# Patient Record
Sex: MALE | Race: WHITE | Employment: OTHER | URBAN - METROPOLITAN AREA
[De-identification: names, ages, dates, MRNs, and addresses within clinical notes are randomized per-mention and may not be internally consistent; named-entity substitution may affect disease eponyms.]

---

## 2020-05-17 ENCOUNTER — TELEPHONE (OUTPATIENT)
Dept: FAMILY MEDICINE CLINIC | Facility: CLINIC | Age: 60
End: 2020-05-17

## 2020-05-17 PROBLEM — E07.9 THYROID DISEASE: Status: ACTIVE | Noted: 2020-05-17

## 2020-05-17 PROBLEM — I10 HYPERTENSION: Status: ACTIVE | Noted: 2020-05-17

## 2020-05-17 PROBLEM — I48.91 ATRIAL FIBRILLATION (HCC): Status: ACTIVE | Noted: 2020-05-17

## 2020-05-17 RX ORDER — METOPROLOL SUCCINATE 25 MG/1
25 TABLET, EXTENDED RELEASE ORAL DAILY
COMMUNITY
End: 2020-05-17 | Stop reason: CLARIF

## 2020-05-17 RX ORDER — LISINOPRIL 20 MG/1
20 TABLET ORAL DAILY
COMMUNITY
End: 2020-05-19 | Stop reason: ALTCHOICE

## 2020-05-17 RX ORDER — LEVOTHYROXINE SODIUM 0.05 MG/1
50 TABLET ORAL
COMMUNITY

## 2020-05-17 RX ORDER — TORSEMIDE 20 MG/1
20 TABLET ORAL DAILY
COMMUNITY
End: 2020-05-19 | Stop reason: ALTCHOICE

## 2020-05-17 RX ORDER — METOPROLOL SUCCINATE 50 MG/1
50 TABLET, EXTENDED RELEASE ORAL 2 TIMES DAILY
COMMUNITY

## 2020-05-17 RX ORDER — BUPROPION HYDROCHLORIDE 300 MG/1
300 TABLET ORAL EVERY MORNING
COMMUNITY

## 2020-05-17 RX ORDER — AMIODARONE HYDROCHLORIDE 200 MG/1
200 TABLET ORAL DAILY
COMMUNITY
End: 2020-08-31

## 2020-05-17 RX ORDER — ATORVASTATIN CALCIUM 20 MG/1
20 TABLET, FILM COATED ORAL NIGHTLY
COMMUNITY

## 2020-05-19 ENCOUNTER — LAB ENCOUNTER (OUTPATIENT)
Dept: LAB | Facility: HOSPITAL | Age: 60
End: 2020-05-19
Attending: FAMILY MEDICINE
Payer: MEDICARE

## 2020-05-19 ENCOUNTER — OFFICE VISIT (OUTPATIENT)
Dept: FAMILY MEDICINE CLINIC | Facility: CLINIC | Age: 60
End: 2020-05-19
Payer: MEDICARE

## 2020-05-19 ENCOUNTER — HOSPITAL ENCOUNTER (OUTPATIENT)
Dept: GENERAL RADIOLOGY | Facility: HOSPITAL | Age: 60
Discharge: HOME | End: 2020-05-19
Attending: FAMILY MEDICINE
Payer: MEDICARE

## 2020-05-19 VITALS
HEIGHT: 75 IN | OXYGEN SATURATION: 98 % | TEMPERATURE: 98 F | RESPIRATION RATE: 16 BRPM | BODY MASS INDEX: 37.67 KG/M2 | DIASTOLIC BLOOD PRESSURE: 80 MMHG | WEIGHT: 303 LBS | HEART RATE: 60 BPM | SYSTOLIC BLOOD PRESSURE: 140 MMHG

## 2020-05-19 DIAGNOSIS — I48.0 PAROXYSMAL ATRIAL FIBRILLATION (HCC): ICD-10-CM

## 2020-05-19 DIAGNOSIS — Z89.422 HISTORY OF PARTIAL RAY AMPUTATION OF SECOND TOE OF LEFT FOOT (HCC): ICD-10-CM

## 2020-05-19 DIAGNOSIS — F32.9 REACTIVE DEPRESSION: ICD-10-CM

## 2020-05-19 DIAGNOSIS — Z79.01 ANTICOAGULATED: ICD-10-CM

## 2020-05-19 DIAGNOSIS — S86.019S RUPTURE OF ACHILLES TENDON, UNSPECIFIED LATERALITY, SEQUELA: ICD-10-CM

## 2020-05-19 DIAGNOSIS — Z01.818 OTHER SPECIFIED PRE-OPERATIVE EXAMINATION: ICD-10-CM

## 2020-05-19 DIAGNOSIS — Z01.812 PRE-OPERATIVE LABORATORY EXAMINATION: ICD-10-CM

## 2020-05-19 DIAGNOSIS — M17.11 PRIMARY OSTEOARTHRITIS OF RIGHT KNEE: Primary | ICD-10-CM

## 2020-05-19 DIAGNOSIS — K59.01 SLOW TRANSIT CONSTIPATION: ICD-10-CM

## 2020-05-19 DIAGNOSIS — E07.9 THYROID DISEASE: ICD-10-CM

## 2020-05-19 DIAGNOSIS — Z88.9 ALLERGY HISTORY, DRUG: ICD-10-CM

## 2020-05-19 DIAGNOSIS — T07.XXXA MULTIPLE ABRASIONS: ICD-10-CM

## 2020-05-19 DIAGNOSIS — E78.2 MIXED HYPERLIPIDEMIA: ICD-10-CM

## 2020-05-19 DIAGNOSIS — I10 ESSENTIAL HYPERTENSION: ICD-10-CM

## 2020-05-19 PROCEDURE — 3008F BODY MASS INDEX DOCD: CPT | Performed by: FAMILY MEDICINE

## 2020-05-19 PROCEDURE — 85025 COMPLETE CBC W/AUTO DIFF WBC: CPT

## 2020-05-19 PROCEDURE — 3077F SYST BP >= 140 MM HG: CPT | Performed by: FAMILY MEDICINE

## 2020-05-19 PROCEDURE — 36415 COLL VENOUS BLD VENIPUNCTURE: CPT

## 2020-05-19 PROCEDURE — 93010 ELECTROCARDIOGRAM REPORT: CPT | Performed by: FAMILY MEDICINE

## 2020-05-19 PROCEDURE — 71046 X-RAY EXAM CHEST 2 VIEWS: CPT | Performed by: FAMILY MEDICINE

## 2020-05-19 PROCEDURE — 99202 OFFICE O/P NEW SF 15 MIN: CPT | Performed by: FAMILY MEDICINE

## 2020-05-19 PROCEDURE — 81015 MICROSCOPIC EXAM OF URINE: CPT

## 2020-05-19 PROCEDURE — 93005 ELECTROCARDIOGRAM TRACING: CPT

## 2020-05-19 PROCEDURE — 80053 COMPREHEN METABOLIC PANEL: CPT

## 2020-05-19 PROCEDURE — 86850 RBC ANTIBODY SCREEN: CPT

## 2020-05-19 PROCEDURE — 86900 BLOOD TYPING SEROLOGIC ABO: CPT

## 2020-05-19 PROCEDURE — 86901 BLOOD TYPING SEROLOGIC RH(D): CPT

## 2020-05-19 PROCEDURE — 3079F DIAST BP 80-89 MM HG: CPT | Performed by: FAMILY MEDICINE

## 2020-05-19 PROCEDURE — 87081 CULTURE SCREEN ONLY: CPT

## 2020-05-19 NOTE — H&P (VIEW-ONLY)
300 [facility]    PRE[facility]    HPI:   I have been consulted by Dr. Candelario Persons to see Yobani Grady 62year old male for a preoperative evaluation and medical clearance.  He has a long history of worsening severe degenerative arthritis of his r Tobacco Use      Smoking status: Never Smoker      Smokeless tobacco: Never Used    Substance and Sexual Activity      Alcohol use: Yes        Drinks per session: 1 or 2        Binge frequency: Less than monthly      Drug use: Never      Sexual activity: N hives,     EXAM:   /80 (BP Location: Left arm, Patient Position: Sitting, Cuff Size: adult)   Pulse 60   Temp 97.8 °F (36.6 °C)   Resp 16   Ht 75\"   Wt (!) 303 lb (137.4 kg)   SpO2 98%   BMI 37.87 kg/m²  Estimated body mass index is 37.87 kg/m² as c right knee  (primary encounter diagnosis)    (I48.0) Paroxysmal atrial fibrillation (HCC)    (I10) Essential hypertension    (E07.9) Thyroid disease    (Z68.37) BMI 37.0-37.9, adult    (E78.2) Mixed hyperlipidemia    (F32.9) Reactive depression    (Z79.01) with food for four weeks.     GI protection: Protonix  Incentive Spirometry   Telemetry as needed  CPAP/O2 as needed   DM: QID glucoscans and sliding scale insulin as needed   Renal protection: (hydration / NSAID and ACE/ARB avoidance)   Cognitive protectio

## 2020-05-19 NOTE — H&P
[facility] PRE-OP CLINIC [facility]    PRE-OP NOTE    HPI:   I have been consulted by Dr. Regina Hoffman to see Enid Valentine 62year old male for a preoperative evaluation and medical clearance.  He has a long history of worsening severe degenerative arthritis of his r Tobacco Use      Smoking status: Never Smoker      Smokeless tobacco: Never Used    Substance and Sexual Activity      Alcohol use: Yes        Drinks per session: 1 or 2        Binge frequency: Less than monthly      Drug use: Never      Sexual activity: N hives,     EXAM:   /80 (BP Location: Left arm, Patient Position: Sitting, Cuff Size: adult)   Pulse 60   Temp 97.8 °F (36.6 °C)   Resp 16   Ht 75\"   Wt (!) 303 lb (137.4 kg)   SpO2 98%   BMI 37.87 kg/m²  Estimated body mass index is 37.87 kg/m² as c right knee  (primary encounter diagnosis)    (I48.0) Paroxysmal atrial fibrillation (HCC)    (I10) Essential hypertension    (E07.9) Thyroid disease    (Z68.37) BMI 37.0-37.9, adult    (E78.2) Mixed hyperlipidemia    (F32.9) Reactive depression    (Z79.01) with food for four weeks.     GI protection: Protonix  Incentive Spirometry   Telemetry as needed  CPAP/O2 as needed   DM: QID glucoscans and sliding scale insulin as needed   Renal protection: (hydration / NSAID and ACE/ARB avoidance)   Cognitive protectio

## 2020-05-22 PROBLEM — M17.11 OSTEOARTHRITIS OF RIGHT KNEE: Status: ACTIVE | Noted: 2020-05-22

## 2020-05-22 NOTE — TELEPHONE ENCOUNTER
Dr Eri Zuleta please review.     H&P- complete  LABS- MCV (105.2), MCH (35.0), RDW-SD (60.4), RDW (15.3), chloride (95), CO2 (35.0), BUN (26), creatinine (1.52), GFR (49), albumin (3.2), A/G ratio (0.8), all other labs WNL  EKG- abnormal  XRAY- WNL    Cardiology

## 2020-05-22 NOTE — DISCHARGE SUMMARY
Ortho Discharge Summary     Patient ID:  Jason Berry  [id number]  1 year old  7/11/1960      Admit Date: 6/1/2020  8:25 AM    Discharge Date and Time: 6/2/2020  3:18 PM    Attending Physician: Chuckie Fontaine MD     Reason for admission: right knee DJD

## 2020-05-28 PROBLEM — Z89.412: Chronic | Status: ACTIVE | Noted: 2020-05-28

## 2020-05-28 PROBLEM — Z87.19 HISTORY OF CONSTIPATION: Status: ACTIVE | Noted: 2020-05-28

## 2020-05-29 ENCOUNTER — LAB ENCOUNTER (OUTPATIENT)
Dept: LAB | Facility: HOSPITAL | Age: 60
End: 2020-05-29
Attending: ORTHOPAEDIC SURGERY
Payer: MEDICARE

## 2020-05-29 DIAGNOSIS — Z01.818 PREOP TESTING: ICD-10-CM

## 2020-06-01 ENCOUNTER — ANESTHESIA (OUTPATIENT)
Dept: SURGERY | Facility: HOSPITAL | Age: 60
End: 2020-06-01
Payer: MEDICARE

## 2020-06-01 ENCOUNTER — APPOINTMENT (OUTPATIENT)
Dept: GENERAL RADIOLOGY | Facility: HOSPITAL | Age: 60
End: 2020-06-01
Attending: PHYSICIAN ASSISTANT
Payer: MEDICARE

## 2020-06-01 ENCOUNTER — ANESTHESIA EVENT (OUTPATIENT)
Dept: SURGERY | Facility: HOSPITAL | Age: 60
End: 2020-06-01
Payer: MEDICARE

## 2020-06-01 ENCOUNTER — HOSPITAL ENCOUNTER (OUTPATIENT)
Facility: HOSPITAL | Age: 60
Discharge: HOME | End: 2020-06-02
Attending: ORTHOPAEDIC SURGERY | Admitting: ORTHOPAEDIC SURGERY
Payer: MEDICARE

## 2020-06-01 DIAGNOSIS — M17.11 PRIMARY OSTEOARTHRITIS OF RIGHT KNEE: Primary | ICD-10-CM

## 2020-06-01 DIAGNOSIS — Z01.818 PREOP TESTING: ICD-10-CM

## 2020-06-01 PROBLEM — Z96.651 S/P TOTAL KNEE ARTHROPLASTY, RIGHT: Status: ACTIVE | Noted: 2020-06-01

## 2020-06-01 PROBLEM — Z79.01 ENCOUNTER FOR CURRENT LONG-TERM USE OF ANTICOAGULANTS: Chronic | Status: ACTIVE | Noted: 2020-06-01

## 2020-06-01 PROCEDURE — 88305 TISSUE EXAM BY PATHOLOGIST: CPT | Performed by: ORTHOPAEDIC SURGERY

## 2020-06-01 PROCEDURE — 73560 X-RAY EXAM OF KNEE 1 OR 2: CPT | Performed by: PHYSICIAN ASSISTANT

## 2020-06-01 PROCEDURE — 88311 DECALCIFY TISSUE: CPT | Performed by: ORTHOPAEDIC SURGERY

## 2020-06-01 PROCEDURE — S0028 INJECTION, FAMOTIDINE, 20 MG: HCPCS | Performed by: PHYSICIAN ASSISTANT

## 2020-06-01 PROCEDURE — 0SRC069 REPLACEMENT OF RIGHT KNEE JOINT WITH OXIDIZED ZIRCONIUM ON POLYETHYLENE SYNTHETIC SUBSTITUTE, CEMENTED, OPEN APPROACH: ICD-10-PCS | Performed by: ORTHOPAEDIC SURGERY

## 2020-06-01 DEVICE — IMPLANTABLE DEVICE: Type: IMPLANTABLE DEVICE | Site: KNEE | Status: FUNCTIONAL

## 2020-06-01 DEVICE — CEM BN NLTX STRL REUSE MED VSC: Type: IMPLANTABLE DEVICE | Site: KNEE | Status: FUNCTIONAL

## 2020-06-01 DEVICE — DOME PAT 38MM 9MM STRL 3 PG: Type: IMPLANTABLE DEVICE | Site: KNEE | Status: FUNCTIONAL

## 2020-06-01 DEVICE — TKA CAP, PRIMARY W/O STEM EXT: Type: IMPLANTABLE DEVICE | Status: FUNCTIONAL

## 2020-06-01 RX ORDER — CYCLOBENZAPRINE HCL 5 MG
5 TABLET ORAL EVERY 8 HOURS PRN
Status: DISCONTINUED | OUTPATIENT
Start: 2020-06-01 | End: 2020-06-02

## 2020-06-01 RX ORDER — ONDANSETRON 4 MG/1
4 TABLET, FILM COATED ORAL EVERY 8 HOURS PRN
Qty: 30 TABLET | Refills: 0 | Status: SHIPPED | OUTPATIENT
Start: 2020-06-01 | End: 2020-08-31

## 2020-06-01 RX ORDER — TRANEXAMIC ACID 10 MG/ML
1000 INJECTION, SOLUTION INTRAVENOUS ONCE
Status: COMPLETED | OUTPATIENT
Start: 2020-06-01 | End: 2020-06-01

## 2020-06-01 RX ORDER — HYDROCODONE BITARTRATE AND ACETAMINOPHEN 5; 325 MG/1; MG/1
1 TABLET ORAL AS NEEDED
Status: DISCONTINUED | OUTPATIENT
Start: 2020-06-01 | End: 2020-06-01 | Stop reason: HOSPADM

## 2020-06-01 RX ORDER — BUPROPION HYDROCHLORIDE 300 MG/1
300 TABLET ORAL DAILY
Status: DISCONTINUED | OUTPATIENT
Start: 2020-06-01 | End: 2020-06-02

## 2020-06-01 RX ORDER — DEXAMETHASONE SODIUM PHOSPHATE 4 MG/ML
VIAL (ML) INJECTION AS NEEDED
Status: DISCONTINUED | OUTPATIENT
Start: 2020-06-01 | End: 2020-06-01 | Stop reason: SURG

## 2020-06-01 RX ORDER — NALOXONE HYDROCHLORIDE 0.4 MG/ML
80 INJECTION, SOLUTION INTRAMUSCULAR; INTRAVENOUS; SUBCUTANEOUS AS NEEDED
Status: DISCONTINUED | OUTPATIENT
Start: 2020-06-01 | End: 2020-06-01 | Stop reason: HOSPADM

## 2020-06-01 RX ORDER — SODIUM CHLORIDE, SODIUM LACTATE, POTASSIUM CHLORIDE, CALCIUM CHLORIDE 600; 310; 30; 20 MG/100ML; MG/100ML; MG/100ML; MG/100ML
INJECTION, SOLUTION INTRAVENOUS CONTINUOUS
Status: DISCONTINUED | OUTPATIENT
Start: 2020-06-01 | End: 2020-06-01 | Stop reason: HOSPADM

## 2020-06-01 RX ORDER — PANTOPRAZOLE SODIUM 40 MG/1
40 TABLET, DELAYED RELEASE ORAL
Qty: 30 TABLET | Refills: 0 | Status: SHIPPED | OUTPATIENT
Start: 2020-06-01 | End: 2020-08-31

## 2020-06-01 RX ORDER — AMIODARONE HYDROCHLORIDE 200 MG/1
200 TABLET ORAL DAILY
Status: DISCONTINUED | OUTPATIENT
Start: 2020-06-01 | End: 2020-06-02

## 2020-06-01 RX ORDER — DIPHENHYDRAMINE HYDROCHLORIDE 50 MG/ML
12.5 INJECTION INTRAMUSCULAR; INTRAVENOUS EVERY 4 HOURS PRN
Status: DISCONTINUED | OUTPATIENT
Start: 2020-06-01 | End: 2020-06-02

## 2020-06-01 RX ORDER — ACETAMINOPHEN 500 MG
1000 TABLET ORAL ONCE
Status: COMPLETED | OUTPATIENT
Start: 2020-06-01 | End: 2020-06-01

## 2020-06-01 RX ORDER — ASPIRIN 325 MG
325 TABLET, DELAYED RELEASE (ENTERIC COATED) ORAL 2 TIMES DAILY
Qty: 60 TABLET | Refills: 0 | Status: SHIPPED | OUTPATIENT
Start: 2020-06-01 | End: 2020-06-02

## 2020-06-01 RX ORDER — CELECOXIB 200 MG/1
200 CAPSULE ORAL ONCE
Status: COMPLETED | OUTPATIENT
Start: 2020-06-01 | End: 2020-06-01

## 2020-06-01 RX ORDER — METOPROLOL TARTRATE 5 MG/5ML
2.5 INJECTION INTRAVENOUS ONCE
Status: DISCONTINUED | OUTPATIENT
Start: 2020-06-01 | End: 2020-06-01 | Stop reason: HOSPADM

## 2020-06-01 RX ORDER — HYDROCODONE BITARTRATE AND ACETAMINOPHEN 5; 325 MG/1; MG/1
2 TABLET ORAL AS NEEDED
Status: DISCONTINUED | OUTPATIENT
Start: 2020-06-01 | End: 2020-06-01 | Stop reason: HOSPADM

## 2020-06-01 RX ORDER — PROCHLORPERAZINE EDISYLATE 5 MG/ML
10 INJECTION INTRAMUSCULAR; INTRAVENOUS EVERY 6 HOURS PRN
Status: DISCONTINUED | OUTPATIENT
Start: 2020-06-01 | End: 2020-06-02

## 2020-06-01 RX ORDER — ATORVASTATIN CALCIUM 20 MG/1
20 TABLET, FILM COATED ORAL NIGHTLY
Status: DISCONTINUED | OUTPATIENT
Start: 2020-06-01 | End: 2020-06-02

## 2020-06-01 RX ORDER — HYDROMORPHONE HYDROCHLORIDE 1 MG/ML
1.2 INJECTION, SOLUTION INTRAMUSCULAR; INTRAVENOUS; SUBCUTANEOUS EVERY 2 HOUR PRN
Status: DISCONTINUED | OUTPATIENT
Start: 2020-06-01 | End: 2020-06-02

## 2020-06-01 RX ORDER — HYDROMORPHONE HYDROCHLORIDE 1 MG/ML
0.4 INJECTION, SOLUTION INTRAMUSCULAR; INTRAVENOUS; SUBCUTANEOUS EVERY 5 MIN PRN
Status: DISCONTINUED | OUTPATIENT
Start: 2020-06-01 | End: 2020-06-01 | Stop reason: HOSPADM

## 2020-06-01 RX ORDER — DIPHENHYDRAMINE HYDROCHLORIDE 50 MG/ML
25 INJECTION INTRAMUSCULAR; INTRAVENOUS ONCE AS NEEDED
Status: ACTIVE | OUTPATIENT
Start: 2020-06-01 | End: 2020-06-01

## 2020-06-01 RX ORDER — OXYCODONE HYDROCHLORIDE 5 MG/1
5 TABLET ORAL EVERY 4 HOURS PRN
Status: DISCONTINUED | OUTPATIENT
Start: 2020-06-01 | End: 2020-06-02

## 2020-06-01 RX ORDER — OXYCODONE HCL 10 MG/1
10 TABLET, FILM COATED, EXTENDED RELEASE ORAL ONCE
Status: COMPLETED | OUTPATIENT
Start: 2020-06-01 | End: 2020-06-01

## 2020-06-01 RX ORDER — CEFADROXIL 500 MG/1
500 CAPSULE ORAL 2 TIMES DAILY
Qty: 14 CAPSULE | Refills: 0 | Status: SHIPPED | OUTPATIENT
Start: 2020-06-01 | End: 2020-06-08

## 2020-06-01 RX ORDER — PROCHLORPERAZINE EDISYLATE 5 MG/ML
5 INJECTION INTRAMUSCULAR; INTRAVENOUS ONCE AS NEEDED
Status: DISCONTINUED | OUTPATIENT
Start: 2020-06-01 | End: 2020-06-01 | Stop reason: HOSPADM

## 2020-06-01 RX ORDER — ONDANSETRON 2 MG/ML
4 INJECTION INTRAMUSCULAR; INTRAVENOUS EVERY 4 HOURS PRN
Status: DISCONTINUED | OUTPATIENT
Start: 2020-06-01 | End: 2020-06-02

## 2020-06-01 RX ORDER — HYDRALAZINE HYDROCHLORIDE 20 MG/ML
10 INJECTION INTRAMUSCULAR; INTRAVENOUS ONCE
Status: DISCONTINUED | OUTPATIENT
Start: 2020-06-01 | End: 2020-06-01 | Stop reason: HOSPADM

## 2020-06-01 RX ORDER — BISACODYL 10 MG
10 SUPPOSITORY, RECTAL RECTAL
Status: DISCONTINUED | OUTPATIENT
Start: 2020-06-01 | End: 2020-06-02

## 2020-06-01 RX ORDER — FAMOTIDINE 10 MG/ML
20 INJECTION, SOLUTION INTRAVENOUS 2 TIMES DAILY
Status: DISCONTINUED | OUTPATIENT
Start: 2020-06-01 | End: 2020-06-02

## 2020-06-01 RX ORDER — DOCUSATE SODIUM 100 MG/1
100 CAPSULE, LIQUID FILLED ORAL 2 TIMES DAILY
Status: DISCONTINUED | OUTPATIENT
Start: 2020-06-01 | End: 2020-06-02

## 2020-06-01 RX ORDER — FAMOTIDINE 20 MG/1
20 TABLET ORAL ONCE
Status: COMPLETED | OUTPATIENT
Start: 2020-06-01 | End: 2020-06-01

## 2020-06-01 RX ORDER — MAGNESIUM HYDROXIDE 1200 MG/15ML
LIQUID ORAL CONTINUOUS PRN
Status: COMPLETED | OUTPATIENT
Start: 2020-06-01 | End: 2020-06-01

## 2020-06-01 RX ORDER — HYDROMORPHONE HYDROCHLORIDE 1 MG/ML
0.6 INJECTION, SOLUTION INTRAMUSCULAR; INTRAVENOUS; SUBCUTANEOUS EVERY 5 MIN PRN
Status: DISCONTINUED | OUTPATIENT
Start: 2020-06-01 | End: 2020-06-01 | Stop reason: HOSPADM

## 2020-06-01 RX ORDER — HYDROMORPHONE HYDROCHLORIDE 1 MG/ML
0.8 INJECTION, SOLUTION INTRAMUSCULAR; INTRAVENOUS; SUBCUTANEOUS EVERY 2 HOUR PRN
Status: DISCONTINUED | OUTPATIENT
Start: 2020-06-01 | End: 2020-06-02

## 2020-06-01 RX ORDER — DIPHENHYDRAMINE HCL 25 MG
25 CAPSULE ORAL EVERY 4 HOURS PRN
Status: DISCONTINUED | OUTPATIENT
Start: 2020-06-01 | End: 2020-06-02

## 2020-06-01 RX ORDER — SODIUM CHLORIDE, SODIUM LACTATE, POTASSIUM CHLORIDE, CALCIUM CHLORIDE 600; 310; 30; 20 MG/100ML; MG/100ML; MG/100ML; MG/100ML
INJECTION, SOLUTION INTRAVENOUS CONTINUOUS
Status: DISCONTINUED | OUTPATIENT
Start: 2020-06-01 | End: 2020-06-02

## 2020-06-01 RX ORDER — ONDANSETRON 2 MG/ML
4 INJECTION INTRAMUSCULAR; INTRAVENOUS ONCE AS NEEDED
Status: DISCONTINUED | OUTPATIENT
Start: 2020-06-01 | End: 2020-06-01 | Stop reason: HOSPADM

## 2020-06-01 RX ORDER — FAMOTIDINE 20 MG/1
20 TABLET ORAL 2 TIMES DAILY
Status: DISCONTINUED | OUTPATIENT
Start: 2020-06-01 | End: 2020-06-02

## 2020-06-01 RX ORDER — METOPROLOL SUCCINATE 50 MG/1
50 TABLET, EXTENDED RELEASE ORAL DAILY
Status: DISCONTINUED | OUTPATIENT
Start: 2020-06-02 | End: 2020-06-02

## 2020-06-01 RX ORDER — HYDROMORPHONE HYDROCHLORIDE 1 MG/ML
0.4 INJECTION, SOLUTION INTRAMUSCULAR; INTRAVENOUS; SUBCUTANEOUS EVERY 2 HOUR PRN
Status: DISCONTINUED | OUTPATIENT
Start: 2020-06-01 | End: 2020-06-02

## 2020-06-01 RX ORDER — ASPIRIN 325 MG
325 TABLET ORAL 2 TIMES DAILY
Status: DISCONTINUED | OUTPATIENT
Start: 2020-06-01 | End: 2020-06-01

## 2020-06-01 RX ORDER — MORPHINE SULFATE 4 MG/ML
4 INJECTION, SOLUTION INTRAMUSCULAR; INTRAVENOUS EVERY 10 MIN PRN
Status: DISCONTINUED | OUTPATIENT
Start: 2020-06-01 | End: 2020-06-01 | Stop reason: HOSPADM

## 2020-06-01 RX ORDER — MORPHINE SULFATE 10 MG/ML
6 INJECTION, SOLUTION INTRAMUSCULAR; INTRAVENOUS EVERY 10 MIN PRN
Status: DISCONTINUED | OUTPATIENT
Start: 2020-06-01 | End: 2020-06-01 | Stop reason: HOSPADM

## 2020-06-01 RX ORDER — METOCLOPRAMIDE HYDROCHLORIDE 5 MG/ML
10 INJECTION INTRAMUSCULAR; INTRAVENOUS EVERY 6 HOURS PRN
Status: DISCONTINUED | OUTPATIENT
Start: 2020-06-01 | End: 2020-06-02

## 2020-06-01 RX ORDER — OXYCODONE HYDROCHLORIDE 5 MG/1
10 TABLET ORAL EVERY 4 HOURS PRN
Status: DISCONTINUED | OUTPATIENT
Start: 2020-06-01 | End: 2020-06-02

## 2020-06-01 RX ORDER — HYDRALAZINE HYDROCHLORIDE 20 MG/ML
10 INJECTION INTRAMUSCULAR; INTRAVENOUS EVERY 6 HOURS PRN
Status: DISCONTINUED | OUTPATIENT
Start: 2020-06-01 | End: 2020-06-02

## 2020-06-01 RX ORDER — MORPHINE SULFATE 4 MG/ML
2 INJECTION, SOLUTION INTRAMUSCULAR; INTRAVENOUS EVERY 10 MIN PRN
Status: DISCONTINUED | OUTPATIENT
Start: 2020-06-01 | End: 2020-06-01 | Stop reason: HOSPADM

## 2020-06-01 RX ORDER — SENNOSIDES 8.6 MG
17.2 TABLET ORAL NIGHTLY
Status: DISCONTINUED | OUTPATIENT
Start: 2020-06-01 | End: 2020-06-02

## 2020-06-01 RX ORDER — MIDAZOLAM HYDROCHLORIDE 1 MG/ML
INJECTION INTRAMUSCULAR; INTRAVENOUS AS NEEDED
Status: DISCONTINUED | OUTPATIENT
Start: 2020-06-01 | End: 2020-06-01 | Stop reason: SURG

## 2020-06-01 RX ORDER — SODIUM PHOSPHATE, DIBASIC AND SODIUM PHOSPHATE, MONOBASIC 7; 19 G/133ML; G/133ML
1 ENEMA RECTAL ONCE AS NEEDED
Status: DISCONTINUED | OUTPATIENT
Start: 2020-06-01 | End: 2020-06-02

## 2020-06-01 RX ORDER — HALOPERIDOL 5 MG/ML
0.25 INJECTION INTRAMUSCULAR ONCE AS NEEDED
Status: DISCONTINUED | OUTPATIENT
Start: 2020-06-01 | End: 2020-06-01 | Stop reason: HOSPADM

## 2020-06-01 RX ORDER — POLYETHYLENE GLYCOL 3350 17 G/17G
17 POWDER, FOR SOLUTION ORAL DAILY PRN
Status: DISCONTINUED | OUTPATIENT
Start: 2020-06-01 | End: 2020-06-02

## 2020-06-01 RX ORDER — HYDROMORPHONE HYDROCHLORIDE 1 MG/ML
0.2 INJECTION, SOLUTION INTRAMUSCULAR; INTRAVENOUS; SUBCUTANEOUS EVERY 5 MIN PRN
Status: DISCONTINUED | OUTPATIENT
Start: 2020-06-01 | End: 2020-06-01 | Stop reason: HOSPADM

## 2020-06-01 RX ORDER — DOCUSATE SODIUM 100 MG/1
100 CAPSULE, LIQUID FILLED ORAL 2 TIMES DAILY
Qty: 60 CAPSULE | Refills: 2 | Status: SHIPPED | OUTPATIENT
Start: 2020-06-01 | End: 2020-08-31

## 2020-06-01 RX ORDER — TRANEXAMIC ACID 10 MG/ML
INJECTION, SOLUTION INTRAVENOUS AS NEEDED
Status: DISCONTINUED | OUTPATIENT
Start: 2020-06-01 | End: 2020-06-01 | Stop reason: SURG

## 2020-06-01 RX ORDER — LEVOTHYROXINE SODIUM 0.05 MG/1
50 TABLET ORAL
Status: DISCONTINUED | OUTPATIENT
Start: 2020-06-01 | End: 2020-06-02

## 2020-06-01 RX ORDER — SCOLOPAMINE TRANSDERMAL SYSTEM 1 MG/1
1 PATCH, EXTENDED RELEASE TRANSDERMAL ONCE
Status: DISCONTINUED | OUTPATIENT
Start: 2020-06-01 | End: 2020-06-02

## 2020-06-01 RX ORDER — BUPIVACAINE HYDROCHLORIDE 7.5 MG/ML
INJECTION, SOLUTION INTRASPINAL AS NEEDED
Status: DISCONTINUED | OUTPATIENT
Start: 2020-06-01 | End: 2020-06-01 | Stop reason: SURG

## 2020-06-01 RX ORDER — ACETAMINOPHEN 325 MG/1
650 TABLET ORAL EVERY 6 HOURS SCHEDULED
Status: DISCONTINUED | OUTPATIENT
Start: 2020-06-01 | End: 2020-06-02

## 2020-06-01 RX ORDER — OXYCODONE HYDROCHLORIDE 5 MG/1
TABLET ORAL EVERY 4 HOURS PRN
Qty: 60 TABLET | Refills: 0 | Status: SHIPPED | OUTPATIENT
Start: 2020-06-01 | End: 2020-08-31

## 2020-06-01 RX ORDER — LIDOCAINE HYDROCHLORIDE 10 MG/ML
INJECTION, SOLUTION EPIDURAL; INFILTRATION; INTRACAUDAL; PERINEURAL AS NEEDED
Status: DISCONTINUED | OUTPATIENT
Start: 2020-06-01 | End: 2020-06-01 | Stop reason: SURG

## 2020-06-01 RX ORDER — ONDANSETRON 2 MG/ML
INJECTION INTRAMUSCULAR; INTRAVENOUS AS NEEDED
Status: DISCONTINUED | OUTPATIENT
Start: 2020-06-01 | End: 2020-06-01 | Stop reason: SURG

## 2020-06-01 RX ORDER — LABETALOL HYDROCHLORIDE 5 MG/ML
5 INJECTION, SOLUTION INTRAVENOUS EVERY 10 MIN PRN
Status: COMPLETED | OUTPATIENT
Start: 2020-06-01 | End: 2020-06-01

## 2020-06-01 RX ORDER — PHENYLEPHRINE HCL 10 MG/ML
VIAL (ML) INJECTION AS NEEDED
Status: DISCONTINUED | OUTPATIENT
Start: 2020-06-01 | End: 2020-06-01 | Stop reason: SURG

## 2020-06-01 RX ADMIN — MIDAZOLAM HYDROCHLORIDE 2 MG: 1 INJECTION INTRAMUSCULAR; INTRAVENOUS at 11:10:00

## 2020-06-01 RX ADMIN — MIDAZOLAM HYDROCHLORIDE 2 MG: 1 INJECTION INTRAMUSCULAR; INTRAVENOUS at 11:03:00

## 2020-06-01 RX ADMIN — ONDANSETRON 4 MG: 2 INJECTION INTRAMUSCULAR; INTRAVENOUS at 11:30:00

## 2020-06-01 RX ADMIN — SODIUM CHLORIDE, SODIUM LACTATE, POTASSIUM CHLORIDE, CALCIUM CHLORIDE: 600; 310; 30; 20 INJECTION, SOLUTION INTRAVENOUS at 13:16:00

## 2020-06-01 RX ADMIN — LIDOCAINE HYDROCHLORIDE 10 MG: 10 INJECTION, SOLUTION EPIDURAL; INFILTRATION; INTRACAUDAL; PERINEURAL at 11:08:00

## 2020-06-01 RX ADMIN — BUPIVACAINE HYDROCHLORIDE 1.5 ML: 7.5 INJECTION, SOLUTION INTRASPINAL at 11:12:00

## 2020-06-01 RX ADMIN — PHENYLEPHRINE HCL 100 MCG: 10 MG/ML VIAL (ML) INJECTION at 11:59:00

## 2020-06-01 RX ADMIN — DEXAMETHASONE SODIUM PHOSPHATE 4 MG: 4 MG/ML VIAL (ML) INJECTION at 11:30:00

## 2020-06-01 RX ADMIN — TRANEXAMIC ACID 1000 MG: 10 INJECTION, SOLUTION INTRAVENOUS at 11:20:00

## 2020-06-01 NOTE — ANESTHESIA PROCEDURE NOTES
Spinal Block  Performed by: Hoang Cherry CRNA  Authorized by: Alana Nunez DO       General Information and Staff    Start Time:  6/1/2020 11:08 AM  End Time:  6/1/2020 11:12 AM  Anesthesiologist:  Alana Nunez DO  CRNA:  Amanda Schwartz

## 2020-06-01 NOTE — PLAN OF CARE
Problem: Patient Centered Care  Goal: Patient preferences are identified and integrated in the patient's plan of care  Description  Interventions:  - What would you like us to know as we care for you?  Patient is retired but found a job at the [employer] and evaluate response  - Implement non-pharmacological measures as appropriate and evaluate response  - Consider cultural and social influences on pain and pain management  - Manage/alleviate anxiety  - Utilize distraction and/or relaxation techniques  - M for pressure ulcer development  - Assess and document skin integrity  - Assess and document dressing/incision, wound bed, drain sites and surrounding tissue  - Implement wound care per orders  - Initiate isolation precautions as appropriate  - Initiate Pre O2 via NC, lung sounds clear, denies shortness of breathing nor chest pain. Patient can be up with walker, weight bearing as tolerated on his right leg. Pain is now managed with Dilaudid IVP/Oxy as needed. Patient still needs to void post op.   Patient w

## 2020-06-01 NOTE — ANESTHESIA POSTPROCEDURE EVALUATION
Patient: Shelbie Fonseca    Procedure Summary     Date:  06/01/20 Room / Location:  [facility] OR 04 / [facility] OR    Anesthesia Start:  2308 Anesthesia Stop:      Procedure:  KNEE TOTAL REPLACEMENT (Right Knee) Diagnosis:  (right knee degenerative joint

## 2020-06-01 NOTE — PROGRESS NOTES
[facility]    Ortho Medical Progress Note     Lali Hernandez Patient Status:  Outpatient in a Bed    1960 MRN [id number]   Location [address] [facility] Attending Angie Carrington MD   Hosp Day # 0 PCP [facility] following for medical assessment by monitoring respiratory status, hemodynamics, GI status, reviewing labs, and assisting with anticoagulation monitoring as patient appropriate. Fransisca Clayton 3, APN  6/1/2020

## 2020-06-01 NOTE — INTERVAL H&P NOTE
Pre-op Diagnosis: right knee degenerative joint disease    The above referenced H&P was reviewed by Shirley Fields MD on 6/1/2020, the patient was examined and no significant changes have occurred in the patient's condition since the H&P was performed.   I

## 2020-06-01 NOTE — ANESTHESIA PREPROCEDURE EVALUATION
Anesthesia PreOp Note    HPI:     Daniella Campos is a 62year old male who presents for preoperative consultation requested by: Louise Figueroa MD    Date of Surgery: 6/1/2020    Procedure(s):  KNEE TOTAL REPLACEMENT  Indication: right knee degenerative Succinate ER 50 MG Oral Tablet 24 Hr, Take 50 mg by mouth daily. , Disp: , Rfl: , 6/1/2020 at 0515      lactated ringers infusion, , Intravenous, Continuous, SporerHaresh MD, Last Rate: 20 mL/hr at 06/01/20 0915  metoprolol Tartrate (LOPRESSOR) tab 25 mg, Not on file        Active member of club or organization: Not on file        Attends meetings of clubs or organizations: Not on file        Relationship status: Not on file      Intimate partner violence:        Fear of current or ex partner: Not on file have informed Perla Lee of the risks of spinal anesthesia including, but not limited to: failure, headache, backache, difficulty breathing, infection, bleeding, nerve damage, paralysis, death.  The patient desires the proposed anesthetic as planned

## 2020-06-02 VITALS
HEIGHT: 75 IN | SYSTOLIC BLOOD PRESSURE: 129 MMHG | DIASTOLIC BLOOD PRESSURE: 92 MMHG | WEIGHT: 304 LBS | HEART RATE: 77 BPM | OXYGEN SATURATION: 97 % | BODY MASS INDEX: 37.8 KG/M2 | TEMPERATURE: 98 F | RESPIRATION RATE: 18 BRPM

## 2020-06-02 PROCEDURE — 85018 HEMOGLOBIN: CPT | Performed by: NURSE PRACTITIONER

## 2020-06-02 PROCEDURE — 97530 THERAPEUTIC ACTIVITIES: CPT

## 2020-06-02 PROCEDURE — 97110 THERAPEUTIC EXERCISES: CPT

## 2020-06-02 PROCEDURE — 85014 HEMATOCRIT: CPT | Performed by: NURSE PRACTITIONER

## 2020-06-02 PROCEDURE — 80053 COMPREHEN METABOLIC PANEL: CPT | Performed by: NURSE PRACTITIONER

## 2020-06-02 PROCEDURE — 97161 PT EVAL LOW COMPLEX 20 MIN: CPT

## 2020-06-02 PROCEDURE — 97116 GAIT TRAINING THERAPY: CPT

## 2020-06-02 NOTE — CM/SW NOTE
SHARMILA met with pt to discuss discharge planning per MDO. SW confirmed pt's address. Pt states he lives in a 3 level house with 3 steps to go into the home and 6 steps to go to the bedrooms. Pt lives with his father and son in the home.  Pt used a cane previo

## 2020-06-02 NOTE — OPERATIVE REPORT
[address], [facility] [address]  OPERATIVE REPORT  PATIENT NAME: Catherine Albarado  MR#: [id number]  PMD: Laurie Carr  DATE OF PROCEDURE: 6/1/2020  PREOPERATIVE DIAGNOSIS: Degenerative Arthritis right knee  POSTOPERA the same day surgery program on Monday, June 1, 2020. Following proper identification in the preoperative holding area with confirmation of the above-stated procedure, the patient was brought to the main operating room suite. Procedure was confirmed.  He re appropriate. The rotation was based between the medial and middle third of the tibial tubercle. This was provisionally pinned. A size 10 right EMPOWR 3D Non-Porous Femur femoral component was placed along with a 10 mm polyethylene spacer.  With this in plac

## 2020-06-02 NOTE — PHYSICAL THERAPY NOTE
PHYSICAL THERAPY KNEE EVALUATION - INPATIENT       Room Number: 421/421-A  Evaluation Date: 6/2/2020  Type of Evaluation: Initial  Physician Order: PT Eval and Treat    Presenting Problem: R TKR  Reason for Therapy: Mobility Dysfunction and Discharge Plann training;Range of motion;Strengthening;Stair training;Transfer training;Balance training  Rehab Potential : Good  Frequency (Obs): BID       PHYSICAL THERAPY MEDICAL/SOCIAL HISTORY     History related to current admission: admitted 6/1/2020 for R TKR by  promotion;Breathing techniques;Relaxation;Repositioning(received pain meds prior to session)    COGNITION  · Overall Cognitive Status:  WFL - within functional limits    RANGE OF MOTION AND STRENGTH ASSESSMENT  Upper extremity ROM and strength are within f mobility  Functional activity tolerated  Gait training  ROM  Strengthening  Lower therapeutic exercise:   Ankle pumps  Heel slides  Knee extension  LAQ  Quad sets  Transfer training    Patient End of Session: Up in chair;Needs met;Call light within reach;RN

## 2020-06-02 NOTE — PROGRESS NOTES
[facility]    Progress Note    Lali Hernandez Patient Status:  Outpatient in a Bed    1960 MRN [id number]   Location [facility] 4W/SW/SE Attending Angie Carrington MD   Hosp Day # 0 PCP [facility]       Subjective:   Jennifer Hunter CA 8.4 (L) 06/02/2020    ALB 3.0 (L) 06/02/2020    ALKPHO 106 06/02/2020    BILT 1.0 06/02/2020    TP 6.6 06/02/2020    AST 33 06/02/2020    ALT 23 06/02/2020       Xr Knee (1 Or 2 Views), Right (cpt=73560)    Result Date: 6/1/2020  CONCLUSION: Status post

## 2020-06-02 NOTE — PLAN OF CARE
Problem: Patient Centered Care  Goal: Patient preferences are identified and integrated in the patient's plan of care  Description  Interventions:  - What would you like us to know as we care for you?  Patient is retired but found a job at the [employer] based on type and severity of pain and evaluate response  - Implement non-pharmacological measures as appropriate and evaluate response  - Consider cultural and social influences on pain and pain management  - Manage/alleviate anxiety  - Utilize distractio of infection  Description  INTERVENTIONS:  - Assess and document risk factors for pressure ulcer development  - Assess and document skin integrity  - Assess and document dressing/incision, wound bed, drain sites and surrounding tissue  - Implement wound ca Adequate for Discharge       Richad Beer is stable,S/P right TKA. Has hx of numbness due to frostbites has hands tremors. Breathing on room air.  encouraged the use of IS every hour while awake. tolerated diet well .  Dressing to right knee acewrap with poler ic

## 2020-06-02 NOTE — PHYSICAL THERAPY NOTE
PHYSICAL THERAPY KNEE TREATMENT NOTE - INPATIENT     Room Number: 421/421-A             Presenting Problem: R TKR    Problem List  Principal Problem:    Osteoarthritis of right knee  Active Problems:    Atrial fibrillation (HCC)    Hypertension    Thyroid Restriction: R lower extremity        R Lower Extremity: Weight Bearing as Tolerated       PAIN ASSESSMENT   Ratin  Location: R knee  Management Techniques:  Activity promotion;Relaxation;Repositioning(received pain meds prior to session)    BALANCE  St GOALS  Goals to be met by: 6/4/2020  Patient Goal Patient's self-stated goal is: to go home   Goal #1 Patient is able to demonstrate supine - sit EOB @ level: modified independent     Goal #1   Current Status Goal Met   Goal #2 Patient is able to [facility]

## 2020-08-31 RX ORDER — FAMOTIDINE 20 MG/1
20 TABLET ORAL ONCE
Status: CANCELLED | OUTPATIENT
Start: 2020-08-31 | End: 2020-08-31

## 2020-08-31 RX ORDER — METOCLOPRAMIDE 10 MG/1
10 TABLET ORAL ONCE
Status: CANCELLED | OUTPATIENT
Start: 2020-08-31 | End: 2020-08-31

## 2020-08-31 RX ORDER — ACETAMINOPHEN 500 MG
1000 TABLET ORAL ONCE
Status: CANCELLED | OUTPATIENT
Start: 2020-08-31 | End: 2020-08-31

## 2020-08-31 RX ORDER — SODIUM CHLORIDE, SODIUM LACTATE, POTASSIUM CHLORIDE, CALCIUM CHLORIDE 600; 310; 30; 20 MG/100ML; MG/100ML; MG/100ML; MG/100ML
INJECTION, SOLUTION INTRAVENOUS CONTINUOUS
Status: CANCELLED | OUTPATIENT
Start: 2020-08-31

## 2020-09-01 ENCOUNTER — TELEPHONE (OUTPATIENT)
Dept: FAMILY MEDICINE CLINIC | Facility: CLINIC | Age: 60
End: 2020-09-01

## 2020-09-01 ENCOUNTER — LAB ENCOUNTER (OUTPATIENT)
Dept: LAB | Facility: HOSPITAL | Age: 60
End: 2020-09-01
Attending: FAMILY MEDICINE
Payer: MEDICARE

## 2020-09-01 ENCOUNTER — OFFICE VISIT (OUTPATIENT)
Dept: FAMILY MEDICINE CLINIC | Facility: CLINIC | Age: 60
End: 2020-09-01
Payer: MEDICARE

## 2020-09-01 VITALS
WEIGHT: 293 LBS | OXYGEN SATURATION: 98 % | DIASTOLIC BLOOD PRESSURE: 68 MMHG | RESPIRATION RATE: 16 BRPM | SYSTOLIC BLOOD PRESSURE: 118 MMHG | BODY MASS INDEX: 36.43 KG/M2 | TEMPERATURE: 98 F | HEIGHT: 75 IN | HEART RATE: 78 BPM

## 2020-09-01 DIAGNOSIS — Z01.818 OTHER SPECIFIED PRE-OPERATIVE EXAMINATION: ICD-10-CM

## 2020-09-01 DIAGNOSIS — T07.XXXA MULTIPLE EXCORIATIONS: ICD-10-CM

## 2020-09-01 DIAGNOSIS — E07.9 THYROID DISEASE: ICD-10-CM

## 2020-09-01 DIAGNOSIS — E78.2 MIXED HYPERLIPIDEMIA: ICD-10-CM

## 2020-09-01 DIAGNOSIS — Z89.412 HISTORY OF PARTIAL RAY AMPUTATION OF LEFT GREAT TOE (HCC): Chronic | ICD-10-CM

## 2020-09-01 DIAGNOSIS — Z01.812 PRE-OPERATIVE LABORATORY EXAMINATION: ICD-10-CM

## 2020-09-01 DIAGNOSIS — M17.12 PRIMARY OSTEOARTHRITIS OF LEFT KNEE: Primary | ICD-10-CM

## 2020-09-01 DIAGNOSIS — Z79.01 ENCOUNTER FOR CURRENT LONG-TERM USE OF ANTICOAGULANTS: Chronic | ICD-10-CM

## 2020-09-01 DIAGNOSIS — F51.01 PRIMARY INSOMNIA: ICD-10-CM

## 2020-09-01 DIAGNOSIS — Z89.422 S/P AMPUTATION OF LESSER TOE, LEFT (HCC): ICD-10-CM

## 2020-09-01 DIAGNOSIS — I10 ESSENTIAL HYPERTENSION: ICD-10-CM

## 2020-09-01 DIAGNOSIS — I48.0 PAROXYSMAL ATRIAL FIBRILLATION (HCC): ICD-10-CM

## 2020-09-01 DIAGNOSIS — Z96.651 S/P TOTAL KNEE ARTHROPLASTY, RIGHT: ICD-10-CM

## 2020-09-01 DIAGNOSIS — Z87.19 HISTORY OF CONSTIPATION: ICD-10-CM

## 2020-09-01 PROBLEM — T14.8XXA EXCORIATION: Chronic | Status: ACTIVE | Noted: 2020-09-01

## 2020-09-01 LAB
ALBUMIN SERPL-MCNC: 3 G/DL (ref 3.4–5)
ALBUMIN/GLOB SERPL: 0.8 {RATIO} (ref 1–2)
ALP LIVER SERPL-CCNC: 116 U/L (ref 45–117)
ALT SERPL-CCNC: 23 U/L (ref 16–61)
ANION GAP SERPL CALC-SCNC: 8 MMOL/L (ref 0–18)
ANTIBODY SCREEN: NEGATIVE
AST SERPL-CCNC: 38 U/L (ref 15–37)
BASOPHILS # BLD AUTO: 0.06 X10(3) UL (ref 0–0.2)
BASOPHILS NFR BLD AUTO: 0.8 %
BILIRUB SERPL-MCNC: 0.7 MG/DL (ref 0.1–2)
BUN BLD-MCNC: 16 MG/DL (ref 7–18)
BUN/CREAT SERPL: 12.1 (ref 10–20)
CALCIUM BLD-MCNC: 8.4 MG/DL (ref 8.5–10.1)
CHLORIDE SERPL-SCNC: 99 MMOL/L (ref 98–112)
CO2 SERPL-SCNC: 34 MMOL/L (ref 21–32)
CREAT BLD-MCNC: 1.32 MG/DL (ref 0.7–1.3)
DEPRECATED RDW RBC AUTO: 64.1 FL (ref 35.1–46.3)
EOSINOPHIL # BLD AUTO: 0.12 X10(3) UL (ref 0–0.7)
EOSINOPHIL NFR BLD AUTO: 1.7 %
ERYTHROCYTE [DISTWIDTH] IN BLOOD BY AUTOMATED COUNT: 16.6 % (ref 11–15)
GLOBULIN PLAS-MCNC: 3.9 G/DL (ref 2.8–4.4)
GLUCOSE BLD-MCNC: 96 MG/DL (ref 70–99)
HCT VFR BLD AUTO: 42.8 % (ref 39–53)
HGB BLD-MCNC: 14.5 G/DL (ref 13–17.5)
IMM GRANULOCYTES # BLD AUTO: 0.04 X10(3) UL (ref 0–1)
IMM GRANULOCYTES NFR BLD: 0.6 %
LYMPHOCYTES # BLD AUTO: 1.86 X10(3) UL (ref 1–4)
LYMPHOCYTES NFR BLD AUTO: 26.1 %
M PROTEIN MFR SERPL ELPH: 6.9 G/DL (ref 6.4–8.2)
MCH RBC QN AUTO: 36.1 PG (ref 26–34)
MCHC RBC AUTO-ENTMCNC: 33.9 G/DL (ref 31–37)
MCV RBC AUTO: 106.5 FL (ref 80–100)
MONOCYTES # BLD AUTO: 0.58 X10(3) UL (ref 0.1–1)
MONOCYTES NFR BLD AUTO: 8.1 %
NEUTROPHILS # BLD AUTO: 4.47 X10 (3) UL (ref 1.5–7.7)
NEUTROPHILS # BLD AUTO: 4.47 X10(3) UL (ref 1.5–7.7)
NEUTROPHILS NFR BLD AUTO: 62.7 %
OSMOLALITY SERPL CALC.SUM OF ELEC: 293 MOSM/KG (ref 275–295)
PATIENT FASTING Y/N/NP: YES
PLATELET # BLD AUTO: 210 10(3)UL (ref 150–450)
POTASSIUM SERPL-SCNC: 3.4 MMOL/L (ref 3.5–5.1)
RBC # BLD AUTO: 4.02 X10(6)UL (ref 4.3–5.7)
RH BLOOD TYPE: POSITIVE
SODIUM SERPL-SCNC: 141 MMOL/L (ref 136–145)
WBC # BLD AUTO: 7.1 X10(3) UL (ref 4–11)

## 2020-09-01 PROCEDURE — 86901 BLOOD TYPING SEROLOGIC RH(D): CPT

## 2020-09-01 PROCEDURE — 86850 RBC ANTIBODY SCREEN: CPT

## 2020-09-01 PROCEDURE — 99214 OFFICE O/P EST MOD 30 MIN: CPT | Performed by: FAMILY MEDICINE

## 2020-09-01 PROCEDURE — 36415 COLL VENOUS BLD VENIPUNCTURE: CPT

## 2020-09-01 PROCEDURE — 87081 CULTURE SCREEN ONLY: CPT

## 2020-09-01 PROCEDURE — 80053 COMPREHEN METABOLIC PANEL: CPT

## 2020-09-01 PROCEDURE — 3008F BODY MASS INDEX DOCD: CPT | Performed by: FAMILY MEDICINE

## 2020-09-01 PROCEDURE — 3074F SYST BP LT 130 MM HG: CPT | Performed by: FAMILY MEDICINE

## 2020-09-01 PROCEDURE — 3078F DIAST BP <80 MM HG: CPT | Performed by: FAMILY MEDICINE

## 2020-09-01 PROCEDURE — 85025 COMPLETE CBC W/AUTO DIFF WBC: CPT

## 2020-09-01 PROCEDURE — 86900 BLOOD TYPING SEROLOGIC ABO: CPT

## 2020-09-01 NOTE — TELEPHONE ENCOUNTER
Dr. Bhavna Rai, please review:    LABS- RBC (4.02), MCV (106.5), MCH (36.1), RDW-SD (64.1), RDW (16.6), Potassium (3.4), CO2 (34.0), Creatinine (1.32), calcium (8.4), GFR (57), AST (38), Albumin (3.0), A/G ratio (0.8), MRSA pending, all other labs WNL  EKG-05/19/20 Afib  XRAY-WNL    Pulmonary Clearance 05/20 in Media tab  Cardiac clearance 05/20 in Media tab  Hold Eliquis for 7 days prior to surgery      OK for surgery?

## 2020-09-01 NOTE — H&P
300 [facility] PRE-OP CLINIC [place]    PRE-OP NOTE    HPI:   I have been consulted by Dr. Bianca Esparza to see Taylor Camacho 56year old male for a preoperative evaluation and medical clearance.  He has a long history of worsening severe degenerative arthritis of TOTAL REPLACEMENT Right 6/1/2020    Performed by Rosalino Liang MD at [facility] MAIN OR   • TOTAL KNEE REPLACEMENT Right 2020     Social History    Socioeconomic History      Marital status: Unknown      Spouse name: Not on file      Number of children: Not on f skin lesion,   CARDIOVASCULAR:  Denies DVT.  Denies chest pain, palpitations, edema, dyspnea  RESPIRATORY:  Denies LESLY, Denies shortness of breath, wheezing, cough  GASTROINTESTINAL:  Denies abdominal pain, nausea, vomiting, constipation, diarrhea, or blood cyanosis, no clubbing,   NEURO:  No focal deficit, speech fluent, normal gait, strength and tone, sensory intact      Lab Results   Component Value Date    WBC 8.2 05/19/2020    HGB 14.5 06/02/2020    HCT 43.3 06/02/2020    .0 05/19/2020    CREATSER Hypertension     Thyroid disease     Osteoarthritis of right knee     History of constipation     History of partial ray amputation of left great toe ([facility] [address]     Encounter for current long-term use of anticoagulants     S/P total knee arthroplasty, right

## 2020-09-01 NOTE — TELEPHONE ENCOUNTER
Patient having surgery on DOS 09/14/20 LTKA with Dr [email or web address]      H&P- complete  LABS- RBC (4.02), MCV (106.5), MCH (36.1), RDW-SD (64.1), RDW (16.6), Potassium (3.4), CO2 (34.0), Creatinine (1.32), calcium (8.4), GFR (57), AST (38), Albumin (3.0), A/G ratio (0.8), MRSA pending, all other labs WNL  EKG-05/19/20 Afib  XRAY-WNL    Pulmonary Clearance 05/20 in Media tab  Cardiac clearance 05/20 in Media tab  Hold Eliquis for 7 days prior to surgery

## 2020-09-02 RX ORDER — ACETAMINOPHEN 325 MG/1
650 TABLET ORAL EVERY 6 HOURS SCHEDULED
Status: CANCELLED | OUTPATIENT
Start: 2020-09-02

## 2020-09-02 RX ORDER — METOCLOPRAMIDE HYDROCHLORIDE 5 MG/ML
10 INJECTION INTRAMUSCULAR; INTRAVENOUS EVERY 6 HOURS PRN
Status: CANCELLED | OUTPATIENT
Start: 2020-09-02 | End: 2020-09-04

## 2020-09-02 RX ORDER — DIPHENHYDRAMINE HCL 25 MG
25 CAPSULE ORAL EVERY 4 HOURS PRN
Status: CANCELLED | OUTPATIENT
Start: 2020-09-02

## 2020-09-02 RX ORDER — PROCHLORPERAZINE EDISYLATE 5 MG/ML
10 INJECTION INTRAMUSCULAR; INTRAVENOUS EVERY 6 HOURS PRN
Status: CANCELLED | OUTPATIENT
Start: 2020-09-02 | End: 2020-09-04

## 2020-09-02 RX ORDER — BISACODYL 10 MG
10 SUPPOSITORY, RECTAL RECTAL
Status: CANCELLED | OUTPATIENT
Start: 2020-09-02

## 2020-09-02 RX ORDER — DOCUSATE SODIUM 100 MG/1
100 CAPSULE, LIQUID FILLED ORAL 2 TIMES DAILY
Status: CANCELLED | OUTPATIENT
Start: 2020-09-02

## 2020-09-02 RX ORDER — ONDANSETRON 2 MG/ML
4 INJECTION INTRAMUSCULAR; INTRAVENOUS EVERY 4 HOURS PRN
Status: CANCELLED | OUTPATIENT
Start: 2020-09-02 | End: 2020-09-04

## 2020-09-02 RX ORDER — SODIUM CHLORIDE, SODIUM LACTATE, POTASSIUM CHLORIDE, CALCIUM CHLORIDE 600; 310; 30; 20 MG/100ML; MG/100ML; MG/100ML; MG/100ML
INJECTION, SOLUTION INTRAVENOUS CONTINUOUS
Status: CANCELLED | OUTPATIENT
Start: 2020-09-02

## 2020-09-02 RX ORDER — FAMOTIDINE 20 MG/1
20 TABLET ORAL 2 TIMES DAILY
Status: CANCELLED | OUTPATIENT
Start: 2020-09-02

## 2020-09-02 RX ORDER — OXYCODONE HYDROCHLORIDE 5 MG/1
5 TABLET ORAL EVERY 4 HOURS PRN
Status: CANCELLED | OUTPATIENT
Start: 2020-09-02

## 2020-09-02 RX ORDER — HYDROMORPHONE HYDROCHLORIDE 1 MG/ML
1.2 INJECTION, SOLUTION INTRAMUSCULAR; INTRAVENOUS; SUBCUTANEOUS EVERY 2 HOUR PRN
Status: CANCELLED | OUTPATIENT
Start: 2020-09-02

## 2020-09-02 RX ORDER — SODIUM PHOSPHATE, DIBASIC AND SODIUM PHOSPHATE, MONOBASIC 7; 19 G/133ML; G/133ML
1 ENEMA RECTAL ONCE AS NEEDED
Status: CANCELLED | OUTPATIENT
Start: 2020-09-02

## 2020-09-02 RX ORDER — CYCLOBENZAPRINE HCL 5 MG
5 TABLET ORAL EVERY 8 HOURS PRN
Status: CANCELLED | OUTPATIENT
Start: 2020-09-02

## 2020-09-02 RX ORDER — HYDROMORPHONE HYDROCHLORIDE 1 MG/ML
0.8 INJECTION, SOLUTION INTRAMUSCULAR; INTRAVENOUS; SUBCUTANEOUS EVERY 2 HOUR PRN
Status: CANCELLED | OUTPATIENT
Start: 2020-09-02

## 2020-09-02 RX ORDER — FAMOTIDINE 10 MG/ML
20 INJECTION, SOLUTION INTRAVENOUS 2 TIMES DAILY
Status: CANCELLED | OUTPATIENT
Start: 2020-09-02

## 2020-09-02 RX ORDER — POLYETHYLENE GLYCOL 3350 17 G/17G
17 POWDER, FOR SOLUTION ORAL DAILY PRN
Status: CANCELLED | OUTPATIENT
Start: 2020-09-02

## 2020-09-02 RX ORDER — DIPHENHYDRAMINE HYDROCHLORIDE 50 MG/ML
12.5 INJECTION INTRAMUSCULAR; INTRAVENOUS EVERY 4 HOURS PRN
Status: CANCELLED | OUTPATIENT
Start: 2020-09-02

## 2020-09-02 RX ORDER — DIPHENHYDRAMINE HYDROCHLORIDE 50 MG/ML
25 INJECTION INTRAMUSCULAR; INTRAVENOUS ONCE AS NEEDED
Status: CANCELLED | OUTPATIENT
Start: 2020-09-02 | End: 2020-09-02

## 2020-09-02 RX ORDER — OXYCODONE HYDROCHLORIDE 5 MG/1
10 TABLET ORAL EVERY 4 HOURS PRN
Status: CANCELLED | OUTPATIENT
Start: 2020-09-02

## 2020-09-02 RX ORDER — HYDROMORPHONE HYDROCHLORIDE 1 MG/ML
0.4 INJECTION, SOLUTION INTRAMUSCULAR; INTRAVENOUS; SUBCUTANEOUS EVERY 2 HOUR PRN
Status: CANCELLED | OUTPATIENT
Start: 2020-09-02

## 2020-09-02 NOTE — TELEPHONE ENCOUNTER
Spoke to patient, let him know he is clear for surgery per Dr. Roland Neri. Went over all test results. Patient aware he needs to hold Eliquis 7days prior to surgery. Will ONLY call if MRSA is positive.     MRSA-neg

## 2020-09-11 ENCOUNTER — APPOINTMENT (OUTPATIENT)
Dept: LAB | Facility: HOSPITAL | Age: 60
End: 2020-09-11
Attending: ORTHOPAEDIC SURGERY
Payer: MEDICARE

## 2020-09-11 DIAGNOSIS — Z01.818 PRE-OP TESTING: ICD-10-CM

## 2020-09-11 RX ORDER — ACETAMINOPHEN 500 MG
1000 TABLET ORAL ONCE
Status: CANCELLED | OUTPATIENT
Start: 2020-09-11 | End: 2020-09-11

## 2020-09-11 RX ORDER — SCOLOPAMINE TRANSDERMAL SYSTEM 1 MG/1
1 PATCH, EXTENDED RELEASE TRANSDERMAL ONCE
Status: CANCELLED | OUTPATIENT
Start: 2020-09-11 | End: 2020-09-11

## 2020-09-11 RX ORDER — CELECOXIB 200 MG/1
200 CAPSULE ORAL ONCE
Status: CANCELLED | OUTPATIENT
Start: 2020-09-11 | End: 2020-09-11

## 2020-09-11 RX ORDER — OXYCODONE HYDROCHLORIDE 5 MG/1
10 TABLET ORAL ONCE
Status: CANCELLED | OUTPATIENT
Start: 2020-09-11 | End: 2020-09-11

## 2020-09-11 RX ORDER — TRANEXAMIC ACID 10 MG/ML
1000 INJECTION, SOLUTION INTRAVENOUS ONCE
Status: CANCELLED | OUTPATIENT
Start: 2020-09-14

## 2020-09-12 LAB — SARS-COV-2 RNA RESP QL NAA+PROBE: NOT DETECTED

## 2020-09-14 ENCOUNTER — HOSPITAL ENCOUNTER (OUTPATIENT)
Facility: HOSPITAL | Age: 60
Setting detail: HOSPITAL OUTPATIENT SURGERY
Discharge: HOME | End: 2020-09-14
Attending: ORTHOPAEDIC SURGERY | Admitting: ORTHOPAEDIC SURGERY
Payer: MEDICARE

## 2020-09-14 VITALS
OXYGEN SATURATION: 95 % | WEIGHT: 300 LBS | RESPIRATION RATE: 17 BRPM | BODY MASS INDEX: 37.3 KG/M2 | HEART RATE: 90 BPM | HEIGHT: 75 IN | TEMPERATURE: 98 F | SYSTOLIC BLOOD PRESSURE: 177 MMHG | DIASTOLIC BLOOD PRESSURE: 85 MMHG

## 2020-09-14 DIAGNOSIS — Z01.818 PRE-OP TESTING: Primary | ICD-10-CM

## 2020-09-14 NOTE — PROGRESS NOTES
Noted open skin area on the left leg and black scab on the left knee , red and warm to touch, 's assitant informed seen the patient and still waiting for Dr. Augusto Jim to come see patient  But he is aware of cancellation today.

## 2020-10-19 ENCOUNTER — TELEPHONE (OUTPATIENT)
Dept: FAMILY MEDICINE CLINIC | Facility: CLINIC | Age: 60
End: 2020-10-19

## 2020-10-19 NOTE — TELEPHONE ENCOUNTER
Surgery on 11/03/20, Radha Sebastian with Dr. Sherif Bella @ [facility]    H&P-  Labs-  EKG- done 05/19/20  X-ray- done 05/19/20    Pulmonary Clearance 05/20 in Media tab  Cardiac clearance 05/20 in Media tab  Hold Eliquis for 7 days prior to surgery    Patient's surgery was canc

## 2020-10-27 ENCOUNTER — TELEPHONE (OUTPATIENT)
Dept: FAMILY MEDICINE CLINIC | Facility: CLINIC | Age: 60
End: 2020-10-27

## 2020-10-27 DIAGNOSIS — E87.6 HYPOKALEMIA: Primary | ICD-10-CM

## 2020-10-27 NOTE — TELEPHONE ENCOUNTER
Surgery on 11/03/20, Salima Henderson with Dr. Nidia Montes @ [address] [place] [address]     H&P- complete  Labs- RBC (4.07), MCV (108.4), MCH (34.6), RDW-SD (59.7), RDW (15.1), FBS (67), Potassium (3.4), CO2 (34), BUN (20), creatinine (1.35), Alk Phos (135), albumin (3.3), A/G ratio (0.8), +MSSA

## 2020-10-28 ENCOUNTER — LAB ENCOUNTER (OUTPATIENT)
Dept: LAB | Facility: HOSPITAL | Age: 60
End: 2020-10-28
Attending: FAMILY MEDICINE
Payer: MEDICARE

## 2020-10-28 ENCOUNTER — OFFICE VISIT (OUTPATIENT)
Dept: FAMILY MEDICINE CLINIC | Facility: CLINIC | Age: 60
End: 2020-10-28
Payer: MEDICARE

## 2020-10-28 VITALS
TEMPERATURE: 98 F | HEIGHT: 75 IN | WEIGHT: 270 LBS | OXYGEN SATURATION: 98 % | SYSTOLIC BLOOD PRESSURE: 118 MMHG | DIASTOLIC BLOOD PRESSURE: 68 MMHG | BODY MASS INDEX: 33.57 KG/M2 | HEART RATE: 90 BPM | RESPIRATION RATE: 16 BRPM

## 2020-10-28 DIAGNOSIS — I10 ESSENTIAL HYPERTENSION: ICD-10-CM

## 2020-10-28 DIAGNOSIS — R73.01 ELEVATED FASTING BLOOD SUGAR: ICD-10-CM

## 2020-10-28 DIAGNOSIS — M17.12 PRIMARY OSTEOARTHRITIS OF LEFT KNEE: ICD-10-CM

## 2020-10-28 DIAGNOSIS — Z01.812 PRE-OPERATIVE LABORATORY EXAMINATION: ICD-10-CM

## 2020-10-28 DIAGNOSIS — E06.3 HYPOTHYROIDISM DUE TO HASHIMOTO'S THYROIDITIS: ICD-10-CM

## 2020-10-28 DIAGNOSIS — E78.2 MIXED HYPERLIPIDEMIA: ICD-10-CM

## 2020-10-28 DIAGNOSIS — Z01.818 PREOP EXAMINATION: ICD-10-CM

## 2020-10-28 DIAGNOSIS — I48.20 CHRONIC ATRIAL FIBRILLATION (HCC): ICD-10-CM

## 2020-10-28 DIAGNOSIS — M10.9 GOUT, UNSPECIFIED CAUSE, UNSPECIFIED CHRONICITY, UNSPECIFIED SITE: ICD-10-CM

## 2020-10-28 DIAGNOSIS — Z01.818 PREOP EXAMINATION: Primary | ICD-10-CM

## 2020-10-28 DIAGNOSIS — E03.8 HYPOTHYROIDISM DUE TO HASHIMOTO'S THYROIDITIS: ICD-10-CM

## 2020-10-28 DIAGNOSIS — G47.33 OSA (OBSTRUCTIVE SLEEP APNEA): ICD-10-CM

## 2020-10-28 PROCEDURE — 80053 COMPREHEN METABOLIC PANEL: CPT

## 2020-10-28 PROCEDURE — 87081 CULTURE SCREEN ONLY: CPT

## 2020-10-28 PROCEDURE — 36415 COLL VENOUS BLD VENIPUNCTURE: CPT

## 2020-10-28 PROCEDURE — 3078F DIAST BP <80 MM HG: CPT | Performed by: FAMILY MEDICINE

## 2020-10-28 PROCEDURE — 85025 COMPLETE CBC W/AUTO DIFF WBC: CPT

## 2020-10-28 PROCEDURE — 83036 HEMOGLOBIN GLYCOSYLATED A1C: CPT

## 2020-10-28 PROCEDURE — 86850 RBC ANTIBODY SCREEN: CPT

## 2020-10-28 PROCEDURE — 99204 OFFICE O/P NEW MOD 45 MIN: CPT | Performed by: FAMILY MEDICINE

## 2020-10-28 PROCEDURE — 3008F BODY MASS INDEX DOCD: CPT | Performed by: FAMILY MEDICINE

## 2020-10-28 PROCEDURE — 3074F SYST BP LT 130 MM HG: CPT | Performed by: FAMILY MEDICINE

## 2020-10-28 PROCEDURE — 86901 BLOOD TYPING SEROLOGIC RH(D): CPT

## 2020-10-28 PROCEDURE — 87077 CULTURE AEROBIC IDENTIFY: CPT

## 2020-10-28 PROCEDURE — 86900 BLOOD TYPING SEROLOGIC ABO: CPT

## 2020-10-28 RX ORDER — RIBOFLAVIN (VITAMIN B2) 100 MG
100 TABLET ORAL DAILY
COMMUNITY

## 2020-10-28 RX ORDER — PNV NO.95/FERROUS FUM/FOLIC AC 28MG-0.8MG
1 TABLET ORAL DAILY
COMMUNITY

## 2020-10-28 RX ORDER — BUPROPION HYDROCHLORIDE 150 MG/1
150 TABLET, EXTENDED RELEASE ORAL NIGHTLY
COMMUNITY

## 2020-10-28 RX ORDER — ALLOPURINOL 100 MG/1
100 TABLET ORAL DAILY
COMMUNITY

## 2020-10-28 RX ORDER — CHOLECALCIFEROL (VITAMIN D3) 125 MCG
500 CAPSULE ORAL DAILY
COMMUNITY

## 2020-10-28 NOTE — H&P (VIEW-ONLY)
300 [facility]    HPI:   I have been consulted by Dr. Danielle Yu to see Ignacio Georgia 56year old male for a preoperative evaluation and medical clearance.  Sunita Herrera has a long history of worsening severe degenerative arthriti Levothyroxine Sodium 50 MCG Oral Tab Take 50 mcg by mouth before breakfast.     • Metoprolol Succinate ER 50 MG Oral Tablet 24 Hr Take 50 mg by mouth 2 (two) times a day. Past Medical History:   Diagnosis Date   • Arrhythmia     A. Fib   • Atrial fi club or organization: Not on file        Attends meetings of clubs or organizations: Not on file        Relationship status: Not on file      Intimate partner violence        Fear of current or ex partner: Not on file        Emotionally abused: Not on file EOMI, PERRLA, no scleral icterus, conjunctivae clear bilaterally, no eye discharge Nose: patent, no nasal discharge Throat:  No tonsillar erythema or exudate. Mouth:  No oral lesions or ulcerations, good dentition.   NECK: Supple, no CLAD, no JVD, no carot No  DM on insulin: No  Serum Creatinine >2 mg/dl: No  Saratha Thaddeus has an RCRI score that is low - moderate risk and is at 0.9% risk for major cardiac event in the perioperative period.      Patient is medically optimized and has an acceptable risk of surgery and

## 2020-10-28 NOTE — DISCHARGE SUMMARY
Ortho Discharge Summary     Patient ID:  Laal Beltran  [id number]  11 year old  7/11/1960      Admit Date: 11/3/2020  6:13 AM  Discharge Date and Time: 11/4/2020  3:03 PM    Attending Physician: Martha Hoffman MD     Reason for admission: left knee D

## 2020-10-28 NOTE — H&P
300 [facility] [facility]    PRE[facility]    HPI:   I have been consulted by Dr. Bhanu Castillo to see Moises Colon 56year old male for a preoperative evaluation and medical clearance.  Reta Hurley has a long history of worsening severe degenerative arthriti Levothyroxine Sodium 50 MCG Oral Tab Take 50 mcg by mouth before breakfast.     • Metoprolol Succinate ER 50 MG Oral Tablet 24 Hr Take 50 mg by mouth 2 (two) times a day. Past Medical History:   Diagnosis Date   • Arrhythmia     A. Fib   • Atrial fi club or organization: Not on file        Attends meetings of clubs or organizations: Not on file        Relationship status: Not on file      Intimate partner violence        Fear of current or ex partner: Not on file        Emotionally abused: Not on file EOMI, PERRLA, no scleral icterus, conjunctivae clear bilaterally, no eye discharge Nose: patent, no nasal discharge Throat:  No tonsillar erythema or exudate. Mouth:  No oral lesions or ulcerations, good dentition.   NECK: Supple, no CLAD, no JVD, no carot No  DM on insulin: No  Serum Creatinine >2 mg/dl: No  Stefania Caballero has an RCRI score that is low - moderate risk and is at 0.9% risk for major cardiac event in the perioperative period.      Patient is medically optimized and has an acceptable risk of surgery and

## 2020-10-29 RX ORDER — POTASSIUM CHLORIDE 1500 MG/1
40 TABLET, FILM COATED, EXTENDED RELEASE ORAL DAILY
Qty: 20 TABLET | Refills: 0 | Status: CANCELLED | OUTPATIENT
Start: 2020-10-29 | End: 2020-11-28

## 2020-10-29 NOTE — TELEPHONE ENCOUNTER
Spoke to patient, he has potassium at home. He will take 40meq daily for 3 days. Then get potassium redrawn at [facility] entered in [place].

## 2020-10-29 NOTE — TELEPHONE ENCOUNTER
MD Raúl Alvarez LPN; Candace Warren RN             I recommend pt take KDur 20 meq tablets and take 2 daily for three days. Repeat labs on Sunday or Monday.

## 2020-10-30 PROBLEM — Z01.818 PRE-OP TESTING: Status: ACTIVE | Noted: 2020-10-30

## 2020-10-30 RX ORDER — CYCLOBENZAPRINE HCL 5 MG
5 TABLET ORAL EVERY 8 HOURS PRN
Status: DISCONTINUED | OUTPATIENT
Start: 2020-10-30 | End: 2020-11-04

## 2020-10-30 RX ORDER — ONDANSETRON 2 MG/ML
4 INJECTION INTRAMUSCULAR; INTRAVENOUS EVERY 4 HOURS PRN
Status: ACTIVE | OUTPATIENT
Start: 2020-10-30 | End: 2020-11-01

## 2020-10-30 RX ORDER — ACETAMINOPHEN 500 MG
1000 TABLET ORAL ONCE
Status: DISCONTINUED | OUTPATIENT
Start: 2020-10-30 | End: 2020-10-30

## 2020-10-30 RX ORDER — PROCHLORPERAZINE EDISYLATE 5 MG/ML
10 INJECTION INTRAMUSCULAR; INTRAVENOUS EVERY 6 HOURS PRN
Status: ACTIVE | OUTPATIENT
Start: 2020-10-30 | End: 2020-11-01

## 2020-10-30 RX ORDER — SCOLOPAMINE TRANSDERMAL SYSTEM 1 MG/1
1 PATCH, EXTENDED RELEASE TRANSDERMAL ONCE
Status: DISCONTINUED | OUTPATIENT
Start: 2020-10-30 | End: 2020-11-06

## 2020-10-30 RX ORDER — BISACODYL 10 MG
10 SUPPOSITORY, RECTAL RECTAL
Status: DISCONTINUED | OUTPATIENT
Start: 2020-10-30 | End: 2020-11-04

## 2020-10-30 RX ORDER — OXYCODONE HYDROCHLORIDE 5 MG/1
10 TABLET ORAL EVERY 4 HOURS PRN
Status: DISCONTINUED | OUTPATIENT
Start: 2020-10-30 | End: 2020-11-04

## 2020-10-30 RX ORDER — OXYCODONE HYDROCHLORIDE 5 MG/1
10 TABLET ORAL ONCE
Status: COMPLETED | OUTPATIENT
Start: 2020-10-30 | End: 2020-11-03

## 2020-10-30 RX ORDER — FAMOTIDINE 20 MG/1
20 TABLET ORAL ONCE
Status: DISCONTINUED | OUTPATIENT
Start: 2020-10-30 | End: 2020-11-03 | Stop reason: HOSPADM

## 2020-10-30 RX ORDER — SODIUM CHLORIDE, SODIUM LACTATE, POTASSIUM CHLORIDE, CALCIUM CHLORIDE 600; 310; 30; 20 MG/100ML; MG/100ML; MG/100ML; MG/100ML
INJECTION, SOLUTION INTRAVENOUS CONTINUOUS
Status: DISCONTINUED | OUTPATIENT
Start: 2020-10-30 | End: 2020-11-04

## 2020-10-30 RX ORDER — HYDROMORPHONE HYDROCHLORIDE 1 MG/ML
1.2 INJECTION, SOLUTION INTRAMUSCULAR; INTRAVENOUS; SUBCUTANEOUS EVERY 2 HOUR PRN
Status: DISCONTINUED | OUTPATIENT
Start: 2020-10-30 | End: 2020-11-04

## 2020-10-30 RX ORDER — ACETAMINOPHEN 325 MG/1
650 TABLET ORAL EVERY 6 HOURS SCHEDULED
Status: DISCONTINUED | OUTPATIENT
Start: 2020-10-30 | End: 2020-11-04

## 2020-10-30 RX ORDER — CELECOXIB 200 MG/1
200 CAPSULE ORAL ONCE
Status: COMPLETED | OUTPATIENT
Start: 2020-10-30 | End: 2020-11-03

## 2020-10-30 RX ORDER — ACETAMINOPHEN 500 MG
1000 TABLET ORAL ONCE
Status: DISCONTINUED | OUTPATIENT
Start: 2020-10-30 | End: 2020-11-03 | Stop reason: HOSPADM

## 2020-10-30 RX ORDER — METOCLOPRAMIDE 10 MG/1
10 TABLET ORAL ONCE
Status: DISCONTINUED | OUTPATIENT
Start: 2020-10-30 | End: 2020-11-03 | Stop reason: HOSPADM

## 2020-10-30 RX ORDER — DIPHENHYDRAMINE HCL 25 MG
25 CAPSULE ORAL EVERY 4 HOURS PRN
Status: DISCONTINUED | OUTPATIENT
Start: 2020-10-30 | End: 2020-11-04

## 2020-10-30 RX ORDER — TRANEXAMIC ACID 10 MG/ML
1000 INJECTION, SOLUTION INTRAVENOUS ONCE
Status: DISCONTINUED | OUTPATIENT
Start: 2020-10-30 | End: 2020-11-02 | Stop reason: CLARIF

## 2020-10-30 RX ORDER — DOCUSATE SODIUM 100 MG/1
100 CAPSULE, LIQUID FILLED ORAL 2 TIMES DAILY
Status: DISCONTINUED | OUTPATIENT
Start: 2020-10-30 | End: 2020-11-04

## 2020-10-30 RX ORDER — METOCLOPRAMIDE HYDROCHLORIDE 5 MG/ML
10 INJECTION INTRAMUSCULAR; INTRAVENOUS EVERY 6 HOURS PRN
Status: ACTIVE | OUTPATIENT
Start: 2020-10-30 | End: 2020-11-01

## 2020-10-30 RX ORDER — FAMOTIDINE 20 MG/1
20 TABLET ORAL 2 TIMES DAILY
Status: DISCONTINUED | OUTPATIENT
Start: 2020-10-30 | End: 2020-11-04

## 2020-10-30 RX ORDER — FAMOTIDINE 10 MG/ML
20 INJECTION, SOLUTION INTRAVENOUS 2 TIMES DAILY
Status: DISCONTINUED | OUTPATIENT
Start: 2020-10-30 | End: 2020-11-04

## 2020-10-30 RX ORDER — HYDROMORPHONE HYDROCHLORIDE 1 MG/ML
0.8 INJECTION, SOLUTION INTRAMUSCULAR; INTRAVENOUS; SUBCUTANEOUS EVERY 2 HOUR PRN
Status: DISCONTINUED | OUTPATIENT
Start: 2020-10-30 | End: 2020-11-04

## 2020-10-30 RX ORDER — HYDROMORPHONE HYDROCHLORIDE 1 MG/ML
0.4 INJECTION, SOLUTION INTRAMUSCULAR; INTRAVENOUS; SUBCUTANEOUS EVERY 2 HOUR PRN
Status: DISCONTINUED | OUTPATIENT
Start: 2020-10-30 | End: 2020-11-04

## 2020-10-30 RX ORDER — OXYCODONE HYDROCHLORIDE 5 MG/1
5 TABLET ORAL EVERY 4 HOURS PRN
Status: DISCONTINUED | OUTPATIENT
Start: 2020-10-30 | End: 2020-11-04

## 2020-10-30 RX ORDER — POLYETHYLENE GLYCOL 3350 17 G/17G
17 POWDER, FOR SOLUTION ORAL DAILY PRN
Status: DISCONTINUED | OUTPATIENT
Start: 2020-10-30 | End: 2020-11-04

## 2020-10-30 RX ORDER — DIPHENHYDRAMINE HYDROCHLORIDE 50 MG/ML
12.5 INJECTION INTRAMUSCULAR; INTRAVENOUS EVERY 4 HOURS PRN
Status: DISCONTINUED | OUTPATIENT
Start: 2020-10-30 | End: 2020-11-04

## 2020-10-30 RX ORDER — DIPHENHYDRAMINE HYDROCHLORIDE 50 MG/ML
25 INJECTION INTRAMUSCULAR; INTRAVENOUS ONCE AS NEEDED
Status: ACTIVE | OUTPATIENT
Start: 2020-10-30 | End: 2020-10-30

## 2020-10-30 RX ORDER — SODIUM PHOSPHATE, DIBASIC AND SODIUM PHOSPHATE, MONOBASIC 7; 19 G/133ML; G/133ML
1 ENEMA RECTAL ONCE AS NEEDED
Status: DISCONTINUED | OUTPATIENT
Start: 2020-10-30 | End: 2020-11-04

## 2020-10-31 ENCOUNTER — LAB ENCOUNTER (OUTPATIENT)
Dept: LAB | Facility: HOSPITAL | Age: 60
End: 2020-10-31
Attending: ORTHOPAEDIC SURGERY
Payer: MEDICARE

## 2020-10-31 DIAGNOSIS — E87.6 HYPOKALEMIA: ICD-10-CM

## 2020-10-31 DIAGNOSIS — Z01.818 PREOP TESTING: ICD-10-CM

## 2020-10-31 PROCEDURE — 84132 ASSAY OF SERUM POTASSIUM: CPT

## 2020-10-31 PROCEDURE — 36415 COLL VENOUS BLD VENIPUNCTURE: CPT

## 2020-11-02 ENCOUNTER — ANESTHESIA EVENT (OUTPATIENT)
Dept: SURGERY | Facility: HOSPITAL | Age: 60
End: 2020-11-02
Payer: MEDICARE

## 2020-11-02 NOTE — TELEPHONE ENCOUNTER
Spoke to patient, he hadn't taken potassium as we discussed last week. He does have some at home and will make sure he takes 80meq today. He will also get betadine solutions and use once in each nostril today for +MSSA.  He is clear for surgery per Dr. Luis Miguel Lassiter

## 2020-11-02 NOTE — PAT NURSING NOTE
Dr Katherine Cruz aware of potassium level.   Stat electrolytes ordered for [facility]

## 2020-11-02 NOTE — TELEPHONE ENCOUNTER
Pt to take KDur 80 meq today and have stat electrolytes done prior to surgery tomorrow. He should call pcp after surgery for follow up labs in 2 weeks.      Pt is medically clear to proceed with surgery as planned if potassium is adequately replaced as abov

## 2020-11-02 NOTE — TELEPHONE ENCOUNTER
Dr. Ju Chong, patient had potassium redrawn Saturday and it 3.4, same as las labs on 10/28. He is on K-Dur 40 meq daily. He is also +MSSA, ok to have betadine swab, no time for bactroban ointment. Please advise.

## 2020-11-03 ENCOUNTER — HOSPITAL ENCOUNTER (OUTPATIENT)
Facility: HOSPITAL | Age: 60
Discharge: HOME | End: 2020-11-04
Attending: ORTHOPAEDIC SURGERY | Admitting: ORTHOPAEDIC SURGERY
Payer: MEDICARE

## 2020-11-03 ENCOUNTER — APPOINTMENT (OUTPATIENT)
Dept: GENERAL RADIOLOGY | Facility: HOSPITAL | Age: 60
End: 2020-11-03
Attending: PHYSICIAN ASSISTANT
Payer: MEDICARE

## 2020-11-03 ENCOUNTER — ANESTHESIA (OUTPATIENT)
Dept: SURGERY | Facility: HOSPITAL | Age: 60
End: 2020-11-03
Payer: MEDICARE

## 2020-11-03 DIAGNOSIS — Z01.818 PREOP TESTING: Primary | ICD-10-CM

## 2020-11-03 DIAGNOSIS — M17.12 DEGENERATIVE JOINT DISEASE OF LEFT KNEE: ICD-10-CM

## 2020-11-03 PROBLEM — M10.9 GOUT: Status: RESOLVED | Noted: 2020-10-28 | Resolved: 2020-11-03

## 2020-11-03 PROBLEM — Z96.652 STATUS POST LEFT KNEE REPLACEMENT: Status: ACTIVE | Noted: 2020-11-03

## 2020-11-03 PROBLEM — G47.33 OSA (OBSTRUCTIVE SLEEP APNEA): Status: ACTIVE | Noted: 2020-11-03

## 2020-11-03 PROBLEM — E87.6 HYPOKALEMIA: Status: ACTIVE | Noted: 2020-11-03

## 2020-11-03 PROBLEM — N20.0 URIC ACID KIDNEY STONE: Status: ACTIVE | Noted: 2020-11-03

## 2020-11-03 PROCEDURE — 80051 ELECTROLYTE PANEL: CPT | Performed by: ORTHOPAEDIC SURGERY

## 2020-11-03 PROCEDURE — 36415 COLL VENOUS BLD VENIPUNCTURE: CPT | Performed by: ORTHOPAEDIC SURGERY

## 2020-11-03 PROCEDURE — 88311 DECALCIFY TISSUE: CPT | Performed by: ORTHOPAEDIC SURGERY

## 2020-11-03 PROCEDURE — 0SRD0J9 REPLACEMENT OF LEFT KNEE JOINT WITH SYNTHETIC SUBSTITUTE, CEMENTED, OPEN APPROACH: ICD-10-PCS | Performed by: ORTHOPAEDIC SURGERY

## 2020-11-03 PROCEDURE — 97161 PT EVAL LOW COMPLEX 20 MIN: CPT

## 2020-11-03 PROCEDURE — 73560 X-RAY EXAM OF KNEE 1 OR 2: CPT | Performed by: PHYSICIAN ASSISTANT

## 2020-11-03 PROCEDURE — 97530 THERAPEUTIC ACTIVITIES: CPT

## 2020-11-03 PROCEDURE — 88305 TISSUE EXAM BY PATHOLOGIST: CPT | Performed by: ORTHOPAEDIC SURGERY

## 2020-11-03 DEVICE — IMPLANTABLE DEVICE: Type: IMPLANTABLE DEVICE | Site: KNEE | Status: FUNCTIONAL

## 2020-11-03 DEVICE — TKA CAP, PRIMARY W/O STEM EXT: Type: IMPLANTABLE DEVICE | Status: FUNCTIONAL

## 2020-11-03 DEVICE — CEM BN NLTX STRL REUSE MED VSC: Type: IMPLANTABLE DEVICE | Site: KNEE | Status: FUNCTIONAL

## 2020-11-03 DEVICE — DOME PAT 35MM 9MM STRL 3 PG: Type: IMPLANTABLE DEVICE | Site: KNEE | Status: FUNCTIONAL

## 2020-11-03 RX ORDER — NALOXONE HYDROCHLORIDE 0.4 MG/ML
80 INJECTION, SOLUTION INTRAMUSCULAR; INTRAVENOUS; SUBCUTANEOUS AS NEEDED
Status: DISCONTINUED | OUTPATIENT
Start: 2020-11-03 | End: 2020-11-03 | Stop reason: HOSPADM

## 2020-11-03 RX ORDER — SODIUM CHLORIDE, SODIUM LACTATE, POTASSIUM CHLORIDE, CALCIUM CHLORIDE 600; 310; 30; 20 MG/100ML; MG/100ML; MG/100ML; MG/100ML
INJECTION, SOLUTION INTRAVENOUS CONTINUOUS
Status: DISCONTINUED | OUTPATIENT
Start: 2020-11-03 | End: 2020-11-03 | Stop reason: HOSPADM

## 2020-11-03 RX ORDER — HYDROCODONE BITARTRATE AND ACETAMINOPHEN 5; 325 MG/1; MG/1
1 TABLET ORAL AS NEEDED
Status: DISCONTINUED | OUTPATIENT
Start: 2020-11-03 | End: 2020-11-03 | Stop reason: HOSPADM

## 2020-11-03 RX ORDER — MIDAZOLAM HYDROCHLORIDE 1 MG/ML
INJECTION INTRAMUSCULAR; INTRAVENOUS AS NEEDED
Status: DISCONTINUED | OUTPATIENT
Start: 2020-11-03 | End: 2020-11-03 | Stop reason: SURG

## 2020-11-03 RX ORDER — HALOPERIDOL 5 MG/ML
0.25 INJECTION INTRAMUSCULAR ONCE AS NEEDED
Status: DISCONTINUED | OUTPATIENT
Start: 2020-11-03 | End: 2020-11-03 | Stop reason: HOSPADM

## 2020-11-03 RX ORDER — ONDANSETRON 2 MG/ML
4 INJECTION INTRAMUSCULAR; INTRAVENOUS ONCE AS NEEDED
Status: COMPLETED | OUTPATIENT
Start: 2020-11-03 | End: 2020-11-03

## 2020-11-03 RX ORDER — SODIUM CHLORIDE, SODIUM LACTATE, POTASSIUM CHLORIDE, CALCIUM CHLORIDE 600; 310; 30; 20 MG/100ML; MG/100ML; MG/100ML; MG/100ML
INJECTION, SOLUTION INTRAVENOUS CONTINUOUS
Status: DISCONTINUED | OUTPATIENT
Start: 2020-11-03 | End: 2020-11-04

## 2020-11-03 RX ORDER — HYDROMORPHONE HYDROCHLORIDE 1 MG/ML
0.6 INJECTION, SOLUTION INTRAMUSCULAR; INTRAVENOUS; SUBCUTANEOUS EVERY 5 MIN PRN
Status: DISCONTINUED | OUTPATIENT
Start: 2020-11-03 | End: 2020-11-03 | Stop reason: HOSPADM

## 2020-11-03 RX ORDER — ONDANSETRON 4 MG/1
4 TABLET, FILM COATED ORAL EVERY 8 HOURS PRN
Qty: 30 TABLET | Refills: 0 | Status: SHIPPED | OUTPATIENT
Start: 2020-11-03

## 2020-11-03 RX ORDER — MORPHINE SULFATE 4 MG/ML
2 INJECTION, SOLUTION INTRAMUSCULAR; INTRAVENOUS EVERY 10 MIN PRN
Status: DISCONTINUED | OUTPATIENT
Start: 2020-11-03 | End: 2020-11-03 | Stop reason: HOSPADM

## 2020-11-03 RX ORDER — OXYCODONE HYDROCHLORIDE 5 MG/1
TABLET ORAL EVERY 4 HOURS PRN
Qty: 60 TABLET | Refills: 0 | Status: SHIPPED | OUTPATIENT
Start: 2020-11-03

## 2020-11-03 RX ORDER — BUPIVACAINE HYDROCHLORIDE 7.5 MG/ML
INJECTION, SOLUTION INTRASPINAL AS NEEDED
Status: DISCONTINUED | OUTPATIENT
Start: 2020-11-03 | End: 2020-11-03 | Stop reason: SURG

## 2020-11-03 RX ORDER — CEFADROXIL 500 MG/1
500 CAPSULE ORAL 2 TIMES DAILY
Qty: 14 CAPSULE | Refills: 0 | Status: SHIPPED | OUTPATIENT
Start: 2020-11-03 | End: 2020-11-10

## 2020-11-03 RX ORDER — SODIUM CHLORIDE, SODIUM LACTATE, POTASSIUM CHLORIDE, CALCIUM CHLORIDE 600; 310; 30; 20 MG/100ML; MG/100ML; MG/100ML; MG/100ML
INJECTION, SOLUTION INTRAVENOUS CONTINUOUS PRN
Status: DISCONTINUED | OUTPATIENT
Start: 2020-11-03 | End: 2020-11-03 | Stop reason: SURG

## 2020-11-03 RX ORDER — DOCUSATE SODIUM 100 MG/1
100 CAPSULE, LIQUID FILLED ORAL 2 TIMES DAILY
Qty: 60 CAPSULE | Refills: 2 | Status: SHIPPED | OUTPATIENT
Start: 2020-11-03

## 2020-11-03 RX ORDER — METOPROLOL SUCCINATE 50 MG/1
50 TABLET, EXTENDED RELEASE ORAL 2 TIMES DAILY
Status: DISCONTINUED | OUTPATIENT
Start: 2020-11-03 | End: 2020-11-04

## 2020-11-03 RX ORDER — ATORVASTATIN CALCIUM 20 MG/1
20 TABLET, FILM COATED ORAL NIGHTLY
Status: DISCONTINUED | OUTPATIENT
Start: 2020-11-03 | End: 2020-11-04

## 2020-11-03 RX ORDER — LEVOTHYROXINE SODIUM 0.05 MG/1
50 TABLET ORAL
Status: DISCONTINUED | OUTPATIENT
Start: 2020-11-04 | End: 2020-11-04

## 2020-11-03 RX ORDER — MULTIVITAMIN/IRON/FOLIC ACID 18MG-0.4MG
250 TABLET ORAL DAILY
Status: DISCONTINUED | OUTPATIENT
Start: 2020-11-03 | End: 2020-11-04

## 2020-11-03 RX ORDER — METOPROLOL TARTRATE 5 MG/5ML
2.5 INJECTION INTRAVENOUS ONCE
Status: DISCONTINUED | OUTPATIENT
Start: 2020-11-03 | End: 2020-11-03 | Stop reason: HOSPADM

## 2020-11-03 RX ORDER — ONDANSETRON 2 MG/ML
INJECTION INTRAMUSCULAR; INTRAVENOUS AS NEEDED
Status: DISCONTINUED | OUTPATIENT
Start: 2020-11-03 | End: 2020-11-03 | Stop reason: SURG

## 2020-11-03 RX ORDER — ACETAMINOPHEN 500 MG
TABLET ORAL EVERY 6 HOURS PRN
Qty: 90 TABLET | Refills: 0 | Status: SHIPPED | OUTPATIENT
Start: 2020-11-03

## 2020-11-03 RX ORDER — DEXAMETHASONE SODIUM PHOSPHATE 4 MG/ML
VIAL (ML) INJECTION AS NEEDED
Status: DISCONTINUED | OUTPATIENT
Start: 2020-11-03 | End: 2020-11-03 | Stop reason: SURG

## 2020-11-03 RX ORDER — ACETAMINOPHEN 500 MG
1000 TABLET ORAL ONCE
Status: COMPLETED | OUTPATIENT
Start: 2020-11-03 | End: 2020-11-03

## 2020-11-03 RX ORDER — METOPROLOL TARTRATE 5 MG/5ML
INJECTION INTRAVENOUS AS NEEDED
Status: DISCONTINUED | OUTPATIENT
Start: 2020-11-03 | End: 2020-11-03 | Stop reason: SURG

## 2020-11-03 RX ORDER — MORPHINE SULFATE 10 MG/ML
6 INJECTION, SOLUTION INTRAMUSCULAR; INTRAVENOUS EVERY 10 MIN PRN
Status: DISCONTINUED | OUTPATIENT
Start: 2020-11-03 | End: 2020-11-03 | Stop reason: HOSPADM

## 2020-11-03 RX ORDER — TRANEXAMIC ACID 10 MG/ML
INJECTION, SOLUTION INTRAVENOUS AS NEEDED
Status: DISCONTINUED | OUTPATIENT
Start: 2020-11-03 | End: 2020-11-03 | Stop reason: SURG

## 2020-11-03 RX ORDER — TRANEXAMIC ACID 10 MG/ML
1000 INJECTION, SOLUTION INTRAVENOUS ONCE
Status: DISCONTINUED | OUTPATIENT
Start: 2020-11-04 | End: 2020-11-03 | Stop reason: HOSPADM

## 2020-11-03 RX ORDER — BUPROPION HYDROCHLORIDE 150 MG/1
300 TABLET ORAL EVERY MORNING
Status: DISCONTINUED | OUTPATIENT
Start: 2020-11-04 | End: 2020-11-04

## 2020-11-03 RX ORDER — PROCHLORPERAZINE EDISYLATE 5 MG/ML
5 INJECTION INTRAMUSCULAR; INTRAVENOUS ONCE AS NEEDED
Status: DISCONTINUED | OUTPATIENT
Start: 2020-11-03 | End: 2020-11-03 | Stop reason: HOSPADM

## 2020-11-03 RX ORDER — BUPROPION HYDROCHLORIDE 150 MG/1
150 TABLET, EXTENDED RELEASE ORAL NIGHTLY
Status: DISCONTINUED | OUTPATIENT
Start: 2020-11-03 | End: 2020-11-04

## 2020-11-03 RX ORDER — HYDROMORPHONE HYDROCHLORIDE 1 MG/ML
0.2 INJECTION, SOLUTION INTRAMUSCULAR; INTRAVENOUS; SUBCUTANEOUS EVERY 5 MIN PRN
Status: DISCONTINUED | OUTPATIENT
Start: 2020-11-03 | End: 2020-11-03 | Stop reason: HOSPADM

## 2020-11-03 RX ORDER — POTASSIUM CHLORIDE 20 MEQ/1
60 TABLET, EXTENDED RELEASE ORAL DAILY
Status: DISCONTINUED | OUTPATIENT
Start: 2020-11-03 | End: 2020-11-04

## 2020-11-03 RX ORDER — MORPHINE SULFATE 4 MG/ML
4 INJECTION, SOLUTION INTRAMUSCULAR; INTRAVENOUS EVERY 10 MIN PRN
Status: DISCONTINUED | OUTPATIENT
Start: 2020-11-03 | End: 2020-11-03 | Stop reason: HOSPADM

## 2020-11-03 RX ORDER — LIDOCAINE HYDROCHLORIDE 10 MG/ML
INJECTION, SOLUTION EPIDURAL; INFILTRATION; INTRACAUDAL; PERINEURAL AS NEEDED
Status: DISCONTINUED | OUTPATIENT
Start: 2020-11-03 | End: 2020-11-03 | Stop reason: SURG

## 2020-11-03 RX ORDER — FAMOTIDINE 20 MG/1
20 TABLET ORAL ONCE
Status: COMPLETED | OUTPATIENT
Start: 2020-11-03 | End: 2020-11-03

## 2020-11-03 RX ORDER — ALLOPURINOL 100 MG/1
100 TABLET ORAL DAILY
Status: DISCONTINUED | OUTPATIENT
Start: 2020-11-03 | End: 2020-11-04

## 2020-11-03 RX ORDER — HYDROCODONE BITARTRATE AND ACETAMINOPHEN 5; 325 MG/1; MG/1
2 TABLET ORAL AS NEEDED
Status: DISCONTINUED | OUTPATIENT
Start: 2020-11-03 | End: 2020-11-03 | Stop reason: HOSPADM

## 2020-11-03 RX ORDER — HYDROMORPHONE HYDROCHLORIDE 1 MG/ML
0.4 INJECTION, SOLUTION INTRAMUSCULAR; INTRAVENOUS; SUBCUTANEOUS EVERY 5 MIN PRN
Status: DISCONTINUED | OUTPATIENT
Start: 2020-11-03 | End: 2020-11-03 | Stop reason: HOSPADM

## 2020-11-03 RX ADMIN — SODIUM CHLORIDE, SODIUM LACTATE, POTASSIUM CHLORIDE, CALCIUM CHLORIDE: 600; 310; 30; 20 INJECTION, SOLUTION INTRAVENOUS at 10:08:00

## 2020-11-03 RX ADMIN — BUPIVACAINE HYDROCHLORIDE 1.6 ML: 7.5 INJECTION, SOLUTION INTRASPINAL at 08:34:00

## 2020-11-03 RX ADMIN — METOPROLOL TARTRATE 2.5 MG: 5 INJECTION INTRAVENOUS at 09:24:00

## 2020-11-03 RX ADMIN — LIDOCAINE HYDROCHLORIDE 30 MG: 10 INJECTION, SOLUTION EPIDURAL; INFILTRATION; INTRACAUDAL; PERINEURAL at 08:34:00

## 2020-11-03 RX ADMIN — TRANEXAMIC ACID 1000 MG: 10 INJECTION, SOLUTION INTRAVENOUS at 08:50:00

## 2020-11-03 RX ADMIN — DEXAMETHASONE SODIUM PHOSPHATE 4 MG: 4 MG/ML VIAL (ML) INJECTION at 08:49:00

## 2020-11-03 RX ADMIN — METOPROLOL TARTRATE 2.5 MG: 5 INJECTION INTRAVENOUS at 09:13:00

## 2020-11-03 RX ADMIN — SODIUM CHLORIDE, SODIUM LACTATE, POTASSIUM CHLORIDE, CALCIUM CHLORIDE: 600; 310; 30; 20 INJECTION, SOLUTION INTRAVENOUS at 10:51:00

## 2020-11-03 RX ADMIN — ONDANSETRON 4 MG: 2 INJECTION INTRAMUSCULAR; INTRAVENOUS at 08:49:00

## 2020-11-03 RX ADMIN — MIDAZOLAM HYDROCHLORIDE 2 MG: 1 INJECTION INTRAMUSCULAR; INTRAVENOUS at 08:30:00

## 2020-11-03 RX ADMIN — SODIUM CHLORIDE, SODIUM LACTATE, POTASSIUM CHLORIDE, CALCIUM CHLORIDE: 600; 310; 30; 20 INJECTION, SOLUTION INTRAVENOUS at 08:27:00

## 2020-11-03 NOTE — ANESTHESIA POSTPROCEDURE EVALUATION
Patient: Marga Guadarrama    Procedure Summary     Date: 11/03/20 Room / Location: [address] [place] [address] MAIN OR 05 / 300 [facility] MAIN OR    Anesthesia Start: 0827 Anesthesia Stop: 3482    Procedure: KNEE TOTAL REPLACEMENT (Left Knee) Diagnosis: (left knee degenerative joint dise

## 2020-11-03 NOTE — INTERVAL H&P NOTE
Pre-op Diagnosis: left knee degenerative joint disease    The above referenced H&P was reviewed by CAMI Paredes on 11/3/2020, the patient was examined and no significant changes have occurred in the patient's condition since the H&P was performed.   I

## 2020-11-03 NOTE — ANESTHESIA PROCEDURE NOTES
Spinal Block  Performed by: Lyndon Dillon CRNA  Authorized by: Juan Chaney MD       General Information and Staff    Start Time:  11/3/2020 8:30 AM  End Time:  11/3/2020 8:34 AM  Anesthesiologist:  Juan Chaney MD  CRNA:  Lyndon Dillon CRNA  Per

## 2020-11-03 NOTE — PHYSICAL THERAPY NOTE
PHYSICAL THERAPY KNEE EVALUATION - INPATIENT       Room Number: 433/433-A  Evaluation Date: 11/3/2020  Type of Evaluation: Initial  Physician Order: PT Eval and Treat    Presenting Problem: L TKR by Dr. Jaya Bowers  Reason for Therapy: Mobility Dysfunction and mobility; Endurance; Patient education;Gait training;Range of motion;Strengthening;Stair training;Transfer training;Balance training  Rehab Potential : Good  Frequency (Obs): BID       PHYSICAL THERAPY MEDICAL/SOCIAL HISTORY     History related to current ad tomorrow. \"     PHYSICAL THERAPY EXAMINATION     OBJECTIVE  Precautions: Limb alert - left  Fall Risk: Standard fall risk    WEIGHT BEARING RESTRICTION  Weight Bearing Restriction: L lower extremity           L Lower Extremity: Weight Bearing as Tolerated (G-Code): CK    FUNCTIONAL ABILITY STATUS  Gait Assessment   Gait Assistance: Contact guard assist  Distance (ft): 5  Assistive Device: Rolling walker  Pattern: L Decreased stance time;L Flexed knee(slow but steady, guarded due to pain)  Stoop/Curb Assista

## 2020-11-03 NOTE — ANESTHESIA PREPROCEDURE EVALUATION
Anesthesia PreOp Note    HPI:     Nanda Ramires is a 56year old male who presents for preoperative consultation requested by: Josué Mclain MD    Date of Surgery: 11/3/2020    Procedure(s):  KNEE TOTAL REPLACEMENT  Indication: left knee degenerative ANESTH,ACHILLES TENDON SURG Bilateral    • APPENDECTOMY     • COLONOSCOPY     • FRACTURE SURGERY Left     wrist   • GASTRIC BYPASS,OBESITY,SB RECONSTRUC  2000   • KNEE TOTAL REPLACEMENT Right 6/1/2020    Performed by Gabrielle Kincaid MD at [facility] OR   • TO 20 mL/hr at 11/03/20 1314    •  metoprolol Tartrate (LOPRESSOR) tab 25 mg, 25 mg, Oral, Once PRN, Josué Mclain MD    •  clonidine/epinephrine/ropivacaine/ketorolac (CERTS) pain cocktail, , Intra-articular, Once (Intra-Op), Josué Mclain MD    •  acetami injection 0.4 mg, 0.4 mg, Intravenous, Q2H PRN, Farheen Murphy, PA    Or    •  HYDROmorphone HCl (DILAUDID) 1 MG/ML injection 0.8 mg, 0.8 mg, Intravenous, Q2H PRN, Farheen Murphy, PA    Or    •  HYDROmorphone HCl (DILAUDID) 1 MG/ML injection 1.2 mg, 1.2 Tobacco Use      Smoking status: Never Smoker      Smokeless tobacco: Never Used    Substance and Sexual Activity      Alcohol use:  Yes        Alcohol/week: 2.0 standard drinks        Types: 2 Standard drinks or equivalent per week        Frequency: 2-4 t 144/81   Pulse:  100   Resp:  18   SpO2:  98%   Weight: 127 kg (280 lb) 135.2 kg (298 lb 1.6 oz)   Height: 1.905 m (6' 3\")         Anesthesia Evaluation     Patient summary reviewed and Nursing notes reviewed    Airway   Mallampati: II  TM distance: >3 FB

## 2020-11-03 NOTE — CM/SW NOTE
SHARMILA met with the pt. At bedside. The pt. Lives with his father in a split level home with his bedroom up 5 stairs. The pt. Reports being independent prior to admission with adls, ambulation and driving. The pt's father also drives. The pt.  Has 3 childre

## 2020-11-04 VITALS
DIASTOLIC BLOOD PRESSURE: 92 MMHG | TEMPERATURE: 98 F | HEIGHT: 75 IN | HEART RATE: 96 BPM | WEIGHT: 298.13 LBS | RESPIRATION RATE: 20 BRPM | SYSTOLIC BLOOD PRESSURE: 135 MMHG | OXYGEN SATURATION: 99 % | BODY MASS INDEX: 37.07 KG/M2

## 2020-11-04 PROCEDURE — 97530 THERAPEUTIC ACTIVITIES: CPT

## 2020-11-04 PROCEDURE — 97116 GAIT TRAINING THERAPY: CPT

## 2020-11-04 PROCEDURE — 99212 OFFICE O/P EST SF 10 MIN: CPT

## 2020-11-04 PROCEDURE — 97110 THERAPEUTIC EXERCISES: CPT

## 2020-11-04 PROCEDURE — 94640 AIRWAY INHALATION TREATMENT: CPT

## 2020-11-04 PROCEDURE — 85025 COMPLETE CBC W/AUTO DIFF WBC: CPT | Performed by: FAMILY MEDICINE

## 2020-11-04 PROCEDURE — 80048 BASIC METABOLIC PNL TOTAL CA: CPT | Performed by: FAMILY MEDICINE

## 2020-11-04 NOTE — PROGRESS NOTES
[facility]    Progress Note    Perla Lee Patient Status:  Outpatient in a Bed    1960 MRN [id number]   Location [facility] 4W/SW/SE Attending Edison Champagne MD   Hosp Day # 0 PCP [facility]       Subjective:   [place] [address]: [phone]  C: [phone]

## 2020-11-04 NOTE — PLAN OF CARE
Problem: Patient Centered Care  Goal: Patient preferences are identified and integrated in the patient's plan of care  Description: Interventions:  - What would you like us to know as we care for you?   - Provide timely, complete, and accurate informatio for physical needs  - Identify cognitive and physical deficits and behaviors that affect risk of falls.   - [place] fall precautions as indicated by assessment.  - Educate pt/family on patient safety including physical limitations  - Instruct pt to call f

## 2020-11-04 NOTE — PROGRESS NOTES
[facility]    Progress Note    Fredrick Toney Patient Status:  Outpatient in a Bed    1960 MRN [id number]   Location [facility] 4W/SW/SE Attending Rubie Klinefelter, MD   Hosp Day # 0 PCP [facility]       Subjective:   Fer Ruiz 11/03/2020 at 12:24 PM                Assessment and Plan:     Primary osteoarthritis of left knee  Now replaced      Status post left knee replacement  Stable for discharge. He is to call pcp and review bp's, potassium and use of torsemide and potassium.

## 2020-11-04 NOTE — PLAN OF CARE
Pt adequate for D/C. Discharge med rec completed. Ortho, PT/OT, MD cleared. Scripts in chart, pt verbalizes comfort with PO pain meds. Ambulation safety measures taught. Discharge instructions presented and reviewed. Medication side effects discussed.  Sharon Mayorga

## 2020-11-04 NOTE — PHYSICAL THERAPY NOTE
PHYSICAL THERAPY KNEE TREATMENT NOTE - INPATIENT     Room Number: 433/433-A             Presenting Problem: L TKR by Dr. Grey Alpha    Problem List  Principal Problem:    Primary osteoarthritis of left knee  Active Problems:    Atrial fibrillation (HCC)    Hyp does the patient currently have. ..  -   Turning over in bed (including adjusting bedclothes, sheets and blankets)?: A Little   -   Sitting down on and standing up from a chair with arms (e.g., wheelchair, bedside commode, etc.): None   -   Moving from lyin #2  Current Status CGA   Goal #3 Patient is able to ambulate 300 feet with assistive device at assistance level: modified independent    Goal #3   Current Status CGA/SBA   Goal #4 Patient will negotiate 5 stairs/one curb w/ assistive device and supervision

## 2020-11-04 NOTE — WOUND PROGRESS NOTE
WOUND CARE NOTE    History:  Past Medical History:   Diagnosis Date   • Arrhythmia     A. Fib   • Atrial fibrillation (HCC)    • Calculus of kidney    • Depression    • Disorder of thyroid    • Essential hypertension    • High blood pressure    • High cho foam.     Allergies: Vancomycin and Daptomycin    Labs:   Lab Results   Component Value Date    WBC 11.0 11/04/2020    HGB 12.5 (L) 11/04/2020    HCT 39.2 11/04/2020    .0 11/04/2020    CREATSERUM 1.90 (H) 11/04/2020    BUN 25 (H) 11/04/2020    NA 1

## 2020-11-04 NOTE — PLAN OF CARE
Comments: Pt is alert, calling appropriately. Sats well on R/A. Vitals monitored, with results relayed to MD. Alyssa Corbett Tylenol and PRN Oxy 10 for pain management. Getting OOB x1a/walker. Gel ice packs kept to site. Ace over site is c/d/I.  Tolerating general d including physical limitations  - Instruct pt to call for assistance with activity based on assessment  - Modify environment to reduce risk of injury  - Provide assistive devices as appropriate  - Consider OT/PT consult to assist with strengthening/mobilit

## 2021-10-08 NOTE — TELEPHONE ENCOUNTER
CC:  Dalia Lopez is here today for possible emb/ frequent UTI's .    Medications: medications verified and updated  Refills needed today?  NO  Patient would like communication of their results via:  [employer]    Cell Phone:   Telephone Information:   Mobile [phone]     Okay to leave a message containing results? Yes       If your visit includes an exam today, would you like an assistant to be present in the room during that time? NO       Patient having surgery onDOS 06/01/20 RTKA with Dr [email or web address]      H&P- complete  LABS- MCV (105.2), MCH (35.0), RDW-SD (60.4), RDW (15.3), chloride (95), CO2 (35.0), BUN (26), creatinine (1.52), GFR (49), albumin (3.2), A/G ratio (0.8), all other labs WNL

## 2022-07-09 NOTE — PAT NURSING NOTE
Spoke with patient regarding stopping Apixaban prior to sure as instructed by MD. Patient states he is still taking his Apixaban. When asked when doctor wanted him to stop it for his upcoming surgery, patient stated he wasn't sure, it was in his information packet somewhere. Instructed the patient to find his packet as soon as possible to ensure he stops his medication in the appropriate amount of time. Instructed patient to notify office if he did not stop his Apixaban by the time frame given per MD instuctions to see how to next proceed.

## 2022-07-11 ENCOUNTER — LAB ENCOUNTER (OUTPATIENT)
Dept: LAB | Facility: HOSPITAL | Age: 62
End: 2022-07-11
Attending: ORTHOPAEDIC SURGERY
Payer: MEDICARE

## 2022-07-11 DIAGNOSIS — Z01.811 PRE-OP CHEST EXAM: Primary | ICD-10-CM

## 2022-07-11 DIAGNOSIS — Z01.818 PREOP TESTING: ICD-10-CM

## 2022-07-11 PROCEDURE — 85060 BLOOD SMEAR INTERPRETATION: CPT

## 2022-07-11 PROCEDURE — 85025 COMPLETE CBC W/AUTO DIFF WBC: CPT

## 2022-07-11 PROCEDURE — 93005 ELECTROCARDIOGRAM TRACING: CPT

## 2022-07-11 PROCEDURE — 93010 ELECTROCARDIOGRAM REPORT: CPT | Performed by: FAMILY MEDICINE

## 2022-07-11 PROCEDURE — 36415 COLL VENOUS BLD VENIPUNCTURE: CPT

## 2022-07-11 PROCEDURE — 87641 MR-STAPH DNA AMP PROBE: CPT

## 2022-07-12 LAB
BASOPHILS # BLD AUTO: 0.04 X10(3) UL (ref 0–0.2)
BASOPHILS NFR BLD AUTO: 0.5 %
DEPRECATED RDW RBC AUTO: 60.7 FL (ref 35.1–46.3)
EOSINOPHIL # BLD AUTO: 0.16 X10(3) UL (ref 0–0.7)
EOSINOPHIL NFR BLD AUTO: 2 %
ERYTHROCYTE [DISTWIDTH] IN BLOOD BY AUTOMATED COUNT: 14.9 % (ref 11–15)
HCT VFR BLD AUTO: 35.7 %
HGB BLD-MCNC: 11.7 G/DL
IMM GRANULOCYTES # BLD AUTO: 0.04 X10(3) UL (ref 0–1)
IMM GRANULOCYTES NFR BLD: 0.5 %
LYMPHOCYTES # BLD AUTO: 1.41 X10(3) UL (ref 1–4)
LYMPHOCYTES NFR BLD AUTO: 17.5 %
MCH RBC QN AUTO: 36.6 PG (ref 26–34)
MCHC RBC AUTO-ENTMCNC: 32.8 G/DL (ref 31–37)
MCV RBC AUTO: 111.6 FL
MONOCYTES # BLD AUTO: 0.96 X10(3) UL (ref 0.1–1)
MONOCYTES NFR BLD AUTO: 11.9 %
MRSA DNA SPEC QL NAA+PROBE: POSITIVE
NEUTROPHILS # BLD AUTO: 5.43 X10 (3) UL (ref 1.5–7.7)
NEUTROPHILS # BLD AUTO: 5.43 X10(3) UL (ref 1.5–7.7)
NEUTROPHILS NFR BLD AUTO: 67.6 %
PLATELET # BLD AUTO: 211 10(3)UL (ref 150–450)
RBC # BLD AUTO: 3.2 X10(6)UL
SARS-COV-2 RNA RESP QL NAA+PROBE: NOT DETECTED
WBC # BLD AUTO: 8 X10(3) UL (ref 4–11)

## 2022-07-12 RX ORDER — VANCOMYCIN 2 GRAM/500 ML IN 0.9 % SODIUM CHLORIDE INTRAVENOUS
15 EVERY 12 HOURS
Status: CANCELLED | OUTPATIENT
Start: 2022-07-12 | End: 2022-07-13

## 2022-07-12 NOTE — H&P
The H&P performed by Dr. Gay Elliott on 7/12/22 was reviewed by Nahomi Kim MD on 7/13/22, the patient was examined and no significant changes have occurred in the patient's condition since the H&P was performed. Risks, benefits, alternative treatments and consequences of no treatment were discussed.   We will proceed with procedure as planned

## 2022-07-12 NOTE — PAT NURSING NOTE
S/w Emilie Negrete from Dr Saba Rodrigez ofc re: (+) MRSA results. Per Emilie Negrete MD is aware and someone talked to pt to start ointment.

## 2022-07-13 ENCOUNTER — ANESTHESIA EVENT (OUTPATIENT)
Dept: SURGERY | Facility: HOSPITAL | Age: 62
End: 2022-07-13
Payer: MEDICARE

## 2022-07-13 ENCOUNTER — HOSPITAL ENCOUNTER (INPATIENT)
Facility: HOSPITAL | Age: 62
LOS: 1 days | Discharge: HOME | End: 2022-07-14
Attending: ORTHOPAEDIC SURGERY | Admitting: ORTHOPAEDIC SURGERY
Payer: MEDICARE

## 2022-07-13 ENCOUNTER — ANESTHESIA (OUTPATIENT)
Dept: SURGERY | Facility: HOSPITAL | Age: 62
End: 2022-07-13
Payer: MEDICARE

## 2022-07-13 ENCOUNTER — APPOINTMENT (OUTPATIENT)
Dept: GENERAL RADIOLOGY | Facility: HOSPITAL | Age: 62
End: 2022-07-13
Attending: ORTHOPAEDIC SURGERY
Payer: MEDICARE

## 2022-07-13 DIAGNOSIS — Z01.818 PREOP TESTING: Primary | ICD-10-CM

## 2022-07-13 PROBLEM — M19.011 PRIMARY OSTEOARTHRITIS OF RIGHT SHOULDER: Status: ACTIVE | Noted: 2022-07-13

## 2022-07-13 PROBLEM — M19.011 ARTHRITIS OF RIGHT GLENOHUMERAL JOINT: Status: ACTIVE | Noted: 2022-07-13

## 2022-07-13 PROBLEM — E78.5 HYPERLIPIDEMIA: Status: ACTIVE | Noted: 2020-10-28

## 2022-07-13 PROBLEM — I48.0 PAROXYSMAL ATRIAL FIBRILLATION (HCC): Status: ACTIVE | Noted: 2020-05-17

## 2022-07-13 PROCEDURE — 73020 X-RAY EXAM OF SHOULDER: CPT | Performed by: ORTHOPAEDIC SURGERY

## 2022-07-13 PROCEDURE — 0LS30ZZ REPOSITION RIGHT UPPER ARM TENDON, OPEN APPROACH: ICD-10-PCS | Performed by: ORTHOPAEDIC SURGERY

## 2022-07-13 PROCEDURE — 99232 SBSQ HOSP IP/OBS MODERATE 35: CPT | Performed by: HOSPITALIST

## 2022-07-13 PROCEDURE — 3E0T3BZ INTRODUCTION OF ANESTHETIC AGENT INTO PERIPHERAL NERVES AND PLEXI, PERCUTANEOUS APPROACH: ICD-10-PCS | Performed by: ANESTHESIOLOGY

## 2022-07-13 PROCEDURE — 0RRJ0JZ REPLACEMENT OF RIGHT SHOULDER JOINT WITH SYNTHETIC SUBSTITUTE, OPEN APPROACH: ICD-10-PCS | Performed by: ORTHOPAEDIC SURGERY

## 2022-07-13 DEVICE — BIOMET BC R 1X40 US: Type: IMPLANTABLE DEVICE | Site: SHOULDER | Status: FUNCTIONAL

## 2022-07-13 DEVICE — IMPLANTABLE DEVICE
Type: IMPLANTABLE DEVICE | Site: SHOULDER | Status: FUNCTIONAL
Brand: SIMPLICITI™

## 2022-07-13 DEVICE — NUCLEUS
Type: IMPLANTABLE DEVICE | Site: SHOULDER | Status: FUNCTIONAL
Brand: TORNIER SIMPLICITI SHOULDER SYSTEM

## 2022-07-13 DEVICE — IMPLANTABLE DEVICE
Type: IMPLANTABLE DEVICE | Site: SHOULDER | Status: FUNCTIONAL
Brand: AEQUALIS™ PERFORM+

## 2022-07-13 RX ORDER — BISACODYL 10 MG
10 SUPPOSITORY, RECTAL RECTAL
Status: DISCONTINUED | OUTPATIENT
Start: 2022-07-13 | End: 2022-07-14

## 2022-07-13 RX ORDER — BUPROPION HYDROCHLORIDE 150 MG/1
150 TABLET, EXTENDED RELEASE ORAL NIGHTLY
Status: DISCONTINUED | OUTPATIENT
Start: 2022-07-13 | End: 2022-07-14

## 2022-07-13 RX ORDER — ONDANSETRON 2 MG/ML
INJECTION INTRAMUSCULAR; INTRAVENOUS AS NEEDED
Status: DISCONTINUED | OUTPATIENT
Start: 2022-07-13 | End: 2022-07-13 | Stop reason: SURG

## 2022-07-13 RX ORDER — OXYCODONE HYDROCHLORIDE 5 MG/1
5 TABLET ORAL EVERY 4 HOURS PRN
Status: DISCONTINUED | OUTPATIENT
Start: 2022-07-13 | End: 2022-07-14

## 2022-07-13 RX ORDER — TRANEXAMIC ACID 10 MG/ML
INJECTION, SOLUTION INTRAVENOUS AS NEEDED
Status: DISCONTINUED | OUTPATIENT
Start: 2022-07-13 | End: 2022-07-13 | Stop reason: SURG

## 2022-07-13 RX ORDER — MEPERIDINE HYDROCHLORIDE 25 MG/ML
12.5 INJECTION INTRAMUSCULAR; INTRAVENOUS; SUBCUTANEOUS AS NEEDED
Status: DISCONTINUED | OUTPATIENT
Start: 2022-07-13 | End: 2022-07-13 | Stop reason: HOSPADM

## 2022-07-13 RX ORDER — ROCURONIUM BROMIDE 10 MG/ML
INJECTION, SOLUTION INTRAVENOUS AS NEEDED
Status: DISCONTINUED | OUTPATIENT
Start: 2022-07-13 | End: 2022-07-13 | Stop reason: SURG

## 2022-07-13 RX ORDER — ACETAMINOPHEN 500 MG
1000 TABLET ORAL EVERY 8 HOURS SCHEDULED
Status: DISCONTINUED | OUTPATIENT
Start: 2022-07-13 | End: 2022-07-14

## 2022-07-13 RX ORDER — MORPHINE SULFATE 4 MG/ML
4 INJECTION, SOLUTION INTRAMUSCULAR; INTRAVENOUS EVERY 10 MIN PRN
Status: DISCONTINUED | OUTPATIENT
Start: 2022-07-13 | End: 2022-07-13 | Stop reason: HOSPADM

## 2022-07-13 RX ORDER — METOCLOPRAMIDE 10 MG/1
10 TABLET ORAL ONCE
Status: COMPLETED | OUTPATIENT
Start: 2022-07-13 | End: 2022-07-13

## 2022-07-13 RX ORDER — ROPIVACAINE HYDROCHLORIDE 5 MG/ML
INJECTION, SOLUTION EPIDURAL; INFILTRATION; PERINEURAL
Status: COMPLETED | OUTPATIENT
Start: 2022-07-13 | End: 2022-07-13

## 2022-07-13 RX ORDER — FAMOTIDINE 20 MG/1
20 TABLET, FILM COATED ORAL ONCE
Status: COMPLETED | OUTPATIENT
Start: 2022-07-13 | End: 2022-07-13

## 2022-07-13 RX ORDER — SODIUM PHOSPHATE, DIBASIC AND SODIUM PHOSPHATE, MONOBASIC 7; 19 G/133ML; G/133ML
1 ENEMA RECTAL ONCE AS NEEDED
Status: DISCONTINUED | OUTPATIENT
Start: 2022-07-13 | End: 2022-07-14

## 2022-07-13 RX ORDER — SODIUM CHLORIDE, SODIUM LACTATE, POTASSIUM CHLORIDE, CALCIUM CHLORIDE 600; 310; 30; 20 MG/100ML; MG/100ML; MG/100ML; MG/100ML
INJECTION, SOLUTION INTRAVENOUS CONTINUOUS
Status: DISCONTINUED | OUTPATIENT
Start: 2022-07-13 | End: 2022-07-14

## 2022-07-13 RX ORDER — DOCUSATE SODIUM 100 MG/1
100 CAPSULE, LIQUID FILLED ORAL 2 TIMES DAILY PRN
Status: DISCONTINUED | OUTPATIENT
Start: 2022-07-13 | End: 2022-07-14

## 2022-07-13 RX ORDER — DOCUSATE SODIUM 100 MG/1
100 CAPSULE, LIQUID FILLED ORAL 2 TIMES DAILY
Status: DISCONTINUED | OUTPATIENT
Start: 2022-07-13 | End: 2022-07-14

## 2022-07-13 RX ORDER — POLYETHYLENE GLYCOL 3350 17 G/17G
17 POWDER, FOR SOLUTION ORAL DAILY PRN
Status: DISCONTINUED | OUTPATIENT
Start: 2022-07-13 | End: 2022-07-14

## 2022-07-13 RX ORDER — HYDROMORPHONE HYDROCHLORIDE 1 MG/ML
0.8 INJECTION, SOLUTION INTRAMUSCULAR; INTRAVENOUS; SUBCUTANEOUS EVERY 2 HOUR PRN
Status: DISCONTINUED | OUTPATIENT
Start: 2022-07-13 | End: 2022-07-14

## 2022-07-13 RX ORDER — ACETAMINOPHEN 500 MG
1000 TABLET ORAL EVERY 8 HOURS
Status: DISCONTINUED | OUTPATIENT
Start: 2022-07-13 | End: 2022-07-13

## 2022-07-13 RX ORDER — MAGNESIUM HYDROXIDE 1200 MG/15ML
LIQUID ORAL CONTINUOUS PRN
Status: COMPLETED | OUTPATIENT
Start: 2022-07-13 | End: 2022-07-13

## 2022-07-13 RX ORDER — SODIUM CHLORIDE, SODIUM LACTATE, POTASSIUM CHLORIDE, CALCIUM CHLORIDE 600; 310; 30; 20 MG/100ML; MG/100ML; MG/100ML; MG/100ML
INJECTION, SOLUTION INTRAVENOUS CONTINUOUS
Status: DISCONTINUED | OUTPATIENT
Start: 2022-07-13 | End: 2022-07-13 | Stop reason: HOSPADM

## 2022-07-13 RX ORDER — ONDANSETRON 2 MG/ML
4 INJECTION INTRAMUSCULAR; INTRAVENOUS EVERY 6 HOURS PRN
Status: DISCONTINUED | OUTPATIENT
Start: 2022-07-13 | End: 2022-07-14

## 2022-07-13 RX ORDER — MORPHINE SULFATE 10 MG/ML
6 INJECTION, SOLUTION INTRAMUSCULAR; INTRAVENOUS EVERY 10 MIN PRN
Status: DISCONTINUED | OUTPATIENT
Start: 2022-07-13 | End: 2022-07-13 | Stop reason: HOSPADM

## 2022-07-13 RX ORDER — OXYCODONE HYDROCHLORIDE 5 MG/1
10 TABLET ORAL EVERY 4 HOURS PRN
Status: DISCONTINUED | OUTPATIENT
Start: 2022-07-13 | End: 2022-07-14

## 2022-07-13 RX ORDER — ACETAMINOPHEN 500 MG
1000 TABLET ORAL ONCE
Status: COMPLETED | OUTPATIENT
Start: 2022-07-13 | End: 2022-07-13

## 2022-07-13 RX ORDER — MORPHINE SULFATE 15 MG/1
15 TABLET ORAL EVERY 4 HOURS PRN
Status: ACTIVE | OUTPATIENT
Start: 2022-07-13 | End: 2022-07-14

## 2022-07-13 RX ORDER — DIPHENHYDRAMINE HYDROCHLORIDE 50 MG/ML
25 INJECTION INTRAMUSCULAR; INTRAVENOUS ONCE AS NEEDED
Status: ACTIVE | OUTPATIENT
Start: 2022-07-13 | End: 2022-07-13

## 2022-07-13 RX ORDER — MORPHINE SULFATE 4 MG/ML
4 INJECTION, SOLUTION INTRAMUSCULAR; INTRAVENOUS EVERY 2 HOUR PRN
Status: ACTIVE | OUTPATIENT
Start: 2022-07-13 | End: 2022-07-14

## 2022-07-13 RX ORDER — BUPROPION HYDROCHLORIDE 300 MG/1
300 TABLET ORAL EVERY MORNING
Status: DISCONTINUED | OUTPATIENT
Start: 2022-07-14 | End: 2022-07-14

## 2022-07-13 RX ORDER — ALLOPURINOL 100 MG/1
100 TABLET ORAL DAILY
Status: DISCONTINUED | OUTPATIENT
Start: 2022-07-14 | End: 2022-07-14

## 2022-07-13 RX ORDER — METOPROLOL TARTRATE 5 MG/5ML
2.5 INJECTION INTRAVENOUS ONCE
Status: DISCONTINUED | OUTPATIENT
Start: 2022-07-13 | End: 2022-07-13 | Stop reason: HOSPADM

## 2022-07-13 RX ORDER — OXYCODONE HYDROCHLORIDE 5 MG/1
5 TABLET ORAL EVERY 4 HOURS PRN
Status: DISPENSED | OUTPATIENT
Start: 2022-07-13 | End: 2022-07-14

## 2022-07-13 RX ORDER — DIGOXIN 250 MCG
0.25 TABLET ORAL DAILY
Status: DISCONTINUED | OUTPATIENT
Start: 2022-07-14 | End: 2022-07-14

## 2022-07-13 RX ORDER — MORPHINE SULFATE 4 MG/ML
2 INJECTION, SOLUTION INTRAMUSCULAR; INTRAVENOUS EVERY 10 MIN PRN
Status: DISCONTINUED | OUTPATIENT
Start: 2022-07-13 | End: 2022-07-13 | Stop reason: HOSPADM

## 2022-07-13 RX ORDER — MORPHINE SULFATE 2 MG/ML
2 INJECTION, SOLUTION INTRAMUSCULAR; INTRAVENOUS EVERY 2 HOUR PRN
Status: ACTIVE | OUTPATIENT
Start: 2022-07-13 | End: 2022-07-14

## 2022-07-13 RX ORDER — DEXAMETHASONE SODIUM PHOSPHATE 4 MG/ML
VIAL (ML) INJECTION AS NEEDED
Status: DISCONTINUED | OUTPATIENT
Start: 2022-07-13 | End: 2022-07-13 | Stop reason: SURG

## 2022-07-13 RX ORDER — LIDOCAINE HYDROCHLORIDE 10 MG/ML
INJECTION, SOLUTION EPIDURAL; INFILTRATION; INTRACAUDAL; PERINEURAL AS NEEDED
Status: DISCONTINUED | OUTPATIENT
Start: 2022-07-13 | End: 2022-07-13 | Stop reason: SURG

## 2022-07-13 RX ORDER — HYDROMORPHONE HYDROCHLORIDE 1 MG/ML
0.4 INJECTION, SOLUTION INTRAMUSCULAR; INTRAVENOUS; SUBCUTANEOUS EVERY 5 MIN PRN
Status: DISCONTINUED | OUTPATIENT
Start: 2022-07-13 | End: 2022-07-13 | Stop reason: HOSPADM

## 2022-07-13 RX ORDER — DEXAMETHASONE SODIUM PHOSPHATE 10 MG/ML
INJECTION, SOLUTION INTRAMUSCULAR; INTRAVENOUS
Status: COMPLETED | OUTPATIENT
Start: 2022-07-13 | End: 2022-07-13

## 2022-07-13 RX ORDER — HYDROMORPHONE HYDROCHLORIDE 1 MG/ML
0.6 INJECTION, SOLUTION INTRAMUSCULAR; INTRAVENOUS; SUBCUTANEOUS EVERY 5 MIN PRN
Status: DISCONTINUED | OUTPATIENT
Start: 2022-07-13 | End: 2022-07-13 | Stop reason: HOSPADM

## 2022-07-13 RX ORDER — ATORVASTATIN CALCIUM 20 MG/1
20 TABLET, FILM COATED ORAL NIGHTLY
Status: DISCONTINUED | OUTPATIENT
Start: 2022-07-13 | End: 2022-07-14

## 2022-07-13 RX ORDER — SENNOSIDES 8.6 MG
17.2 TABLET ORAL NIGHTLY
Status: DISCONTINUED | OUTPATIENT
Start: 2022-07-13 | End: 2022-07-14

## 2022-07-13 RX ORDER — OXYCODONE HYDROCHLORIDE 5 MG/1
10 TABLET ORAL EVERY 4 HOURS PRN
Status: DISPENSED | OUTPATIENT
Start: 2022-07-13 | End: 2022-07-14

## 2022-07-13 RX ORDER — CELECOXIB 200 MG/1
200 CAPSULE ORAL ONCE
Status: COMPLETED | OUTPATIENT
Start: 2022-07-13 | End: 2022-07-13

## 2022-07-13 RX ORDER — OXYCODONE HCL 10 MG/1
10 TABLET, FILM COATED, EXTENDED RELEASE ORAL ONCE
Status: COMPLETED | OUTPATIENT
Start: 2022-07-13 | End: 2022-07-13

## 2022-07-13 RX ORDER — HYDROMORPHONE HYDROCHLORIDE 1 MG/ML
0.4 INJECTION, SOLUTION INTRAMUSCULAR; INTRAVENOUS; SUBCUTANEOUS EVERY 2 HOUR PRN
Status: DISCONTINUED | OUTPATIENT
Start: 2022-07-13 | End: 2022-07-14

## 2022-07-13 RX ORDER — MIDAZOLAM HYDROCHLORIDE 1 MG/ML
INJECTION INTRAMUSCULAR; INTRAVENOUS
Status: COMPLETED | OUTPATIENT
Start: 2022-07-13 | End: 2022-07-13

## 2022-07-13 RX ORDER — ONDANSETRON 2 MG/ML
4 INJECTION INTRAMUSCULAR; INTRAVENOUS EVERY 6 HOURS PRN
Status: DISCONTINUED | OUTPATIENT
Start: 2022-07-13 | End: 2022-07-13 | Stop reason: HOSPADM

## 2022-07-13 RX ORDER — LEVOTHYROXINE SODIUM 0.05 MG/1
50 TABLET ORAL
Status: DISCONTINUED | OUTPATIENT
Start: 2022-07-14 | End: 2022-07-14

## 2022-07-13 RX ORDER — NALOXONE HYDROCHLORIDE 0.4 MG/ML
80 INJECTION, SOLUTION INTRAMUSCULAR; INTRAVENOUS; SUBCUTANEOUS AS NEEDED
Status: DISCONTINUED | OUTPATIENT
Start: 2022-07-13 | End: 2022-07-13 | Stop reason: HOSPADM

## 2022-07-13 RX ORDER — PROCHLORPERAZINE EDISYLATE 5 MG/ML
5 INJECTION INTRAMUSCULAR; INTRAVENOUS EVERY 8 HOURS PRN
Status: DISCONTINUED | OUTPATIENT
Start: 2022-07-13 | End: 2022-07-14

## 2022-07-13 RX ORDER — HYDROMORPHONE HYDROCHLORIDE 1 MG/ML
0.2 INJECTION, SOLUTION INTRAMUSCULAR; INTRAVENOUS; SUBCUTANEOUS EVERY 5 MIN PRN
Status: DISCONTINUED | OUTPATIENT
Start: 2022-07-13 | End: 2022-07-13 | Stop reason: HOSPADM

## 2022-07-13 RX ORDER — MORPHINE SULFATE 4 MG/ML
6 INJECTION, SOLUTION INTRAMUSCULAR; INTRAVENOUS EVERY 2 HOUR PRN
Status: ACTIVE | OUTPATIENT
Start: 2022-07-13 | End: 2022-07-14

## 2022-07-13 RX ORDER — TRANEXAMIC ACID 10 MG/ML
1000 INJECTION, SOLUTION INTRAVENOUS ONCE
Status: COMPLETED | OUTPATIENT
Start: 2022-07-13 | End: 2022-07-13

## 2022-07-13 RX ADMIN — SODIUM CHLORIDE, SODIUM LACTATE, POTASSIUM CHLORIDE, CALCIUM CHLORIDE: 600; 310; 30; 20 INJECTION, SOLUTION INTRAVENOUS at 12:23:00

## 2022-07-13 RX ADMIN — DEXAMETHASONE SODIUM PHOSPHATE 4 MG: 4 MG/ML VIAL (ML) INJECTION at 10:04:00

## 2022-07-13 RX ADMIN — TRANEXAMIC ACID 1000 MG: 10 INJECTION, SOLUTION INTRAVENOUS at 10:05:00

## 2022-07-13 RX ADMIN — ROCURONIUM BROMIDE 20 MG: 10 INJECTION, SOLUTION INTRAVENOUS at 11:44:00

## 2022-07-13 RX ADMIN — ROCURONIUM BROMIDE 5 MG: 10 INJECTION, SOLUTION INTRAVENOUS at 09:45:00

## 2022-07-13 RX ADMIN — ONDANSETRON 4 MG: 2 INJECTION INTRAMUSCULAR; INTRAVENOUS at 10:04:00

## 2022-07-13 RX ADMIN — LIDOCAINE HYDROCHLORIDE 50 MG: 10 INJECTION, SOLUTION EPIDURAL; INFILTRATION; INTRACAUDAL; PERINEURAL at 09:45:00

## 2022-07-13 RX ADMIN — ROCURONIUM BROMIDE 35 MG: 10 INJECTION, SOLUTION INTRAVENOUS at 10:44:00

## 2022-07-13 RX ADMIN — MIDAZOLAM HYDROCHLORIDE 2 MG: 1 INJECTION INTRAMUSCULAR; INTRAVENOUS at 09:23:00

## 2022-07-13 RX ADMIN — ROPIVACAINE HYDROCHLORIDE 30 ML: 5 INJECTION, SOLUTION EPIDURAL; INFILTRATION; PERINEURAL at 09:23:00

## 2022-07-13 RX ADMIN — SODIUM CHLORIDE, SODIUM LACTATE, POTASSIUM CHLORIDE, CALCIUM CHLORIDE: 600; 310; 30; 20 INJECTION, SOLUTION INTRAVENOUS at 09:40:00

## 2022-07-13 RX ADMIN — DEXAMETHASONE SODIUM PHOSPHATE 10 MG: 10 INJECTION, SOLUTION INTRAMUSCULAR; INTRAVENOUS at 09:23:00

## 2022-07-13 NOTE — OPERATIVE REPORT
Pre-Operative Diagnosis: Right shoulder osteoarthritis     Post-Operative Diagnosis: Right shoulder osteoarthritis      Procedure Performed:   Right total shoulder arthroplasty    Surgeon(s) and Role:     * Farida Meng MD - Primary     * Jenny Guevara MD - Assisting Surgeon    Assistant(s):  PA: Kwesi Umana PA-C     Surgical Findings: see dictation     Specimen: humeral head      Estimated Blood Loss:  100 cc    Dictation Number:  -     Zeus Cordoba MD  7/13/2022  12:45 PM

## 2022-07-13 NOTE — ANESTHESIA PROCEDURE NOTES
Peripheral Block    Date/Time: 7/13/2022 9:23 AM  Performed by: Michael Hsu MD  Authorized by: Michael Hsu MD       General Information and Staff    Start Time:  7/13/2022 9:21 AM  End Time:  7/13/2022 9:30 AM  Anesthesiologist:  Michael Hsu MD  Performed by: Anesthesiologist  Patient Location:  PACU      Site Identification: real time ultrasound guided and image stored and retrievable      Reason for Block: at surgeon's request and post-op pain management    Preanesthetic Checklist: 2 patient identifers, IV checked, risks and benefits discussed, monitors and equipment checked, pre-op evaluation, timeout performed, anesthesia consent and sterile technique used      Procedure Details    Patient Position:  Sitting  Prep: ChloraPrep    Monitoring:  Cardiac monitor  Block Type:   Interscalene  Laterality:  Right  Injection Technique:  Single-shot    Needle    Needle Type:  Short-bevel  Needle Gauge:  22 G  Needle Length:  50 mm  Needle Localization:  Ultrasound guidance and nerve stimulator  Reason for Ultrasound Use: appropriate spread of the medication was noted in real time and no ultrasound evidence of intravascular and/or intraneural injection      Muscle Twitch Response: deltoid response and distal twitch      Assessment    Injection Assessment:  Good spread noted, incremental injection, local visualized surrounding nerve on ultrasound, low pressure, negative aspiration for heme and no pain on injection  Heart Rate Change: No        Medications  7/13/2022 9:23 AM  Ropivacaine (NAROPIN) injection 0.5%, 30 mL  dexamethasone (DECADRON) PF injection 10 mg/ml, 10 mg  midazolam (VERSED)injection 2mg/2ml, 2 mg    Additional Comments

## 2022-07-14 VITALS
HEART RATE: 72 BPM | SYSTOLIC BLOOD PRESSURE: 118 MMHG | HEIGHT: 75 IN | WEIGHT: 291.81 LBS | RESPIRATION RATE: 18 BRPM | TEMPERATURE: 98 F | BODY MASS INDEX: 36.28 KG/M2 | OXYGEN SATURATION: 97 % | DIASTOLIC BLOOD PRESSURE: 69 MMHG

## 2022-07-14 LAB
HCT VFR BLD AUTO: 32.8 %
HGB BLD-MCNC: 10.8 G/DL

## 2022-07-14 PROCEDURE — 99239 HOSP IP/OBS DSCHRG MGMT >30: CPT | Performed by: INTERNAL MEDICINE

## 2022-07-14 NOTE — PLAN OF CARE
Problem: Patient Centered Care  Goal: Patient preferences are identified and integrated in the patient's plan of care  Description: Interventions:  - What would you like us to know as we care for you?   - Provide timely, complete, and accurate information to patient/family  - Incorporate patient and family knowledge, values, beliefs, and cultural backgrounds into the planning and delivery of care  - Encourage patient/family to participate in care and decision-making at the level they choose  - Honor patient and family perspectives and choices  Outcome: Progressing     Problem: PAIN - ADULT  Goal: Verbalizes/displays adequate comfort level or patient's stated pain goal  Description: INTERVENTIONS:  - Encourage pt to monitor pain and request assistance  - Assess pain using appropriate pain scale  - Administer analgesics based on type and severity of pain and evaluate response  - Implement non-pharmacological measures as appropriate and evaluate response  - Consider cultural and social influences on pain and pain management  - Manage/alleviate anxiety  - Utilize distraction and/or relaxation techniques  - Monitor for opioid side effects  - Notify MD/LIP if interventions unsuccessful or patient reports new pain  - Anticipate increased pain with activity and pre-medicate as appropriate  Outcome: Progressing     Problem: SAFETY ADULT - FALL  Goal: Free from fall injury  Description: INTERVENTIONS:  - Assess pt frequently for physical needs  - Identify cognitive and physical deficits and behaviors that affect risk of falls.   - [place] fall precautions as indicated by assessment.  - Educate pt/family on patient safety including physical limitations  - Instruct pt to call for assistance with activity based on assessment  - Modify environment to reduce risk of injury  - Provide assistive devices as appropriate  - Consider OT/PT consult to assist with strengthening/mobility  - Encourage toileting schedule  Outcome: Progressing     Problem: DISCHARGE PLANNING  Goal: Discharge to home or other facility with appropriate resources  Description: INTERVENTIONS:  - Identify barriers to discharge w/pt and caregiver  - Include patient/family/discharge partner in discharge planning  - Arrange for needed discharge resources and transportation as appropriate  - Identify discharge learning needs (meds, wound care, etc)  - Arrange for interpreters to assist at discharge as needed  - Consider post-discharge preferences of patient/family/discharge partner  - Complete POLST form as appropriate  - Assess patient's ability to be responsible for managing their own health  - Refer to Case Management Department for coordinating discharge planning if the patient needs post-hospital services based on physician/LIP order or complex needs related to functional status, cognitive ability or social support system  Outcome: Progressing     Problem: SKIN/TISSUE INTEGRITY - ADULT  Goal: Incision(s), wounds(s) or drain site(s) healing without S/S of infection  Description: INTERVENTIONS:  - Assess and document risk factors for pressure ulcer development  - Assess and document skin integrity  - Assess and document dressing/incision, wound bed, drain sites and surrounding tissue  - Implement wound care per orders  - Initiate isolation precautions as appropriate  - Initiate Pressure Ulcer prevention bundle as indicated  Outcome: Progressing     Problem: MUSCULOSKELETAL - ADULT  Goal: Return mobility to safest level of function  Description: INTERVENTIONS:  - Assess patient stability and activity tolerance for standing, transferring and ambulating w/ or w/o assistive devices  - Assist with transfers and ambulation using safe patient handling equipment as needed  - Ensure adequate protection for wounds/incisions during mobilization  - Obtain PT/OT consults as needed  - Advance activity as appropriate  - Communicate ordered activity level and limitations with patient/family  Outcome: Progressing  Goal: Maintain proper alignment of affected body part  Description: INTERVENTIONS:  - Support and protect limb and body alignment per provider's orders  - Instruct and reinforce with patient and family use of appropriate assistive device and precautions (e.g. spinal or hip dislocation precautions)  Outcome: Progressing     Problem: Impaired Activities of Daily Living  Goal: Achieve highest/safest level of independence in self care  Description: Interventions:  - Assess ability and encourage patient to participate in ADLs to maximize function  - Promote sitting position while performing ADLs such as feeding, grooming, and bathing  - Educate and encourage patient/family in tolerated functional activity level and precautions during self-care  - Provide support under elbow of weak side to prevent shoulder subluxation  Outcome: Progressing    Pt alert and oriented. Surgical dressing CDI. Immobilizer in place. Ambulates with stand by assist. On remote tele. Voids freely. Ice gel packs as needed. Eliquis for Dvt prophylaxis. Tolerating diet. Scheduled tylenol for pain. Call light within reach. Bed in lowest and locked position. Plan for home when cleared.

## 2022-07-14 NOTE — PLAN OF CARE
Ambulating in room, denies pain. Imobilizer brace and ice in place. Good appetite, no nausea or vomiting. Calling appropriately. Problem: Patient Centered Care  Goal: Patient preferences are identified and integrated in the patient's plan of care  Description: Interventions:  - What would you like us to know as we care for you?   - Provide timely, complete, and accurate information to patient/family  - Incorporate patient and family knowledge, values, beliefs, and cultural backgrounds into the planning and delivery of care  - Encourage patient/family to participate in care and decision-making at the level they choose  - Honor patient and family perspectives and choices  Outcome: Progressing     Problem: PAIN - ADULT  Goal: Verbalizes/displays adequate comfort level or patient's stated pain goal  Description: INTERVENTIONS:  - Encourage pt to monitor pain and request assistance  - Assess pain using appropriate pain scale  - Administer analgesics based on type and severity of pain and evaluate response  - Implement non-pharmacological measures as appropriate and evaluate response  - Consider cultural and social influences on pain and pain management  - Manage/alleviate anxiety  - Utilize distraction and/or relaxation techniques  - Monitor for opioid side effects  - Notify MD/LIP if interventions unsuccessful or patient reports new pain  - Anticipate increased pain with activity and pre-medicate as appropriate  Outcome: Progressing     Problem: SAFETY ADULT - FALL  Goal: Free from fall injury  Description: INTERVENTIONS:  - Assess pt frequently for physical needs  - Identify cognitive and physical deficits and behaviors that affect risk of falls.   - [place] fall precautions as indicated by assessment.  - Educate pt/family on patient safety including physical limitations  - Instruct pt to call for assistance with activity based on assessment  - Modify environment to reduce risk of injury  - Provide assistive devices as appropriate  - Consider OT/PT consult to assist with strengthening/mobility  - Encourage toileting schedule  Outcome: Progressing     Problem: DISCHARGE PLANNING  Goal: Discharge to home or other facility with appropriate resources  Description: INTERVENTIONS:  - Identify barriers to discharge w/pt and caregiver  - Include patient/family/discharge partner in discharge planning  - Arrange for needed discharge resources and transportation as appropriate  - Identify discharge learning needs (meds, wound care, etc)  - Arrange for interpreters to assist at discharge as needed  - Consider post-discharge preferences of patient/family/discharge partner  - Complete POLST form as appropriate  - Assess patient's ability to be responsible for managing their own health  - Refer to Case Management Department for coordinating discharge planning if the patient needs post-hospital services based on physician/LIP order or complex needs related to functional status, cognitive ability or social support system  Outcome: Progressing     Problem: SKIN/TISSUE INTEGRITY - ADULT  Goal: Incision(s), wounds(s) or drain site(s) healing without S/S of infection  Description: INTERVENTIONS:  - Assess and document risk factors for pressure ulcer development  - Assess and document skin integrity  - Assess and document dressing/incision, wound bed, drain sites and surrounding tissue  - Implement wound care per orders  - Initiate isolation precautions as appropriate  - Initiate Pressure Ulcer prevention bundle as indicated  Outcome: Progressing     Problem: MUSCULOSKELETAL - ADULT  Goal: Return mobility to safest level of function  Description: INTERVENTIONS:  - Assess patient stability and activity tolerance for standing, transferring and ambulating w/ or w/o assistive devices  - Assist with transfers and ambulation using safe patient handling equipment as needed  - Ensure adequate protection for wounds/incisions during mobilization  - Obtain PT/OT consults as needed  - Advance activity as appropriate  - Communicate ordered activity level and limitations with patient/family  Outcome: Progressing  Goal: Maintain proper alignment of affected body part  Description: INTERVENTIONS:  - Support and protect limb and body alignment per provider's orders  - Instruct and reinforce with patient and family use of appropriate assistive device and precautions (e.g. spinal or hip dislocation precautions)  Outcome: Progressing     Problem: Impaired Activities of Daily Living  Goal: Achieve highest/safest level of independence in self care  Description: Interventions:  - Assess ability and encourage patient to participate in ADLs to maximize function  - Promote sitting position while performing ADLs such as feeding, grooming, and bathing  - Educate and encourage patient/family in tolerated functional activity level and precautions during self-care

## 2022-07-14 NOTE — PLAN OF CARE
Pt A&O x4. General diet. Stand by assist. RA. Clear to be DC home by P/T, hospitalist and ortho. AVS provided. Patient has remained free from falls throughout the day. Hourly rounding maintained. Pt's bed in lowest position w/ side rails up. Patient has been educated and is compliant with rufina light system. The patient has been frequently assessed  and evaluated pursuant to at risk medications. Pt's room tidy and clear from potential fall risk.     Problem: Patient Centered Care  Goal: Patient preferences are identified and integrated in the patient's plan of care  Description: Interventions:  - What would you like us to know as we care for you?   - Provide timely, complete, and accurate information to patient/family  - Incorporate patient and family knowledge, values, beliefs, and cultural backgrounds into the planning and delivery of care  - Encourage patient/family to participate in care and decision-making at the level they choose  - Honor patient and family perspectives and choices  Outcome: Adequate for Discharge     Problem: PAIN - ADULT  Goal: Verbalizes/displays adequate comfort level or patient's stated pain goal  Description: INTERVENTIONS:  - Encourage pt to monitor pain and request assistance  - Assess pain using appropriate pain scale  - Administer analgesics based on type and severity of pain and evaluate response  - Implement non-pharmacological measures as appropriate and evaluate response  - Consider cultural and social influences on pain and pain management  - Manage/alleviate anxiety  - Utilize distraction and/or relaxation techniques  - Monitor for opioid side effects  - Notify MD/LIP if interventions unsuccessful or patient reports new pain  - Anticipate increased pain with activity and pre-medicate as appropriate  Outcome: Adequate for Discharge     Problem: SAFETY ADULT - FALL  Goal: Free from fall injury  Description: INTERVENTIONS:  - Assess pt frequently for physical needs  - Identify cognitive and physical deficits and behaviors that affect risk of falls.   - [place] fall precautions as indicated by assessment.  - Educate pt/family on patient safety including physical limitations  - Instruct pt to call for assistance with activity based on assessment  - Modify environment to reduce risk of injury  - Provide assistive devices as appropriate  - Consider OT/PT consult to assist with strengthening/mobility  - Encourage toileting schedule  Outcome: Adequate for Discharge     Problem: DISCHARGE PLANNING  Goal: Discharge to home or other facility with appropriate resources  Description: INTERVENTIONS:  - Identify barriers to discharge w/pt and caregiver  - Include patient/family/discharge partner in discharge planning  - Arrange for needed discharge resources and transportation as appropriate  - Identify discharge learning needs (meds, wound care, etc)  - Arrange for interpreters to assist at discharge as needed  - Consider post-discharge preferences of patient/family/discharge partner  - Complete POLST form as appropriate  - Assess patient's ability to be responsible for managing their own health  - Refer to Case Management Department for coordinating discharge planning if the patient needs post-hospital services based on physician/LIP order or complex needs related to functional status, cognitive ability or social support system  Outcome: Adequate for Discharge     Problem: SKIN/TISSUE INTEGRITY - ADULT  Goal: Incision(s), wounds(s) or drain site(s) healing without S/S of infection  Description: INTERVENTIONS:  - Assess and document risk factors for pressure ulcer development  - Assess and document skin integrity  - Assess and document dressing/incision, wound bed, drain sites and surrounding tissue  - Implement wound care per orders  - Initiate isolation precautions as appropriate  - Initiate Pressure Ulcer prevention bundle as indicated  Outcome: Adequate for Discharge     Problem: MUSCULOSKELETAL - ADULT  Goal: Return mobility to safest level of function  Description: INTERVENTIONS:  - Assess patient stability and activity tolerance for standing, transferring and ambulating w/ or w/o assistive devices  - Assist with transfers and ambulation using safe patient handling equipment as needed  - Ensure adequate protection for wounds/incisions during mobilization  - Obtain PT/OT consults as needed  - Advance activity as appropriate  - Communicate ordered activity level and limitations with patient/family  Outcome: Adequate for Discharge  Goal: Maintain proper alignment of affected body part  Description: INTERVENTIONS:  - Support and protect limb and body alignment per provider's orders  - Instruct and reinforce with patient and family use of appropriate assistive device and precautions (e.g. spinal or hip dislocation precautions)  Outcome: Adequate for Discharge     Problem: Impaired Activities of Daily Living  Goal: Achieve highest/safest level of independence in self care  Description: Interventions:  - Assess ability and encourage patient to participate in ADLs to maximize function  - Promote sitting position while performing ADLs such as feeding, grooming, and bathing  - Educate and encourage patient/family in tolerated functional activity level and precautions during self-care    Outcome: Adequate for Discharge     Problem: Patient/Family Goals  Goal: Patient/Family Long Term Goal  Description: Patient's Long Term Goal:     Interventions:  - See additional Care Plan goals for specific interventions  Outcome: Adequate for Discharge  Goal: Patient/Family Short Term Goal  Description: Patient's Short Term Goal:     Interventions:  - See additional Care Plan goals for specific interventions  Outcome: Adequate for Discharge

## 2022-07-18 NOTE — PAYOR COMM NOTE
Discharge Notification    Patient Name: Raheel Turkey  Payor: Lc Susiejuana #: [id number]  Authorization Number: [id number]  [facility] Date/Time: 7/13/2022 7:28 AM  Discharge Date/Time: 7/14/2022 3:41 PM

## 2022-07-24 ENCOUNTER — HOSPITAL ENCOUNTER (INPATIENT)
Facility: HOSPITAL | Age: 62
LOS: 8 days | Discharge: HOME | DRG: 559 | End: 2022-08-01
Attending: HOSPITALIST
Payer: MEDICARE

## 2022-07-24 ENCOUNTER — HOSPITAL ENCOUNTER (INPATIENT)
Facility: HOSPITAL | Age: 62
LOS: 8 days | Discharge: HOME | DRG: 559 | End: 2022-08-01
Attending: HOSPITALIST | Admitting: HOSPITALIST
Payer: MEDICARE

## 2022-07-24 DIAGNOSIS — M00.9: ICD-10-CM

## 2022-07-24 LAB
ALBUMIN SERPL-MCNC: 2.3 G/DL (ref 3.4–5)
ALP LIVER SERPL-CCNC: 127 U/L
ALT SERPL-CCNC: 18 U/L
ANION GAP SERPL CALC-SCNC: 8 MMOL/L (ref 0–18)
AST SERPL-CCNC: 19 U/L (ref 15–37)
BASOPHILS # BLD AUTO: 0.03 X10(3) UL (ref 0–0.2)
BASOPHILS NFR BLD AUTO: 0.2 %
BILIRUB DIRECT SERPL-MCNC: 0.9 MG/DL (ref 0–0.2)
BILIRUB SERPL-MCNC: 1.4 MG/DL (ref 0.1–2)
BUN BLD-MCNC: 20 MG/DL (ref 7–18)
BUN/CREAT SERPL: 19.2 (ref 10–20)
CALCIUM BLD-MCNC: 8.5 MG/DL (ref 8.5–10.1)
CHLORIDE SERPL-SCNC: 103 MMOL/L (ref 98–112)
CO2 SERPL-SCNC: 26 MMOL/L (ref 21–32)
CREAT BLD-MCNC: 1.04 MG/DL
DEPRECATED RDW RBC AUTO: 59.9 FL (ref 35.1–46.3)
EOSINOPHIL # BLD AUTO: 0.06 X10(3) UL (ref 0–0.7)
EOSINOPHIL NFR BLD AUTO: 0.4 %
ERYTHROCYTE [DISTWIDTH] IN BLOOD BY AUTOMATED COUNT: 14.7 % (ref 11–15)
GLUCOSE BLD-MCNC: 133 MG/DL (ref 70–99)
HCT VFR BLD AUTO: 31.2 %
HGB BLD-MCNC: 9.7 G/DL
IMM GRANULOCYTES # BLD AUTO: 0.13 X10(3) UL (ref 0–1)
IMM GRANULOCYTES NFR BLD: 0.8 %
INR BLD: 1.17 (ref 0.8–1.2)
LYMPHOCYTES # BLD AUTO: 0.53 X10(3) UL (ref 1–4)
LYMPHOCYTES NFR BLD AUTO: 3.2 %
MAGNESIUM SERPL-MCNC: 1.8 MG/DL (ref 1.6–2.6)
MCH RBC QN AUTO: 35.3 PG (ref 26–34)
MCHC RBC AUTO-ENTMCNC: 31.1 G/DL (ref 31–37)
MCV RBC AUTO: 113.5 FL
MONOCYTES # BLD AUTO: 0.8 X10(3) UL (ref 0.1–1)
MONOCYTES NFR BLD AUTO: 4.8 %
NEUTROPHILS # BLD AUTO: 15.12 X10 (3) UL (ref 1.5–7.7)
NEUTROPHILS # BLD AUTO: 15.12 X10(3) UL (ref 1.5–7.7)
NEUTROPHILS NFR BLD AUTO: 90.6 %
OSMOLALITY SERPL CALC.SUM OF ELEC: 289 MOSM/KG (ref 275–295)
PLATELET # BLD AUTO: 397 10(3)UL (ref 150–450)
POTASSIUM SERPL-SCNC: 3.6 MMOL/L (ref 3.5–5.1)
PROT SERPL-MCNC: 6.8 G/DL (ref 6.4–8.2)
PROTHROMBIN TIME: 15 SECONDS (ref 11.6–14.8)
RBC # BLD AUTO: 2.75 X10(6)UL
SODIUM SERPL-SCNC: 137 MMOL/L (ref 136–145)
WBC # BLD AUTO: 16.7 X10(3) UL (ref 4–11)

## 2022-07-24 PROCEDURE — 99223 1ST HOSP IP/OBS HIGH 75: CPT | Performed by: HOSPITALIST

## 2022-07-24 RX ORDER — ALLOPURINOL 300 MG/1
TABLET ORAL
COMMUNITY
Start: 2022-06-08 | End: 2022-08-01

## 2022-07-24 RX ORDER — MAGNESIUM OXIDE 400 MG/1
400 TABLET ORAL ONCE
Status: COMPLETED | OUTPATIENT
Start: 2022-07-24 | End: 2022-07-25

## 2022-07-24 RX ORDER — BUPROPION HYDROCHLORIDE 150 MG/1
300 TABLET ORAL EVERY MORNING
Status: DISCONTINUED | OUTPATIENT
Start: 2022-07-25 | End: 2022-08-01

## 2022-07-24 RX ORDER — FAMOTIDINE 10 MG/ML
20 INJECTION, SOLUTION INTRAVENOUS 2 TIMES DAILY
Status: DISCONTINUED | OUTPATIENT
Start: 2022-07-24 | End: 2022-08-01

## 2022-07-24 RX ORDER — ATORVASTATIN CALCIUM 10 MG/1
10 TABLET, FILM COATED ORAL NIGHTLY
COMMUNITY
Start: 2022-04-26

## 2022-07-24 RX ORDER — TORSEMIDE 20 MG/1
TABLET ORAL
COMMUNITY
Start: 2022-04-20

## 2022-07-24 RX ORDER — DIGOXIN 125 MCG
0.25 TABLET ORAL DAILY
Status: DISCONTINUED | OUTPATIENT
Start: 2022-07-24 | End: 2022-08-01

## 2022-07-24 RX ORDER — FERROUS SULFATE 325(65) MG
1 TABLET ORAL DAILY
COMMUNITY
Start: 2019-11-08

## 2022-07-24 RX ORDER — HYDRALAZINE HYDROCHLORIDE 20 MG/ML
10 INJECTION INTRAMUSCULAR; INTRAVENOUS EVERY 4 HOURS PRN
Status: DISCONTINUED | OUTPATIENT
Start: 2022-07-24 | End: 2022-08-01

## 2022-07-24 RX ORDER — BUPROPION HYDROCHLORIDE 150 MG/1
150 TABLET, EXTENDED RELEASE ORAL NIGHTLY
Status: DISCONTINUED | OUTPATIENT
Start: 2022-07-24 | End: 2022-08-01

## 2022-07-24 RX ORDER — APIXABAN 5 MG/1
5 TABLET, FILM COATED ORAL 2 TIMES DAILY
COMMUNITY
Start: 2022-07-05

## 2022-07-24 RX ORDER — ALLOPURINOL 300 MG/1
300 TABLET ORAL DAILY
Status: DISCONTINUED | OUTPATIENT
Start: 2022-07-25 | End: 2022-08-01

## 2022-07-24 RX ORDER — MORPHINE SULFATE 4 MG/ML
4 INJECTION, SOLUTION INTRAMUSCULAR; INTRAVENOUS EVERY 2 HOUR PRN
Status: DISCONTINUED | OUTPATIENT
Start: 2022-07-24 | End: 2022-08-01

## 2022-07-24 RX ORDER — MORPHINE SULFATE 2 MG/ML
2 INJECTION, SOLUTION INTRAMUSCULAR; INTRAVENOUS EVERY 2 HOUR PRN
Status: DISCONTINUED | OUTPATIENT
Start: 2022-07-24 | End: 2022-08-01

## 2022-07-24 RX ORDER — ATORVASTATIN CALCIUM 10 MG/1
10 TABLET, FILM COATED ORAL NIGHTLY
Status: DISCONTINUED | OUTPATIENT
Start: 2022-07-24 | End: 2022-08-01

## 2022-07-24 RX ORDER — DIGOXIN 250 MCG
0.25 TABLET ORAL DAILY
COMMUNITY
Start: 2022-03-28

## 2022-07-24 RX ORDER — DICLOFENAC SODIUM 75 MG/1
TABLET, DELAYED RELEASE ORAL
Status: ON HOLD | COMMUNITY
End: 2022-07-24

## 2022-07-24 RX ORDER — ONDANSETRON 2 MG/ML
4 INJECTION INTRAMUSCULAR; INTRAVENOUS EVERY 6 HOURS PRN
Status: DISCONTINUED | OUTPATIENT
Start: 2022-07-24 | End: 2022-08-01

## 2022-07-24 RX ORDER — DILTIAZEM HYDROCHLORIDE 180 MG/1
180 CAPSULE, EXTENDED RELEASE ORAL DAILY
Status: DISCONTINUED | OUTPATIENT
Start: 2022-07-24 | End: 2022-08-01

## 2022-07-24 RX ORDER — AMOXICILLIN 500 MG/1
1 CAPSULE ORAL AS DIRECTED
COMMUNITY
End: 2022-08-01

## 2022-07-24 RX ORDER — CEFADROXIL 500 MG/1
CAPSULE ORAL
COMMUNITY
End: 2022-08-01

## 2022-07-24 RX ORDER — SPIRONOLACTONE 25 MG/1
TABLET ORAL
COMMUNITY
Start: 2019-11-08 | End: 2022-08-01

## 2022-07-24 RX ORDER — LEVOTHYROXINE SODIUM 0.05 MG/1
50 TABLET ORAL
Status: DISCONTINUED | OUTPATIENT
Start: 2022-07-25 | End: 2022-08-01

## 2022-07-24 RX ORDER — PSEUDOEPHEDRINE HCL 30 MG
100 TABLET ORAL 2 TIMES DAILY PRN
COMMUNITY
Start: 2020-09-29

## 2022-07-24 RX ORDER — SILDENAFIL 100 MG/1
TABLET, FILM COATED ORAL
COMMUNITY
End: 2022-08-01

## 2022-07-24 RX ORDER — SACUBITRIL AND VALSARTAN 24; 26 MG/1; MG/1
1 TABLET, FILM COATED ORAL 2 TIMES DAILY
COMMUNITY
Start: 2022-02-09 | End: 2022-08-01

## 2022-07-24 RX ORDER — OXYCODONE HYDROCHLORIDE AND ACETAMINOPHEN 5; 325 MG/1; MG/1
1-2 TABLET ORAL EVERY 4 HOURS PRN
Status: ON HOLD | COMMUNITY
Start: 2022-07-19 | End: 2022-07-24

## 2022-07-24 RX ORDER — ASCORBIC ACID 500 MG
500 TABLET ORAL DAILY
COMMUNITY
Start: 2020-07-27 | End: 2022-08-01

## 2022-07-24 RX ORDER — MORPHINE SULFATE 2 MG/ML
4 INJECTION, SOLUTION INTRAMUSCULAR; INTRAVENOUS
Status: DISCONTINUED | OUTPATIENT
Start: 2022-07-24 | End: 2022-08-01

## 2022-07-24 RX ORDER — DILTIAZEM HYDROCHLORIDE 180 MG/1
180 CAPSULE, EXTENDED RELEASE ORAL DAILY
Status: ON HOLD | COMMUNITY
End: 2022-07-24

## 2022-07-24 RX ORDER — AMOXICILLIN 875 MG/1
TABLET, COATED ORAL
COMMUNITY
End: 2022-08-01

## 2022-07-24 RX ORDER — SULFAMETHOXAZOLE AND TRIMETHOPRIM 800; 160 MG/1; MG/1
1 TABLET ORAL AS DIRECTED
COMMUNITY
End: 2022-08-01

## 2022-07-24 RX ORDER — ZOLPIDEM TARTRATE 5 MG/1
5 TABLET ORAL NIGHTLY PRN
Status: DISCONTINUED | OUTPATIENT
Start: 2022-07-24 | End: 2022-08-01

## 2022-07-24 RX ORDER — ALLOPURINOL 100 MG/1
2 TABLET ORAL DAILY
Status: ON HOLD | COMMUNITY
Start: 2019-03-05 | End: 2022-07-24

## 2022-07-24 RX ORDER — MAGNESIUM 200 MG
2 TABLET ORAL DAILY
COMMUNITY
Start: 2020-07-27

## 2022-07-24 RX ORDER — DEXTROSE AND SODIUM CHLORIDE 5; .45 G/100ML; G/100ML
INJECTION, SOLUTION INTRAVENOUS CONTINUOUS
Status: DISCONTINUED | OUTPATIENT
Start: 2022-07-24 | End: 2022-07-26

## 2022-07-24 RX ORDER — DILTIAZEM HYDROCHLORIDE 180 MG/1
180 CAPSULE, COATED, EXTENDED RELEASE ORAL DAILY
COMMUNITY
Start: 2022-05-02

## 2022-07-24 RX ORDER — VANCOMYCIN 2 GRAM/500 ML IN 0.9 % SODIUM CHLORIDE INTRAVENOUS
25 ONCE
Status: COMPLETED | OUTPATIENT
Start: 2022-07-24 | End: 2022-07-25

## 2022-07-24 RX ORDER — METOLAZONE 2.5 MG/1
TABLET ORAL
COMMUNITY
End: 2022-08-01

## 2022-07-25 ENCOUNTER — ANESTHESIA (OUTPATIENT)
Dept: SURGERY | Facility: HOSPITAL | Age: 62
End: 2022-07-25
Payer: MEDICARE

## 2022-07-25 ENCOUNTER — ANESTHESIA EVENT (OUTPATIENT)
Dept: SURGERY | Facility: HOSPITAL | Age: 62
End: 2022-07-25
Payer: MEDICARE

## 2022-07-25 ENCOUNTER — APPOINTMENT (OUTPATIENT)
Dept: CT IMAGING | Facility: HOSPITAL | Age: 62
DRG: 559 | End: 2022-07-25
Attending: PHYSICIAN ASSISTANT
Payer: MEDICARE

## 2022-07-25 PROBLEM — M00.9 SEPTIC JOINT OF RIGHT SHOULDER REGION (HCC): Status: ACTIVE | Noted: 2022-07-25

## 2022-07-25 LAB
ANION GAP SERPL CALC-SCNC: 10 MMOL/L (ref 0–18)
BUN BLD-MCNC: 24 MG/DL (ref 7–18)
BUN/CREAT SERPL: 21.4 (ref 10–20)
CALCIUM BLD-MCNC: 8.8 MG/DL (ref 8.5–10.1)
CHLORIDE SERPL-SCNC: 105 MMOL/L (ref 98–112)
CO2 SERPL-SCNC: 22 MMOL/L (ref 21–32)
CREAT BLD-MCNC: 1.12 MG/DL
GLUCOSE BLD-MCNC: 106 MG/DL (ref 70–99)
MAGNESIUM SERPL-MCNC: 1.9 MG/DL (ref 1.6–2.6)
OSMOLALITY SERPL CALC.SUM OF ELEC: 288 MOSM/KG (ref 275–295)
POTASSIUM SERPL-SCNC: 3.1 MMOL/L (ref 3.5–5.1)
SARS-COV-2 RNA RESP QL NAA+PROBE: DETECTED
SODIUM SERPL-SCNC: 137 MMOL/L (ref 136–145)

## 2022-07-25 PROCEDURE — 99233 SBSQ HOSP IP/OBS HIGH 50: CPT | Performed by: HOSPITALIST

## 2022-07-25 PROCEDURE — 0JBD0ZZ EXCISION OF RIGHT UPPER ARM SUBCUTANEOUS TISSUE AND FASCIA, OPEN APPROACH: ICD-10-PCS | Performed by: ORTHOPAEDIC SURGERY

## 2022-07-25 PROCEDURE — 0RRJ0J6 REPLACEMENT OF RIGHT SHOULDER JOINT WITH SYNTHETIC SUBSTITUTE, HUMERAL SURFACE, OPEN APPROACH: ICD-10-PCS | Performed by: ORTHOPAEDIC SURGERY

## 2022-07-25 PROCEDURE — 0RPJ0J6 REMOVAL OF SYNTHETIC SUBSTITUTE FROM RIGHT SHOULDER JOINT, HUMERAL SURFACE, OPEN APPROACH: ICD-10-PCS | Performed by: ORTHOPAEDIC SURGERY

## 2022-07-25 DEVICE — IMPLANTABLE DEVICE: Type: IMPLANTABLE DEVICE | Site: SHOULDER | Status: FUNCTIONAL

## 2022-07-25 RX ORDER — MIDAZOLAM HYDROCHLORIDE 1 MG/ML
INJECTION INTRAMUSCULAR; INTRAVENOUS AS NEEDED
Status: DISCONTINUED | OUTPATIENT
Start: 2022-07-25 | End: 2022-07-26 | Stop reason: SURG

## 2022-07-25 RX ORDER — VANCOMYCIN HYDROCHLORIDE
15 EVERY 12 HOURS
Status: DISCONTINUED | OUTPATIENT
Start: 2022-07-25 | End: 2022-07-27

## 2022-07-25 RX ORDER — POTASSIUM CHLORIDE 20 MEQ/1
40 TABLET, EXTENDED RELEASE ORAL ONCE
Status: COMPLETED | OUTPATIENT
Start: 2022-07-25 | End: 2022-07-25

## 2022-07-25 RX ORDER — SODIUM CHLORIDE 9 MG/ML
INJECTION, SOLUTION INTRAVENOUS CONTINUOUS PRN
Status: DISCONTINUED | OUTPATIENT
Start: 2022-07-25 | End: 2022-07-26 | Stop reason: SURG

## 2022-07-25 RX ORDER — MAGNESIUM OXIDE 400 MG/1
400 TABLET ORAL DAILY
Status: DISCONTINUED | OUTPATIENT
Start: 2022-07-26 | End: 2022-08-01

## 2022-07-25 RX ORDER — SODIUM CHLORIDE, SODIUM LACTATE, POTASSIUM CHLORIDE, CALCIUM CHLORIDE 600; 310; 30; 20 MG/100ML; MG/100ML; MG/100ML; MG/100ML
INJECTION, SOLUTION INTRAVENOUS CONTINUOUS PRN
Status: DISCONTINUED | OUTPATIENT
Start: 2022-07-25 | End: 2022-07-26 | Stop reason: SURG

## 2022-07-25 RX ORDER — DEXAMETHASONE SODIUM PHOSPHATE 4 MG/ML
VIAL (ML) INJECTION AS NEEDED
Status: DISCONTINUED | OUTPATIENT
Start: 2022-07-25 | End: 2022-07-26 | Stop reason: SURG

## 2022-07-25 RX ORDER — PHENYLEPHRINE HCL 10 MG/ML
VIAL (ML) INJECTION AS NEEDED
Status: DISCONTINUED | OUTPATIENT
Start: 2022-07-25 | End: 2022-07-26 | Stop reason: SURG

## 2022-07-25 RX ADMIN — PHENYLEPHRINE HCL 100 MCG: 10 MG/ML VIAL (ML) INJECTION at 23:19:00

## 2022-07-25 RX ADMIN — SODIUM CHLORIDE, SODIUM LACTATE, POTASSIUM CHLORIDE, CALCIUM CHLORIDE: 600; 310; 30; 20 INJECTION, SOLUTION INTRAVENOUS at 23:24:00

## 2022-07-25 RX ADMIN — SODIUM CHLORIDE: 9 INJECTION, SOLUTION INTRAVENOUS at 22:56:00

## 2022-07-25 RX ADMIN — MIDAZOLAM HYDROCHLORIDE 2 MG: 1 INJECTION INTRAMUSCULAR; INTRAVENOUS at 23:06:00

## 2022-07-25 RX ADMIN — DEXAMETHASONE SODIUM PHOSPHATE 4 MG: 4 MG/ML VIAL (ML) INJECTION at 23:27:00

## 2022-07-25 NOTE — PLAN OF CARE
Problem: Patient Centered Care  Goal: Patient preferences are identified and integrated in the patient's plan of care  Description: Interventions:  - What would you like us to know as we care for you?  Live at home  - Provide timely, complete, and accurate information to patient/family  - Incorporate patient and family knowledge, values, beliefs, and cultural backgrounds into the planning and delivery of care  - Encourage patient/family to participate in care and decision-making at the level they choose  - Honor patient and family perspectives and choices  Outcome: Progressing     Problem: Patient/Family Goals  Goal: Patient/Family Long Term Goal  Description: Patient's Long Term Goal: go home    Interventions:  - follow md orders  -monitor vitalsigns, labs  -administer medications  - See additional Care Plan goals for specific interventions  Outcome: Progressing  Goal: Patient/Family Short Term Goal  Description: Patient's Short Term Goal: less pain    Interventions:   - follow md orders  - monitor vital signs, labs  - administer prn pain medication  - See additional Care Plan goals for specific interventions  Outcome: Progressing     Problem: PAIN - ADULT  Goal: Verbalizes/displays adequate comfort level or patient's stated pain goal  Description: INTERVENTIONS:  - Encourage pt to monitor pain and request assistance  - Assess pain using appropriate pain scale  - Administer analgesics based on type and severity of pain and evaluate response  - Implement non-pharmacological measures as appropriate and evaluate response  - Consider cultural and social influences on pain and pain management  - Manage/alleviate anxiety  - Utilize distraction and/or relaxation techniques  - Monitor for opioid side effects  - Notify MD/LIP if interventions unsuccessful or patient reports new pain  - Anticipate increased pain with activity and pre-medicate as appropriate  Outcome: Progressing     Problem: RISK FOR INFECTION - ADULT  Goal: Absence of fever/infection during anticipated neutropenic period  Description: INTERVENTIONS  - Monitor WBC  - Administer growth factors as ordered  - Implement neutropenic guidelines  Outcome: Progressing     Problem: DISCHARGE PLANNING  Goal: Discharge to home or other facility with appropriate resources  Description: INTERVENTIONS:  - Identify barriers to discharge w/pt and caregiver  - Include patient/family/discharge partner in discharge planning  - Arrange for needed discharge resources and transportation as appropriate  - Identify discharge learning needs (meds, wound care, etc)  - Arrange for interpreters to assist at discharge as needed  - Consider post-discharge preferences of patient/family/discharge partner  - Complete POLST form as appropriate  - Assess patient's ability to be responsible for managing their own health  - Refer to Case Management Department for coordinating discharge planning if the patient needs post-hospital services based on physician/LIP order or complex needs related to functional status, cognitive ability or social support system  Outcome: Progressing  Direct admit from [facility], pt  is resting comfortably , denies pain at the moment, PRN Pain medication given, IVF, IV abx, call light within reach, all needs met at this time,

## 2022-07-25 NOTE — PLAN OF CARE
Mr. Iola Heimlich, Physician Assistant for Ortho came to see patient and was informed that patient had light brown oozing drainage coming from surgical site steri-strips. Physician Assistant is aware that 4x4 gauze pads along with Abdominal pad was applied to site for drainage. Dressing is dry and intact.

## 2022-07-25 NOTE — PROGRESS NOTES
[facility] Dosing Service    Initial Pharmacokinetic Consult for Vancomycin AUC Dosing    Suzanne Colunga is a 57year old patient who is being treated for cellulitis. Pharmacy has been asked to dose vancomycin by Myla Whitman MD.    Weights:  Ideal body weight: 84.5 kg (186 lb 4.6 oz)  Adjusted ideal body weight: 104.9 kg (231 lb 4 oz)  Actual weight:  135.5 kg (298 lb 11.2 oz)    Labs:  Lab Results   Component Value Date    CREATSERUM 1.04 07/24/2022      CrCl:  Estimated Creatinine Clearance: 85.8 mL/min (based on SCr of 1.04 mg/dL). Based on the above:    1. This patient has received a loading dose of Vancomycin  2000 mg IVPB (25mg/kg, capped at 2000 mg) x 1 dose. This will be followed by 1500 mg Q 12 hours based upon adjusted body weight of 104.9 kg and renal function. 2. Vancomycin peak and trough will be obtained at steady state in order to calculate AUC24. Goal AUC24 is 400-600 mg-h/L.    3. Pharmacy will order SCr as clinically indicated while on vancomycin to assess renal function. 4. Pharmacy will follow and monitor renal function, toxicity and efficacy. We appreciate the opportunity to assist in the care of this patient.     Fe Gallo PharmD  7/25/2022  1:08 AM  [place]  Pharmacy Extension: [phone]

## 2022-07-26 ENCOUNTER — APPOINTMENT (OUTPATIENT)
Dept: PICC SERVICES | Facility: HOSPITAL | Age: 62
DRG: 559 | End: 2022-07-26
Attending: PHYSICIAN ASSISTANT
Payer: MEDICARE

## 2022-07-26 ENCOUNTER — APPOINTMENT (OUTPATIENT)
Dept: GENERAL RADIOLOGY | Facility: HOSPITAL | Age: 62
DRG: 559 | End: 2022-07-26
Attending: PHYSICIAN ASSISTANT
Payer: MEDICARE

## 2022-07-26 ENCOUNTER — APPOINTMENT (OUTPATIENT)
Dept: GENERAL RADIOLOGY | Facility: HOSPITAL | Age: 62
DRG: 559 | End: 2022-07-26
Attending: STUDENT IN AN ORGANIZED HEALTH CARE EDUCATION/TRAINING PROGRAM
Payer: MEDICARE

## 2022-07-26 LAB
BASOPHILS # BLD: 0 X10(3) UL (ref 0–0.2)
BASOPHILS NFR BLD: 0 %
CRP SERPL-MCNC: 27.8 MG/DL (ref ?–0.3)
DEPRECATED RDW RBC AUTO: 62.8 FL (ref 35.1–46.3)
EOSINOPHIL # BLD: 0 X10(3) UL (ref 0–0.7)
EOSINOPHIL NFR BLD: 0 %
ERYTHROCYTE [DISTWIDTH] IN BLOOD BY AUTOMATED COUNT: 14.9 % (ref 11–15)
ERYTHROCYTE [SEDIMENTATION RATE] IN BLOOD: 103 MM/HR
HCT VFR BLD AUTO: 28.9 %
HGB BLD-MCNC: 9.1 G/DL
LYMPHOCYTES NFR BLD: 0.36 X10(3) UL (ref 1–4)
LYMPHOCYTES NFR BLD: 2 %
MCH RBC QN AUTO: 35.8 PG (ref 26–34)
MCHC RBC AUTO-ENTMCNC: 31.5 G/DL (ref 31–37)
MCV RBC AUTO: 113.8 FL
MONOCYTES # BLD: 0.72 X10(3) UL (ref 0.1–1)
MONOCYTES NFR BLD: 4 %
NEUTROPHILS # BLD AUTO: 16.11 X10 (3) UL (ref 1.5–7.7)
NEUTROPHILS NFR BLD: 92 %
NEUTS BAND NFR BLD: 2 %
NEUTS HYPERSEG # BLD: 16.83 X10(3) UL (ref 1.5–7.7)
PLATELET # BLD AUTO: 479 10(3)UL (ref 150–450)
PLATELET MORPHOLOGY: NORMAL
RBC # BLD AUTO: 2.54 X10(6)UL
TOTAL CELLS COUNTED BLD: 100
WBC # BLD AUTO: 17.9 X10(3) UL (ref 4–11)

## 2022-07-26 PROCEDURE — 99233 SBSQ HOSP IP/OBS HIGH 50: CPT | Performed by: HOSPITALIST

## 2022-07-26 PROCEDURE — 02HV33Z INSERTION OF INFUSION DEVICE INTO SUPERIOR VENA CAVA, PERCUTANEOUS APPROACH: ICD-10-PCS | Performed by: HOSPITALIST

## 2022-07-26 PROCEDURE — 71045 X-RAY EXAM CHEST 1 VIEW: CPT | Performed by: STUDENT IN AN ORGANIZED HEALTH CARE EDUCATION/TRAINING PROGRAM

## 2022-07-26 PROCEDURE — 73030 X-RAY EXAM OF SHOULDER: CPT | Performed by: PHYSICIAN ASSISTANT

## 2022-07-26 RX ORDER — VANCOMYCIN 2 GRAM/500 ML IN 0.9 % SODIUM CHLORIDE INTRAVENOUS
15 ONCE
Status: DISCONTINUED | OUTPATIENT
Start: 2022-07-26 | End: 2022-07-26

## 2022-07-26 RX ORDER — BISACODYL 10 MG
10 SUPPOSITORY, RECTAL RECTAL
Status: DISCONTINUED | OUTPATIENT
Start: 2022-07-26 | End: 2022-08-01

## 2022-07-26 RX ORDER — SODIUM PHOSPHATE, DIBASIC AND SODIUM PHOSPHATE, MONOBASIC 7; 19 G/133ML; G/133ML
1 ENEMA RECTAL ONCE AS NEEDED
Status: DISCONTINUED | OUTPATIENT
Start: 2022-07-26 | End: 2022-07-28

## 2022-07-26 RX ORDER — VANCOMYCIN HYDROCHLORIDE 1 G/20ML
INJECTION, POWDER, LYOPHILIZED, FOR SOLUTION INTRAVENOUS AS NEEDED
Status: DISCONTINUED | OUTPATIENT
Start: 2022-07-26 | End: 2022-07-26 | Stop reason: HOSPADM

## 2022-07-26 RX ORDER — ACETAMINOPHEN 325 MG/1
650 TABLET ORAL
Status: DISCONTINUED | OUTPATIENT
Start: 2022-07-26 | End: 2022-08-01

## 2022-07-26 RX ORDER — DIPHENHYDRAMINE HYDROCHLORIDE 50 MG/ML
25 INJECTION INTRAMUSCULAR; INTRAVENOUS ONCE AS NEEDED
Status: ACTIVE | OUTPATIENT
Start: 2022-07-26 | End: 2022-07-26

## 2022-07-26 RX ORDER — ONDANSETRON 2 MG/ML
INJECTION INTRAMUSCULAR; INTRAVENOUS AS NEEDED
Status: DISCONTINUED | OUTPATIENT
Start: 2022-07-26 | End: 2022-07-26 | Stop reason: SURG

## 2022-07-26 RX ORDER — HYDROMORPHONE HYDROCHLORIDE 1 MG/ML
0.2 INJECTION, SOLUTION INTRAMUSCULAR; INTRAVENOUS; SUBCUTANEOUS EVERY 5 MIN PRN
Status: DISCONTINUED | OUTPATIENT
Start: 2022-07-26 | End: 2022-07-26 | Stop reason: HOSPADM

## 2022-07-26 RX ORDER — DOCUSATE SODIUM 100 MG/1
100 CAPSULE, LIQUID FILLED ORAL 2 TIMES DAILY
Status: DISCONTINUED | OUTPATIENT
Start: 2022-07-26 | End: 2022-08-01

## 2022-07-26 RX ORDER — MORPHINE SULFATE 10 MG/ML
6 INJECTION, SOLUTION INTRAMUSCULAR; INTRAVENOUS EVERY 10 MIN PRN
Status: DISCONTINUED | OUTPATIENT
Start: 2022-07-26 | End: 2022-07-26 | Stop reason: HOSPADM

## 2022-07-26 RX ORDER — HYDROMORPHONE HYDROCHLORIDE 1 MG/ML
0.4 INJECTION, SOLUTION INTRAMUSCULAR; INTRAVENOUS; SUBCUTANEOUS EVERY 5 MIN PRN
Status: DISCONTINUED | OUTPATIENT
Start: 2022-07-26 | End: 2022-07-26 | Stop reason: HOSPADM

## 2022-07-26 RX ORDER — ONDANSETRON 2 MG/ML
4 INJECTION INTRAMUSCULAR; INTRAVENOUS EVERY 6 HOURS PRN
Status: DISCONTINUED | OUTPATIENT
Start: 2022-07-26 | End: 2022-08-01

## 2022-07-26 RX ORDER — HYDROMORPHONE HYDROCHLORIDE 1 MG/ML
0.4 INJECTION, SOLUTION INTRAMUSCULAR; INTRAVENOUS; SUBCUTANEOUS EVERY 2 HOUR PRN
Status: DISCONTINUED | OUTPATIENT
Start: 2022-07-26 | End: 2022-08-01

## 2022-07-26 RX ORDER — HYDROMORPHONE HYDROCHLORIDE 1 MG/ML
0.6 INJECTION, SOLUTION INTRAMUSCULAR; INTRAVENOUS; SUBCUTANEOUS EVERY 5 MIN PRN
Status: DISCONTINUED | OUTPATIENT
Start: 2022-07-26 | End: 2022-07-26 | Stop reason: HOSPADM

## 2022-07-26 RX ORDER — PROCHLORPERAZINE EDISYLATE 5 MG/ML
5 INJECTION INTRAMUSCULAR; INTRAVENOUS EVERY 8 HOURS PRN
Status: DISCONTINUED | OUTPATIENT
Start: 2022-07-26 | End: 2022-08-01

## 2022-07-26 RX ORDER — OXYCODONE HYDROCHLORIDE 5 MG/1
5 TABLET ORAL EVERY 4 HOURS PRN
Status: DISCONTINUED | OUTPATIENT
Start: 2022-07-26 | End: 2022-08-01

## 2022-07-26 RX ORDER — HYDROMORPHONE HYDROCHLORIDE 1 MG/ML
0.8 INJECTION, SOLUTION INTRAMUSCULAR; INTRAVENOUS; SUBCUTANEOUS EVERY 2 HOUR PRN
Status: DISCONTINUED | OUTPATIENT
Start: 2022-07-26 | End: 2022-08-01

## 2022-07-26 RX ORDER — SENNOSIDES 8.6 MG
17.2 TABLET ORAL NIGHTLY
Status: DISCONTINUED | OUTPATIENT
Start: 2022-07-26 | End: 2022-08-01

## 2022-07-26 RX ORDER — POLYETHYLENE GLYCOL 3350 17 G/17G
17 POWDER, FOR SOLUTION ORAL DAILY PRN
Status: DISCONTINUED | OUTPATIENT
Start: 2022-07-26 | End: 2022-08-01

## 2022-07-26 RX ORDER — NALOXONE HYDROCHLORIDE 0.4 MG/ML
80 INJECTION, SOLUTION INTRAMUSCULAR; INTRAVENOUS; SUBCUTANEOUS AS NEEDED
Status: DISCONTINUED | OUTPATIENT
Start: 2022-07-26 | End: 2022-07-26 | Stop reason: HOSPADM

## 2022-07-26 RX ORDER — OXYCODONE HYDROCHLORIDE 5 MG/1
10 TABLET ORAL EVERY 4 HOURS PRN
Status: DISCONTINUED | OUTPATIENT
Start: 2022-07-26 | End: 2022-08-01

## 2022-07-26 RX ORDER — SODIUM CHLORIDE, SODIUM LACTATE, POTASSIUM CHLORIDE, CALCIUM CHLORIDE 600; 310; 30; 20 MG/100ML; MG/100ML; MG/100ML; MG/100ML
INJECTION, SOLUTION INTRAVENOUS CONTINUOUS
Status: DISCONTINUED | OUTPATIENT
Start: 2022-07-26 | End: 2022-07-26 | Stop reason: HOSPADM

## 2022-07-26 RX ORDER — MORPHINE SULFATE 4 MG/ML
4 INJECTION, SOLUTION INTRAMUSCULAR; INTRAVENOUS EVERY 10 MIN PRN
Status: DISCONTINUED | OUTPATIENT
Start: 2022-07-26 | End: 2022-07-26 | Stop reason: HOSPADM

## 2022-07-26 RX ORDER — MORPHINE SULFATE 4 MG/ML
2 INJECTION, SOLUTION INTRAMUSCULAR; INTRAVENOUS EVERY 10 MIN PRN
Status: DISCONTINUED | OUTPATIENT
Start: 2022-07-26 | End: 2022-07-26 | Stop reason: HOSPADM

## 2022-07-26 RX ORDER — LIDOCAINE HYDROCHLORIDE 10 MG/ML
5 INJECTION, SOLUTION EPIDURAL; INFILTRATION; INTRACAUDAL; PERINEURAL
Status: COMPLETED | OUTPATIENT
Start: 2022-07-26 | End: 2022-07-26

## 2022-07-26 RX ADMIN — PHENYLEPHRINE HCL 50 MCG: 10 MG/ML VIAL (ML) INJECTION at 00:46:00

## 2022-07-26 RX ADMIN — PHENYLEPHRINE HCL 100 MCG: 10 MG/ML VIAL (ML) INJECTION at 01:01:00

## 2022-07-26 RX ADMIN — ONDANSETRON 4 MG: 2 INJECTION INTRAMUSCULAR; INTRAVENOUS at 00:35:00

## 2022-07-26 NOTE — PLAN OF CARE
Problem: Patient Centered Care  Goal: Patient preferences are identified and integrated in the patient's plan of care  Description: Interventions:  - What would you like us to know as we care for you?  Live at home  - Provide timely, complete, and accurate information to patient/family  - Incorporate patient and family knowledge, values, beliefs, and cultural backgrounds into the planning and delivery of care  - Encourage patient/family to participate in care and decision-making at the level they choose  - Honor patient and family perspectives and choices  Outcome: Progressing     Problem: Patient/Family Goals  Goal: Patient/Family Long Term Goal  Description: Patient's Long Term Goal: go home    Interventions:  - follow md orders  -monitor vitalsigns, labs  -administer medications  - See additional Care Plan goals for specific interventions  Outcome: Progressing  Goal: Patient/Family Short Term Goal  Description: Patient's Short Term Goal: less pain    Interventions:   - follow md orders  - monitor vital signs, labs  - administer prn pain medication  - See additional Care Plan goals for specific interventions  Outcome: Progressing     Problem: PAIN - ADULT  Goal: Verbalizes/displays adequate comfort level or patient's stated pain goal  Description: INTERVENTIONS:  - Encourage pt to monitor pain and request assistance  - Assess pain using appropriate pain scale  - Administer analgesics based on type and severity of pain and evaluate response  - Implement non-pharmacological measures as appropriate and evaluate response  - Consider cultural and social influences on pain and pain management  - Manage/alleviate anxiety  - Utilize distraction and/or relaxation techniques  - Monitor for opioid side effects  - Notify MD/LIP if interventions unsuccessful or patient reports new pain  - Anticipate increased pain with activity and pre-medicate as appropriate  Outcome: Progressing     Problem: RISK FOR INFECTION - ADULT  Goal: Absence of fever/infection during anticipated neutropenic period  Description: INTERVENTIONS  - Monitor WBC  - Administer growth factors as ordered  - Implement neutropenic guidelines  Outcome: Progressing     Problem: SAFETY ADULT - FALL  Goal: Free from fall injury  Description: INTERVENTIONS:  - Assess pt frequently for physical needs  - Identify cognitive and physical deficits and behaviors that affect risk of falls. - [place] fall precautions as indicated by assessment.  - Educate pt/family on patient safety including physical limitations  - Instruct pt to call for assistance with activity based on assessment  - Modify environment to reduce risk of injury  - Provide assistive devices as appropriate  - Consider OT/PT consult to assist with strengthening/mobility  - Encourage toileting schedule  Outcome: Progressing     Problem: DISCHARGE PLANNING  Goal: Discharge to home or other facility with appropriate resources  Description: INTERVENTIONS:  - Identify barriers to discharge w/pt and caregiver  - Include patient/family/discharge partner in discharge planning  - Arrange for needed discharge resources and transportation as appropriate  - Identify discharge learning needs (meds, wound care, etc)  - Arrange for interpreters to assist at discharge as needed  - Consider post-discharge preferences of patient/family/discharge partner  - Complete POLST form as appropriate  - Assess patient's ability to be responsible for managing their own health  - Refer to Case Management Department for coordinating discharge planning if the patient needs post-hospital services based on physician/LIP order or complex needs related to functional status, cognitive ability or social support system  Outcome: Progressing     Patient is presently resting in the bed. Alert x 4. Patient is medicated as needed for pain or discomfort. Vital signs taken and stable. Patient receives intravenous fluids per doctor orders.  Patient receives intravenous antibiotics per orders. Patient was seen by Infection and Disease doctor on today. Patient seen by Mr. Jocy Asher, Physician Assistant for Ortho per consult. Patient currently Nothing by mouth for surgical procedure on tomorrow.

## 2022-07-26 NOTE — ANESTHESIA PROCEDURE NOTES
Peripheral IV  Date/Time: 7/25/2022 11:24 PM  Inserted by: Alpesh Pang MD    Placement  Needle size: 18 G  Laterality: left  Location: wrist  Local anesthetic: none  Site prep: alcohol  Technique: anatomical landmarks  Attempts: 1

## 2022-07-26 NOTE — OPERATIVE REPORT
Pre-Operative Diagnosis: Septic joint of right shoulder region [facility]) [M00.9]     Post-Operative Diagnosis: Septic joint of right shoulder region [facility]) [M00.9]      Procedure Performed:   RIGHT SHOULDER IRRIGATION AND DEBRIDEMENT WITH REVISION OF TOTAL SHOULDER ARTHROPLASTY,  APPLICATION OF PREVENA WOUND VAC    Surgeon(s) and Role:     Gricelda Britton MD - Primary    Assistant(s):  PA: Becka Daigle PA-C     Surgical Findings: see dictation     Specimen: tissue cultures      Estimated Blood Loss: 200 ml     Dictation Number:  Art SUJIT Kaur  7/26/2022  1:29 AM

## 2022-07-26 NOTE — ANESTHESIA POSTPROCEDURE EVALUATION
Patient: Jojo Ng    Procedure Summary     Date: 07/25/22 Room / Location: [facility] MAIN OR [address] [place] [address] MAIN OR    Anesthesia Start: 2256 Anesthesia Stop:     Procedure: RIGHT SHOULDER IRRIGATION AND DEBRIDEMENT WITH REVISION OF TOTAL SHOULDER ARTHROPLASTY,  APPLICATION OF PREVENA WOUND VAC (Right ) Diagnosis:       Septic joint of right shoulder region ([address]      (Septic joint of right shoulder region [facility]) [M00.9])    Surgeons: Zena Carl MD Anesthesiologist: General Zakiya MD    Anesthesia Type: general ASA Status: 3 - Emergent          Anesthesia Type: general    Vitals Value Taken Time   /60 07/26/22 0129   Temp 98.7 07/26/22 0129   Pulse 75 07/26/22 0129   Resp 18 07/26/22 0129   SpO2 99 07/26/22 0129       [facility] AN Post Evaluation:   Patient Evaluated in PACU  Patient Participation: complete - patient participated  Level of Consciousness: awake and alert  Pain Score: 0  Pain Management: adequate  Airway Patency:patent  Dental exam unchanged from preop  Yes    Cardiovascular Status: hemodynamically stable  Respiratory Status: nasal cannula  Postoperative Hydration stable      YENY Jeannie Ganser, MD  7/26/2022 1:29 AM

## 2022-07-26 NOTE — PLAN OF CARE
Admitted from PACU. Given morphine x2 and dilaudid x1 for pain, sitting at the edge of the bed this morning. Neurovascularly intact, wiggles fingers on R hand, adequate cap refill, wound vac in place. Will continue to monitor. Problem: Patient Centered Care  Goal: Patient preferences are identified and integrated in the patient's plan of care  Description: Interventions:  - What would you like us to know as we care for you?  Live at home  - Provide timely, complete, and accurate information to patient/family  - Incorporate patient and family knowledge, values, beliefs, and cultural backgrounds into the planning and delivery of care  - Encourage patient/family to participate in care and decision-making at the level they choose  - Honor patient and family perspectives and choices  Outcome: Progressing     Problem: Patient/Family Goals  Goal: Patient/Family Long Term Goal  Description: Patient's Long Term Goal: go home    Interventions:  - follow md orders  -monitor vitalsigns, labs  -administer medications  - See additional Care Plan goals for specific interventions  Outcome: Progressing  Goal: Patient/Family Short Term Goal  Description: Patient's Short Term Goal: less pain    Interventions:   - follow md orders  - monitor vital signs, labs  - administer prn pain medication  - See additional Care Plan goals for specific interventions  Outcome: Progressing     Problem: PAIN - ADULT  Goal: Verbalizes/displays adequate comfort level or patient's stated pain goal  Description: INTERVENTIONS:  - Encourage pt to monitor pain and request assistance  - Assess pain using appropriate pain scale  - Administer analgesics based on type and severity of pain and evaluate response  - Implement non-pharmacological measures as appropriate and evaluate response  - Consider cultural and social influences on pain and pain management  - Manage/alleviate anxiety  - Utilize distraction and/or relaxation techniques  - Monitor for opioid side effects  - Notify MD/LIP if interventions unsuccessful or patient reports new pain  - Anticipate increased pain with activity and pre-medicate as appropriate  Outcome: Progressing     Problem: RISK FOR INFECTION - ADULT  Goal: Absence of fever/infection during anticipated neutropenic period  Description: INTERVENTIONS  - Monitor WBC  - Administer growth factors as ordered  - Implement neutropenic guidelines  Outcome: Progressing     Problem: SAFETY ADULT - FALL  Goal: Free from fall injury  Description: INTERVENTIONS:  - Assess pt frequently for physical needs  - Identify cognitive and physical deficits and behaviors that affect risk of falls.   - [place] fall precautions as indicated by assessment.  - Educate pt/family on patient safety including physical limitations  - Instruct pt to call for assistance with activity based on assessment  - Modify environment to reduce risk of injury  - Provide assistive devices as appropriate  - Consider OT/PT consult to assist with strengthening/mobility  - Encourage toileting schedule  Outcome: Progressing     Problem: DISCHARGE PLANNING  Goal: Discharge to home or other facility with appropriate resources  Description: INTERVENTIONS:  - Identify barriers to discharge w/pt and caregiver  - Include patient/family/discharge partner in discharge planning  - Arrange for needed discharge resources and transportation as appropriate  - Identify discharge learning needs (meds, wound care, etc)  - Arrange for interpreters to assist at discharge as needed  - Consider post-discharge preferences of patient/family/discharge partner  - Complete POLST form as appropriate  - Assess patient's ability to be responsible for managing their own health  - Refer to Case Management Department for coordinating discharge planning if the patient needs post-hospital services based on physician/LIP order or complex needs related to functional status, cognitive ability or social support system  Outcome: Progressing

## 2022-07-26 NOTE — ANESTHESIA PROCEDURE NOTES
Airway  Date/Time: 7/25/2022 11:10 PM  Urgency: Elective      General Information and Staff    Patient location during procedure: OR  Anesthesiologist: Warnell Gosselin, MD  Performed: anesthesiologist     Indications and Patient Condition  Indications for airway management: anesthesia  Spontaneous Ventilation: absent  Sedation level: deep  Preoxygenated: yes  Patient position: sniffing  Mask difficulty assessment: 0 - not attempted  Planned trial extubation    Final Airway Details  Final airway type: endotracheal airway      Successful airway: ETT  Cuffed: yes   Successful intubation technique: Video laryngoscopy  Facilitating devices/methods: intubating stylet, cricoid pressure and rapid sequence intubation  Endotracheal tube insertion site: oral  Blade: GlideScope  Blade size: #4  ETT size (mm): 7.5    Cormack-Lehane Classification: grade I - full view of glottis  Placement verified by: chest auscultation and capnometry   Measured from: teeth  ETT to teeth (cm): 23  Number of attempts at approach: 1  Ventilation between attempts: none  Number of other approaches attempted: 0

## 2022-07-27 ENCOUNTER — APPOINTMENT (OUTPATIENT)
Dept: GENERAL RADIOLOGY | Facility: HOSPITAL | Age: 62
DRG: 559 | End: 2022-07-27
Attending: STUDENT IN AN ORGANIZED HEALTH CARE EDUCATION/TRAINING PROGRAM
Payer: MEDICARE

## 2022-07-27 LAB
BASOPHILS # BLD AUTO: 0.02 X10(3) UL (ref 0–0.2)
BASOPHILS NFR BLD AUTO: 0.2 %
DEPRECATED RDW RBC AUTO: 63.8 FL (ref 35.1–46.3)
EOSINOPHIL # BLD AUTO: 0.01 X10(3) UL (ref 0–0.7)
EOSINOPHIL NFR BLD AUTO: 0.1 %
ERYTHROCYTE [DISTWIDTH] IN BLOOD BY AUTOMATED COUNT: 15.1 % (ref 11–15)
HCT VFR BLD AUTO: 26.6 %
HGB BLD-MCNC: 8.1 G/DL
IMM GRANULOCYTES # BLD AUTO: 0.36 X10(3) UL (ref 0–1)
IMM GRANULOCYTES NFR BLD: 2.8 %
LYMPHOCYTES # BLD AUTO: 0.65 X10(3) UL (ref 1–4)
LYMPHOCYTES NFR BLD AUTO: 5.1 %
MCH RBC QN AUTO: 34.9 PG (ref 26–34)
MCHC RBC AUTO-ENTMCNC: 30.5 G/DL (ref 31–37)
MCV RBC AUTO: 114.7 FL
MONOCYTES # BLD AUTO: 0.91 X10(3) UL (ref 0.1–1)
MONOCYTES NFR BLD AUTO: 7.1 %
NEUTROPHILS # BLD AUTO: 10.84 X10 (3) UL (ref 1.5–7.7)
NEUTROPHILS # BLD AUTO: 10.84 X10(3) UL (ref 1.5–7.7)
NEUTROPHILS NFR BLD AUTO: 84.7 %
PLATELET # BLD AUTO: 416 10(3)UL (ref 150–450)
RBC # BLD AUTO: 2.32 X10(6)UL
VANCOMYCIN PEAK SERPL-MCNC: 31.5 UG/ML (ref 30–50)
WBC # BLD AUTO: 12.8 X10(3) UL (ref 4–11)

## 2022-07-27 PROCEDURE — 05WYX3Z REVISION OF INFUSION DEVICE IN UPPER VEIN, EXTERNAL APPROACH: ICD-10-PCS | Performed by: HOSPITALIST

## 2022-07-27 PROCEDURE — 71045 X-RAY EXAM CHEST 1 VIEW: CPT | Performed by: STUDENT IN AN ORGANIZED HEALTH CARE EDUCATION/TRAINING PROGRAM

## 2022-07-27 PROCEDURE — 99233 SBSQ HOSP IP/OBS HIGH 50: CPT | Performed by: HOSPITALIST

## 2022-07-27 RX ORDER — LIDOCAINE HYDROCHLORIDE 10 MG/ML
5 INJECTION, SOLUTION EPIDURAL; INFILTRATION; INTRACAUDAL; PERINEURAL
Status: COMPLETED | OUTPATIENT
Start: 2022-07-27 | End: 2022-07-27

## 2022-07-27 RX ORDER — CEFAZOLIN SODIUM/WATER 2 G/20 ML
2 SYRINGE (ML) INTRAVENOUS EVERY 8 HOURS
Status: DISCONTINUED | OUTPATIENT
Start: 2022-07-27 | End: 2022-07-30

## 2022-07-27 NOTE — CM/SW NOTE
Patient will require ancef every 8 hours for 6 weeks. Patient's L arm is in a sling and he has a wound vac to his arm. Patient lives with his 8 year old father. SHARMILA/REYNALDO to follow up on 7/28/22 to make determination on what is the best discharge plan for the patient. Patient cannot administer himself. PLAN:  Further assessment to be completed on 7/28 to determine a safe discharge plan for both IV infusion q 8 hours and wound vac needs. HH for IV and wound care needs if a capable CG can help administer or MATILDE for the duration of the antibiotic therapy. / to remain available for support and/or discharge planning.      Maribel PLEITEZN RN [address]  RN Case Manager  [phone]

## 2022-07-27 NOTE — PLAN OF CARE
Pt. Alert, oriented, vitals stable. C/o pain to right arm- prn  and scheduled medications with effectiveness. Up with assistance, set up for adls. Wound vac on and patent per orders. Isolation precautions per protocol, denies sob or respiratory distress on room air. Call light within reach, able to make needs known. Problem: Patient Centered Care  Goal: Patient preferences are identified and integrated in the patient's plan of care  Description: Interventions:  - What would you like us to know as we care for you?  Live at home  - Provide timely, complete, and accurate information to patient/family  - Incorporate patient and family knowledge, values, beliefs, and cultural backgrounds into the planning and delivery of care  - Encourage patient/family to participate in care and decision-making at the level they choose  - Honor patient and family perspectives and choices  Outcome: Progressing     Problem: Patient/Family Goals  Goal: Patient/Family Long Term Goal  Description: Patient's Long Term Goal: go home    Interventions:  - follow md orders  -monitor vitalsigns, labs  -administer medications  - See additional Care Plan goals for specific interventions  Outcome: Progressing  Goal: Patient/Family Short Term Goal  Description: Patient's Short Term Goal: less pain    Interventions:   - follow md orders  - monitor vital signs, labs  - administer prn pain medication  - See additional Care Plan goals for specific interventions  Outcome: Progressing     Problem: PAIN - ADULT  Goal: Verbalizes/displays adequate comfort level or patient's stated pain goal  Description: INTERVENTIONS:  - Encourage pt to monitor pain and request assistance  - Assess pain using appropriate pain scale  - Administer analgesics based on type and severity of pain and evaluate response  - Implement non-pharmacological measures as appropriate and evaluate response  - Consider cultural and social influences on pain and pain management  - Manage/alleviate anxiety  - Utilize distraction and/or relaxation techniques  - Monitor for opioid side effects  - Notify MD/LIP if interventions unsuccessful or patient reports new pain  - Anticipate increased pain with activity and pre-medicate as appropriate  Outcome: Progressing     Problem: RISK FOR INFECTION - ADULT  Goal: Absence of fever/infection during anticipated neutropenic period  Description: INTERVENTIONS  - Monitor WBC  - Administer growth factors as ordered  - Implement neutropenic guidelines  Outcome: Progressing     Problem: SAFETY ADULT - FALL  Goal: Free from fall injury  Description: INTERVENTIONS:  - Assess pt frequently for physical needs  - Identify cognitive and physical deficits and behaviors that affect risk of falls.   - [place] fall precautions as indicated by assessment.  - Educate pt/family on patient safety including physical limitations  - Instruct pt to call for assistance with activity based on assessment  - Modify environment to reduce risk of injury  - Provide assistive devices as appropriate  - Consider OT/PT consult to assist with strengthening/mobility  - Encourage toileting schedule  Outcome: Progressing     Problem: DISCHARGE PLANNING  Goal: Discharge to home or other facility with appropriate resources  Description: INTERVENTIONS:  - Identify barriers to discharge w/pt and caregiver  - Include patient/family/discharge partner in discharge planning  - Arrange for needed discharge resources and transportation as appropriate  - Identify discharge learning needs (meds, wound care, etc)  - Arrange for interpreters to assist at discharge as needed  - Consider post-discharge preferences of patient/family/discharge partner  - Complete POLST form as appropriate  - Assess patient's ability to be responsible for managing their own health  - Refer to Case Management Department for coordinating discharge planning if the patient needs post-hospital services based on physician/LIP order or complex needs related to functional status, cognitive ability or social support system  Outcome: Progressing     Problem: RESPIRATORY - ADULT  Goal: Achieves optimal ventilation and oxygenation  Description: INTERVENTIONS:  - Assess for changes in respiratory status  - Assess for changes in mentation and behavior  - Position to facilitate oxygenation and minimize respiratory effort  - Oxygen supplementation based on oxygen saturation or ABGs  - Provide Smoking Cessation handout, if applicable  - Encourage broncho-pulmonary hygiene including cough, deep breathe, Incentive Spirometry  - Assess the need for suctioning and perform as needed  - Assess and instruct to report SOB or any respiratory difficulty  - Respiratory Therapy support as indicated  - Manage/alleviate anxiety  - Monitor for signs/symptoms of CO2 retention  Outcome: Progressing     Problem: SKIN/TISSUE INTEGRITY - ADULT  Goal: Skin integrity remains intact  Description: INTERVENTIONS  - Assess and document risk factors for pressure ulcer development  - Assess and document skin integrity  - Monitor for areas of redness and/or skin breakdown  - Initiate interventions, skin care algorithm/standards of care as needed  Outcome: Progressing  Goal: Incision(s), wounds(s) or drain site(s) healing without S/S of infection  Description: INTERVENTIONS:  - Assess and document risk factors for pressure ulcer development  - Assess and document skin integrity  - Assess and document dressing/incision, wound bed, drain sites and surrounding tissue  - Implement wound care per orders  - Initiate isolation precautions as appropriate  - Initiate Pressure Ulcer prevention bundle as indicated  Outcome: Progressing  Goal: Oral mucous membranes remain intact  Description: INTERVENTIONS  - Assess oral mucosa and hygiene practices  - Implement preventative oral hygiene regimen  - Implement oral medicated treatments as ordered  Outcome: Progressing

## 2022-07-27 NOTE — PLAN OF CARE
Problem: Patient Centered Care  Goal: Patient preferences are identified and integrated in the patient's plan of care  Description: Interventions:  - What would you like us to know as we care for you?  Live at home  - Provide timely, complete, and accurate information to patient/family  - Incorporate patient and family knowledge, values, beliefs, and cultural backgrounds into the planning and delivery of care  - Encourage patient/family to participate in care and decision-making at the level they choose  - Honor patient and family perspectives and choices  Outcome: Progressing     Problem: Patient/Family Goals  Goal: Patient/Family Long Term Goal  Description: Patient's Long Term Goal: go home    Interventions:  - follow md orders  -monitor vitalsigns, labs  -administer medications  - See additional Care Plan goals for specific interventions  Outcome: Progressing  Goal: Patient/Family Short Term Goal  Description: Patient's Short Term Goal: less pain    Interventions:   - follow md orders  - monitor vital signs, labs  - administer prn pain medication  - See additional Care Plan goals for specific interventions  Outcome: Progressing     Problem: PAIN - ADULT  Goal: Verbalizes/displays adequate comfort level or patient's stated pain goal  Description: INTERVENTIONS:  - Encourage pt to monitor pain and request assistance  - Assess pain using appropriate pain scale  - Administer analgesics based on type and severity of pain and evaluate response  - Implement non-pharmacological measures as appropriate and evaluate response  - Consider cultural and social influences on pain and pain management  - Manage/alleviate anxiety  - Utilize distraction and/or relaxation techniques  - Monitor for opioid side effects  - Notify MD/LIP if interventions unsuccessful or patient reports new pain  - Anticipate increased pain with activity and pre-medicate as appropriate  Outcome: Progressing     Problem: RISK FOR INFECTION - ADULT  Goal: Absence of fever/infection during anticipated neutropenic period  Description: INTERVENTIONS  - Monitor WBC  - Administer growth factors as ordered  - Implement neutropenic guidelines  Outcome: Progressing     Problem: SAFETY ADULT - FALL  Goal: Free from fall injury  Description: INTERVENTIONS:  - Assess pt frequently for physical needs  - Identify cognitive and physical deficits and behaviors that affect risk of falls.   - [place] fall precautions as indicated by assessment.  - Educate pt/family on patient safety including physical limitations  - Instruct pt to call for assistance with activity based on assessment  - Modify environment to reduce risk of injury  - Provide assistive devices as appropriate  - Consider OT/PT consult to assist with strengthening/mobility  - Encourage toileting schedule  Outcome: Progressing     Problem: DISCHARGE PLANNING  Goal: Discharge to home or other facility with appropriate resources  Description: INTERVENTIONS:  - Identify barriers to discharge w/pt and caregiver  - Include patient/family/discharge partner in discharge planning  - Arrange for needed discharge resources and transportation as appropriate  - Identify discharge learning needs (meds, wound care, etc)  - Arrange for interpreters to assist at discharge as needed  - Consider post-discharge preferences of patient/family/discharge partner  - Complete POLST form as appropriate  - Assess patient's ability to be responsible for managing their own health  - Refer to Case Management Department for coordinating discharge planning if the patient needs post-hospital services based on physician/LIP order or complex needs related to functional status, cognitive ability or social support system  Outcome: Progressing     Problem: RESPIRATORY - ADULT  Goal: Achieves optimal ventilation and oxygenation  Description: INTERVENTIONS:  - Assess for changes in respiratory status  - Assess for changes in mentation and behavior  - Position to facilitate oxygenation and minimize respiratory effort  - Oxygen supplementation based on oxygen saturation or ABGs  - Provide Smoking Cessation handout, if applicable  - Encourage broncho-pulmonary hygiene including cough, deep breathe, Incentive Spirometry  - Assess the need for suctioning and perform as needed  - Assess and instruct to report SOB or any respiratory difficulty  - Respiratory Therapy support as indicated  - Manage/alleviate anxiety  - Monitor for signs/symptoms of CO2 retention  Outcome: Progressing     Problem: SKIN/TISSUE INTEGRITY - ADULT  Goal: Skin integrity remains intact  Description: INTERVENTIONS  - Assess and document risk factors for pressure ulcer development  - Assess and document skin integrity  - Monitor for areas of redness and/or skin breakdown  - Initiate interventions, skin care algorithm/standards of care as needed  Outcome: Progressing  Goal: Incision(s), wounds(s) or drain site(s) healing without S/S of infection  Description: INTERVENTIONS:  - Assess and document risk factors for pressure ulcer development  - Assess and document skin integrity  - Assess and document dressing/incision, wound bed, drain sites and surrounding tissue  - Implement wound care per orders  - Initiate isolation precautions as appropriate  - Initiate Pressure Ulcer prevention bundle as indicated  Outcome: Progressing  Goal: Oral mucous membranes remain intact  Description: INTERVENTIONS  - Assess oral mucosa and hygiene practices  - Implement preventative oral hygiene regimen  - Implement oral medicated treatments as ordered  Outcome: Progressing

## 2022-07-28 ENCOUNTER — APPOINTMENT (OUTPATIENT)
Dept: ULTRASOUND IMAGING | Facility: HOSPITAL | Age: 62
DRG: 559 | End: 2022-07-28
Attending: NURSE PRACTITIONER
Payer: MEDICARE

## 2022-07-28 LAB
ANION GAP SERPL CALC-SCNC: 11 MMOL/L (ref 0–18)
ANION GAP SERPL CALC-SCNC: 11 MMOL/L (ref 0–18)
BILIRUB UR QL: NEGATIVE
BUN BLD-MCNC: 48 MG/DL (ref 7–18)
BUN BLD-MCNC: 50 MG/DL (ref 7–18)
BUN/CREAT SERPL: 14.1 (ref 10–20)
BUN/CREAT SERPL: 14.5 (ref 10–20)
CALCIUM BLD-MCNC: 8.3 MG/DL (ref 8.5–10.1)
CALCIUM BLD-MCNC: 8.4 MG/DL (ref 8.5–10.1)
CHLORIDE SERPL-SCNC: 109 MMOL/L (ref 98–112)
CHLORIDE SERPL-SCNC: 109 MMOL/L (ref 98–112)
CLARITY UR: CLEAR
CO2 SERPL-SCNC: 19 MMOL/L (ref 21–32)
CO2 SERPL-SCNC: 20 MMOL/L (ref 21–32)
COLOR UR: YELLOW
CREAT BLD-MCNC: 3.31 MG/DL
CREAT BLD-MCNC: 3.55 MG/DL
CREAT UR-SCNC: 63 MG/DL
DEPRECATED RDW RBC AUTO: 66.2 FL (ref 35.1–46.3)
ERYTHROCYTE [DISTWIDTH] IN BLOOD BY AUTOMATED COUNT: 15.4 % (ref 11–15)
GLUCOSE BLD-MCNC: 87 MG/DL (ref 70–99)
GLUCOSE BLD-MCNC: 93 MG/DL (ref 70–99)
GLUCOSE UR-MCNC: NEGATIVE MG/DL
HCT VFR BLD AUTO: 27.3 %
HGB BLD-MCNC: 8.2 G/DL
HGB UR QL STRIP.AUTO: NEGATIVE
KETONES UR-MCNC: NEGATIVE MG/DL
LEUKOCYTE ESTERASE UR QL STRIP.AUTO: NEGATIVE
MCH RBC QN AUTO: 35.2 PG (ref 26–34)
MCHC RBC AUTO-ENTMCNC: 30 G/DL (ref 31–37)
MCV RBC AUTO: 117.2 FL
NITRITE UR QL STRIP.AUTO: NEGATIVE
NT-PROBNP SERPL-MCNC: 5378 PG/ML (ref ?–125)
OSMOLALITY SERPL CALC.SUM OF ELEC: 300 MOSM/KG (ref 275–295)
OSMOLALITY SERPL CALC.SUM OF ELEC: 303 MOSM/KG (ref 275–295)
PH UR: 5 [PH] (ref 5–8)
PLATELET # BLD AUTO: 474 10(3)UL (ref 150–450)
POTASSIUM SERPL-SCNC: 3.5 MMOL/L (ref 3.5–5.1)
POTASSIUM SERPL-SCNC: 3.7 MMOL/L (ref 3.5–5.1)
PROT UR-MCNC: NEGATIVE MG/DL
RBC # BLD AUTO: 2.33 X10(6)UL
SODIUM SERPL-SCNC: 139 MMOL/L (ref 136–145)
SODIUM SERPL-SCNC: 140 MMOL/L (ref 136–145)
SODIUM SERPL-SCNC: 28 MMOL/L
SP GR UR STRIP: <=1.005 (ref 1–1.03)
UROBILINOGEN UR STRIP-ACNC: 0.2
VANCOMYCIN SERPL-MCNC: 22.6 UG/ML (ref ?–40)
WBC # BLD AUTO: 9.8 X10(3) UL (ref 4–11)

## 2022-07-28 PROCEDURE — 76775 US EXAM ABDO BACK WALL LIM: CPT | Performed by: NURSE PRACTITIONER

## 2022-07-28 PROCEDURE — 99233 SBSQ HOSP IP/OBS HIGH 50: CPT | Performed by: HOSPITALIST

## 2022-07-28 RX ORDER — SODIUM CHLORIDE 9 MG/ML
INJECTION, SOLUTION INTRAVENOUS CONTINUOUS
Status: DISCONTINUED | OUTPATIENT
Start: 2022-07-28 | End: 2022-07-28

## 2022-07-28 RX ORDER — DIPHENHYDRAMINE HCL 25 MG
25 CAPSULE ORAL EVERY 6 HOURS PRN
Status: DISCONTINUED | OUTPATIENT
Start: 2022-07-28 | End: 2022-08-01

## 2022-07-28 NOTE — PLAN OF CARE
VS stable. C/o same right shoulder pain, relieved with oxy PO. Pt refusing to wear the sling at all times. Up with SBA. Strict intake and output. Daily weights. Fall precautions. 2D echo ordered. Problem: Patient Centered Care  Goal: Patient preferences are identified and integrated in the patient's plan of care  Description: Interventions:  - What would you like us to know as we care for you?  Live at home  - Provide timely, complete, and accurate information to patient/family  - Incorporate patient and family knowledge, values, beliefs, and cultural backgrounds into the planning and delivery of care  - Encourage patient/family to participate in care and decision-making at the level they choose  - Honor patient and family perspectives and choices  Outcome: Progressing     Problem: Patient/Family Goals  Goal: Patient/Family Long Term Goal  Description: Patient's Long Term Goal: go home    Interventions:  - follow md orders  -monitor vitalsigns, labs  -administer medications  - See additional Care Plan goals for specific interventions  Outcome: Progressing  Goal: Patient/Family Short Term Goal  Description: Patient's Short Term Goal: less pain    Interventions:   - follow md orders  - monitor vital signs, labs  - administer prn pain medication  - See additional Care Plan goals for specific interventions  Outcome: Progressing     Problem: PAIN - ADULT  Goal: Verbalizes/displays adequate comfort level or patient's stated pain goal  Description: INTERVENTIONS:  - Encourage pt to monitor pain and request assistance  - Assess pain using appropriate pain scale  - Administer analgesics based on type and severity of pain and evaluate response  - Implement non-pharmacological measures as appropriate and evaluate response  - Consider cultural and social influences on pain and pain management  - Manage/alleviate anxiety  - Utilize distraction and/or relaxation techniques  - Monitor for opioid side effects  - Notify MD/LIP if interventions unsuccessful or patient reports new pain  - Anticipate increased pain with activity and pre-medicate as appropriate  Outcome: Progressing     Problem: RISK FOR INFECTION - ADULT  Goal: Absence of fever/infection during anticipated neutropenic period  Description: INTERVENTIONS  - Monitor WBC  - Administer growth factors as ordered  - Implement neutropenic guidelines  Outcome: Progressing     Problem: SAFETY ADULT - FALL  Goal: Free from fall injury  Description: INTERVENTIONS:  - Assess pt frequently for physical needs  - Identify cognitive and physical deficits and behaviors that affect risk of falls.   - [place] fall precautions as indicated by assessment.  - Educate pt/family on patient safety including physical limitations  - Instruct pt to call for assistance with activity based on assessment  - Modify environment to reduce risk of injury  - Provide assistive devices as appropriate  - Consider OT/PT consult to assist with strengthening/mobility  - Encourage toileting schedule  Outcome: Progressing     Problem: DISCHARGE PLANNING  Goal: Discharge to home or other facility with appropriate resources  Description: INTERVENTIONS:  - Identify barriers to discharge w/pt and caregiver  - Include patient/family/discharge partner in discharge planning  - Arrange for needed discharge resources and transportation as appropriate  - Identify discharge learning needs (meds, wound care, etc)  - Arrange for interpreters to assist at discharge as needed  - Consider post-discharge preferences of patient/family/discharge partner  - Complete POLST form as appropriate  - Assess patient's ability to be responsible for managing their own health  - Refer to Case Management Department for coordinating discharge planning if the patient needs post-hospital services based on physician/LIP order or complex needs related to functional status, cognitive ability or social support system  Outcome: Progressing     Problem: RESPIRATORY - ADULT  Goal: Achieves optimal ventilation and oxygenation  Description: INTERVENTIONS:  - Assess for changes in respiratory status  - Assess for changes in mentation and behavior  - Position to facilitate oxygenation and minimize respiratory effort  - Oxygen supplementation based on oxygen saturation or ABGs  - Provide Smoking Cessation handout, if applicable  - Encourage broncho-pulmonary hygiene including cough, deep breathe, Incentive Spirometry  - Assess the need for suctioning and perform as needed  - Assess and instruct to report SOB or any respiratory difficulty  - Respiratory Therapy support as indicated  - Manage/alleviate anxiety  - Monitor for signs/symptoms of CO2 retention  Outcome: Progressing     Problem: SKIN/TISSUE INTEGRITY - ADULT  Goal: Skin integrity remains intact  Description: INTERVENTIONS  - Assess and document risk factors for pressure ulcer development  - Assess and document skin integrity  - Monitor for areas of redness and/or skin breakdown  - Initiate interventions, skin care algorithm/standards of care as needed  Outcome: Progressing  Goal: Incision(s), wounds(s) or drain site(s) healing without S/S of infection  Description: INTERVENTIONS:  - Assess and document risk factors for pressure ulcer development  - Assess and document skin integrity  - Assess and document dressing/incision, wound bed, drain sites and surrounding tissue  - Implement wound care per orders  - Initiate isolation precautions as appropriate  - Initiate Pressure Ulcer prevention bundle as indicated  Outcome: Progressing  Goal: Oral mucous membranes remain intact  Description: INTERVENTIONS  - Assess oral mucosa and hygiene practices  - Implement preventative oral hygiene regimen  - Implement oral medicated treatments as ordered  Outcome: Progressing

## 2022-07-28 NOTE — PLAN OF CARE
Problem: Patient Centered Care  Goal: Patient preferences are identified and integrated in the patient's plan of care  Description: Interventions:  - What would you like us to know as we care for you?  Live at home  - Provide timely, complete, and accurate information to patient/family  - Incorporate patient and family knowledge, values, beliefs, and cultural backgrounds into the planning and delivery of care  - Encourage patient/family to participate in care and decision-making at the level they choose  - Honor patient and family perspectives and choices  Outcome: Progressing     Problem: Patient/Family Goals  Goal: Patient/Family Long Term Goal  Description: Patient's Long Term Goal: go home    Interventions:  - follow md orders  -monitor vitalsigns, labs  -administer medications  - See additional Care Plan goals for specific interventions  Outcome: Progressing  Goal: Patient/Family Short Term Goal  Description: Patient's Short Term Goal: less pain    Interventions:   - follow md orders  - monitor vital signs, labs  - administer prn pain medication  - See additional Care Plan goals for specific interventions  Outcome: Progressing     Problem: PAIN - ADULT  Goal: Verbalizes/displays adequate comfort level or patient's stated pain goal  Description: INTERVENTIONS:  - Encourage pt to monitor pain and request assistance  - Assess pain using appropriate pain scale  - Administer analgesics based on type and severity of pain and evaluate response  - Implement non-pharmacological measures as appropriate and evaluate response  - Consider cultural and social influences on pain and pain management  - Manage/alleviate anxiety  - Utilize distraction and/or relaxation techniques  - Monitor for opioid side effects  - Notify MD/LIP if interventions unsuccessful or patient reports new pain  - Anticipate increased pain with activity and pre-medicate as appropriate  Outcome: Progressing     Problem: RISK FOR INFECTION - ADULT  Goal: Absence of fever/infection during anticipated neutropenic period  Description: INTERVENTIONS  - Monitor WBC  - Administer growth factors as ordered  - Implement neutropenic guidelines  Outcome: Progressing     Problem: SAFETY ADULT - FALL  Goal: Free from fall injury  Description: INTERVENTIONS:  - Assess pt frequently for physical needs  - Identify cognitive and physical deficits and behaviors that affect risk of falls.   - [place] fall precautions as indicated by assessment.  - Educate pt/family on patient safety including physical limitations  - Instruct pt to call for assistance with activity based on assessment  - Modify environment to reduce risk of injury  - Provide assistive devices as appropriate  - Consider OT/PT consult to assist with strengthening/mobility  - Encourage toileting schedule  Outcome: Progressing     Problem: DISCHARGE PLANNING  Goal: Discharge to home or other facility with appropriate resources  Description: INTERVENTIONS:  - Identify barriers to discharge w/pt and caregiver  - Include patient/family/discharge partner in discharge planning  - Arrange for needed discharge resources and transportation as appropriate  - Identify discharge learning needs (meds, wound care, etc)  - Arrange for interpreters to assist at discharge as needed  - Consider post-discharge preferences of patient/family/discharge partner  - Complete POLST form as appropriate  - Assess patient's ability to be responsible for managing their own health  - Refer to Case Management Department for coordinating discharge planning if the patient needs post-hospital services based on physician/LIP order or complex needs related to functional status, cognitive ability or social support system  Outcome: Progressing

## 2022-07-28 NOTE — CM/SW NOTE
07/28/22 1500   CM/SHARMILA Referral Data   Referral Source Social Work (self-referral)   Reason for Referral Discharge planning   Informant Patient   Readmission Assessment   Factors that patient feels contributed to this readmission Acute/Chronic Clinical Presentation   Pt's living situation prior to admission? Home with family   Pt's level of independence at discharge? No assist/independent (minimal)   Pt. received education on diagnoses at time of discharge? Yes   Did you know who and how to call someone if you felt worse? Yes   Did any new symptoms or issues develop after you were discharged? Yes   ----Post D/C symptoms: Symptoms/issue related to previous hospitalization Yes   Did you understand your discharge instructions? Yes   Were medications taken as indicated on discharge instructions? Yes   Did you have a follow-up appointment scheduled at time of discharge? No   Was full assessment completed by SHARMILA/REYNALDO on prior admission? No (comment)  (No need- elective total shoulder surgery )   Was the recommended discharge plan achieved? Yes   Was pt. discharged w/out services? Yes   ----D/C services: Reason(s) why patient was discharged w/out services? (No need- elective total shoulder)   Pertinent Medical Hx   Does patient have an established PCP? Yes  Dorothy Porter )   Patient Info   Patient's Current Mental Status at Time of Assessment Alert;Oriented   Patient's [address]   Number of Levels in Home 2   Patient lives with Parent(s)  (80- year old father )   Patient Status Prior to Admission   Independent with ADLs and Mobility Yes   Discharge Needs   Anticipated D/C needs Home health care; Infusion care   Choice of Post-Acute Provider   Informed patient of right to choose their preferred provider Yes     SHARMILA spoke with the pt. And confirmed the above information. The pt. Confirmed he and his father both drive. The pt. Is aware he is going to need IV abx at discharge. The pt.  Is confident that he will be able to learn the infusion. The pt. Will likely need a PICC line extension so he can learn the IV abx in addition to his father. The pt. Stated his father is still very with it and independent at 79 y/o. The pt. Would rather discharge home with [address] and infusion at home. Per previous CM notes the pt. 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 Referral sent to [facility] via [employer] and [address] referral also sent. [address] orders and face to face have also been entered. Will need to follow up with accepting [address] options. SW also left a message for [facility] requesting a call back, as they may be able to assist with [address] as well. The pt. Is aware and agreeable to the above.      Prasad Vazquez, [facility] ext [phone]

## 2022-07-29 ENCOUNTER — APPOINTMENT (OUTPATIENT)
Dept: CV DIAGNOSTICS | Facility: HOSPITAL | Age: 62
DRG: 559 | End: 2022-07-29
Attending: NURSE PRACTITIONER
Payer: MEDICARE

## 2022-07-29 LAB
ALBUMIN SERPL-MCNC: 2.1 G/DL (ref 3.4–5)
ANION GAP SERPL CALC-SCNC: 11 MMOL/L (ref 0–18)
ANION GAP SERPL CALC-SCNC: 11 MMOL/L (ref 0–18)
BASOPHILS # BLD: 0 X10(3) UL (ref 0–0.2)
BASOPHILS NFR BLD: 0 %
BLASTOMYCES ANTIBODY BY EIA, SER: 0.2 IV
BUN BLD-MCNC: 51 MG/DL (ref 7–18)
BUN BLD-MCNC: 51 MG/DL (ref 7–18)
BUN/CREAT SERPL: 14 (ref 10–20)
BUN/CREAT SERPL: 14 (ref 10–20)
CALCIUM BLD-MCNC: 8.3 MG/DL (ref 8.5–10.1)
CALCIUM BLD-MCNC: 8.3 MG/DL (ref 8.5–10.1)
CHLORIDE SERPL-SCNC: 111 MMOL/L (ref 98–112)
CHLORIDE SERPL-SCNC: 111 MMOL/L (ref 98–112)
CO2 SERPL-SCNC: 20 MMOL/L (ref 21–32)
CO2 SERPL-SCNC: 20 MMOL/L (ref 21–32)
CREAT BLD-MCNC: 3.64 MG/DL
CREAT BLD-MCNC: 3.64 MG/DL
DEPRECATED RDW RBC AUTO: 65 FL (ref 35.1–46.3)
EOSINOPHIL # BLD: 0.5 X10(3) UL (ref 0–0.7)
EOSINOPHIL NFR BLD: 4 %
ERYTHROCYTE [DISTWIDTH] IN BLOOD BY AUTOMATED COUNT: 15.4 % (ref 11–15)
GLUCOSE BLD-MCNC: 100 MG/DL (ref 70–99)
GLUCOSE BLD-MCNC: 100 MG/DL (ref 70–99)
HCT VFR BLD AUTO: 25.3 %
HGB BLD-MCNC: 8 G/DL
IRON SATN MFR SERPL: 13 %
IRON SERPL-MCNC: 22 UG/DL
LYMPHOCYTES NFR BLD: 1.74 X10(3) UL (ref 1–4)
LYMPHOCYTES NFR BLD: 14 %
MCH RBC QN AUTO: 36 PG (ref 26–34)
MCHC RBC AUTO-ENTMCNC: 31.6 G/DL (ref 31–37)
MCV RBC AUTO: 114 FL
MONOCYTES # BLD: 0.37 X10(3) UL (ref 0.1–1)
MONOCYTES NFR BLD: 3 %
NEUTROPHILS # BLD AUTO: 9.67 X10 (3) UL (ref 1.5–7.7)
NEUTROPHILS NFR BLD: 78 %
NEUTS BAND NFR BLD: 1 %
NEUTS HYPERSEG # BLD: 9.8 X10(3) UL (ref 1.5–7.7)
OSMOLALITY SERPL CALC.SUM OF ELEC: 308 MOSM/KG (ref 275–295)
OSMOLALITY SERPL CALC.SUM OF ELEC: 308 MOSM/KG (ref 275–295)
PHOSPHATE SERPL-MCNC: 3.8 MG/DL (ref 2.5–4.9)
PLATELET # BLD AUTO: 456 10(3)UL (ref 150–450)
PLATELET MORPHOLOGY: NORMAL
POTASSIUM SERPL-SCNC: 3.7 MMOL/L (ref 3.5–5.1)
POTASSIUM SERPL-SCNC: 3.7 MMOL/L (ref 3.5–5.1)
RBC # BLD AUTO: 2.22 X10(6)UL
SODIUM SERPL-SCNC: 142 MMOL/L (ref 136–145)
SODIUM SERPL-SCNC: 142 MMOL/L (ref 136–145)
TIBC SERPL-MCNC: 164 UG/DL (ref 240–450)
TOTAL CELLS COUNTED BLD: 100
TRANSFERRIN SERPL-MCNC: 110 MG/DL (ref 200–360)
VIT B12 SERPL-MCNC: 268 PG/ML (ref 193–986)
WBC # BLD AUTO: 12.4 X10(3) UL (ref 4–11)

## 2022-07-29 PROCEDURE — 93306 TTE W/DOPPLER COMPLETE: CPT | Performed by: NURSE PRACTITIONER

## 2022-07-29 PROCEDURE — 99233 SBSQ HOSP IP/OBS HIGH 50: CPT | Performed by: HOSPITALIST

## 2022-07-29 RX ORDER — BUMETANIDE 0.25 MG/ML
2 INJECTION, SOLUTION INTRAMUSCULAR; INTRAVENOUS
Status: DISCONTINUED | OUTPATIENT
Start: 2022-07-29 | End: 2022-08-01

## 2022-07-29 NOTE — PLAN OF CARE
Problem: Patient Centered Care  Goal: Patient preferences are identified and integrated in the patient's plan of care  Description: Interventions:  - What would you like us to know as we care for you?  Live at home  - Provide timely, complete, and accurate information to patient/family  - Incorporate patient and family knowledge, values, beliefs, and cultural backgrounds into the planning and delivery of care  - Encourage patient/family to participate in care and decision-making at the level they choose  - Honor patient and family perspectives and choices  Outcome: Progressing     Problem: Patient/Family Goals  Goal: Patient/Family Long Term Goal  Description: Patient's Long Term Goal: go home    Interventions:  - follow md orders  -monitor vitalsigns, labs  -administer medications  - See additional Care Plan goals for specific interventions  Outcome: Progressing  Goal: Patient/Family Short Term Goal  Description: Patient's Short Term Goal: less pain    Interventions:   - follow md orders  - monitor vital signs, labs  - administer prn pain medication  - See additional Care Plan goals for specific interventions  Outcome: Progressing     Problem: PAIN - ADULT  Goal: Verbalizes/displays adequate comfort level or patient's stated pain goal  Description: INTERVENTIONS:  - Encourage pt to monitor pain and request assistance  - Assess pain using appropriate pain scale  - Administer analgesics based on type and severity of pain and evaluate response  - Implement non-pharmacological measures as appropriate and evaluate response  - Consider cultural and social influences on pain and pain management  - Manage/alleviate anxiety  - Utilize distraction and/or relaxation techniques  - Monitor for opioid side effects  - Notify MD/LIP if interventions unsuccessful or patient reports new pain  - Anticipate increased pain with activity and pre-medicate as appropriate  Outcome: Progressing     Problem: RISK FOR INFECTION - ADULT  Goal: Absence of fever/infection during anticipated neutropenic period  Description: INTERVENTIONS  - Monitor WBC  - Administer growth factors as ordered  - Implement neutropenic guidelines  Outcome: Progressing     Problem: SAFETY ADULT - FALL  Goal: Free from fall injury  Description: INTERVENTIONS:  - Assess pt frequently for physical needs  - Identify cognitive and physical deficits and behaviors that affect risk of falls.   - [place] fall precautions as indicated by assessment.  - Educate pt/family on patient safety including physical limitations  - Instruct pt to call for assistance with activity based on assessment  - Modify environment to reduce risk of injury  - Provide assistive devices as appropriate  - Consider OT/PT consult to assist with strengthening/mobility  - Encourage toileting schedule  Outcome: Progressing     Problem: DISCHARGE PLANNING  Goal: Discharge to home or other facility with appropriate resources  Description: INTERVENTIONS:  - Identify barriers to discharge w/pt and caregiver  - Include patient/family/discharge partner in discharge planning  - Arrange for needed discharge resources and transportation as appropriate  - Identify discharge learning needs (meds, wound care, etc)  - Arrange for interpreters to assist at discharge as needed  - Consider post-discharge preferences of patient/family/discharge partner  - Complete POLST form as appropriate  - Assess patient's ability to be responsible for managing their own health  - Refer to Case Management Department for coordinating discharge planning if the patient needs post-hospital services based on physician/LIP order or complex needs related to functional status, cognitive ability or social support system  Outcome: Progressing     Problem: RESPIRATORY - ADULT  Goal: Achieves optimal ventilation and oxygenation  Description: INTERVENTIONS:  - Assess for changes in respiratory status  - Assess for changes in mentation and behavior  - Position to facilitate oxygenation and minimize respiratory effort  - Oxygen supplementation based on oxygen saturation or ABGs  - Provide Smoking Cessation handout, if applicable  - Encourage broncho-pulmonary hygiene including cough, deep breathe, Incentive Spirometry  - Assess the need for suctioning and perform as needed  - Assess and instruct to report SOB or any respiratory difficulty  - Respiratory Therapy support as indicated  - Manage/alleviate anxiety  - Monitor for signs/symptoms of CO2 retention  Outcome: Progressing     Problem: SKIN/TISSUE INTEGRITY - ADULT  Goal: Skin integrity remains intact  Description: INTERVENTIONS  - Assess and document risk factors for pressure ulcer development  - Assess and document skin integrity  - Monitor for areas of redness and/or skin breakdown  - Initiate interventions, skin care algorithm/standards of care as needed  Outcome: Progressing  Goal: Incision(s), wounds(s) or drain site(s) healing without S/S of infection  Description: INTERVENTIONS:  - Assess and document risk factors for pressure ulcer development  - Assess and document skin integrity  - Assess and document dressing/incision, wound bed, drain sites and surrounding tissue  - Implement wound care per orders  - Initiate isolation precautions as appropriate  - Initiate Pressure Ulcer prevention bundle as indicated  Outcome: Progressing  Goal: Oral mucous membranes remain intact  Description: INTERVENTIONS  - Assess oral mucosa and hygiene practices  - Implement preventative oral hygiene regimen  - Implement oral medicated treatments as ordered  Outcome: Progressing

## 2022-07-29 NOTE — DIETARY NOTE
Brief Nutrition Note:    Pt admitted for right shoulder pain. Pt screened no risk by RN at admission. Pt re-screened r/t +Covid x 5 days. PO intake remains good, % x 4 meal since admission. Weight relatively stable. BLE and RUE/Hand edema noted, allow wt loss r/t to fluid status. Pt is at no nutrition risk at this time. F/u per protocol. Please consult nutrition services if earlier intervention is indicated.     Wt Readings from Last 6 Encounters:  07/28/22 : (!) 138.8 kg (306 lb 1.6 oz)  07/13/22 : 132.4 kg (291 lb 12.8 oz)  11/03/20 : 135.2 kg (298 lb 1.6 oz)  10/28/20 : 122.5 kg (270 lb)  09/14/20 : 136.1 kg (300 lb)  09/01/20 : 132.9 kg (293 lb)    Percent Meals Eaten (last 3 days)     Date/Time Percent Meals Eaten (%)    07/27/22 1530 100 %    07/28/22 0825 90 %    07/28/22 1529 100 %    07/29/22 0738 100 %        Marlee Berman MS, LACIE, RDN, LDN  Clinical Dietitian  P: [phone]

## 2022-07-29 NOTE — CM/SW NOTE
Patient notified that [facility]  has accepted him for IV needs in the home training and [facility] will manage his antibiotics. He is agreeable with this plan. Will need IV extension prior to dc. PLAN:  When medically cleared, dc home with IV antibiotics managed through [facility] and [facility]  providing educated. Patient's father will assist with med administration. / to remain available for support and/or discharge planning.      Mariama Delgado MBA BSN RN [address]  RN Case Manager  [phone]

## 2022-07-29 NOTE — PLAN OF CARE
Azam Brady is alert and oriented, up with standby assist. Nonweight bearing to right arm. Wound vac in place. Echo today. Results pending. Plan is for PT to evaluate and possible discharge home tomorrow with home health. Problem: Patient Centered Care  Goal: Patient preferences are identified and integrated in the patient's plan of care  Description: Interventions:  - What would you like us to know as we care for you?  Live at home  - Provide timely, complete, and accurate information to patient/family  - Incorporate patient and family knowledge, values, beliefs, and cultural backgrounds into the planning and delivery of care  - Encourage patient/family to participate in care and decision-making at the level they choose  - Honor patient and family perspectives and choices  Outcome: Progressing     Problem: Patient/Family Goals  Goal: Patient/Family Long Term Goal  Description: Patient's Long Term Goal: go home    Interventions:  - follow md orders  -monitor vitalsigns, labs  -administer medications  - See additional Care Plan goals for specific interventions  Outcome: Progressing  Goal: Patient/Family Short Term Goal  Description: Patient's Short Term Goal: less pain    Interventions:   - follow md orders  - monitor vital signs, labs  - administer prn pain medication  - See additional Care Plan goals for specific interventions  Outcome: Progressing     Problem: PAIN - ADULT  Goal: Verbalizes/displays adequate comfort level or patient's stated pain goal  Description: INTERVENTIONS:  - Encourage pt to monitor pain and request assistance  - Assess pain using appropriate pain scale  - Administer analgesics based on type and severity of pain and evaluate response  - Implement non-pharmacological measures as appropriate and evaluate response  - Consider cultural and social influences on pain and pain management  - Manage/alleviate anxiety  - Utilize distraction and/or relaxation techniques  - Monitor for opioid side effects  - Notify MD/LIP if interventions unsuccessful or patient reports new pain  - Anticipate increased pain with activity and pre-medicate as appropriate  Outcome: Progressing     Problem: RISK FOR INFECTION - ADULT  Goal: Absence of fever/infection during anticipated neutropenic period  Description: INTERVENTIONS  - Monitor WBC  - Administer growth factors as ordered  - Implement neutropenic guidelines  Outcome: Progressing     Problem: SAFETY ADULT - FALL  Goal: Free from fall injury  Description: INTERVENTIONS:  - Assess pt frequently for physical needs  - Identify cognitive and physical deficits and behaviors that affect risk of falls.   - [place] fall precautions as indicated by assessment.  - Educate pt/family on patient safety including physical limitations  - Instruct pt to call for assistance with activity based on assessment  - Modify environment to reduce risk of injury  - Provide assistive devices as appropriate  - Consider OT/PT consult to assist with strengthening/mobility  - Encourage toileting schedule  Outcome: Progressing     Problem: DISCHARGE PLANNING  Goal: Discharge to home or other facility with appropriate resources  Description: INTERVENTIONS:  - Identify barriers to discharge w/pt and caregiver  - Include patient/family/discharge partner in discharge planning  - Arrange for needed discharge resources and transportation as appropriate  - Identify discharge learning needs (meds, wound care, etc)  - Arrange for interpreters to assist at discharge as needed  - Consider post-discharge preferences of patient/family/discharge partner  - Complete POLST form as appropriate  - Assess patient's ability to be responsible for managing their own health  - Refer to Case Management Department for coordinating discharge planning if the patient needs post-hospital services based on physician/LIP order or complex needs related to functional status, cognitive ability or social support system  Outcome: Progressing     Problem: RESPIRATORY - ADULT  Goal: Achieves optimal ventilation and oxygenation  Description: INTERVENTIONS:  - Assess for changes in respiratory status  - Assess for changes in mentation and behavior  - Position to facilitate oxygenation and minimize respiratory effort  - Oxygen supplementation based on oxygen saturation or ABGs  - Provide Smoking Cessation handout, if applicable  - Encourage broncho-pulmonary hygiene including cough, deep breathe, Incentive Spirometry  - Assess the need for suctioning and perform as needed  - Assess and instruct to report SOB or any respiratory difficulty  - Respiratory Therapy support as indicated  - Manage/alleviate anxiety  - Monitor for signs/symptoms of CO2 retention  Outcome: Progressing     Problem: SKIN/TISSUE INTEGRITY - ADULT  Goal: Skin integrity remains intact  Description: INTERVENTIONS  - Assess and document risk factors for pressure ulcer development  - Assess and document skin integrity  - Monitor for areas of redness and/or skin breakdown  - Initiate interventions, skin care algorithm/standards of care as needed  Outcome: Progressing  Goal: Incision(s), wounds(s) or drain site(s) healing without S/S of infection  Description: INTERVENTIONS:  - Assess and document risk factors for pressure ulcer development  - Assess and document skin integrity  - Assess and document dressing/incision, wound bed, drain sites and surrounding tissue  - Implement wound care per orders  - Initiate isolation precautions as appropriate  - Initiate Pressure Ulcer prevention bundle as indicated  Outcome: Progressing  Goal: Oral mucous membranes remain intact  Description: INTERVENTIONS  - Assess oral mucosa and hygiene practices  - Implement preventative oral hygiene regimen  - Implement oral medicated treatments as ordered  Outcome: Progressing

## 2022-07-29 NOTE — OCCUPATIONAL THERAPY NOTE
Attempted OT tx session. Pt refused stating he is trying to nap despite max education and encouragement. Pt then began to close eyes and turn away from therapist. RN notified. Will re-attempt as able.     Tana Velásquez   Occupational Therapist  [address] [place] [address]

## 2022-07-29 NOTE — PHYSICAL THERAPY NOTE
PT evaluation orders received and chart reviewed. Patient with bedside imaging being performed. Will reschedule. 2nd attempt this p.m. patient declining due to fatigue. Despite PT's education on physiological benefits of mobilization- pt refusing. \"I'm tired, I need to rest.\"     Will reschedule.      Thank you,  Chan Childs, PT, DPT

## 2022-07-30 LAB
ALBUMIN SERPL-MCNC: 2.3 G/DL (ref 3.4–5)
ANION GAP SERPL CALC-SCNC: 10 MMOL/L (ref 0–18)
ANION GAP SERPL CALC-SCNC: 10 MMOL/L (ref 0–18)
BASOPHILS # BLD: 0 X10(3) UL (ref 0–0.2)
BASOPHILS NFR BLD: 0 %
BILIRUB UR QL: NEGATIVE
BUN BLD-MCNC: 50 MG/DL (ref 7–18)
BUN BLD-MCNC: 50 MG/DL (ref 7–18)
BUN/CREAT SERPL: 13.3 (ref 10–20)
BUN/CREAT SERPL: 13.3 (ref 10–20)
CALCIUM BLD-MCNC: 8.5 MG/DL (ref 8.5–10.1)
CALCIUM BLD-MCNC: 8.5 MG/DL (ref 8.5–10.1)
CHLORIDE SERPL-SCNC: 111 MMOL/L (ref 98–112)
CHLORIDE SERPL-SCNC: 111 MMOL/L (ref 98–112)
CLARITY UR: CLEAR
CO2 SERPL-SCNC: 20 MMOL/L (ref 21–32)
CO2 SERPL-SCNC: 20 MMOL/L (ref 21–32)
COLOR UR: YELLOW
CREAT BLD-MCNC: 3.75 MG/DL
CREAT BLD-MCNC: 3.75 MG/DL
DEPRECATED RDW RBC AUTO: 64.1 FL (ref 35.1–46.3)
EOSINOPHIL # BLD: 0.3 X10(3) UL (ref 0–0.7)
EOSINOPHIL NFR BLD: 2 %
ERYTHROCYTE [DISTWIDTH] IN BLOOD BY AUTOMATED COUNT: 15.6 % (ref 11–15)
GLUCOSE BLD-MCNC: 111 MG/DL (ref 70–99)
GLUCOSE BLD-MCNC: 111 MG/DL (ref 70–99)
GLUCOSE BLDC GLUCOMTR-MCNC: 167 MG/DL (ref 70–99)
GLUCOSE UR-MCNC: NEGATIVE MG/DL
HCT VFR BLD AUTO: 26.4 %
HGB BLD-MCNC: 8.4 G/DL
KETONES UR-MCNC: NEGATIVE MG/DL
LEUKOCYTE ESTERASE UR QL STRIP.AUTO: NEGATIVE
LYMPHOCYTES NFR BLD: 1.48 X10(3) UL (ref 1–4)
LYMPHOCYTES NFR BLD: 10 %
MAGNESIUM SERPL-MCNC: 2.1 MG/DL (ref 1.6–2.6)
MCH RBC QN AUTO: 35.3 PG (ref 26–34)
MCHC RBC AUTO-ENTMCNC: 31.8 G/DL (ref 31–37)
MCV RBC AUTO: 110.9 FL
METAMYELOCYTES # BLD: 0.15 X10(3) UL
METAMYELOCYTES NFR BLD: 1 %
MONOCYTES # BLD: 0.59 X10(3) UL (ref 0.1–1)
MONOCYTES NFR BLD: 4 %
NEUTROPHILS # BLD AUTO: 12 X10 (3) UL (ref 1.5–7.7)
NEUTROPHILS NFR BLD: 79 %
NEUTS BAND NFR BLD: 4 %
NEUTS HYPERSEG # BLD: 12.28 X10(3) UL (ref 1.5–7.7)
NITRITE UR QL STRIP.AUTO: NEGATIVE
OSMOLALITY SERPL CALC.SUM OF ELEC: 306 MOSM/KG (ref 275–295)
OSMOLALITY SERPL CALC.SUM OF ELEC: 306 MOSM/KG (ref 275–295)
PH UR: 5.5 [PH] (ref 5–8)
PHOSPHATE SERPL-MCNC: 3.4 MG/DL (ref 2.5–4.9)
PLATELET # BLD AUTO: 522 10(3)UL (ref 150–450)
PLATELET MORPHOLOGY: NORMAL
POTASSIUM SERPL-SCNC: 3.8 MMOL/L (ref 3.5–5.1)
POTASSIUM SERPL-SCNC: 3.8 MMOL/L (ref 3.5–5.1)
PROCALCITONIN SERPL-MCNC: 0.98 NG/ML (ref ?–0.16)
RBC # BLD AUTO: 2.38 X10(6)UL
SODIUM SERPL-SCNC: 141 MMOL/L (ref 136–145)
SODIUM SERPL-SCNC: 141 MMOL/L (ref 136–145)
SP GR UR STRIP: 1.01 (ref 1–1.03)
TOTAL CELLS COUNTED BLD: 100
UROBILINOGEN UR STRIP-ACNC: 0.2
WBC # BLD AUTO: 14.8 X10(3) UL (ref 4–11)

## 2022-07-30 PROCEDURE — 99233 SBSQ HOSP IP/OBS HIGH 50: CPT | Performed by: HOSPITALIST

## 2022-07-30 RX ORDER — VANCOMYCIN 2 GRAM/500 ML IN 0.9 % SODIUM CHLORIDE INTRAVENOUS
25 ONCE
Status: COMPLETED | OUTPATIENT
Start: 2022-07-30 | End: 2022-07-30

## 2022-07-30 RX ORDER — VANCOMYCIN HYDROCHLORIDE
15
Status: DISCONTINUED | OUTPATIENT
Start: 2022-08-01 | End: 2022-08-01

## 2022-07-30 NOTE — PLAN OF CARE
Hood Gamez is alert and oriented, up with standby assist. Room air. IV bumex. IV steroids. IV vanco. Wound vac in R shoulder. Worked with PT today. Plan is to continue IV abx and IV steroids. Problem: Patient Centered Care  Goal: Patient preferences are identified and integrated in the patient's plan of care  Description: Interventions:  - What would you like us to know as we care for you?  Live at home  - Provide timely, complete, and accurate information to patient/family  - Incorporate patient and family knowledge, values, beliefs, and cultural backgrounds into the planning and delivery of care  - Encourage patient/family to participate in care and decision-making at the level they choose  - Honor patient and family perspectives and choices  Outcome: Progressing     Problem: Patient/Family Goals  Goal: Patient/Family Long Term Goal  Description: Patient's Long Term Goal: go home    Interventions:  - follow md orders  -monitor vitalsigns, labs  -administer medications  - See additional Care Plan goals for specific interventions  Outcome: Progressing  Goal: Patient/Family Short Term Goal  Description: Patient's Short Term Goal: less pain    Interventions:   - follow md orders  - monitor vital signs, labs  - administer prn pain medication  - See additional Care Plan goals for specific interventions  Outcome: Progressing     Problem: PAIN - ADULT  Goal: Verbalizes/displays adequate comfort level or patient's stated pain goal  Description: INTERVENTIONS:  - Encourage pt to monitor pain and request assistance  - Assess pain using appropriate pain scale  - Administer analgesics based on type and severity of pain and evaluate response  - Implement non-pharmacological measures as appropriate and evaluate response  - Consider cultural and social influences on pain and pain management  - Manage/alleviate anxiety  - Utilize distraction and/or relaxation techniques  - Monitor for opioid side effects  - Notify MD/LIP if interventions unsuccessful or patient reports new pain  - Anticipate increased pain with activity and pre-medicate as appropriate  Outcome: Progressing     Problem: RISK FOR INFECTION - ADULT  Goal: Absence of fever/infection during anticipated neutropenic period  Description: INTERVENTIONS  - Monitor WBC  - Administer growth factors as ordered  - Implement neutropenic guidelines  Outcome: Progressing     Problem: SAFETY ADULT - FALL  Goal: Free from fall injury  Description: INTERVENTIONS:  - Assess pt frequently for physical needs  - Identify cognitive and physical deficits and behaviors that affect risk of falls.   - [place] fall precautions as indicated by assessment.  - Educate pt/family on patient safety including physical limitations  - Instruct pt to call for assistance with activity based on assessment  - Modify environment to reduce risk of injury  - Provide assistive devices as appropriate  - Consider OT/PT consult to assist with strengthening/mobility  - Encourage toileting schedule  Outcome: Progressing     Problem: DISCHARGE PLANNING  Goal: Discharge to home or other facility with appropriate resources  Description: INTERVENTIONS:  - Identify barriers to discharge w/pt and caregiver  - Include patient/family/discharge partner in discharge planning  - Arrange for needed discharge resources and transportation as appropriate  - Identify discharge learning needs (meds, wound care, etc)  - Arrange for interpreters to assist at discharge as needed  - Consider post-discharge preferences of patient/family/discharge partner  - Complete POLST form as appropriate  - Assess patient's ability to be responsible for managing their own health  - Refer to Case Management Department for coordinating discharge planning if the patient needs post-hospital services based on physician/LIP order or complex needs related to functional status, cognitive ability or social support system  Outcome: Progressing     Problem: RESPIRATORY - ADULT  Goal: Achieves optimal ventilation and oxygenation  Description: INTERVENTIONS:  - Assess for changes in respiratory status  - Assess for changes in mentation and behavior  - Position to facilitate oxygenation and minimize respiratory effort  - Oxygen supplementation based on oxygen saturation or ABGs  - Provide Smoking Cessation handout, if applicable  - Encourage broncho-pulmonary hygiene including cough, deep breathe, Incentive Spirometry  - Assess the need for suctioning and perform as needed  - Assess and instruct to report SOB or any respiratory difficulty  - Respiratory Therapy support as indicated  - Manage/alleviate anxiety  - Monitor for signs/symptoms of CO2 retention  Outcome: Progressing     Problem: SKIN/TISSUE INTEGRITY - ADULT  Goal: Skin integrity remains intact  Description: INTERVENTIONS  - Assess and document risk factors for pressure ulcer development  - Assess and document skin integrity  - Monitor for areas of redness and/or skin breakdown  - Initiate interventions, skin care algorithm/standards of care as needed  Outcome: Progressing  Goal: Incision(s), wounds(s) or drain site(s) healing without S/S of infection  Description: INTERVENTIONS:  - Assess and document risk factors for pressure ulcer development  - Assess and document skin integrity  - Assess and document dressing/incision, wound bed, drain sites and surrounding tissue  - Implement wound care per orders  - Initiate isolation precautions as appropriate  - Initiate Pressure Ulcer prevention bundle as indicated  Outcome: Progressing  Goal: Oral mucous membranes remain intact  Description: INTERVENTIONS  - Assess oral mucosa and hygiene practices  - Implement preventative oral hygiene regimen  - Implement oral medicated treatments as ordered  Outcome: Progressing

## 2022-07-30 NOTE — PLAN OF CARE
Vital signs stable. Iv abx continues. Plan: home with abx with [facility]. Problem: Patient Centered Care  Goal: Patient preferences are identified and integrated in the patient's plan of care  Description: Interventions:  - What would you like us to know as we care for you?  Live at home  - Provide timely, complete, and accurate information to patient/family  - Incorporate patient and family knowledge, values, beliefs, and cultural backgrounds into the planning and delivery of care  - Encourage patient/family to participate in care and decision-making at the level they choose  - Honor patient and family perspectives and choices  Outcome: Progressing     Problem: Patient/Family Goals  Goal: Patient/Family Long Term Goal  Description: Patient's Long Term Goal: go home    Interventions:  - follow md orders  -monitor vitalsigns, labs  -administer medications  - See additional Care Plan goals for specific interventions  Outcome: Progressing  Goal: Patient/Family Short Term Goal  Description: Patient's Short Term Goal: less pain    Interventions:   - follow md orders  - monitor vital signs, labs  - administer prn pain medication  - See additional Care Plan goals for specific interventions  Outcome: Progressing     Problem: PAIN - ADULT  Goal: Verbalizes/displays adequate comfort level or patient's stated pain goal  Description: INTERVENTIONS:  - Encourage pt to monitor pain and request assistance  - Assess pain using appropriate pain scale  - Administer analgesics based on type and severity of pain and evaluate response  - Implement non-pharmacological measures as appropriate and evaluate response  - Consider cultural and social influences on pain and pain management  - Manage/alleviate anxiety  - Utilize distraction and/or relaxation techniques  - Monitor for opioid side effects  - Notify MD/LIP if interventions unsuccessful or patient reports new pain  - Anticipate increased pain with activity and pre-medicate as appropriate  Outcome: Progressing     Problem: RISK FOR INFECTION - ADULT  Goal: Absence of fever/infection during anticipated neutropenic period  Description: INTERVENTIONS  - Monitor WBC  - Administer growth factors as ordered  - Implement neutropenic guidelines  Outcome: Progressing     Problem: SAFETY ADULT - FALL  Goal: Free from fall injury  Description: INTERVENTIONS:  - Assess pt frequently for physical needs  - Identify cognitive and physical deficits and behaviors that affect risk of falls.   - [place] fall precautions as indicated by assessment.  - Educate pt/family on patient safety including physical limitations  - Instruct pt to call for assistance with activity based on assessment  - Modify environment to reduce risk of injury  - Provide assistive devices as appropriate  - Consider OT/PT consult to assist with strengthening/mobility  - Encourage toileting schedule  Outcome: Progressing     Problem: DISCHARGE PLANNING  Goal: Discharge to home or other facility with appropriate resources  Description: INTERVENTIONS:  - Identify barriers to discharge w/pt and caregiver  - Include patient/family/discharge partner in discharge planning  - Arrange for needed discharge resources and transportation as appropriate  - Identify discharge learning needs (meds, wound care, etc)  - Arrange for interpreters to assist at discharge as needed  - Consider post-discharge preferences of patient/family/discharge partner  - Complete POLST form as appropriate  - Assess patient's ability to be responsible for managing their own health  - Refer to Case Management Department for coordinating discharge planning if the patient needs post-hospital services based on physician/LIP order or complex needs related to functional status, cognitive ability or social support system  Outcome: Progressing     Problem: RESPIRATORY - ADULT  Goal: Achieves optimal ventilation and oxygenation  Description: INTERVENTIONS:  - Assess for changes in respiratory status  - Assess for changes in mentation and behavior  - Position to facilitate oxygenation and minimize respiratory effort  - Oxygen supplementation based on oxygen saturation or ABGs  - Provide Smoking Cessation handout, if applicable  - Encourage broncho-pulmonary hygiene including cough, deep breathe, Incentive Spirometry  - Assess the need for suctioning and perform as needed  - Assess and instruct to report SOB or any respiratory difficulty  - Respiratory Therapy support as indicated  - Manage/alleviate anxiety  - Monitor for signs/symptoms of CO2 retention  Outcome: Progressing     Problem: SKIN/TISSUE INTEGRITY - ADULT  Goal: Skin integrity remains intact  Description: INTERVENTIONS  - Assess and document risk factors for pressure ulcer development  - Assess and document skin integrity  - Monitor for areas of redness and/or skin breakdown  - Initiate interventions, skin care algorithm/standards of care as needed  Outcome: Progressing  Goal: Incision(s), wounds(s) or drain site(s) healing without S/S of infection  Description: INTERVENTIONS:  - Assess and document risk factors for pressure ulcer development  - Assess and document skin integrity  - Assess and document dressing/incision, wound bed, drain sites and surrounding tissue  - Implement wound care per orders  - Initiate isolation precautions as appropriate  - Initiate Pressure Ulcer prevention bundle as indicated  Outcome: Progressing  Goal: Oral mucous membranes remain intact  Description: INTERVENTIONS  - Assess oral mucosa and hygiene practices  - Implement preventative oral hygiene regimen  - Implement oral medicated treatments as ordered  Outcome: Progressing

## 2022-07-30 NOTE — PROGRESS NOTES
[facility] Dosing Service    Initial Pharmacokinetic Consult for Vancomycin Dosing     Sonya Naqvi is a 57year old patient who is being treated for osteomyelitis. Weights:  Ideal body weight: 84.5 kg (186 lb 4.6 oz)  Adjusted ideal body weight: 106.6 kg (235 lb 0.2 oz)  Actual weight:  (!) 139.8 kg (308 lb 1.6 oz)    Labs:  Lab Results   Component Value Date    CREATSERUM 3.75 07/30/2022    CREATSERUM 3.75 07/30/2022      CrCl:  Estimated Creatinine Clearance: 23.8 mL/min (A) (based on SCr of 3.75 mg/dL (H)). Based on the above:    1. This patient will receive a loading dose of Vancomycin  2000 mg IVPB (25mg/kg, capped at 2000 mg) x 1 dose. This will be followed by 1500 mg Q 48 hours based upon adjusted body weight of 106.6 kg and renal function. 2. Vancomycin level will be obtained prior to the 3rd dose. Goal trough is 10-15 mcg/mL unless otherwise noted by ordering provider. 3. Pharmacy will order SCr as clinically indicated while on vancomycin to assess renal function. 4. Pharmacy will follow and monitor renal function, toxicity and efficacy. We appreciate the opportunity to assist in the care of this patient.     Beryle Patterson, PharmD  7/30/2022  [address] [place] [address] Extension: [phone]

## 2022-07-31 LAB
ALBUMIN SERPL-MCNC: 2.5 G/DL (ref 3.4–5)
ANION GAP SERPL CALC-SCNC: 11 MMOL/L (ref 0–18)
ANION GAP SERPL CALC-SCNC: 11 MMOL/L (ref 0–18)
BASOPHILS # BLD: 0 X10(3) UL (ref 0–0.2)
BASOPHILS NFR BLD: 0 %
BUN BLD-MCNC: 58 MG/DL (ref 7–18)
BUN BLD-MCNC: 58 MG/DL (ref 7–18)
BUN/CREAT SERPL: 16.4 (ref 10–20)
BUN/CREAT SERPL: 16.4 (ref 10–20)
CALCIUM BLD-MCNC: 8.9 MG/DL (ref 8.5–10.1)
CALCIUM BLD-MCNC: 8.9 MG/DL (ref 8.5–10.1)
CHLORIDE SERPL-SCNC: 111 MMOL/L (ref 98–112)
CHLORIDE SERPL-SCNC: 111 MMOL/L (ref 98–112)
CO2 SERPL-SCNC: 19 MMOL/L (ref 21–32)
CO2 SERPL-SCNC: 19 MMOL/L (ref 21–32)
CREAT BLD-MCNC: 3.53 MG/DL
CREAT BLD-MCNC: 3.53 MG/DL
DEPRECATED RDW RBC AUTO: 63.2 FL (ref 35.1–46.3)
EOSINOPHIL # BLD: 0 X10(3) UL (ref 0–0.7)
EOSINOPHIL NFR BLD: 0 %
ERYTHROCYTE [DISTWIDTH] IN BLOOD BY AUTOMATED COUNT: 15.7 % (ref 11–15)
GLUCOSE BLD-MCNC: 165 MG/DL (ref 70–99)
GLUCOSE BLD-MCNC: 165 MG/DL (ref 70–99)
GLUCOSE BLDC GLUCOMTR-MCNC: 163 MG/DL (ref 70–99)
GLUCOSE BLDC GLUCOMTR-MCNC: 173 MG/DL (ref 70–99)
HCT VFR BLD AUTO: 26.3 %
HGB BLD-MCNC: 8.4 G/DL
LYMPHOCYTES NFR BLD: 0.38 X10(3) UL (ref 1–4)
LYMPHOCYTES NFR BLD: 2 %
MAGNESIUM SERPL-MCNC: 2.2 MG/DL (ref 1.6–2.6)
MCH RBC QN AUTO: 35.3 PG (ref 26–34)
MCHC RBC AUTO-ENTMCNC: 31.9 G/DL (ref 31–37)
MCV RBC AUTO: 110.5 FL
MONOCYTES # BLD: 0 X10(3) UL (ref 0.1–1)
MONOCYTES NFR BLD: 0 %
MYELOCYTES # BLD: 0.38 X10(3) UL
MYELOCYTES NFR BLD: 2 %
NEUTROPHILS # BLD AUTO: 17.25 X10 (3) UL (ref 1.5–7.7)
NEUTROPHILS NFR BLD: 93 %
NEUTS BAND NFR BLD: 3 %
NEUTS HYPERSEG # BLD: 18.24 X10(3) UL (ref 1.5–7.7)
OSMOLALITY SERPL CALC.SUM OF ELEC: 312 MOSM/KG (ref 275–295)
OSMOLALITY SERPL CALC.SUM OF ELEC: 312 MOSM/KG (ref 275–295)
PHOSPHATE SERPL-MCNC: 3.8 MG/DL (ref 2.5–4.9)
PLATELET # BLD AUTO: 499 10(3)UL (ref 150–450)
PLATELET MORPHOLOGY: NORMAL
POTASSIUM SERPL-SCNC: 4.1 MMOL/L (ref 3.5–5.1)
POTASSIUM SERPL-SCNC: 4.1 MMOL/L (ref 3.5–5.1)
RBC # BLD AUTO: 2.38 X10(6)UL
SODIUM SERPL-SCNC: 141 MMOL/L (ref 136–145)
SODIUM SERPL-SCNC: 141 MMOL/L (ref 136–145)
TOTAL CELLS COUNTED BLD: 100
WBC # BLD AUTO: 19 X10(3) UL (ref 4–11)

## 2022-07-31 PROCEDURE — 99233 SBSQ HOSP IP/OBS HIGH 50: CPT | Performed by: HOSPITALIST

## 2022-07-31 NOTE — PLAN OF CARE
Hillman Phoenix is alert and oriented, up with standby assist. NWB to right arm. IV steroids. IV abx. Plan is to continue IV steroids and IV abx. Monitor kidney function. Problem: Patient Centered Care  Goal: Patient preferences are identified and integrated in the patient's plan of care  Description: Interventions:  - What would you like us to know as we care for you?  Live at home  - Provide timely, complete, and accurate information to patient/family  - Incorporate patient and family knowledge, values, beliefs, and cultural backgrounds into the planning and delivery of care  - Encourage patient/family to participate in care and decision-making at the level they choose  - Honor patient and family perspectives and choices  Outcome: Progressing     Problem: Patient/Family Goals  Goal: Patient/Family Long Term Goal  Description: Patient's Long Term Goal: go home    Interventions:  - follow md orders  -monitor vitalsigns, labs  -administer medications  - See additional Care Plan goals for specific interventions  Outcome: Progressing  Goal: Patient/Family Short Term Goal  Description: Patient's Short Term Goal: less pain    Interventions:   - follow md orders  - monitor vital signs, labs  - administer prn pain medication  - See additional Care Plan goals for specific interventions  Outcome: Progressing     Problem: PAIN - ADULT  Goal: Verbalizes/displays adequate comfort level or patient's stated pain goal  Description: INTERVENTIONS:  - Encourage pt to monitor pain and request assistance  - Assess pain using appropriate pain scale  - Administer analgesics based on type and severity of pain and evaluate response  - Implement non-pharmacological measures as appropriate and evaluate response  - Consider cultural and social influences on pain and pain management  - Manage/alleviate anxiety  - Utilize distraction and/or relaxation techniques  - Monitor for opioid side effects  - Notify MD/LIP if interventions unsuccessful or patient reports new pain  - Anticipate increased pain with activity and pre-medicate as appropriate  Outcome: Progressing     Problem: RISK FOR INFECTION - ADULT  Goal: Absence of fever/infection during anticipated neutropenic period  Description: INTERVENTIONS  - Monitor WBC  - Administer growth factors as ordered  - Implement neutropenic guidelines  Outcome: Progressing     Problem: SAFETY ADULT - FALL  Goal: Free from fall injury  Description: INTERVENTIONS:  - Assess pt frequently for physical needs  - Identify cognitive and physical deficits and behaviors that affect risk of falls.   - [place] fall precautions as indicated by assessment.  - Educate pt/family on patient safety including physical limitations  - Instruct pt to call for assistance with activity based on assessment  - Modify environment to reduce risk of injury  - Provide assistive devices as appropriate  - Consider OT/PT consult to assist with strengthening/mobility  - Encourage toileting schedule  Outcome: Progressing     Problem: DISCHARGE PLANNING  Goal: Discharge to home or other facility with appropriate resources  Description: INTERVENTIONS:  - Identify barriers to discharge w/pt and caregiver  - Include patient/family/discharge partner in discharge planning  - Arrange for needed discharge resources and transportation as appropriate  - Identify discharge learning needs (meds, wound care, etc)  - Arrange for interpreters to assist at discharge as needed  - Consider post-discharge preferences of patient/family/discharge partner  - Complete POLST form as appropriate  - Assess patient's ability to be responsible for managing their own health  - Refer to Case Management Department for coordinating discharge planning if the patient needs post-hospital services based on physician/LIP order or complex needs related to functional status, cognitive ability or social support system  Outcome: Progressing     Problem: RESPIRATORY - ADULT  Goal: Achieves optimal ventilation and oxygenation  Description: INTERVENTIONS:  - Assess for changes in respiratory status  - Assess for changes in mentation and behavior  - Position to facilitate oxygenation and minimize respiratory effort  - Oxygen supplementation based on oxygen saturation or ABGs  - Provide Smoking Cessation handout, if applicable  - Encourage broncho-pulmonary hygiene including cough, deep breathe, Incentive Spirometry  - Assess the need for suctioning and perform as needed  - Assess and instruct to report SOB or any respiratory difficulty  - Respiratory Therapy support as indicated  - Manage/alleviate anxiety  - Monitor for signs/symptoms of CO2 retention  Outcome: Progressing     Problem: SKIN/TISSUE INTEGRITY - ADULT  Goal: Skin integrity remains intact  Description: INTERVENTIONS  - Assess and document risk factors for pressure ulcer development  - Assess and document skin integrity  - Monitor for areas of redness and/or skin breakdown  - Initiate interventions, skin care algorithm/standards of care as needed  Outcome: Progressing  Goal: Incision(s), wounds(s) or drain site(s) healing without S/S of infection  Description: INTERVENTIONS:  - Assess and document risk factors for pressure ulcer development  - Assess and document skin integrity  - Assess and document dressing/incision, wound bed, drain sites and surrounding tissue  - Implement wound care per orders  - Initiate isolation precautions as appropriate  - Initiate Pressure Ulcer prevention bundle as indicated  Outcome: Progressing  Goal: Oral mucous membranes remain intact  Description: INTERVENTIONS  - Assess oral mucosa and hygiene practices  - Implement preventative oral hygiene regimen  - Implement oral medicated treatments as ordered  Outcome: Progressing

## 2022-08-01 VITALS
RESPIRATION RATE: 20 BRPM | BODY MASS INDEX: 38.27 KG/M2 | DIASTOLIC BLOOD PRESSURE: 32 MMHG | TEMPERATURE: 97 F | WEIGHT: 307.81 LBS | SYSTOLIC BLOOD PRESSURE: 121 MMHG | HEIGHT: 75 IN | HEART RATE: 79 BPM | OXYGEN SATURATION: 100 %

## 2022-08-01 LAB
ALBUMIN SERPL-MCNC: 2.6 G/DL (ref 3.4–5)
ANION GAP SERPL CALC-SCNC: 12 MMOL/L (ref 0–18)
BASOPHILS # BLD AUTO: 0.03 X10(3) UL (ref 0–0.2)
BASOPHILS NFR BLD AUTO: 0.2 %
BUN BLD-MCNC: 74 MG/DL (ref 7–18)
BUN/CREAT SERPL: 21.9 (ref 10–20)
CALCIUM BLD-MCNC: 8.7 MG/DL (ref 8.5–10.1)
CHLORIDE SERPL-SCNC: 111 MMOL/L (ref 98–112)
CO2 SERPL-SCNC: 20 MMOL/L (ref 21–32)
CREAT BLD-MCNC: 3.38 MG/DL
DEPRECATED RDW RBC AUTO: 65.3 FL (ref 35.1–46.3)
EOSINOPHIL # BLD AUTO: 0 X10(3) UL (ref 0–0.7)
EOSINOPHIL NFR BLD AUTO: 0 %
ERYTHROCYTE [DISTWIDTH] IN BLOOD BY AUTOMATED COUNT: 15.8 % (ref 11–15)
GLUCOSE BLD-MCNC: 171 MG/DL (ref 70–99)
GLUCOSE BLDC GLUCOMTR-MCNC: 224 MG/DL (ref 70–99)
HCT VFR BLD AUTO: 25.7 %
HGB BLD-MCNC: 8 G/DL
IMM GRANULOCYTES # BLD AUTO: 0.4 X10(3) UL (ref 0–1)
IMM GRANULOCYTES NFR BLD: 2.3 %
LYMPHOCYTES # BLD AUTO: 0.45 X10(3) UL (ref 1–4)
LYMPHOCYTES NFR BLD AUTO: 2.6 %
MCH RBC QN AUTO: 35.2 PG (ref 26–34)
MCHC RBC AUTO-ENTMCNC: 31.1 G/DL (ref 31–37)
MCV RBC AUTO: 113.2 FL
MONOCYTES # BLD AUTO: 0.35 X10(3) UL (ref 0.1–1)
MONOCYTES NFR BLD AUTO: 2 %
NEUTROPHILS # BLD AUTO: 16.41 X10 (3) UL (ref 1.5–7.7)
NEUTROPHILS # BLD AUTO: 16.41 X10(3) UL (ref 1.5–7.7)
NEUTROPHILS NFR BLD AUTO: 92.9 %
OSMOLALITY SERPL CALC.SUM OF ELEC: 322 MOSM/KG (ref 275–295)
PHOSPHATE SERPL-MCNC: 3.8 MG/DL (ref 2.5–4.9)
PLATELET # BLD AUTO: 480 10(3)UL (ref 150–450)
POTASSIUM SERPL-SCNC: 3.7 MMOL/L (ref 3.5–5.1)
RBC # BLD AUTO: 2.27 X10(6)UL
SODIUM SERPL-SCNC: 143 MMOL/L (ref 136–145)
WBC # BLD AUTO: 17.6 X10(3) UL (ref 4–11)

## 2022-08-01 PROCEDURE — 99239 HOSP IP/OBS DSCHRG MGMT >30: CPT | Performed by: HOSPITALIST

## 2022-08-01 RX ORDER — PREDNISONE 20 MG/1
60 TABLET ORAL DAILY
Qty: 80 TABLET | Refills: 0 | Status: SHIPPED | OUTPATIENT
Start: 2022-08-01 | End: 2022-08-13

## 2022-08-01 RX ORDER — VANCOMYCIN HYDROCHLORIDE
15
Qty: 2500 ML | Refills: 0 | Status: SHIPPED | OUTPATIENT
Start: 2022-08-03

## 2022-08-01 NOTE — CM/SW NOTE
Spoke with Duane Sims from [facility] today who is also working with [facility] coordination with [facility]. Patient received first dose of vancomycin today. Updates sent to [facility]. Next dose of antibiotics will be due on Wednesday. Wound vac to be removed today- awaiting final details for wound care needs. PLAN:  Home today after seen by surgeon. [facility] to do [place] on 8/2/22 and then see again on 8/3/22. [facility] to help with wound care, IV training and PICC line dressing changes.   Information shared with patient verbally and provided in AVS.    Shellie Barnes MBA BSN RN Glenis Fletcher  RN Case Manager  [phone]

## 2022-08-01 NOTE — PLAN OF CARE
Problem: PAIN - ADULT  Goal: Verbalizes/displays adequate comfort level or patient's stated pain goal  Description: INTERVENTIONS:  - Encourage pt to monitor pain and request assistance  - Assess pain using appropriate pain scale  - Administer analgesics based on type and severity of pain and evaluate response  - Implement non-pharmacological measures as appropriate and evaluate response  - Consider cultural and social influences on pain and pain management  - Manage/alleviate anxiety  - Utilize distraction and/or relaxation techniques  - Monitor for opioid side effects  - Notify MD/LIP if interventions unsuccessful or patient reports new pain  - Anticipate increased pain with activity and pre-medicate as appropriate  Outcome: Progressing     Problem: RISK FOR INFECTION - ADULT  Goal: Absence of fever/infection during anticipated neutropenic period  Description: INTERVENTIONS  - Monitor WBC  - Administer growth factors as ordered  - Implement neutropenic guidelines  Outcome: Progressing     Problem: SAFETY ADULT - FALL  Goal: Free from fall injury  Description: INTERVENTIONS:  - Assess pt frequently for physical needs  - Identify cognitive and physical deficits and behaviors that affect risk of falls.   - [place] fall precautions as indicated by assessment.  - Educate pt/family on patient safety including physical limitations  - Instruct pt to call for assistance with activity based on assessment  - Modify environment to reduce risk of injury  - Provide assistive devices as appropriate  - Consider OT/PT consult to assist with strengthening/mobility  - Encourage toileting schedule  Outcome: Progressing     Problem: RESPIRATORY - ADULT  Goal: Achieves optimal ventilation and oxygenation  Description: INTERVENTIONS:  - Assess for changes in respiratory status  - Assess for changes in mentation and behavior  - Position to facilitate oxygenation and minimize respiratory effort  - Oxygen supplementation based on oxygen saturation or ABGs  - Provide Smoking Cessation handout, if applicable  - Encourage broncho-pulmonary hygiene including cough, deep breathe, Incentive Spirometry  - Assess the need for suctioning and perform as needed  - Assess and instruct to report SOB or any respiratory difficulty  - Respiratory Therapy support as indicated  - Manage/alleviate anxiety  - Monitor for signs/symptoms of CO2 retention  Outcome: Progressing     Problem: SKIN/TISSUE INTEGRITY - ADULT  Goal: Skin integrity remains intact  Description: INTERVENTIONS  - Assess and document risk factors for pressure ulcer development  - Assess and document skin integrity  - Monitor for areas of redness and/or skin breakdown  - Initiate interventions, skin care algorithm/standards of care as needed  Outcome: Progressing  Goal: Incision(s), wounds(s) or drain site(s) healing without S/S of infection  Description: INTERVENTIONS:  - Assess and document risk factors for pressure ulcer development  - Assess and document skin integrity  - Assess and document dressing/incision, wound bed, drain sites and surrounding tissue  - Implement wound care per orders  - Initiate isolation precautions as appropriate  - Initiate Pressure Ulcer prevention bundle as indicated  Outcome: Progressing    Received awake,oriented. Tylenol declined by pt. Rt.shoulder wound switched off last night(vac is full)-per pt.leaflet instruction at bedside. Karis Chatterjee called -no call back at this time. Wound consult sent. IV steroids/IV antibiotics continued. Call light within reach. Instructed to call for assistance. Will continue to monitor pt.

## 2022-08-01 NOTE — OCCUPATIONAL THERAPY NOTE
RN cleared pt to participate in OT session, this therapist attempted [id number]; however, pt refused. Pt reported that pt has exercised today and shoulder hurts from activity. Pt was in bed with HOB elevated and pt was not wearing shoulder sling. Per RN, pt will be discharged before 17:00 today. DC IP OT at this time as pt will be discharged from this facility.       Seda Nam, OTR/L  [address]

## 2022-08-01 NOTE — PLAN OF CARE
Problem: Patient Centered Care  Goal: Patient preferences are identified and integrated in the patient's plan of care  Description: Interventions:  - What would you like us to know as we care for you? Live at home  - Provide timely, complete, and accurate information to patient/family  - Incorporate patient and family knowledge, values, beliefs, and cultural backgrounds into the planning and delivery of care  - Encourage patient/family to participate in care and decision-making at the level they choose  - Honor patient and family perspectives and choices  Outcome: Adequate for Discharge   Patient will be discharged home with Prevena wound vac in place that was placed by ortho this evening and will follow-up in 1 week.

## 2022-08-02 ENCOUNTER — PATIENT OUTREACH (OUTPATIENT)
Dept: CASE MANAGEMENT | Age: 62
End: 2022-08-02

## 2022-08-02 LAB — MMA: 1.69 UMOL/L

## 2022-08-02 NOTE — PROGRESS NOTES
VM received; pt requesting assistance w/scheduling apt (dc 08/01)    Dr Flor Lagurere, [address]  [phone]  Follow up 2 weeks    [facility] [id number] [phone]  Follow up 1 week    Dr Enola Jeans, CARDIOLOGY  [address] [place] [id number] [phone]  Follow up 1 week    Dr Kit Mitchell  [facility]  [address] [facility] [address] [facility]  [address] [place] [address] Road [address] [phone]  Follow up 2 weeks    Dr [facility]  [address] [facility]  [phone]  Follow up 2 weeks

## 2022-08-09 ENCOUNTER — HOSPITAL ENCOUNTER (EMERGENCY)
Facility: HOSPITAL | Age: 62
Discharge: HOME | End: 2022-08-09
Payer: MEDICARE

## 2022-08-09 ENCOUNTER — APPOINTMENT (OUTPATIENT)
Dept: GENERAL RADIOLOGY | Facility: HOSPITAL | Age: 62
End: 2022-08-09
Payer: MEDICARE

## 2022-08-09 VITALS
OXYGEN SATURATION: 98 % | BODY MASS INDEX: 39 KG/M2 | TEMPERATURE: 98 F | HEART RATE: 79 BPM | WEIGHT: 308.63 LBS | RESPIRATION RATE: 16 BRPM | SYSTOLIC BLOOD PRESSURE: 135 MMHG | DIASTOLIC BLOOD PRESSURE: 72 MMHG

## 2022-08-09 DIAGNOSIS — M00.9 PYOGENIC ARTHRITIS OF RIGHT SHOULDER REGION, DUE TO UNSPECIFIED ORGANISM (HCC): Primary | ICD-10-CM

## 2022-08-09 LAB
ANION GAP SERPL CALC-SCNC: 8 MMOL/L (ref 0–18)
BASOPHILS # BLD AUTO: 0.01 X10(3) UL (ref 0–0.2)
BASOPHILS NFR BLD AUTO: 0.1 %
BUN BLD-MCNC: 54 MG/DL (ref 7–18)
BUN/CREAT SERPL: 28 (ref 10–20)
CALCIUM BLD-MCNC: 8.1 MG/DL (ref 8.5–10.1)
CHLORIDE SERPL-SCNC: 106 MMOL/L (ref 98–112)
CO2 SERPL-SCNC: 22 MMOL/L (ref 21–32)
CREAT BLD-MCNC: 1.93 MG/DL
DEPRECATED RDW RBC AUTO: 61.5 FL (ref 35.1–46.3)
EOSINOPHIL # BLD AUTO: 0.14 X10(3) UL (ref 0–0.7)
EOSINOPHIL NFR BLD AUTO: 1.3 %
ERYTHROCYTE [DISTWIDTH] IN BLOOD BY AUTOMATED COUNT: 15.4 % (ref 11–15)
GFR SERPLBLD BASED ON 1.73 SQ M-ARVRAT: 38 ML/MIN/1.73M2 (ref 60–?)
GLUCOSE BLD-MCNC: 104 MG/DL (ref 70–99)
HCT VFR BLD AUTO: 28.2 %
HGB BLD-MCNC: 8.9 G/DL
IMM GRANULOCYTES # BLD AUTO: 0.11 X10(3) UL (ref 0–1)
IMM GRANULOCYTES NFR BLD: 1 %
LYMPHOCYTES # BLD AUTO: 0.98 X10(3) UL (ref 1–4)
LYMPHOCYTES NFR BLD AUTO: 9.3 %
MCH RBC QN AUTO: 34.1 PG (ref 26–34)
MCHC RBC AUTO-ENTMCNC: 31.6 G/DL (ref 31–37)
MCV RBC AUTO: 108 FL
MONOCYTES # BLD AUTO: 0.84 X10(3) UL (ref 0.1–1)
MONOCYTES NFR BLD AUTO: 8 %
NEUTROPHILS # BLD AUTO: 8.45 X10 (3) UL (ref 1.5–7.7)
NEUTROPHILS # BLD AUTO: 8.45 X10(3) UL (ref 1.5–7.7)
NEUTROPHILS NFR BLD AUTO: 80.3 %
OSMOLALITY SERPL CALC.SUM OF ELEC: 297 MOSM/KG (ref 275–295)
PLATELET # BLD AUTO: 200 10(3)UL (ref 150–450)
POTASSIUM SERPL-SCNC: 3.7 MMOL/L (ref 3.5–5.1)
RBC # BLD AUTO: 2.61 X10(6)UL
SARS-COV-2 RNA RESP QL NAA+PROBE: DETECTED
SODIUM SERPL-SCNC: 136 MMOL/L (ref 136–145)
TROPONIN I HIGH SENSITIVITY: 13 NG/L
WBC # BLD AUTO: 10.5 X10(3) UL (ref 4–11)

## 2022-08-09 PROCEDURE — 85025 COMPLETE CBC W/AUTO DIFF WBC: CPT

## 2022-08-09 PROCEDURE — 36415 COLL VENOUS BLD VENIPUNCTURE: CPT

## 2022-08-09 PROCEDURE — 80048 BASIC METABOLIC PNL TOTAL CA: CPT

## 2022-08-09 PROCEDURE — 71045 X-RAY EXAM CHEST 1 VIEW: CPT

## 2022-08-09 PROCEDURE — 99284 EMERGENCY DEPT VISIT MOD MDM: CPT

## 2022-08-09 PROCEDURE — 99285 EMERGENCY DEPT VISIT HI MDM: CPT

## 2022-08-09 PROCEDURE — 93010 ELECTROCARDIOGRAM REPORT: CPT | Performed by: EMERGENCY MEDICINE

## 2022-08-09 PROCEDURE — 84484 ASSAY OF TROPONIN QUANT: CPT

## 2022-08-09 PROCEDURE — 93005 ELECTROCARDIOGRAM TRACING: CPT

## 2022-08-09 RX ORDER — ONDANSETRON 2 MG/ML
4 INJECTION INTRAMUSCULAR; INTRAVENOUS EVERY 4 HOURS PRN
OUTPATIENT
Start: 2022-08-09 | End: 2022-08-09

## 2022-08-09 NOTE — ED INITIAL ASSESSMENT (HPI)
Pt to ED with c/o \"not feeling well\" for the last 5 days. Pt states recent left total shoulder arthroplasty. Pt states currently being treated for infection in left shoulder. Pt states was scheduled for Dapto injection at [facility] at 1400 today. Pt denies fever. Pt c/o dyspnea and chest pain. Pt with hx of Afib on Eliquis. Pt able to speak in full sentences. Pt is alert and oriented x4. Pt states dx with COVID 7-.

## 2022-08-23 ENCOUNTER — HOSPITAL ENCOUNTER (OUTPATIENT)
Facility: HOSPITAL | Age: 62
Setting detail: OBSERVATION
Discharge: HOME | End: 2022-08-25
Attending: HOSPITALIST | Admitting: HOSPITALIST
Payer: MEDICARE

## 2022-08-23 ENCOUNTER — HOSPITAL ENCOUNTER (INPATIENT)
Facility: HOSPITAL | Age: 62
LOS: 2 days | Discharge: HOME | End: 2022-08-25
Attending: HOSPITALIST | Admitting: HOSPITALIST
Payer: MEDICARE

## 2022-08-23 ENCOUNTER — HOSPITAL ENCOUNTER (EMERGENCY)
Facility: HOSPITAL | Age: 62
Discharge: STILL ADMITTED OR ADMINISTRATIVE | End: 2022-08-23
Payer: MEDICARE

## 2022-08-23 PROBLEM — M00.9: Status: ACTIVE | Noted: 2022-08-23

## 2022-08-23 PROBLEM — L03.90 CELLULITIS: Status: ACTIVE | Noted: 2022-08-23

## 2022-08-23 LAB
ALBUMIN SERPL-MCNC: 2.6 G/DL (ref 3.4–5)
ALBUMIN/GLOB SERPL: 0.7 {RATIO} (ref 1–2)
ALP LIVER SERPL-CCNC: 117 U/L
ALT SERPL-CCNC: 11 U/L
ANION GAP SERPL CALC-SCNC: 8 MMOL/L (ref 0–18)
AST SERPL-CCNC: 15 U/L (ref 15–37)
BASOPHILS # BLD AUTO: 0.04 X10(3) UL (ref 0–0.2)
BASOPHILS NFR BLD AUTO: 0.7 %
BILIRUB SERPL-MCNC: 0.5 MG/DL (ref 0.1–2)
BUN BLD-MCNC: 17 MG/DL (ref 7–18)
BUN/CREAT SERPL: 11.9 (ref 10–20)
CALCIUM BLD-MCNC: 8.6 MG/DL (ref 8.5–10.1)
CHLORIDE SERPL-SCNC: 108 MMOL/L (ref 98–112)
CK SERPL-CCNC: 72 U/L
CO2 SERPL-SCNC: 24 MMOL/L (ref 21–32)
CREAT BLD-MCNC: 1.43 MG/DL
DEPRECATED RDW RBC AUTO: 58.9 FL (ref 35.1–46.3)
EOSINOPHIL # BLD AUTO: 0.19 X10(3) UL (ref 0–0.7)
EOSINOPHIL NFR BLD AUTO: 3.3 %
ERYTHROCYTE [DISTWIDTH] IN BLOOD BY AUTOMATED COUNT: 15 % (ref 11–15)
GFR SERPLBLD BASED ON 1.73 SQ M-ARVRAT: 55 ML/MIN/1.73M2 (ref 60–?)
GLOBULIN PLAS-MCNC: 3.9 G/DL (ref 2.8–4.4)
GLUCOSE BLD-MCNC: 106 MG/DL (ref 70–99)
HCT VFR BLD AUTO: 28.9 %
HGB BLD-MCNC: 8.9 G/DL
IMM GRANULOCYTES # BLD AUTO: 0.04 X10(3) UL (ref 0–1)
IMM GRANULOCYTES NFR BLD: 0.7 %
LYMPHOCYTES # BLD AUTO: 1.54 X10(3) UL (ref 1–4)
LYMPHOCYTES NFR BLD AUTO: 26.4 %
MCH RBC QN AUTO: 33.3 PG (ref 26–34)
MCHC RBC AUTO-ENTMCNC: 30.8 G/DL (ref 31–37)
MCV RBC AUTO: 108.2 FL
MONOCYTES # BLD AUTO: 0.74 X10(3) UL (ref 0.1–1)
MONOCYTES NFR BLD AUTO: 12.7 %
NEUTROPHILS # BLD AUTO: 3.29 X10 (3) UL (ref 1.5–7.7)
NEUTROPHILS # BLD AUTO: 3.29 X10(3) UL (ref 1.5–7.7)
NEUTROPHILS NFR BLD AUTO: 56.2 %
NT-PROBNP SERPL-MCNC: 2378 PG/ML (ref ?–125)
OSMOLALITY SERPL CALC.SUM OF ELEC: 292 MOSM/KG (ref 275–295)
PLATELET # BLD AUTO: 228 10(3)UL (ref 150–450)
POTASSIUM SERPL-SCNC: 3.8 MMOL/L (ref 3.5–5.1)
PROT SERPL-MCNC: 6.5 G/DL (ref 6.4–8.2)
RBC # BLD AUTO: 2.67 X10(6)UL
SODIUM SERPL-SCNC: 140 MMOL/L (ref 136–145)
WBC # BLD AUTO: 5.8 X10(3) UL (ref 4–11)

## 2022-08-23 PROCEDURE — 83880 ASSAY OF NATRIURETIC PEPTIDE: CPT | Performed by: HOSPITALIST

## 2022-08-23 PROCEDURE — 80053 COMPREHEN METABOLIC PANEL: CPT | Performed by: INTERNAL MEDICINE

## 2022-08-23 PROCEDURE — 82550 ASSAY OF CK (CPK): CPT | Performed by: INTERNAL MEDICINE

## 2022-08-23 PROCEDURE — 87088 URINE BACTERIA CULTURE: CPT | Performed by: HOSPITALIST

## 2022-08-23 PROCEDURE — 87040 BLOOD CULTURE FOR BACTERIA: CPT | Performed by: INTERNAL MEDICINE

## 2022-08-23 PROCEDURE — 85025 COMPLETE CBC W/AUTO DIFF WBC: CPT | Performed by: INTERNAL MEDICINE

## 2022-08-23 PROCEDURE — 87205 SMEAR GRAM STAIN: CPT | Performed by: HOSPITALIST

## 2022-08-23 PROCEDURE — 87186 SC STD MICRODIL/AGAR DIL: CPT | Performed by: HOSPITALIST

## 2022-08-23 PROCEDURE — 99211 OFF/OP EST MAY X REQ PHY/QHP: CPT

## 2022-08-23 PROCEDURE — 87070 CULTURE OTHR SPECIMN AEROBIC: CPT | Performed by: HOSPITALIST

## 2022-08-23 PROCEDURE — 36415 COLL VENOUS BLD VENIPUNCTURE: CPT | Performed by: INTERNAL MEDICINE

## 2022-08-23 PROCEDURE — 87075 CULTR BACTERIA EXCEPT BLOOD: CPT | Performed by: HOSPITALIST

## 2022-08-23 RX ORDER — ACETAMINOPHEN 325 MG/1
650 TABLET ORAL EVERY 4 HOURS PRN
Status: DISCONTINUED | OUTPATIENT
Start: 2022-08-23 | End: 2022-08-25

## 2022-08-23 RX ORDER — DILTIAZEM HYDROCHLORIDE 180 MG/1
180 CAPSULE, EXTENDED RELEASE ORAL 2 TIMES DAILY
Status: DISCONTINUED | OUTPATIENT
Start: 2022-08-23 | End: 2022-08-25

## 2022-08-23 RX ORDER — DIGOXIN 250 MCG
0.25 TABLET ORAL DAILY
Status: DISCONTINUED | OUTPATIENT
Start: 2022-08-23 | End: 2022-08-25

## 2022-08-23 RX ORDER — FUROSEMIDE 10 MG/ML
40 INJECTION INTRAMUSCULAR; INTRAVENOUS
Status: DISCONTINUED | OUTPATIENT
Start: 2022-08-23 | End: 2022-08-25

## 2022-08-23 RX ORDER — LEVOTHYROXINE SODIUM 0.1 MG/1
100 TABLET ORAL
Status: DISCONTINUED | OUTPATIENT
Start: 2022-08-23 | End: 2022-08-25

## 2022-08-23 RX ORDER — PROCHLORPERAZINE EDISYLATE 5 MG/ML
5 INJECTION INTRAMUSCULAR; INTRAVENOUS EVERY 8 HOURS PRN
Status: DISCONTINUED | OUTPATIENT
Start: 2022-08-23 | End: 2022-08-25

## 2022-08-23 RX ORDER — ONDANSETRON 2 MG/ML
4 INJECTION INTRAMUSCULAR; INTRAVENOUS EVERY 4 HOURS PRN
Status: ACTIVE | OUTPATIENT
Start: 2022-08-23 | End: 2022-08-23

## 2022-08-23 RX ORDER — DAPTOMYCIN 350 MG/7ML
INJECTION, POWDER, LYOPHILIZED, FOR SOLUTION INTRAVENOUS
COMMUNITY
End: 2022-08-25

## 2022-08-23 RX ORDER — ALLOPURINOL 300 MG/1
300 TABLET ORAL DAILY
COMMUNITY

## 2022-08-23 RX ORDER — ATORVASTATIN CALCIUM 10 MG/1
10 TABLET, FILM COATED ORAL NIGHTLY
Status: DISCONTINUED | OUTPATIENT
Start: 2022-08-23 | End: 2022-08-25

## 2022-08-23 RX ORDER — ALLOPURINOL 300 MG/1
300 TABLET ORAL DAILY
Status: DISCONTINUED | OUTPATIENT
Start: 2022-08-23 | End: 2022-08-25

## 2022-08-23 RX ORDER — MELATONIN
325 DAILY
Status: DISCONTINUED | OUTPATIENT
Start: 2022-08-24 | End: 2022-08-25

## 2022-08-23 RX ORDER — HYDROCODONE BITARTRATE AND ACETAMINOPHEN 5; 325 MG/1; MG/1
2 TABLET ORAL EVERY 4 HOURS PRN
Status: DISCONTINUED | OUTPATIENT
Start: 2022-08-23 | End: 2022-08-25

## 2022-08-23 RX ORDER — MAGNESIUM OXIDE 400 MG/1
400 TABLET ORAL DAILY
Status: DISCONTINUED | OUTPATIENT
Start: 2022-08-23 | End: 2022-08-25

## 2022-08-23 RX ORDER — DOCUSATE SODIUM 100 MG/1
100 CAPSULE, LIQUID FILLED ORAL 2 TIMES DAILY PRN
Status: DISCONTINUED | OUTPATIENT
Start: 2022-08-23 | End: 2022-08-25

## 2022-08-23 RX ORDER — HYDROCODONE BITARTRATE AND ACETAMINOPHEN 5; 325 MG/1; MG/1
1 TABLET ORAL EVERY 4 HOURS PRN
Status: DISCONTINUED | OUTPATIENT
Start: 2022-08-23 | End: 2022-08-25

## 2022-08-23 RX ORDER — BUPROPION HYDROCHLORIDE 150 MG/1
150 TABLET, EXTENDED RELEASE ORAL NIGHTLY
Status: DISCONTINUED | OUTPATIENT
Start: 2022-08-23 | End: 2022-08-25

## 2022-08-23 RX ORDER — TEMAZEPAM 15 MG/1
15 CAPSULE ORAL NIGHTLY PRN
Status: DISCONTINUED | OUTPATIENT
Start: 2022-08-23 | End: 2022-08-25

## 2022-08-23 RX ORDER — ONDANSETRON 2 MG/ML
4 INJECTION INTRAMUSCULAR; INTRAVENOUS EVERY 6 HOURS PRN
Status: DISCONTINUED | OUTPATIENT
Start: 2022-08-23 | End: 2022-08-25

## 2022-08-23 RX ORDER — ACETAMINOPHEN 500 MG
500 TABLET ORAL EVERY 4 HOURS PRN
Status: DISCONTINUED | OUTPATIENT
Start: 2022-08-23 | End: 2022-08-25

## 2022-08-23 RX ORDER — BUPROPION HYDROCHLORIDE 150 MG/1
300 TABLET ORAL EVERY MORNING
Status: DISCONTINUED | OUTPATIENT
Start: 2022-08-24 | End: 2022-08-25

## 2022-08-23 NOTE — RESPIRATORY THERAPY NOTE
ELSLY ASSESSMENT:    Pt does have a previous diagnosis of LESLY. Pt does not routinely use a CPAP device at home.      CPAP INITIATION:    Pt to be placed on CPAP: no  Pt refused: yes

## 2022-08-23 NOTE — H&P
[facility]    PATIENT'S NAME: Law Lowe   ATTENDING PHYSICIAN: Mir Li MD   PATIENT ACCOUNT#:   [id number]    LOCATION:  [address] [place] [address] RECORD #:   [id number]       YOB: 1960  ADMISSION DATE:       08/23/2022    HISTORY AND PHYSICAL EXAMINATION    CHIEF COMPLAINT:  Fever, chills, and recent right shoulder prosthetic infection requiring I and D and right shoulder revision arthroplasty. HISTORY OF PRESENT ILLNESS:  The patient is a 68-year-old  male, poor historian, who initially had right shoulder total arthroplasty on July 13, 2022, last month. Developed an infection and drainage from his wound requiring another operation on July 24 with incision and drainage and right shoulder revision. Cultures grew Staphylococcus aureus non-MRSA. Initially started on IV cefazolin and developed interstitial nephritis with acute on chronic renal injury during that hospitalization, requiring prednisone treatment. Also had an echocardiogram which showed left ventricular diastolic dysfunction with moderate to severe pulmonary artery hypertension. The patient was eventually discharged home on IV daptomycin per infectious disease consult and had a left upper extremity PICC line. Recently he was experiencing on and off fever and chills with diaphoresis at home. Was seen at [facility] Room a few days ago. Blood cultures were obtained. The patient was not hospitalized at that time. Today he was seen on a followup appointment with Orthopedic Surgery and was noted to have some dehiscence of the inferior aspect of his right shoulder wound with some drainage. Also noted to have chronic stasis dermatitis and possible cellulitis of both legs. Sent to [facility] for further management considering the constellation of symptoms.     PAST MEDICAL HISTORY:  Morbid obesity, hypertension, chronic lower extremity stasis dermatitis, obstructive sleep apnea, hypertension, hyperlipidemia, paroxysmal atrial fibrillation anticoagulated with Eliquis, chronic kidney disease stage 3 with recent acute on chronic component secondary to interstitial nephritis thought to be secondary to IV cefazolin. He had history of generalized osteoarthritis, kidney stones, depression, hypothyroidism. PAST SURGICAL HISTORY:  Bilateral total knee arthroplasties, appendectomy, left foot partial ray resection secondary to osteomyelitis, recent right total shoulder arthroplasty complicated by infection with MSSA requiring incision and drainage and revision on July 24, currently on IV daptomycin. ALLERGIES:  Cefazolin, tigecycline, Naprosyn, clindamycin. Also mentioned daptomycin, though he is on daptomycin currently. FAMILY HISTORY:  Both parents had hypertension. Father had heart disease. SOCIAL HISTORY:  No tobacco.  Occasional alcohol, no drug use. Lives by himself. Usually independent in his basic activities of daily living. REVIEW OF SYSTEMS:  Again, the patient is a very poor historian. He has noted some drainage from his right shoulder inferior stitches and wound. Also, he has been having sporadic chills and diaphoresis at night. Last time it happened per his report was 2 days ago. It has been going on and off since his shoulder surgery I and D and revision on July 24, last month. Other 12-point review of systems negative. PHYSICAL EXAMINATION:    GENERAL:  Alert. Oriented to time, place, and person. No acute distress. VITAL SIGNS:  Temperature 97.9, pulse 89, respiratory rate 20, blood pressure 132/78, pulse ox 98% on room air. HEENT:  Atraumatic. Oropharynx clear, dry mucous membranes. Ears, nose normal.  Eyes:  Anicteric sclerae. NECK:  Supple. No lymphadenopathy. Trachea midline. Full range of motion. LUNGS:  Clear to auscultation bilaterally. Normal respiratory effort. HEART:  Regular rate and rhythm. S1, S2 auscultated.   No murmur. ABDOMEN:  Soft, nondistended, obese. Positive bowel sounds. EXTREMITIES:  +2 edema both legs with superficial skin denudation on left anterior distal leg and multiple dried unroofed blisters on the right anterior leg with cellulitic changes extending from both ankles toward knees. NEUROLOGIC:  Motor and sensory intact. ASSESSMENT:    1. Fever and chills. Source could be related to recurrent infection of the right shoulder. Rule out left upper extremity peripherally inserted central catheter line infection or bilateral lower extremity cellulitis. 2.   Hypertension. 3.   Morbid obesity and obstructive sleep apnea. 4.   Paroxysmal atrial fibrillation. 5.   Chronic kidney disease stage 3. PLAN:  The patient will be admitted to general medical floor. Obtain blood cultures, PICC line tip catheter culture and right shoulder surgical wound culture, obtain infectious disease consult, antibiotic coverage per Infectious Disease, continue his home medications including Eliquis anticoagulation, start him on IV Lasix for his lower extremity edema. Further recommendations to follow.     Dictated By Martinez Yoon MD  d: 08/23/2022 14:39:46  t: 08/23/2022 15:16:43  Job [id number]  FB/

## 2022-08-23 NOTE — ED INITIAL ASSESSMENT (HPI)
Patient was sent by Dr. Jonh Aguila to get a picc line change because it has been in for  A month. Patient reports that it is still working and he is using daptomyocin for a shoulder infection.

## 2022-08-24 ENCOUNTER — TELEPHONE (OUTPATIENT)
Dept: MEDSURG UNIT | Facility: HOSPITAL | Age: 62
End: 2022-08-24

## 2022-08-24 ENCOUNTER — APPOINTMENT (OUTPATIENT)
Dept: ULTRASOUND IMAGING | Facility: HOSPITAL | Age: 62
End: 2022-08-24
Attending: INTERNAL MEDICINE
Payer: MEDICARE

## 2022-08-24 LAB
ANION GAP SERPL CALC-SCNC: 7 MMOL/L (ref 0–18)
BASOPHILS # BLD AUTO: 0.03 X10(3) UL (ref 0–0.2)
BASOPHILS NFR BLD AUTO: 0.6 %
BUN BLD-MCNC: 16 MG/DL (ref 7–18)
BUN/CREAT SERPL: 13.1 (ref 10–20)
CALCIUM BLD-MCNC: 8 MG/DL (ref 8.5–10.1)
CHLORIDE SERPL-SCNC: 108 MMOL/L (ref 98–112)
CO2 SERPL-SCNC: 25 MMOL/L (ref 21–32)
CREAT BLD-MCNC: 1.22 MG/DL
D DIMER PPP FEU-MCNC: 1.3 UG/ML FEU (ref ?–0.64)
DEPRECATED RDW RBC AUTO: 57.5 FL (ref 35.1–46.3)
EOSINOPHIL # BLD AUTO: 0.16 X10(3) UL (ref 0–0.7)
EOSINOPHIL NFR BLD AUTO: 2.9 %
ERYTHROCYTE [DISTWIDTH] IN BLOOD BY AUTOMATED COUNT: 14.8 % (ref 11–15)
GFR SERPLBLD BASED ON 1.73 SQ M-ARVRAT: 66 ML/MIN/1.73M2 (ref 60–?)
GLUCOSE BLD-MCNC: 103 MG/DL (ref 70–99)
HCT VFR BLD AUTO: 28.5 %
HGB BLD-MCNC: 9 G/DL
IMM GRANULOCYTES # BLD AUTO: 0.05 X10(3) UL (ref 0–1)
IMM GRANULOCYTES NFR BLD: 0.9 %
LYMPHOCYTES # BLD AUTO: 1.34 X10(3) UL (ref 1–4)
LYMPHOCYTES NFR BLD AUTO: 24.6 %
MCH RBC QN AUTO: 33.5 PG (ref 26–34)
MCHC RBC AUTO-ENTMCNC: 31.6 G/DL (ref 31–37)
MCV RBC AUTO: 105.9 FL
MONOCYTES # BLD AUTO: 0.71 X10(3) UL (ref 0.1–1)
MONOCYTES NFR BLD AUTO: 13.1 %
NEUTROPHILS # BLD AUTO: 3.15 X10 (3) UL (ref 1.5–7.7)
NEUTROPHILS # BLD AUTO: 3.15 X10(3) UL (ref 1.5–7.7)
NEUTROPHILS NFR BLD AUTO: 57.9 %
OSMOLALITY SERPL CALC.SUM OF ELEC: 291 MOSM/KG (ref 275–295)
PLATELET # BLD AUTO: 223 10(3)UL (ref 150–450)
PLATELET MORPHOLOGY: NORMAL
POTASSIUM SERPL-SCNC: 3.2 MMOL/L (ref 3.5–5.1)
RBC # BLD AUTO: 2.69 X10(6)UL
SODIUM SERPL-SCNC: 140 MMOL/L (ref 136–145)
WBC # BLD AUTO: 5.4 X10(3) UL (ref 4–11)

## 2022-08-24 PROCEDURE — 85025 COMPLETE CBC W/AUTO DIFF WBC: CPT | Performed by: HOSPITALIST

## 2022-08-24 PROCEDURE — 80048 BASIC METABOLIC PNL TOTAL CA: CPT | Performed by: HOSPITALIST

## 2022-08-24 PROCEDURE — 93970 EXTREMITY STUDY: CPT | Performed by: INTERNAL MEDICINE

## 2022-08-24 PROCEDURE — 85379 FIBRIN DEGRADATION QUANT: CPT | Performed by: PHYSICIAN ASSISTANT

## 2022-08-24 RX ORDER — POTASSIUM CHLORIDE 1.5 G/1.77G
40 POWDER, FOR SOLUTION ORAL ONCE
Status: COMPLETED | OUTPATIENT
Start: 2022-08-24 | End: 2022-08-24

## 2022-08-24 NOTE — PLAN OF CARE
Problem: Patient Centered Care  Goal: Patient preferences are identified and integrated in the patient's plan of care  Description: Interventions:  - Provide timely, complete, and accurate information to patient/family  - Incorporate patient and family knowledge, values, beliefs, and cultural backgrounds into the planning and delivery of care  - Encourage patient/family to participate in care and decision-making at the level they choose  - Honor patient and family perspectives and choices  Outcome: Progressing     Problem: METABOLIC/FLUID AND ELECTROLYTES - ADULT  Goal: Electrolytes maintained within normal limits  Description: INTERVENTIONS:  - Monitor labs and rhythm and assess patient for signs and symptoms of electrolyte imbalances  - Administer electrolyte replacement as ordered  - Monitor response to electrolyte replacements, including rhythm and repeat lab results as appropriate  - Fluid restriction as ordered  - Instruct patient on fluid and nutrition restrictions as appropriate  Outcome: Progressing     Problem: SKIN/TISSUE INTEGRITY - ADULT  Goal: Skin integrity remains intact  Description: INTERVENTIONS  - Assess and document risk factors for pressure ulcer development  - Assess and document skin integrity  - Monitor for areas of redness and/or skin breakdown  - Initiate interventions, skin care algorithm/standards of care as needed  Outcome: Progressing     Problem: MUSCULOSKELETAL - ADULT  Goal: Return mobility to safest level of function  Description: INTERVENTIONS:  - Assess patient stability and activity tolerance for standing, transferring and ambulating w/ or w/o assistive devices  - Assist with transfers and ambulation using safe patient handling equipment as needed  - Ensure adequate protection for wounds/incisions during mobilization  - Obtain PT/OT consults as needed  - Advance activity as appropriate  - Communicate ordered activity level and limitations with patient/family  Outcome: Progressing     Problem: PAIN - ADULT  Goal: Verbalizes/displays adequate comfort level or patient's stated pain goal  Description: INTERVENTIONS:  - Encourage pt to monitor pain and request assistance  - Assess pain using appropriate pain scale  - Administer analgesics based on type and severity of pain and evaluate response  - Implement non-pharmacological measures as appropriate and evaluate response  - Consider cultural and social influences on pain and pain management  - Manage/alleviate anxiety  - Utilize distraction and/or relaxation techniques  - Monitor for opioid side effects  - Notify MD/LIP if interventions unsuccessful or patient reports new pain  - Anticipate increased pain with activity and pre-medicate as appropriate  Outcome: Progressing     Vital signs stable. No acute changes since arriving to floor from ED. Right shoulder incision cultured, PICC removed and tip cultured, wounds dressed.  Safety precautions in place, call light within reach

## 2022-08-24 NOTE — OCCUPATIONAL THERAPY NOTE
OT orders rcvd; pt is here for recurrent shoulder infection; may require another I&D; (had one 7/24; initial surgery 7/13 with ). Discussed with RN- jt is up and ambulatory with nursing staff to bathroom and up in chair; will initiate OT evaluation once further POC is established. Reached out to  to ascertain any precautions or restrictions. Will continue to follow.      [address] [facility], OTR/L ext [phone]

## 2022-08-24 NOTE — PLAN OF CARE
Patient VSS, no acute changes during shift. PRN Norco. Updated patient on plan of care. Frequent rounding with bed always in the lowest position. Call light always in reach and safety precautions in place.       Problem: Patient Centered Care  Goal: Patient preferences are identified and integrated in the patient's plan of care  Description: Interventions:  - What would you like us to know as we care for you?  - Provide timely, complete, and accurate information to patient/family  - Incorporate patient and family knowledge, values, beliefs, and cultural backgrounds into the planning and delivery of care  - Encourage patient/family to participate in care and decision-making at the level they choose  - Honor patient and family perspectives and choices  Outcome: Progressing     Problem: Patient/Family Goals  Goal: Patient/Family Long Term Goal  Description: Patient's Long Term Goal: Address infection of shoulder    Interventions:  - Blood cx  -Wound cx  - See additional Care Plan goals for specific interventions  Outcome: Progressing  Goal: Patient/Family Short Term Goal  Description: Patient's Short Term Goal: Relieve pain    Interventions:   - PRN pain meds  - See additional Care Plan goals for specific interventions  Outcome: Progressing     Problem: METABOLIC/FLUID AND ELECTROLYTES - ADULT  Goal: Electrolytes maintained within normal limits  Description: INTERVENTIONS:  - Monitor labs and rhythm and assess patient for signs and symptoms of electrolyte imbalances  - Administer electrolyte replacement as ordered  - Monitor response to electrolyte replacements, including rhythm and repeat lab results as appropriate  - Fluid restriction as ordered  - Instruct patient on fluid and nutrition restrictions as appropriate  Outcome: Progressing     Problem: SKIN/TISSUE INTEGRITY - ADULT  Goal: Skin integrity remains intact  Description: INTERVENTIONS  - Assess and document risk factors for pressure ulcer development  - Assess and document skin integrity  - Monitor for areas of redness and/or skin breakdown  - Initiate interventions, skin care algorithm/standards of care as needed  Outcome: Progressing     Problem: MUSCULOSKELETAL - ADULT  Goal: Return mobility to safest level of function  Description: INTERVENTIONS:  - Assess patient stability and activity tolerance for standing, transferring and ambulating w/ or w/o assistive devices  - Assist with transfers and ambulation using safe patient handling equipment as needed  - Ensure adequate protection for wounds/incisions during mobilization  - Obtain PT/OT consults as needed  - Advance activity as appropriate  - Communicate ordered activity level and limitations with patient/family  Outcome: Progressing     Problem: PAIN - ADULT  Goal: Verbalizes/displays adequate comfort level or patient's stated pain goal  Description: INTERVENTIONS:  - Encourage pt to monitor pain and request assistance  - Assess pain using appropriate pain scale  - Administer analgesics based on type and severity of pain and evaluate response  - Implement non-pharmacological measures as appropriate and evaluate response  - Consider cultural and social influences on pain and pain management  - Manage/alleviate anxiety  - Utilize distraction and/or relaxation techniques  - Monitor for opioid side effects  - Notify MD/LIP if interventions unsuccessful or patient reports new pain  - Anticipate increased pain with activity and pre-medicate as appropriate  Outcome: Progressing     Problem: RISK FOR INFECTION - ADULT  Goal: Absence of fever/infection during anticipated neutropenic period  Description: INTERVENTIONS  - Monitor WBC  - Administer growth factors as ordered  - Implement neutropenic guidelines  Outcome: Progressing

## 2022-08-24 NOTE — PHYSICAL THERAPY NOTE
PT evaluation orders received. pt is here for recurrent shoulder infection; may require another I&D; (had one 7/24; initial surgery 7/13 with ). OT reached out to  to ascertain any precautions or restrictions.      Thank you,  Lorrie Hernandez, PT, DPT

## 2022-08-24 NOTE — PLAN OF CARE
Problem: Patient Centered Care  Goal: Patient preferences are identified and integrated in the patient's plan of care  Description: Interventions:  - What would you like us to know as we care for you?   - Provide timely, complete, and accurate information to patient/family  - Incorporate patient and family knowledge, values, beliefs, and cultural backgrounds into the planning and delivery of care  - Encourage patient/family to participate in care and decision-making at the level they choose  - Honor patient and family perspectives and choices  Outcome: Progressing     Problem: METABOLIC/FLUID AND ELECTROLYTES - ADULT  Goal: Electrolytes maintained within normal limits  Description: INTERVENTIONS:  - Monitor labs and rhythm and assess patient for signs and symptoms of electrolyte imbalances  - Administer electrolyte replacement as ordered  - Monitor response to electrolyte replacements, including rhythm and repeat lab results as appropriate  - Fluid restriction as ordered  - Instruct patient on fluid and nutrition restrictions as appropriate  Outcome: Progressing     Problem: SKIN/TISSUE INTEGRITY - ADULT  Goal: Skin integrity remains intact  Description: INTERVENTIONS  - Assess and document risk factors for pressure ulcer development  - Assess and document skin integrity  - Monitor for areas of redness and/or skin breakdown  - Initiate interventions, skin care algorithm/standards of care as needed  Outcome: Progressing     Problem: MUSCULOSKELETAL - ADULT  Goal: Return mobility to safest level of function  Description: INTERVENTIONS:  - Assess patient stability and activity tolerance for standing, transferring and ambulating w/ or w/o assistive devices  - Assist with transfers and ambulation using safe patient handling equipment as needed  - Ensure adequate protection for wounds/incisions during mobilization  - Obtain PT/OT consults as needed  - Advance activity as appropriate  - Communicate ordered activity level and limitations with patient/family  Outcome: Progressing     Problem: PAIN - ADULT  Goal: Verbalizes/displays adequate comfort level or patient's stated pain goal  Description: INTERVENTIONS:  - Encourage pt to monitor pain and request assistance  - Assess pain using appropriate pain scale  - Administer analgesics based on type and severity of pain and evaluate response  - Implement non-pharmacological measures as appropriate and evaluate response  - Consider cultural and social influences on pain and pain management  - Manage/alleviate anxiety  - Utilize distraction and/or relaxation techniques  - Monitor for opioid side effects  - Notify MD/LIP if interventions unsuccessful or patient reports new pain  - Anticipate increased pain with activity and pre-medicate as appropriate  Outcome: Progressing     Problem: RISK FOR INFECTION - ADULT  Goal: Absence of fever/infection during anticipated neutropenic period  Description: INTERVENTIONS  - Monitor WBC  - Administer growth factors as ordered  - Implement neutropenic guidelines  Outcome: Progressing     Patient A/Ox4, up in chair for meals. Complaining of right shoulder pain, norco given PRN. Monitor VS. Continue IV antibiotic. Culture pending. Possible PICC placement tomorrow. Potassium replaced. Ddimer collected. Doppler scheduled. IV diuretic, monitor output. Call light within reach.

## 2022-08-24 NOTE — CM/SW NOTE
08/24/22 1700   REYNALDO/SHARMILA Referral Data   Referral Source    Reason for Referral Discharge planning;Readmission   Informant Patient   Readmission Assessment   Factors that patient feels contributed to this readmission Acute/Chronic Clinical Presentation   Pt's living situation prior to admission? Home with family   Pt's level of independence at discharge? No assist/independent (minimal)   Pt. received education on diagnoses at time of discharge? Yes   Did you know who and how to call someone if you felt worse? Yes   Did any new symptoms or issues develop after you were discharged? Yes   ----Post D/C symptoms: Symptoms/issue related to previous hospitalization Yes   Did you understand your discharge instructions? Yes   Were medications taken as indicated on discharge instructions? Yes   Did you have a follow-up appointment scheduled at time of discharge? Yes   ----F/U appt: Did you go to that appointment? Yes   ---- F/U appt: How many days after discharge was follow-up appointment? 0-14 days   Was patient a candidate for: Home Health   ----Home Health Recommendation: Recommended, arranged   Was full assessment completed by SHARMILA/REYNALDO on prior admission? Yes   Was the recommended discharge plan achieved? Yes   Was pt. discharged w/out services? No   Pertinent Medical Hx   Does patient have an established PCP? Yes  Terra Saavedra)   Patient Info   Patient's Current Mental Status at Time of Assessment Alert;Oriented   Patient's [address]   Patient lives with Parent(s)   Patient Status Prior to Admission   Independent with ADLs and Mobility Yes   Discharge Needs   Anticipated D/C needs Home health care; Infusion care   Choice of Post-Acute Provider   Informed patient of right to choose their preferred provider Yes   List of appropriate post-acute services provided to patient/family with quality data Yes   Patient/family choice   ([facility] and [facility] for IV infusion services)     Pt discussed during nursing rounds. From home w/father, independent prior to admission. Pt was receiving home IV abx prior to admission and is current w/[facility] for HH/infusion services. Pt will resume home IV abx at [place] per ID. Referrals made to [facility] and [facility] in [facility], [address] [place] [address] Rd. entered. Final ID recommendation/script will be needed for pricing. Plan: Home w/father with [facility] and [facility] for IV infusion services pending final ID recommendation and medical clearance. / to remain available for support and/or discharge planning.      Brayan Jimenez, PRICILLAN    [phone]

## 2022-08-25 ENCOUNTER — APPOINTMENT (OUTPATIENT)
Dept: PICC SERVICES | Facility: HOSPITAL | Age: 62
End: 2022-08-25
Attending: PHYSICIAN ASSISTANT
Payer: MEDICARE

## 2022-08-25 VITALS
DIASTOLIC BLOOD PRESSURE: 66 MMHG | TEMPERATURE: 98 F | SYSTOLIC BLOOD PRESSURE: 116 MMHG | OXYGEN SATURATION: 97 % | HEIGHT: 75 IN | RESPIRATION RATE: 19 BRPM | HEART RATE: 73 BPM | BODY MASS INDEX: 35.47 KG/M2 | WEIGHT: 285.31 LBS

## 2022-08-25 LAB
ANION GAP SERPL CALC-SCNC: 8 MMOL/L (ref 0–18)
BASOPHILS # BLD AUTO: 0.01 X10(3) UL (ref 0–0.2)
BASOPHILS NFR BLD AUTO: 0.2 %
BUN BLD-MCNC: 18 MG/DL (ref 7–18)
BUN/CREAT SERPL: 13.4 (ref 10–20)
CALCIUM BLD-MCNC: 8.2 MG/DL (ref 8.5–10.1)
CHLORIDE SERPL-SCNC: 104 MMOL/L (ref 98–112)
CO2 SERPL-SCNC: 27 MMOL/L (ref 21–32)
CREAT BLD-MCNC: 1.34 MG/DL
DEPRECATED RDW RBC AUTO: 58.4 FL (ref 35.1–46.3)
EOSINOPHIL # BLD AUTO: 0.14 X10(3) UL (ref 0–0.7)
EOSINOPHIL NFR BLD AUTO: 2.5 %
ERYTHROCYTE [DISTWIDTH] IN BLOOD BY AUTOMATED COUNT: 14.8 % (ref 11–15)
GFR SERPLBLD BASED ON 1.73 SQ M-ARVRAT: 59 ML/MIN/1.73M2 (ref 60–?)
GLUCOSE BLD-MCNC: 102 MG/DL (ref 70–99)
HCT VFR BLD AUTO: 28.6 %
HGB BLD-MCNC: 8.6 G/DL
IMM GRANULOCYTES # BLD AUTO: 0.05 X10(3) UL (ref 0–1)
IMM GRANULOCYTES NFR BLD: 0.9 %
LYMPHOCYTES # BLD AUTO: 1.46 X10(3) UL (ref 1–4)
LYMPHOCYTES NFR BLD AUTO: 26.4 %
MCH RBC QN AUTO: 32.6 PG (ref 26–34)
MCHC RBC AUTO-ENTMCNC: 30.1 G/DL (ref 31–37)
MCV RBC AUTO: 108.3 FL
MONOCYTES # BLD AUTO: 0.77 X10(3) UL (ref 0.1–1)
MONOCYTES NFR BLD AUTO: 13.9 %
NEUTROPHILS # BLD AUTO: 3.1 X10 (3) UL (ref 1.5–7.7)
NEUTROPHILS # BLD AUTO: 3.1 X10(3) UL (ref 1.5–7.7)
NEUTROPHILS NFR BLD AUTO: 56.1 %
OSMOLALITY SERPL CALC.SUM OF ELEC: 290 MOSM/KG (ref 275–295)
PLATELET # BLD AUTO: 255 10(3)UL (ref 150–450)
POTASSIUM SERPL-SCNC: 3.4 MMOL/L (ref 3.5–5.1)
POTASSIUM SERPL-SCNC: 3.4 MMOL/L (ref 3.5–5.1)
RBC # BLD AUTO: 2.64 X10(6)UL
SODIUM SERPL-SCNC: 139 MMOL/L (ref 136–145)
WBC # BLD AUTO: 5.5 X10(3) UL (ref 4–11)

## 2022-08-25 PROCEDURE — 84132 ASSAY OF SERUM POTASSIUM: CPT | Performed by: HOSPITALIST

## 2022-08-25 PROCEDURE — 85025 COMPLETE CBC W/AUTO DIFF WBC: CPT | Performed by: HOSPITALIST

## 2022-08-25 PROCEDURE — 80048 BASIC METABOLIC PNL TOTAL CA: CPT | Performed by: HOSPITALIST

## 2022-08-25 PROCEDURE — 36410 VNPNXR 3YR/> PHY/QHP DX/THER: CPT

## 2022-08-25 RX ORDER — TORSEMIDE 20 MG/1
20 TABLET ORAL DAILY
Status: DISCONTINUED | OUTPATIENT
Start: 2022-08-25 | End: 2022-08-25

## 2022-08-25 RX ORDER — HYDROCODONE BITARTRATE AND ACETAMINOPHEN 5; 325 MG/1; MG/1
2 TABLET ORAL EVERY 4 HOURS PRN
Qty: 25 TABLET | Refills: 0 | Status: SHIPPED | OUTPATIENT
Start: 2022-08-25

## 2022-08-25 RX ORDER — POTASSIUM CHLORIDE 20 MEQ/1
40 TABLET, EXTENDED RELEASE ORAL ONCE
Status: COMPLETED | OUTPATIENT
Start: 2022-08-25 | End: 2022-08-25

## 2022-08-25 NOTE — PLAN OF CARE
Problem: Patient Centered Care  Goal: Patient preferences are identified and integrated in the patient's plan of care  Description: Interventions:  - What would you like us to know as we care for you? \" I like my door closed so it's quiet\".    - Provide timely, complete, and accurate information to patient/family  - Incorporate patient and family knowledge, values, beliefs, and cultural backgrounds into the planning and delivery of care  - Encourage patient/family to participate in care and decision-making at the level they choose  - Honor patient and family perspectives and choices  Outcome: Progressing     Problem: Patient/Family Goals  Goal: Patient/Family Long Term Goal  Description: Patient's Long Term Goal: Return home    Interventions:  - Follow MD orders  -Administer meds  -Wound care  -Pain management  -Monitor vitals, labs, culture, test results  - See additional Care Plan goals for specific interventions  Outcome: Progressing  Goal: Patient/Family Short Term Goal  Description: Patient's Short Term Goal: Pain control    Interventions:   - prn norco  -Monitor vitals  -Assist w/ adl's  - See additional Care Plan goals for specific interventions  Outcome: Progressing     Problem: METABOLIC/FLUID AND ELECTROLYTES - ADULT  Goal: Electrolytes maintained within normal limits  Description: INTERVENTIONS:  - Monitor labs and rhythm and assess patient for signs and symptoms of electrolyte imbalances  - Administer electrolyte replacement as ordered  - Monitor response to electrolyte replacements, including rhythm and repeat lab results as appropriate  - Fluid restriction as ordered  - Instruct patient on fluid and nutrition restrictions as appropriate  Outcome: Progressing     Problem: SKIN/TISSUE INTEGRITY - ADULT  Goal: Skin integrity remains intact  Description: INTERVENTIONS  - Assess and document risk factors for pressure ulcer development  - Assess and document skin integrity  - Monitor for areas of redness and/or skin breakdown  - Initiate interventions, skin care algorithm/standards of care as needed  Outcome: Progressing  Goal: Incision(s), wounds(s) or drain site(s) healing without S/S of infection  Description: INTERVENTIONS:  - Assess and document risk factors for pressure ulcer development  - Assess and document skin integrity  - Assess and document dressing/incision, wound bed, drain sites and surrounding tissue  - Implement wound care per orders  - Initiate isolation precautions as appropriate  - Initiate Pressure Ulcer prevention bundle as indicated  Outcome: Progressing     Problem: MUSCULOSKELETAL - ADULT  Goal: Return mobility to safest level of function  Description: INTERVENTIONS:  - Assess patient stability and activity tolerance for standing, transferring and ambulating w/ or w/o assistive devices  - Assist with transfers and ambulation using safe patient handling equipment as needed  - Ensure adequate protection for wounds/incisions during mobilization  - Obtain PT/OT consults as needed  - Advance activity as appropriate  - Communicate ordered activity level and limitations with patient/family  Outcome: Progressing     Problem: RISK FOR INFECTION - ADULT  Goal: Absence of fever/infection during anticipated neutropenic period  Description: INTERVENTIONS  - Monitor WBC, temp, culture results  - Administer antibiotics as ordered  - Implement neutropenic guidelines  Outcome: Progressing     Problem: SAFETY ADULT - FALL  Goal: Free from fall injury  Description: INTERVENTIONS:  - Assess pt frequently for physical needs  - Identify cognitive and physical deficits and behaviors that affect risk of falls.   - [place] fall precautions as indicated by assessment.  - Educate pt/family on patient safety including physical limitations  - Instruct pt to call for assistance with activity based on assessment  - Modify environment to reduce risk of injury  - Provide assistive devices as appropriate  - Consider OT/PT consult to assist with strengthening/mobility  - Encourage toileting schedule  Outcome: Progressing     Problem: PAIN - ADULT  Goal: Verbalizes/displays adequate comfort level or patient's stated pain goal  Description: INTERVENTIONS:  - Encourage pt to monitor pain and request assistance  - Assess pain using appropriate pain scale  - Administer analgesics based on type and severity of pain and evaluate response  - Implement non-pharmacological measures as appropriate and evaluate response  - Consider cultural and social influences on pain and pain management  - Manage/alleviate anxiety  - Utilize distraction and/or relaxation techniques  - Monitor for opioid side effects  - Notify MD/LIP if interventions unsuccessful or patient reports new pain  - Anticipate increased pain with activity and pre-medicate as appropriate  Outcome: Not Progressing   Rt shoulder dressing changed, medicated for pain x2, had ultrasound both upper extremities-MD aware, possible picc line placement today.

## 2022-08-25 NOTE — OCCUPATIONAL THERAPY NOTE
OT order generated through Functional  Mobility Screen. Spoke to pt has is declining OT services. Pt states he has no needs reporting I with HEP, ADLs, and functional mobility. Pt states he is aware of his WB, ROM, and sling instructions from hs ortho MD. Nurse aware pt is declining OT/PT services. Pt aware to reach out while here in hospital if question/need arises.

## 2022-08-25 NOTE — PLAN OF CARE
Problem: Patient Centered Care  Goal: Patient preferences are identified and integrated in the patient's plan of care  Description: Interventions:  - What would you like us to know as we care for you?   - Provide timely, complete, and accurate information to patient/family  - Incorporate patient and family knowledge, values, beliefs, and cultural backgrounds into the planning and delivery of care  - Encourage patient/family to participate in care and decision-making at the level they choose  - Honor patient and family perspectives and choices  Outcome: Completed     Problem: METABOLIC/FLUID AND ELECTROLYTES - ADULT  Goal: Electrolytes maintained within normal limits  Description: INTERVENTIONS:  - Monitor labs and rhythm and assess patient for signs and symptoms of electrolyte imbalances  - Administer electrolyte replacement as ordered  - Monitor response to electrolyte replacements, including rhythm and repeat lab results as appropriate  - Fluid restriction as ordered  - Instruct patient on fluid and nutrition restrictions as appropriate  Outcome: Completed     Problem: SKIN/TISSUE INTEGRITY - ADULT  Goal: Skin integrity remains intact  Description: INTERVENTIONS  - Assess and document risk factors for pressure ulcer development  - Assess and document skin integrity  - Monitor for areas of redness and/or skin breakdown  - Initiate interventions, skin care algorithm/standards of care as needed  Outcome: Completed  Goal: Incision(s), wounds(s) or drain site(s) healing without S/S of infection  Description: INTERVENTIONS:  - Assess and document risk factors for pressure ulcer development  - Assess and document skin integrity  - Assess and document dressing/incision, wound bed, drain sites and surrounding tissue  - Implement wound care per orders  - Initiate isolation precautions as appropriate  - Initiate Pressure Ulcer prevention bundle as indicated  Outcome: Completed     Problem: MUSCULOSKELETAL - ADULT  Goal: Return mobility to safest level of function  Description: INTERVENTIONS:  - Assess patient stability and activity tolerance for standing, transferring and ambulating w/ or w/o assistive devices  - Assist with transfers and ambulation using safe patient handling equipment as needed  - Ensure adequate protection for wounds/incisions during mobilization  - Obtain PT/OT consults as needed  - Advance activity as appropriate  - Communicate ordered activity level and limitations with patient/family  Outcome: Completed     Problem: PAIN - ADULT  Goal: Verbalizes/displays adequate comfort level or patient's stated pain goal  Description: INTERVENTIONS:  - Encourage pt to monitor pain and request assistance  - Assess pain using appropriate pain scale  - Administer analgesics based on type and severity of pain and evaluate response  - Implement non-pharmacological measures as appropriate and evaluate response  - Consider cultural and social influences on pain and pain management  - Manage/alleviate anxiety  - Utilize distraction and/or relaxation techniques  - Monitor for opioid side effects  - Notify MD/LIP if interventions unsuccessful or patient reports new pain  - Anticipate increased pain with activity and pre-medicate as appropriate  Outcome: Completed     Problem: RISK FOR INFECTION - ADULT  Goal: Absence of fever/infection during anticipated neutropenic period  Description: INTERVENTIONS  - Monitor WBC  - Administer growth factors as ordered  - Implement neutropenic guidelines  Outcome: Completed     Problem: SAFETY ADULT - FALL  Goal: Free from fall injury  Description: INTERVENTIONS:  - Assess pt frequently for physical needs  - Identify cognitive and physical deficits and behaviors that affect risk of falls.   - [place] fall precautions as indicated by assessment.  - Educate pt/family on patient safety including physical limitations  - Instruct pt to call for assistance with activity based on assessment  - Modify environment to reduce risk of injury  - Provide assistive devices as appropriate  - Consider OT/PT consult to assist with strengthening/mobility  - Encourage toileting schedule  8/25/2022 1807 by Yakov Garcia RN  Outcome: Completed  8/25/2022 1726 by Yakov Garcia RN  Outcome: Not Progressing  Order to discharge patient acknowledged; patient will go home with [facility] for long term antibiotics. PIV removed. Discussed AVS and plan for home antibiotics. Father to transport patient to home.

## 2022-08-25 NOTE — CM/SW NOTE
08/25/22 1300   Discharge disposition   Expected discharge disposition Home-Health   Post Acute Care Provider   ([facility])   DME/Infusion Providers [facility]  (Home IV abx)   Discharge transportation Private car     Pt discussed during nursing rounds. Pt is stable for dc today. MD dc order entered. [facility] and [facility] will resume HH and IV infusion services for home IV abx at OR, both providers notified of patient's dc home today. Pt's father will provide transport at OR. Plan: Home w/father with [facility] and [facility] for IV infusion services starting tomorrow. / to remain available for support and/or discharge planning.      FREDDY Osborne    [phone]

## 2022-08-25 NOTE — PLAN OF CARE
Problem: SAFETY ADULT - FALL  Goal: Free from fall injury  Description: INTERVENTIONS:  - Assess pt frequently for physical needs  - Identify cognitive and physical deficits and behaviors that affect risk of falls. - [place] fall precautions as indicated by assessment.  - Educate pt/family on patient safety including physical limitations  - Instruct pt to call for assistance with activity based on assessment  - Modify environment to reduce risk of injury  - Provide assistive devices as appropriate  - Consider OT/PT consult to assist with strengthening/mobility  - Encourage toileting schedule  Outcome: Not Progressing     RN and PCT heard a thump noise, walked over to patient's room and found sitting on the floor in front of the recliner chair. Patient stated he was sitting in chair, stated he reached over to lift the foot rest and \"slid down to the floor\" denies hitting his head, a/ox4, denies pain. RNs and PCTs used overhead lift to place patient back in chair, refused to be placed in bed; VSS. Medications reviewed, Eliquis on order, MD notified, TL notified. no new orders. Patient instructed to use call light for any type of assistance he may need, call light within reach. Per MD, continue with discharge plan.

## 2022-08-25 NOTE — PHYSICAL THERAPY NOTE
PT order generated through Functional  Mobility Screen. pt declines need for PT/OT services. Pt states he is aware of his WB, ROM, and sling instructions from hs ortho MD. Nurse aware pt is declining OT/PT services. Pt plans to dc home today.  PT order to be discontinued

## 2023-03-26 ENCOUNTER — APPOINTMENT (OUTPATIENT)
Dept: CT IMAGING | Age: 63
End: 2023-03-26
Attending: STUDENT IN AN ORGANIZED HEALTH CARE EDUCATION/TRAINING PROGRAM

## 2023-03-26 ENCOUNTER — HOSPITAL ENCOUNTER (EMERGENCY)
Age: 63
Discharge: HOME | End: 2023-03-26
Attending: STUDENT IN AN ORGANIZED HEALTH CARE EDUCATION/TRAINING PROGRAM

## 2023-03-26 VITALS
DIASTOLIC BLOOD PRESSURE: 94 MMHG | WEIGHT: 264.55 LBS | OXYGEN SATURATION: 99 % | HEART RATE: 104 BPM | TEMPERATURE: 97.7 F | RESPIRATION RATE: 18 BRPM | SYSTOLIC BLOOD PRESSURE: 164 MMHG

## 2023-03-26 DIAGNOSIS — S09.90XD INJURY OF HEAD, SUBSEQUENT ENCOUNTER: Primary | ICD-10-CM

## 2023-03-26 PROCEDURE — 70450 CT HEAD/BRAIN W/O DYE: CPT

## 2023-03-26 PROCEDURE — 99284 EMERGENCY DEPT VISIT MOD MDM: CPT

## 2023-03-26 PROCEDURE — G1004 CDSM NDSC: HCPCS

## 2023-03-26 RX ORDER — DIPHENHYDRAMINE HCL 25 MG
25 CAPSULE ORAL ONCE
Status: DISCONTINUED | OUTPATIENT
Start: 2023-03-26 | End: 2023-03-26

## 2023-03-26 RX ORDER — BUPROPION HYDROCHLORIDE 100 MG/1
100 TABLET ORAL 2 TIMES DAILY
COMMUNITY

## 2023-03-26 RX ORDER — ONDANSETRON 4 MG/1
4 TABLET, ORALLY DISINTEGRATING ORAL EVERY 6 HOURS
Qty: 10 TABLET | Refills: 0 | Status: SHIPPED | OUTPATIENT
Start: 2023-03-26

## 2023-03-26 RX ORDER — METOCLOPRAMIDE HYDROCHLORIDE 5 MG/ML
10 INJECTION INTRAMUSCULAR; INTRAVENOUS ONCE
Status: DISCONTINUED | OUTPATIENT
Start: 2023-03-26 | End: 2023-03-26

## 2023-03-26 RX ORDER — ACETAMINOPHEN 325 MG/1
650 TABLET ORAL ONCE
Status: DISCONTINUED | OUTPATIENT
Start: 2023-03-26 | End: 2023-03-26 | Stop reason: HOSPADM

## 2023-03-26 ASSESSMENT — MOVEMENT AND STRENGTH ASSESSMENTS
ALL_EXTREMITIES: EQUAL STRENGTH/TONE/MOVEMENT
FACE_JAW: FACE SYMMETRICAL

## 2023-03-26 ASSESSMENT — ENCOUNTER SYMPTOMS
HEADACHES: 1
CHILLS: 0
DIZZINESS: 1
WOUND: 0
LIGHT-HEADEDNESS: 0
FEVER: 0
FACIAL ASYMMETRY: 0
COUGH: 0
WEAKNESS: 0
NUMBNESS: 0
SHORTNESS OF BREATH: 0
SEIZURES: 0

## 2023-03-26 ASSESSMENT — PAIN SCALES - GENERAL: PAINLEVEL_OUTOF10: 3

## 2023-04-14 NOTE — PROGRESS NOTES
[facility]    Progress Note    Edison Hutchison Patient Status:  Outpatient in a Bed    1960 MRN [id number]   Location [facility] 4W/SW/SE Attending Vish Velasquez MD   Hosp Day # 0 PCP [facility]       Subjective:   Jessika Guardado Finalized by (CST): Irma Del Castillo MD on 11/03/2020 at 12:24 PM                Assessment and Plan:     Primary osteoarthritis of left knee  Now replaced      Status post left knee replacement  Stable.  He is eliquis for anticoagulation      LESLY (obstructi complains of pain/discomfort

## 2023-06-06 DIAGNOSIS — M81.0 OSTEOPOROSIS: Primary | ICD-10-CM

## 2023-06-19 ENCOUNTER — HOSPITAL ENCOUNTER (OUTPATIENT)
Dept: GENERAL RADIOLOGY | Age: 63
Discharge: HOME | End: 2023-06-19
Attending: FAMILY MEDICINE

## 2023-06-19 DIAGNOSIS — M81.0 OSTEOPOROSIS: ICD-10-CM

## 2023-06-19 PROCEDURE — 77080 DXA BONE DENSITY AXIAL: CPT

## 2024-01-03 NOTE — CONSULTS
The catheter was removed from the aorta. pod1 right shoulder surgery with interscalene block for post op pain relief  Pt tolerrated procedure well  Good post op pain relief  No dypnea doing well cpm

## 2024-02-05 NOTE — PLAN OF CARE
Wound vac on right shoulder in place. Iv abx, iv bumex, steroids continues. Refused scheduled tylenol- prefers oxycodone for pain, with relief. Problem: Patient Centered Care  Goal: Patient preferences are identified and integrated in the patient's plan of care  Description: Interventions:  - What would you like us to know as we care for you?  Live at home  - Provide timely, complete, and accurate information to patient/family  - Incorporate patient and family knowledge, values, beliefs, and cultural backgrounds into the planning and delivery of care  - Encourage patient/family to participate in care and decision-making at the level they choose  - Honor patient and family perspectives and choices  Outcome: Progressing     Problem: Patient/Family Goals  Goal: Patient/Family Long Term Goal  Description: Patient's Long Term Goal: go home    Interventions:  - follow md orders  -monitor vitalsigns, labs  -administer medications  - See additional Care Plan goals for specific interventions  Outcome: Progressing  Goal: Patient/Family Short Term Goal  Description: Patient's Short Term Goal: less pain    Interventions:   - follow md orders  - monitor vital signs, labs  - administer prn pain medication  - See additional Care Plan goals for specific interventions  Outcome: Progressing     Problem: PAIN - ADULT  Goal: Verbalizes/displays adequate comfort level or patient's stated pain goal  Description: INTERVENTIONS:  - Encourage pt to monitor pain and request assistance  - Assess pain using appropriate pain scale  - Administer analgesics based on type and severity of pain and evaluate response  - Implement non-pharmacological measures as appropriate and evaluate response  - Consider cultural and social influences on pain and pain management  - Manage/alleviate anxiety  - Utilize distraction and/or relaxation techniques  - Monitor for opioid side effects  - Notify MD/LIP if interventions unsuccessful or patient reports new pain  - Anticipate increased pain with activity and pre-medicate as appropriate  Outcome: Progressing     Problem: RISK FOR INFECTION - ADULT  Goal: Absence of fever/infection during anticipated neutropenic period  Description: INTERVENTIONS  - Monitor WBC  - Administer growth factors as ordered  - Implement neutropenic guidelines  Outcome: Progressing     Problem: SAFETY ADULT - FALL  Goal: Free from fall injury  Description: INTERVENTIONS:  - Assess pt frequently for physical needs  - Identify cognitive and physical deficits and behaviors that affect risk of falls.   - [place] fall precautions as indicated by assessment.  - Educate pt/family on patient safety including physical limitations  - Instruct pt to call for assistance with activity based on assessment  - Modify environment to reduce risk of injury  - Provide assistive devices as appropriate  - Consider OT/PT consult to assist with strengthening/mobility  - Encourage toileting schedule  Outcome: Progressing     Problem: DISCHARGE PLANNING  Goal: Discharge to home or other facility with appropriate resources  Description: INTERVENTIONS:  - Identify barriers to discharge w/pt and caregiver  - Include patient/family/discharge partner in discharge planning  - Arrange for needed discharge resources and transportation as appropriate  - Identify discharge learning needs (meds, wound care, etc)  - Arrange for interpreters to assist at discharge as needed  - Consider post-discharge preferences of patient/family/discharge partner  - Complete POLST form as appropriate  - Assess patient's ability to be responsible for managing their own health  - Refer to Case Management Department for coordinating discharge planning if the patient needs post-hospital services based on physician/LIP order or complex needs related to functional status, cognitive ability or social support system  Outcome: Progressing     Problem: RESPIRATORY - ADULT  Goal: Achieves optimal ventilation and oxygenation  Description: INTERVENTIONS:  - Assess for changes in respiratory status  - Assess for changes in mentation and behavior  - Position to facilitate oxygenation and minimize respiratory effort  - Oxygen supplementation based on oxygen saturation or ABGs  - Provide Smoking Cessation handout, if applicable  - Encourage broncho-pulmonary hygiene including cough, deep breathe, Incentive Spirometry  - Assess the need for suctioning and perform as needed  - Assess and instruct to report SOB or any respiratory difficulty  - Respiratory Therapy support as indicated  - Manage/alleviate anxiety  - Monitor for signs/symptoms of CO2 retention  Outcome: Progressing     Problem: SKIN/TISSUE INTEGRITY - ADULT  Goal: Skin integrity remains intact  Description: INTERVENTIONS  - Assess and document risk factors for pressure ulcer development  - Assess and document skin integrity  - Monitor for areas of redness and/or skin breakdown  - Initiate interventions, skin care algorithm/standards of care as needed  Outcome: Progressing  Goal: Incision(s), wounds(s) or drain site(s) healing without S/S of infection  Description: INTERVENTIONS:  - Assess and document risk factors for pressure ulcer development  - Assess and document skin integrity  - Assess and document dressing/incision, wound bed, drain sites and surrounding tissue  - Implement wound care per orders  - Initiate isolation precautions as appropriate  - Initiate Pressure Ulcer prevention bundle as indicated  Outcome: Progressing  Goal: Oral mucous membranes remain intact  Description: INTERVENTIONS  - Assess oral mucosa and hygiene practices  - Implement preventative oral hygiene regimen  - Implement oral medicated treatments as ordered  Outcome: Progressing No

## 2024-04-24 ENCOUNTER — HOSPITAL ENCOUNTER (INPATIENT)
Age: 64
LOS: 3 days | Discharge: HOME | DRG: 683 | End: 2024-04-27
Attending: STUDENT IN AN ORGANIZED HEALTH CARE EDUCATION/TRAINING PROGRAM | Admitting: HOSPITALIST

## 2024-04-24 DIAGNOSIS — E87.5 HYPERKALEMIA: ICD-10-CM

## 2024-04-24 DIAGNOSIS — R19.7 VOMITING AND DIARRHEA: ICD-10-CM

## 2024-04-24 DIAGNOSIS — R11.10 VOMITING AND DIARRHEA: ICD-10-CM

## 2024-04-24 DIAGNOSIS — N17.9 ACUTE KIDNEY INJURY (CMD): Primary | ICD-10-CM

## 2024-04-24 LAB
ALBUMIN SERPL-MCNC: 3.4 G/DL (ref 3.6–5.1)
ALBUMIN/GLOB SERPL: 1 {RATIO} (ref 1–2.4)
ALP SERPL-CCNC: 81 UNITS/L (ref 45–117)
ALT SERPL-CCNC: 25 UNITS/L
ANION GAP SERPL CALC-SCNC: 10 MMOL/L (ref 7–19)
ANION GAP SERPL CALC-SCNC: 10 MMOL/L (ref 7–19)
AST SERPL-CCNC: 23 UNITS/L
BASOPHILS # BLD: 0 K/MCL (ref 0–0.3)
BASOPHILS NFR BLD: 1 %
BILIRUB SERPL-MCNC: 0.7 MG/DL (ref 0.2–1)
BUN SERPL-MCNC: 63 MG/DL (ref 6–20)
BUN SERPL-MCNC: 73 MG/DL (ref 6–20)
BUN/CREAT SERPL: 26 (ref 7–25)
BUN/CREAT SERPL: 28 (ref 7–25)
CALCIUM SERPL-MCNC: 8.3 MG/DL (ref 8.4–10.2)
CALCIUM SERPL-MCNC: 9 MG/DL (ref 8.4–10.2)
CHLORIDE SERPL-SCNC: 105 MMOL/L (ref 97–110)
CHLORIDE SERPL-SCNC: 114 MMOL/L (ref 97–110)
CO2 SERPL-SCNC: 21 MMOL/L (ref 21–32)
CO2 SERPL-SCNC: 24 MMOL/L (ref 21–32)
CREAT SERPL-MCNC: 2.24 MG/DL (ref 0.67–1.17)
CREAT SERPL-MCNC: 2.85 MG/DL (ref 0.67–1.17)
DEPRECATED RDW RBC: 49.8 FL (ref 39–50)
EGFRCR SERPLBLD CKD-EPI 2021: 24 ML/MIN/{1.73_M2}
EGFRCR SERPLBLD CKD-EPI 2021: 32 ML/MIN/{1.73_M2}
EOSINOPHIL # BLD: 0.1 K/MCL (ref 0–0.5)
EOSINOPHIL NFR BLD: 2 %
ERYTHROCYTE [DISTWIDTH] IN BLOOD: 13.8 % (ref 11–15)
FASTING DURATION TIME PATIENT: ABNORMAL H
FASTING DURATION TIME PATIENT: ABNORMAL H
GLOBULIN SER-MCNC: 3.5 G/DL (ref 2–4)
GLUCOSE SERPL-MCNC: 150 MG/DL (ref 70–99)
GLUCOSE SERPL-MCNC: 164 MG/DL (ref 70–99)
HCT VFR BLD CALC: 37.7 % (ref 39–51)
HGB BLD-MCNC: 12.2 G/DL (ref 13–17)
IMM GRANULOCYTES # BLD AUTO: 0 K/MCL (ref 0–0.2)
IMM GRANULOCYTES # BLD: 0 %
LIPASE SERPL-CCNC: 51 UNITS/L (ref 15–77)
LYMPHOCYTES # BLD: 1.6 K/MCL (ref 1–4)
LYMPHOCYTES NFR BLD: 24 %
MCH RBC QN AUTO: 31.7 PG (ref 26–34)
MCHC RBC AUTO-ENTMCNC: 32.4 G/DL (ref 32–36.5)
MCV RBC AUTO: 97.9 FL (ref 78–100)
MONOCYTES # BLD: 0.6 K/MCL (ref 0.3–0.9)
MONOCYTES NFR BLD: 9 %
NEUTROPHILS # BLD: 4.3 K/MCL (ref 1.8–7.7)
NEUTROPHILS NFR BLD: 64 %
NRBC BLD MANUAL-RTO: 0 /100 WBC
PLATELET # BLD AUTO: 224 K/MCL (ref 140–450)
POTASSIUM SERPL-SCNC: 5.3 MMOL/L (ref 3.4–5.1)
POTASSIUM SERPL-SCNC: 5.4 MMOL/L (ref 3.4–5.1)
PROT SERPL-MCNC: 6.9 G/DL (ref 6.4–8.2)
QRS-INTERVAL (MSEC): 82
QT-INTERVAL (MSEC): 394
QTC: 380
R AXIS (DEGREES): 45
RAINBOW EXTRA TUBES HOLD SPECIMEN: NORMAL
RAINBOW EXTRA TUBES HOLD SPECIMEN: NORMAL
RBC # BLD: 3.85 MIL/MCL (ref 4.5–5.9)
REPORT TEXT: NORMAL
SODIUM SERPL-SCNC: 134 MMOL/L (ref 135–145)
SODIUM SERPL-SCNC: 140 MMOL/L (ref 135–145)
T AXIS (DEGREES): 47
VENTRICULAR RATE EKG/MIN (BPM): 56
WBC # BLD: 6.6 K/MCL (ref 4.2–11)

## 2024-04-24 PROCEDURE — 10002807 HB RX 258: Performed by: STUDENT IN AN ORGANIZED HEALTH CARE EDUCATION/TRAINING PROGRAM

## 2024-04-24 PROCEDURE — 10002803 HB RX 637: Performed by: HOSPITALIST

## 2024-04-24 PROCEDURE — 10002807 HB RX 258: Performed by: INTERNAL MEDICINE

## 2024-04-24 PROCEDURE — 10004651 HB RX, NO CHARGE ITEM: Performed by: HOSPITALIST

## 2024-04-24 PROCEDURE — 83690 ASSAY OF LIPASE: CPT | Performed by: STUDENT IN AN ORGANIZED HEALTH CARE EDUCATION/TRAINING PROGRAM

## 2024-04-24 PROCEDURE — 85025 COMPLETE CBC W/AUTO DIFF WBC: CPT | Performed by: EMERGENCY MEDICINE

## 2024-04-24 PROCEDURE — 10006031 HB ROOM CHARGE TELEMETRY

## 2024-04-24 PROCEDURE — 96360 HYDRATION IV INFUSION INIT: CPT

## 2024-04-24 PROCEDURE — 80048 BASIC METABOLIC PNL TOTAL CA: CPT | Performed by: INTERNAL MEDICINE

## 2024-04-24 PROCEDURE — 99284 EMERGENCY DEPT VISIT MOD MDM: CPT

## 2024-04-24 PROCEDURE — 93010 ELECTROCARDIOGRAM REPORT: CPT | Performed by: INTERNAL MEDICINE

## 2024-04-24 PROCEDURE — 93005 ELECTROCARDIOGRAM TRACING: CPT | Performed by: STUDENT IN AN ORGANIZED HEALTH CARE EDUCATION/TRAINING PROGRAM

## 2024-04-24 PROCEDURE — 36415 COLL VENOUS BLD VENIPUNCTURE: CPT | Performed by: INTERNAL MEDICINE

## 2024-04-24 PROCEDURE — 80053 COMPREHEN METABOLIC PANEL: CPT | Performed by: EMERGENCY MEDICINE

## 2024-04-24 PROCEDURE — G0378 HOSPITAL OBSERVATION PER HR: HCPCS

## 2024-04-24 RX ORDER — DILTIAZEM HYDROCHLORIDE 180 MG/1
180 CAPSULE, EXTENDED RELEASE ORAL DAILY
Status: DISCONTINUED | OUTPATIENT
Start: 2024-04-24 | End: 2024-04-27 | Stop reason: HOSPADM

## 2024-04-24 RX ORDER — ACETAMINOPHEN 325 MG/1
650 TABLET ORAL EVERY 4 HOURS PRN
Status: DISCONTINUED | OUTPATIENT
Start: 2024-04-24 | End: 2024-04-27 | Stop reason: HOSPADM

## 2024-04-24 RX ORDER — MIRTAZAPINE 30 MG/1
15 TABLET, FILM COATED ORAL NIGHTLY
Status: DISCONTINUED | OUTPATIENT
Start: 2024-04-24 | End: 2024-04-27 | Stop reason: HOSPADM

## 2024-04-24 RX ORDER — GABAPENTIN 100 MG/1
300 CAPSULE ORAL 2 TIMES DAILY
COMMUNITY

## 2024-04-24 RX ORDER — LISINOPRIL 10 MG/1
10 TABLET ORAL DAILY
Status: ON HOLD | COMMUNITY
Start: 2024-02-27 | End: 2024-04-27 | Stop reason: HOSPADM

## 2024-04-24 RX ORDER — DOCUSATE SODIUM 100 MG/1
100 CAPSULE, LIQUID FILLED ORAL DAILY
Status: DISCONTINUED | OUTPATIENT
Start: 2024-04-24 | End: 2024-04-27 | Stop reason: HOSPADM

## 2024-04-24 RX ORDER — ONDANSETRON 2 MG/ML
4 INJECTION INTRAMUSCULAR; INTRAVENOUS EVERY 6 HOURS PRN
Status: DISCONTINUED | OUTPATIENT
Start: 2024-04-24 | End: 2024-04-27 | Stop reason: HOSPADM

## 2024-04-24 RX ORDER — ATORVASTATIN CALCIUM 10 MG/1
10 TABLET, FILM COATED ORAL DAILY
Status: DISCONTINUED | OUTPATIENT
Start: 2024-04-24 | End: 2024-04-27 | Stop reason: HOSPADM

## 2024-04-24 RX ORDER — DILTIAZEM HYDROCHLORIDE 180 MG/1
180 CAPSULE, EXTENDED RELEASE ORAL DAILY
COMMUNITY

## 2024-04-24 RX ORDER — 0.9 % SODIUM CHLORIDE 0.9 %
2 VIAL (ML) INJECTION EVERY 12 HOURS SCHEDULED
Status: DISCONTINUED | OUTPATIENT
Start: 2024-04-24 | End: 2024-04-27 | Stop reason: HOSPADM

## 2024-04-24 RX ORDER — POLYETHYLENE GLYCOL 3350 17 G/17G
17 POWDER, FOR SOLUTION ORAL DAILY
Status: DISCONTINUED | OUTPATIENT
Start: 2024-04-24 | End: 2024-04-27 | Stop reason: HOSPADM

## 2024-04-24 RX ORDER — SODIUM CHLORIDE 9 MG/ML
INJECTION, SOLUTION INTRAVENOUS CONTINUOUS
Status: DISCONTINUED | OUTPATIENT
Start: 2024-04-24 | End: 2024-04-27

## 2024-04-24 RX ORDER — ALLOPURINOL 300 MG/1
300 TABLET ORAL DAILY
Status: DISCONTINUED | OUTPATIENT
Start: 2024-04-24 | End: 2024-04-27 | Stop reason: HOSPADM

## 2024-04-24 RX ORDER — LEVOTHYROXINE SODIUM 0.1 MG/1
100 TABLET ORAL DAILY
COMMUNITY
Start: 2024-01-25

## 2024-04-24 RX ORDER — MIRTAZAPINE 15 MG/1
15 TABLET, FILM COATED ORAL NIGHTLY
COMMUNITY

## 2024-04-24 RX ORDER — DIGOXIN 125 MCG
125 TABLET ORAL DAILY
Status: DISCONTINUED | OUTPATIENT
Start: 2024-04-24 | End: 2024-04-25

## 2024-04-24 RX ORDER — LEVOTHYROXINE SODIUM 0.05 MG/1
100 TABLET ORAL
Status: DISCONTINUED | OUTPATIENT
Start: 2024-04-24 | End: 2024-04-27 | Stop reason: HOSPADM

## 2024-04-24 RX ADMIN — APIXABAN 2.5 MG: 5 TABLET, FILM COATED ORAL at 21:47

## 2024-04-24 RX ADMIN — SODIUM CHLORIDE: 9 INJECTION, SOLUTION INTRAVENOUS at 08:44

## 2024-04-24 RX ADMIN — ALLOPURINOL 300 MG: 300 TABLET ORAL at 17:33

## 2024-04-24 RX ADMIN — LEVOTHYROXINE SODIUM 100 MCG: 0.05 TABLET ORAL at 21:52

## 2024-04-24 RX ADMIN — ATORVASTATIN CALCIUM 10 MG: 10 TABLET, FILM COATED ORAL at 17:39

## 2024-04-24 RX ADMIN — MIRTAZAPINE 15 MG: 30 TABLET, FILM COATED ORAL at 21:48

## 2024-04-24 RX ADMIN — SODIUM CHLORIDE 1000 ML: 9 INJECTION, SOLUTION INTRAVENOUS at 14:22

## 2024-04-24 RX ADMIN — SODIUM CHLORIDE 500 ML: 9 INJECTION, SOLUTION INTRAVENOUS at 06:23

## 2024-04-24 RX ADMIN — SODIUM CHLORIDE 1000 ML: 9 INJECTION, SOLUTION INTRAVENOUS at 07:11

## 2024-04-24 RX ADMIN — SODIUM CHLORIDE, PRESERVATIVE FREE 2 ML: 5 INJECTION INTRAVENOUS at 21:49

## 2024-04-24 RX ADMIN — SODIUM CHLORIDE 1000 ML: 9 INJECTION, SOLUTION INTRAVENOUS at 17:37

## 2024-04-24 RX ADMIN — SODIUM CHLORIDE: 9 INJECTION, SOLUTION INTRAVENOUS at 19:19

## 2024-04-24 RX ADMIN — DILTIAZEM HYDROCHLORIDE 180 MG: 180 CAPSULE, EXTENDED RELEASE ORAL at 17:33

## 2024-04-24 SDOH — HEALTH STABILITY: PHYSICAL HEALTH: DO YOU HAVE SERIOUS DIFFICULTY WALKING OR CLIMBING STAIRS?: NO

## 2024-04-24 SDOH — SOCIAL STABILITY: SOCIAL NETWORK
HOW OFTEN DO YOU SEE OR TALK TO PEOPLE THAT YOU CARE ABOUT AND FEEL CLOSE TO? (FOR EXAMPLE: TALKING TO FRIENDS ON THE PHONE, VISITING FRIENDS OR FAMILY, GOING TO CHURCH OR CLUB MEETINGS): 3 TO 5 TIMES A WEEK

## 2024-04-24 SDOH — ECONOMIC STABILITY: HOUSING INSECURITY: WHAT IS YOUR LIVING SITUATION TODAY?: ALONE

## 2024-04-24 SDOH — ECONOMIC STABILITY: GENERAL

## 2024-04-24 SDOH — ECONOMIC STABILITY: FOOD INSECURITY: WITHIN THE PAST 12 MONTHS, THE FOOD YOU BOUGHT JUST DIDN'T LAST AND YOU DIDN'T HAVE MONEY TO GET MORE.: NEVER TRUE

## 2024-04-24 SDOH — HEALTH STABILITY: PHYSICAL HEALTH: DO YOU HAVE DIFFICULTY DRESSING OR BATHING?: NO

## 2024-04-24 SDOH — ECONOMIC STABILITY: INCOME INSECURITY: IN THE PAST 12 MONTHS, HAS THE ELECTRIC, GAS, OIL, OR WATER COMPANY THREATENED TO SHUT OFF SERVICE IN YOUR HOME?: NO

## 2024-04-24 SDOH — ECONOMIC STABILITY: HOUSING INSECURITY: DO YOU HAVE PROBLEMS WITH ANY OF THE FOLLOWING?: NONE OF THE ABOVE

## 2024-04-24 SDOH — HEALTH STABILITY: GENERAL
BECAUSE OF A PHYSICAL, MENTAL, OR EMOTIONAL CONDITION, DO YOU HAVE SERIOUS DIFFICULTY CONCENTRATING, REMEMBERING OR MAKING DECISIONS?: NO

## 2024-04-24 SDOH — SOCIAL STABILITY: SOCIAL INSECURITY: HOW OFTEN DOES ANYONE, INCLUDING FAMILY AND FRIENDS, SCREAM OR CURSE AT YOU?: NEVER

## 2024-04-24 SDOH — ECONOMIC STABILITY: GENERAL: WOULD YOU LIKE HELP WITH ANY OF THE FOLLOWING NEEDS?: I DON'T WANT HELP WITH ANY OF THESE

## 2024-04-24 SDOH — HEALTH STABILITY: GENERAL: BECAUSE OF A PHYSICAL, MENTAL, OR EMOTIONAL CONDITION, DO YOU HAVE DIFFICULTY DOING ERRANDS ALONE?: NO

## 2024-04-24 SDOH — SOCIAL STABILITY: SOCIAL INSECURITY: HOW OFTEN DOES ANYONE, INCLUDING FAMILY AND FRIENDS, INSULT OR TALK DOWN TO YOU?: NEVER

## 2024-04-24 SDOH — SOCIAL STABILITY: SOCIAL NETWORK: SUPPORT SYSTEMS: FAMILY MEMBERS;FRIENDS

## 2024-04-24 SDOH — ECONOMIC STABILITY: HOUSING INSECURITY: WHAT IS YOUR LIVING SITUATION TODAY?: APARTMENT

## 2024-04-24 SDOH — SOCIAL STABILITY: SOCIAL INSECURITY: HOW OFTEN DOES ANYONE, INCLUDING FAMILY AND FRIENDS, THREATEN YOU WITH HARM?: NEVER

## 2024-04-24 SDOH — ECONOMIC STABILITY: HOUSING INSECURITY: WHAT IS YOUR LIVING SITUATION TODAY?: I HAVE A STEADY PLACE TO LIVE

## 2024-04-24 SDOH — ECONOMIC STABILITY: TRANSPORTATION INSECURITY
IN THE PAST 12 MONTHS, HAS LACK OF RELIABLE TRANSPORTATION KEPT YOU FROM MEDICAL APPOINTMENTS, MEETINGS, WORK OR FROM GETTING THINGS NEEDED FOR DAILY LIVING?: NO

## 2024-04-24 SDOH — SOCIAL STABILITY: SOCIAL INSECURITY: HOW OFTEN DOES ANYONE, INCLUDING FAMILY AND FRIENDS, PHYSICALLY HURT YOU?: NEVER

## 2024-04-24 ASSESSMENT — ENCOUNTER SYMPTOMS
LIGHT-HEADEDNESS: 0
COLOR CHANGE: 0
BRUISES/BLEEDS EASILY: 0
CONFUSION: 0
CHILLS: 0
FATIGUE: 0
WEAKNESS: 1
EYE ITCHING: 0
ABDOMINAL PAIN: 1
EYE DISCHARGE: 0
SORE THROAT: 0
ABDOMINAL DISTENTION: 1
EYES NEGATIVE: 1
AGITATION: 0
CONSTIPATION: 0
DIARRHEA: 1
ABDOMINAL PAIN: 0
FEVER: 0
NAUSEA: 1
VOMITING: 1
COUGH: 0
BACK PAIN: 0
ACTIVITY CHANGE: 1

## 2024-04-24 ASSESSMENT — LIFESTYLE VARIABLES
HOW OFTEN DO YOU HAVE 6 OR MORE DRINKS ON ONE OCCASION: NEVER
AUDIT-C TOTAL SCORE: 1
HOW OFTEN DO YOU HAVE A DRINK CONTAINING ALCOHOL: MONTHLY OR LESS
ALCOHOL_USE_STATUS: NO OR LOW RISK WITH VALIDATED TOOL
HOW MANY STANDARD DRINKS CONTAINING ALCOHOL DO YOU HAVE ON A TYPICAL DAY: 0,1 OR 2

## 2024-04-24 ASSESSMENT — PATIENT HEALTH QUESTIONNAIRE - PHQ9
SUM OF ALL RESPONSES TO PHQ9 QUESTIONS 1 AND 2: 0
SUM OF ALL RESPONSES TO PHQ9 QUESTIONS 1 AND 2: 0
2. FEELING DOWN, DEPRESSED OR HOPELESS: NOT AT ALL
CLINICAL INTERPRETATION OF PHQ2 SCORE: NO FURTHER SCREENING NEEDED
IS PATIENT ABLE TO COMPLETE PHQ2 OR PHQ9: YES
1. LITTLE INTEREST OR PLEASURE IN DOING THINGS: NOT AT ALL

## 2024-04-24 ASSESSMENT — COLUMBIA-SUICIDE SEVERITY RATING SCALE - C-SSRS
1. WITHIN THE PAST MONTH, HAVE YOU WISHED YOU WERE DEAD OR WISHED YOU COULD GO TO SLEEP AND NOT WAKE UP?: NO
6. HAVE YOU EVER DONE ANYTHING, STARTED TO DO ANYTHING, OR PREPARED TO DO ANYTHING TO END YOUR LIFE?: NO
IS THE PATIENT ABLE TO COMPLETE C-SSRS: YES
2. HAVE YOU ACTUALLY HAD ANY THOUGHTS OF KILLING YOURSELF?: NO

## 2024-04-24 ASSESSMENT — PAIN SCALES - GENERAL
PAINLEVEL_OUTOF10: 0

## 2024-04-24 ASSESSMENT — ACTIVITIES OF DAILY LIVING (ADL)
ADL_SHORT_OF_BREATH: NO
ADL_BEFORE_ADMISSION: INDEPENDENT
ADL_SCORE: 12

## 2024-04-25 LAB
ALBUMIN SERPL-MCNC: 3.3 G/DL (ref 3.6–5.1)
ANION GAP SERPL CALC-SCNC: 9 MMOL/L (ref 7–19)
BASOPHILS # BLD: 0 K/MCL (ref 0–0.3)
BASOPHILS NFR BLD: 1 %
BUN SERPL-MCNC: 54 MG/DL (ref 6–20)
BUN/CREAT SERPL: 29 (ref 7–25)
CALCIUM SERPL-MCNC: 8.6 MG/DL (ref 8.4–10.2)
CHLORIDE SERPL-SCNC: 114 MMOL/L (ref 97–110)
CO2 SERPL-SCNC: 22 MMOL/L (ref 21–32)
CREAT SERPL-MCNC: 1.85 MG/DL (ref 0.67–1.17)
CREAT UR-MCNC: 47.8 MG/DL
DEPRECATED RDW RBC: 50.6 FL (ref 39–50)
EGFRCR SERPLBLD CKD-EPI 2021: 40 ML/MIN/{1.73_M2}
EOSINOPHIL # BLD: 0.1 K/MCL (ref 0–0.5)
EOSINOPHIL NFR BLD: 2 %
ERYTHROCYTE [DISTWIDTH] IN BLOOD: 13.9 % (ref 11–15)
FASTING DURATION TIME PATIENT: ABNORMAL H
GLUCOSE SERPL-MCNC: 129 MG/DL (ref 70–99)
HCT VFR BLD CALC: 36.2 % (ref 39–51)
HGB BLD-MCNC: 11.3 G/DL (ref 13–17)
IMM GRANULOCYTES # BLD AUTO: 0 K/MCL (ref 0–0.2)
IMM GRANULOCYTES # BLD: 1 %
LYMPHOCYTES # BLD: 1.3 K/MCL (ref 1–4)
LYMPHOCYTES NFR BLD: 21 %
MAGNESIUM SERPL-MCNC: 1.9 MG/DL (ref 1.7–2.4)
MCH RBC QN AUTO: 31.6 PG (ref 26–34)
MCHC RBC AUTO-ENTMCNC: 31.2 G/DL (ref 32–36.5)
MCV RBC AUTO: 101.1 FL (ref 78–100)
MONOCYTES # BLD: 0.6 K/MCL (ref 0.3–0.9)
MONOCYTES NFR BLD: 9 %
NEUTROPHILS # BLD: 4.2 K/MCL (ref 1.8–7.7)
NEUTROPHILS NFR BLD: 66 %
NRBC BLD MANUAL-RTO: 0 /100 WBC
PHOSPHATE SERPL-MCNC: 4.3 MG/DL (ref 2.4–4.7)
PLATELET # BLD AUTO: 148 K/MCL (ref 140–450)
POTASSIUM SERPL-SCNC: 5 MMOL/L (ref 3.4–5.1)
POTASSIUM SERPL-SCNC: 5.2 MMOL/L (ref 3.4–5.1)
PROT UR-MCNC: 18 MG/DL
RBC # BLD: 3.58 MIL/MCL (ref 4.5–5.9)
SODIUM SERPL-SCNC: 140 MMOL/L (ref 135–145)
SODIUM UR-SCNC: 93 MMOL/L
UUN UR-MCNC: 483 MG/DL
WBC # BLD: 6.2 K/MCL (ref 4.2–11)

## 2024-04-25 PROCEDURE — 10002803 HB RX 637: Performed by: HOSPITALIST

## 2024-04-25 PROCEDURE — 82570 ASSAY OF URINE CREATININE: CPT | Performed by: INTERNAL MEDICINE

## 2024-04-25 PROCEDURE — 10004651 HB RX, NO CHARGE ITEM: Performed by: HOSPITALIST

## 2024-04-25 PROCEDURE — 10006031 HB ROOM CHARGE TELEMETRY

## 2024-04-25 PROCEDURE — 84156 ASSAY OF PROTEIN URINE: CPT | Performed by: INTERNAL MEDICINE

## 2024-04-25 PROCEDURE — 10002803 HB RX 637: Performed by: INTERNAL MEDICINE

## 2024-04-25 PROCEDURE — 84300 ASSAY OF URINE SODIUM: CPT | Performed by: INTERNAL MEDICINE

## 2024-04-25 PROCEDURE — 84132 ASSAY OF SERUM POTASSIUM: CPT | Performed by: INTERNAL MEDICINE

## 2024-04-25 PROCEDURE — 83735 ASSAY OF MAGNESIUM: CPT | Performed by: INTERNAL MEDICINE

## 2024-04-25 PROCEDURE — 80069 RENAL FUNCTION PANEL: CPT | Performed by: INTERNAL MEDICINE

## 2024-04-25 PROCEDURE — 85025 COMPLETE CBC W/AUTO DIFF WBC: CPT | Performed by: HOSPITALIST

## 2024-04-25 PROCEDURE — 84540 ASSAY OF URINE/UREA-N: CPT | Performed by: INTERNAL MEDICINE

## 2024-04-25 PROCEDURE — 36415 COLL VENOUS BLD VENIPUNCTURE: CPT | Performed by: HOSPITALIST

## 2024-04-25 PROCEDURE — 10002807 HB RX 258: Performed by: INTERNAL MEDICINE

## 2024-04-25 RX ORDER — GABAPENTIN 300 MG/1
300 CAPSULE ORAL 2 TIMES DAILY
Status: DISCONTINUED | OUTPATIENT
Start: 2024-04-25 | End: 2024-04-27 | Stop reason: HOSPADM

## 2024-04-25 RX ADMIN — DILTIAZEM HYDROCHLORIDE 180 MG: 180 CAPSULE, EXTENDED RELEASE ORAL at 09:02

## 2024-04-25 RX ADMIN — MIRTAZAPINE 15 MG: 30 TABLET, FILM COATED ORAL at 20:20

## 2024-04-25 RX ADMIN — GABAPENTIN 300 MG: 300 CAPSULE ORAL at 20:21

## 2024-04-25 RX ADMIN — SODIUM CHLORIDE, PRESERVATIVE FREE 2 ML: 5 INJECTION INTRAVENOUS at 20:21

## 2024-04-25 RX ADMIN — SODIUM ZIRCONIUM CYCLOSILICATE 10 G: 10 POWDER, FOR SUSPENSION ORAL at 12:02

## 2024-04-25 RX ADMIN — SODIUM CHLORIDE, PRESERVATIVE FREE 2 ML: 5 INJECTION INTRAVENOUS at 09:04

## 2024-04-25 RX ADMIN — SODIUM CHLORIDE: 9 INJECTION, SOLUTION INTRAVENOUS at 01:53

## 2024-04-25 RX ADMIN — ALLOPURINOL 300 MG: 300 TABLET ORAL at 09:02

## 2024-04-25 RX ADMIN — LEVOTHYROXINE SODIUM 100 MCG: 0.05 TABLET ORAL at 20:20

## 2024-04-25 RX ADMIN — DIGOXIN 125 MCG: 125 TABLET ORAL at 09:02

## 2024-04-25 RX ADMIN — APIXABAN 5 MG: 5 TABLET, FILM COATED ORAL at 20:21

## 2024-04-25 RX ADMIN — ATORVASTATIN CALCIUM 10 MG: 10 TABLET, FILM COATED ORAL at 20:20

## 2024-04-25 RX ADMIN — APIXABAN 2.5 MG: 5 TABLET, FILM COATED ORAL at 09:03

## 2024-04-25 SDOH — ECONOMIC STABILITY: HOUSING INSECURITY: WHAT IS YOUR LIVING SITUATION TODAY?: I HAVE A STEADY PLACE TO LIVE

## 2024-04-25 SDOH — ECONOMIC STABILITY: HOUSING INSECURITY: DO YOU HAVE PROBLEMS WITH ANY OF THE FOLLOWING?: NONE OF THE ABOVE

## 2024-04-25 SDOH — ECONOMIC STABILITY: GENERAL

## 2024-04-25 ASSESSMENT — PAIN SCALES - GENERAL
PAINLEVEL_OUTOF10: 0
PAINLEVEL_OUTOF10: 0

## 2024-04-25 ASSESSMENT — COGNITIVE AND FUNCTIONAL STATUS - GENERAL
BECAUSE OF A PHYSICAL, MENTAL, OR EMOTIONAL CONDITION, DO YOU HAVE SERIOUS DIFFICULTY CONCENTRATING, REMEMBERING OR MAKING DECISIONS: NO
BECAUSE OF A PHYSICAL, MENTAL, OR EMOTIONAL CONDITION, DO YOU HAVE DIFFICULTY DOING ERRANDS ALONE: NO

## 2024-04-26 LAB
ALBUMIN SERPL-MCNC: 3.3 G/DL (ref 3.6–5.1)
ANION GAP SERPL CALC-SCNC: 10 MMOL/L (ref 7–19)
BASOPHILS # BLD: 0.1 K/MCL (ref 0–0.3)
BASOPHILS NFR BLD: 1 %
BUN SERPL-MCNC: 37 MG/DL (ref 6–20)
BUN/CREAT SERPL: 24 (ref 7–25)
CALCIUM SERPL-MCNC: 9.4 MG/DL (ref 8.4–10.2)
CHLORIDE SERPL-SCNC: 114 MMOL/L (ref 97–110)
CO2 SERPL-SCNC: 22 MMOL/L (ref 21–32)
CREAT SERPL-MCNC: 1.55 MG/DL (ref 0.67–1.17)
DEPRECATED RDW RBC: 51.3 FL (ref 39–50)
EGFRCR SERPLBLD CKD-EPI 2021: 49 ML/MIN/{1.73_M2}
EOSINOPHIL # BLD: 0.2 K/MCL (ref 0–0.5)
EOSINOPHIL NFR BLD: 2 %
ERYTHROCYTE [DISTWIDTH] IN BLOOD: 14.1 % (ref 11–15)
FASTING DURATION TIME PATIENT: ABNORMAL H
GLUCOSE SERPL-MCNC: 131 MG/DL (ref 70–99)
HCT VFR BLD CALC: 34.5 % (ref 39–51)
HGB BLD-MCNC: 11 G/DL (ref 13–17)
IMM GRANULOCYTES # BLD AUTO: 0 K/MCL (ref 0–0.2)
IMM GRANULOCYTES # BLD: 0 %
LYMPHOCYTES # BLD: 1 K/MCL (ref 1–4)
LYMPHOCYTES NFR BLD: 13 %
MAGNESIUM SERPL-MCNC: 1.6 MG/DL (ref 1.7–2.4)
MCH RBC QN AUTO: 32.1 PG (ref 26–34)
MCHC RBC AUTO-ENTMCNC: 31.9 G/DL (ref 32–36.5)
MCV RBC AUTO: 100.6 FL (ref 78–100)
MONOCYTES # BLD: 0.7 K/MCL (ref 0.3–0.9)
MONOCYTES NFR BLD: 8 %
NEUTROPHILS # BLD: 5.8 K/MCL (ref 1.8–7.7)
NEUTROPHILS NFR BLD: 76 %
NRBC BLD MANUAL-RTO: 0 /100 WBC
PHOSPHATE SERPL-MCNC: 3.5 MG/DL (ref 2.4–4.7)
PLATELET # BLD AUTO: 156 K/MCL (ref 140–450)
POTASSIUM SERPL-SCNC: 5.3 MMOL/L (ref 3.4–5.1)
POTASSIUM SERPL-SCNC: 5.4 MMOL/L (ref 3.4–5.1)
RBC # BLD: 3.43 MIL/MCL (ref 4.5–5.9)
SODIUM SERPL-SCNC: 141 MMOL/L (ref 135–145)
WBC # BLD: 7.7 K/MCL (ref 4.2–11)

## 2024-04-26 PROCEDURE — 85025 COMPLETE CBC W/AUTO DIFF WBC: CPT | Performed by: HOSPITALIST

## 2024-04-26 PROCEDURE — 10004651 HB RX, NO CHARGE ITEM: Performed by: HOSPITALIST

## 2024-04-26 PROCEDURE — 10002807 HB RX 258: Performed by: INTERNAL MEDICINE

## 2024-04-26 PROCEDURE — 10006031 HB ROOM CHARGE TELEMETRY

## 2024-04-26 PROCEDURE — 84132 ASSAY OF SERUM POTASSIUM: CPT | Performed by: INTERNAL MEDICINE

## 2024-04-26 PROCEDURE — 83735 ASSAY OF MAGNESIUM: CPT | Performed by: INTERNAL MEDICINE

## 2024-04-26 PROCEDURE — 10002803 HB RX 637: Performed by: HOSPITALIST

## 2024-04-26 PROCEDURE — 10002803 HB RX 637: Performed by: INTERNAL MEDICINE

## 2024-04-26 PROCEDURE — 36415 COLL VENOUS BLD VENIPUNCTURE: CPT | Performed by: HOSPITALIST

## 2024-04-26 PROCEDURE — 80069 RENAL FUNCTION PANEL: CPT | Performed by: INTERNAL MEDICINE

## 2024-04-26 RX ADMIN — APIXABAN 5 MG: 5 TABLET, FILM COATED ORAL at 08:27

## 2024-04-26 RX ADMIN — APIXABAN 5 MG: 5 TABLET, FILM COATED ORAL at 20:03

## 2024-04-26 RX ADMIN — ATORVASTATIN CALCIUM 10 MG: 10 TABLET, FILM COATED ORAL at 16:23

## 2024-04-26 RX ADMIN — GABAPENTIN 300 MG: 300 CAPSULE ORAL at 08:27

## 2024-04-26 RX ADMIN — DILTIAZEM HYDROCHLORIDE 180 MG: 180 CAPSULE, EXTENDED RELEASE ORAL at 08:27

## 2024-04-26 RX ADMIN — SODIUM CHLORIDE, PRESERVATIVE FREE 2 ML: 5 INJECTION INTRAVENOUS at 20:05

## 2024-04-26 RX ADMIN — MIRTAZAPINE 15 MG: 30 TABLET, FILM COATED ORAL at 20:03

## 2024-04-26 RX ADMIN — SODIUM CHLORIDE: 9 INJECTION, SOLUTION INTRAVENOUS at 18:18

## 2024-04-26 RX ADMIN — SODIUM ZIRCONIUM CYCLOSILICATE 10 G: 10 POWDER, FOR SUSPENSION ORAL at 11:41

## 2024-04-26 RX ADMIN — ALLOPURINOL 300 MG: 300 TABLET ORAL at 08:27

## 2024-04-26 RX ADMIN — SODIUM CHLORIDE: 9 INJECTION, SOLUTION INTRAVENOUS at 04:48

## 2024-04-26 RX ADMIN — LEVOTHYROXINE SODIUM 100 MCG: 0.05 TABLET ORAL at 20:03

## 2024-04-26 RX ADMIN — GABAPENTIN 300 MG: 300 CAPSULE ORAL at 20:03

## 2024-04-26 ASSESSMENT — PAIN SCALES - GENERAL
PAINLEVEL_OUTOF10: 0
PAINLEVEL_OUTOF10: 0

## 2024-04-27 VITALS
SYSTOLIC BLOOD PRESSURE: 152 MMHG | BODY MASS INDEX: 37.47 KG/M2 | DIASTOLIC BLOOD PRESSURE: 83 MMHG | OXYGEN SATURATION: 97 % | HEART RATE: 69 BPM | WEIGHT: 301.37 LBS | RESPIRATION RATE: 18 BRPM | TEMPERATURE: 97.7 F | HEIGHT: 75 IN

## 2024-04-27 LAB
ALBUMIN SERPL-MCNC: 3.1 G/DL (ref 3.6–5.1)
ANION GAP SERPL CALC-SCNC: 9 MMOL/L (ref 7–19)
BUN SERPL-MCNC: 29 MG/DL (ref 6–20)
BUN/CREAT SERPL: 22 (ref 7–25)
CALCIUM SERPL-MCNC: 9.2 MG/DL (ref 8.4–10.2)
CHLORIDE SERPL-SCNC: 112 MMOL/L (ref 97–110)
CO2 SERPL-SCNC: 22 MMOL/L (ref 21–32)
CREAT SERPL-MCNC: 1.31 MG/DL (ref 0.67–1.17)
EGFRCR SERPLBLD CKD-EPI 2021: 60 ML/MIN/{1.73_M2}
FASTING DURATION TIME PATIENT: ABNORMAL H
GLUCOSE SERPL-MCNC: 160 MG/DL (ref 70–99)
MAGNESIUM SERPL-MCNC: 1.5 MG/DL (ref 1.7–2.4)
PHOSPHATE SERPL-MCNC: 3.1 MG/DL (ref 2.4–4.7)
POTASSIUM SERPL-SCNC: 5.3 MMOL/L (ref 3.4–5.1)
SODIUM SERPL-SCNC: 138 MMOL/L (ref 135–145)

## 2024-04-27 PROCEDURE — 10002803 HB RX 637: Performed by: INTERNAL MEDICINE

## 2024-04-27 PROCEDURE — 10002803 HB RX 637: Performed by: HOSPITALIST

## 2024-04-27 PROCEDURE — 36415 COLL VENOUS BLD VENIPUNCTURE: CPT | Performed by: INTERNAL MEDICINE

## 2024-04-27 PROCEDURE — 10004651 HB RX, NO CHARGE ITEM: Performed by: HOSPITALIST

## 2024-04-27 PROCEDURE — 83735 ASSAY OF MAGNESIUM: CPT | Performed by: INTERNAL MEDICINE

## 2024-04-27 PROCEDURE — 80069 RENAL FUNCTION PANEL: CPT | Performed by: INTERNAL MEDICINE

## 2024-04-27 RX ADMIN — ALLOPURINOL 300 MG: 300 TABLET ORAL at 09:25

## 2024-04-27 RX ADMIN — APIXABAN 5 MG: 5 TABLET, FILM COATED ORAL at 09:25

## 2024-04-27 RX ADMIN — DILTIAZEM HYDROCHLORIDE 180 MG: 180 CAPSULE, EXTENDED RELEASE ORAL at 09:25

## 2024-04-27 RX ADMIN — GABAPENTIN 300 MG: 300 CAPSULE ORAL at 09:25

## 2024-04-27 RX ADMIN — SODIUM CHLORIDE, PRESERVATIVE FREE 2 ML: 5 INJECTION INTRAVENOUS at 09:27

## 2024-04-27 RX ADMIN — SODIUM ZIRCONIUM CYCLOSILICATE 10 G: 10 POWDER, FOR SUSPENSION ORAL at 09:25

## 2024-04-27 ASSESSMENT — PAIN SCALES - GENERAL: PAINLEVEL_OUTOF10: 0

## 2024-04-29 ENCOUNTER — CASE MANAGEMENT (OUTPATIENT)
Dept: CARE COORDINATION | Age: 64
End: 2024-04-29

## 2024-05-28 ENCOUNTER — TELEPHONE (OUTPATIENT)
Dept: CARE COORDINATION | Age: 64
End: 2024-05-28

## 2024-09-12 ENCOUNTER — APPOINTMENT (OUTPATIENT)
Dept: CT IMAGING | Age: 64
DRG: 291 | End: 2024-09-12
Attending: STUDENT IN AN ORGANIZED HEALTH CARE EDUCATION/TRAINING PROGRAM

## 2024-09-12 ENCOUNTER — APPOINTMENT (OUTPATIENT)
Dept: GENERAL RADIOLOGY | Age: 64
DRG: 291 | End: 2024-09-12
Attending: STUDENT IN AN ORGANIZED HEALTH CARE EDUCATION/TRAINING PROGRAM

## 2024-09-12 ENCOUNTER — HOSPITAL ENCOUNTER (INPATIENT)
Age: 64
LOS: 1 days | Discharge: HOME | DRG: 291 | End: 2024-09-14
Attending: STUDENT IN AN ORGANIZED HEALTH CARE EDUCATION/TRAINING PROGRAM | Admitting: INTERNAL MEDICINE

## 2024-09-12 DIAGNOSIS — E87.70 HYPERVOLEMIA, UNSPECIFIED HYPERVOLEMIA TYPE: Primary | ICD-10-CM

## 2024-09-12 DIAGNOSIS — J90 PLEURAL EFFUSION: ICD-10-CM

## 2024-09-12 DIAGNOSIS — R79.1 SUBTHERAPEUTIC INTERNATIONAL NORMALIZED RATIO (INR): ICD-10-CM

## 2024-09-12 DIAGNOSIS — R93.89 ABNORMAL CT SCAN: ICD-10-CM

## 2024-09-12 LAB
ALBUMIN SERPL-MCNC: 3.3 G/DL (ref 3.6–5.1)
ALBUMIN/GLOB SERPL: 0.9 {RATIO} (ref 1–2.4)
ALP SERPL-CCNC: 105 UNITS/L (ref 45–117)
ALT SERPL-CCNC: 17 UNITS/L
ANION GAP SERPL CALC-SCNC: 8 MMOL/L (ref 7–19)
APPEARANCE UR: CLEAR
APTT PPP: 26 SEC (ref 22–32)
AST SERPL-CCNC: 16 UNITS/L
BACTERIA #/AREA URNS HPF: ABNORMAL /HPF
BASOPHILS # BLD: 0.1 K/MCL (ref 0–0.3)
BASOPHILS NFR BLD: 1 %
BILIRUB SERPL-MCNC: 0.9 MG/DL (ref 0.2–1)
BILIRUB UR QL STRIP: NEGATIVE
BUN SERPL-MCNC: 18 MG/DL (ref 6–20)
BUN/CREAT SERPL: 17 (ref 7–25)
CALCIUM SERPL-MCNC: 9.1 MG/DL (ref 8.4–10.2)
CHLORIDE SERPL-SCNC: 103 MMOL/L (ref 97–110)
CO2 SERPL-SCNC: 32 MMOL/L (ref 21–32)
COLOR UR: COLORLESS
CREAT SERPL-MCNC: 1.08 MG/DL (ref 0.67–1.17)
DEPRECATED RDW RBC: 52.3 FL (ref 39–50)
EGFRCR SERPLBLD CKD-EPI 2021: 76 ML/MIN/{1.73_M2}
EOSINOPHIL # BLD: 0.1 K/MCL (ref 0–0.5)
EOSINOPHIL NFR BLD: 1 %
ERYTHROCYTE [DISTWIDTH] IN BLOOD: 14.6 % (ref 11–15)
FASTING DURATION TIME PATIENT: ABNORMAL H
GLOBULIN SER-MCNC: 3.5 G/DL (ref 2–4)
GLUCOSE SERPL-MCNC: 127 MG/DL (ref 70–99)
GLUCOSE UR STRIP-MCNC: NEGATIVE MG/DL
HCT VFR BLD CALC: 39.1 % (ref 39–51)
HGB BLD-MCNC: 12.5 G/DL (ref 13–17)
HGB UR QL STRIP: ABNORMAL
HYALINE CASTS #/AREA URNS LPF: ABNORMAL /LPF
IMM GRANULOCYTES # BLD AUTO: 0 K/MCL (ref 0–0.2)
IMM GRANULOCYTES # BLD: 0 %
INR PPP: 1.3
KETONES UR STRIP-MCNC: NEGATIVE MG/DL
LEUKOCYTE ESTERASE UR QL STRIP: NEGATIVE
LYMPHOCYTES # BLD: 1.1 K/MCL (ref 1–4)
LYMPHOCYTES NFR BLD: 12 %
MCH RBC QN AUTO: 31.6 PG (ref 26–34)
MCHC RBC AUTO-ENTMCNC: 32 G/DL (ref 32–36.5)
MCV RBC AUTO: 99 FL (ref 78–100)
MONOCYTES # BLD: 0.8 K/MCL (ref 0.3–0.9)
MONOCYTES NFR BLD: 9 %
NEUTROPHILS # BLD: 6.6 K/MCL (ref 1.8–7.7)
NEUTROPHILS NFR BLD: 77 %
NITRITE UR QL STRIP: NEGATIVE
NRBC BLD MANUAL-RTO: 0 /100 WBC
NT-PROBNP SERPL-MCNC: 3010 PG/ML
PH UR STRIP: 6 [PH] (ref 5–7)
PLATELET # BLD AUTO: 181 K/MCL (ref 140–450)
POTASSIUM SERPL-SCNC: 4.6 MMOL/L (ref 3.4–5.1)
PROT SERPL-MCNC: 6.8 G/DL (ref 6.4–8.2)
PROT UR STRIP-MCNC: NEGATIVE MG/DL
PROTHROMBIN TIME: 13.5 SEC (ref 9.7–11.8)
QRS-INTERVAL (MSEC): 84
QT-INTERVAL (MSEC): 390
QTC: 373
R AXIS (DEGREES): 106
RBC # BLD: 3.95 MIL/MCL (ref 4.5–5.9)
RBC #/AREA URNS HPF: ABNORMAL /HPF
REPORT TEXT: NORMAL
SODIUM SERPL-SCNC: 138 MMOL/L (ref 135–145)
SP GR UR STRIP: 1.01 (ref 1–1.03)
SQUAMOUS #/AREA URNS HPF: ABNORMAL /HPF
T AXIS (DEGREES): -140
TROPONIN I SERPL DL<=0.01 NG/ML-MCNC: 22 NG/L
UROBILINOGEN UR STRIP-MCNC: 0.2 MG/DL
VENTRICULAR RATE EKG/MIN (BPM): 55
WBC # BLD: 8.6 K/MCL (ref 4.2–11)
WBC #/AREA URNS HPF: ABNORMAL /HPF

## 2024-09-12 PROCEDURE — 84484 ASSAY OF TROPONIN QUANT: CPT | Performed by: STUDENT IN AN ORGANIZED HEALTH CARE EDUCATION/TRAINING PROGRAM

## 2024-09-12 PROCEDURE — 74174 CTA ABD&PLVS W/CONTRAST: CPT

## 2024-09-12 PROCEDURE — 96374 THER/PROPH/DIAG INJ IV PUSH: CPT

## 2024-09-12 PROCEDURE — 71045 X-RAY EXAM CHEST 1 VIEW: CPT

## 2024-09-12 PROCEDURE — 85610 PROTHROMBIN TIME: CPT | Performed by: STUDENT IN AN ORGANIZED HEALTH CARE EDUCATION/TRAINING PROGRAM

## 2024-09-12 PROCEDURE — 85730 THROMBOPLASTIN TIME PARTIAL: CPT | Performed by: STUDENT IN AN ORGANIZED HEALTH CARE EDUCATION/TRAINING PROGRAM

## 2024-09-12 PROCEDURE — 10002803 HB RX 637: Performed by: INTERNAL MEDICINE

## 2024-09-12 PROCEDURE — 83880 ASSAY OF NATRIURETIC PEPTIDE: CPT | Performed by: STUDENT IN AN ORGANIZED HEALTH CARE EDUCATION/TRAINING PROGRAM

## 2024-09-12 PROCEDURE — 81001 URINALYSIS AUTO W/SCOPE: CPT | Performed by: STUDENT IN AN ORGANIZED HEALTH CARE EDUCATION/TRAINING PROGRAM

## 2024-09-12 PROCEDURE — 93005 ELECTROCARDIOGRAM TRACING: CPT | Performed by: EMERGENCY MEDICINE

## 2024-09-12 PROCEDURE — G0378 HOSPITAL OBSERVATION PER HR: HCPCS

## 2024-09-12 PROCEDURE — 71275 CT ANGIOGRAPHY CHEST: CPT

## 2024-09-12 PROCEDURE — 85025 COMPLETE CBC W/AUTO DIFF WBC: CPT | Performed by: STUDENT IN AN ORGANIZED HEALTH CARE EDUCATION/TRAINING PROGRAM

## 2024-09-12 PROCEDURE — 10002800 HB RX 250 W HCPCS: Performed by: STUDENT IN AN ORGANIZED HEALTH CARE EDUCATION/TRAINING PROGRAM

## 2024-09-12 PROCEDURE — 99291 CRITICAL CARE FIRST HOUR: CPT

## 2024-09-12 PROCEDURE — 10002805 HB CONTRAST AGENT: Performed by: STUDENT IN AN ORGANIZED HEALTH CARE EDUCATION/TRAINING PROGRAM

## 2024-09-12 PROCEDURE — 80053 COMPREHEN METABOLIC PANEL: CPT | Performed by: STUDENT IN AN ORGANIZED HEALTH CARE EDUCATION/TRAINING PROGRAM

## 2024-09-12 RX ORDER — MAGNESIUM 200 MG
1 TABLET ORAL DAILY
COMMUNITY

## 2024-09-12 RX ORDER — ACETAMINOPHEN 325 MG/1
650 TABLET ORAL EVERY 4 HOURS PRN
Status: DISCONTINUED | OUTPATIENT
Start: 2024-09-12 | End: 2024-09-14 | Stop reason: HOSPADM

## 2024-09-12 RX ORDER — BUPROPION HYDROCHLORIDE 150 MG/1
150 TABLET ORAL DAILY
COMMUNITY

## 2024-09-12 RX ORDER — WARFARIN SODIUM 5 MG/1
5 TABLET ORAL DAILY
COMMUNITY
Start: 2024-09-09

## 2024-09-12 RX ORDER — METOPROLOL SUCCINATE 50 MG/1
50 TABLET, EXTENDED RELEASE ORAL DAILY
COMMUNITY

## 2024-09-12 RX ORDER — ATORVASTATIN CALCIUM 10 MG/1
10 TABLET, FILM COATED ORAL DAILY
Status: DISCONTINUED | OUTPATIENT
Start: 2024-09-13 | End: 2024-09-14 | Stop reason: HOSPADM

## 2024-09-12 RX ORDER — HEPARIN SODIUM 10000 [USP'U]/100ML
1-30 INJECTION, SOLUTION INTRAVENOUS CONTINUOUS
Status: DISCONTINUED | OUTPATIENT
Start: 2024-09-12 | End: 2024-09-14 | Stop reason: ALTCHOICE

## 2024-09-12 RX ORDER — ONDANSETRON 2 MG/ML
4 INJECTION INTRAMUSCULAR; INTRAVENOUS EVERY 6 HOURS PRN
Status: DISCONTINUED | OUTPATIENT
Start: 2024-09-12 | End: 2024-09-14 | Stop reason: HOSPADM

## 2024-09-12 RX ORDER — GINSENG 100 MG
1 CAPSULE ORAL DAILY
COMMUNITY

## 2024-09-12 RX ORDER — ALLOPURINOL 300 MG/1
300 TABLET ORAL DAILY
Status: DISCONTINUED | OUTPATIENT
Start: 2024-09-13 | End: 2024-09-14 | Stop reason: HOSPADM

## 2024-09-12 RX ORDER — DIGOXIN 250 MCG
250 TABLET ORAL DAILY
Status: ON HOLD | COMMUNITY
End: 2024-09-14 | Stop reason: HOSPADM

## 2024-09-12 RX ORDER — GABAPENTIN 300 MG/1
300 CAPSULE ORAL EVERY 12 HOURS SCHEDULED
Status: DISCONTINUED | OUTPATIENT
Start: 2024-09-12 | End: 2024-09-14 | Stop reason: HOSPADM

## 2024-09-12 RX ORDER — TORSEMIDE 10 MG/1
10 TABLET ORAL DAILY
COMMUNITY

## 2024-09-12 RX ORDER — LEVOTHYROXINE SODIUM 100 UG/1
100 TABLET ORAL DAILY
Status: DISCONTINUED | OUTPATIENT
Start: 2024-09-13 | End: 2024-09-14 | Stop reason: HOSPADM

## 2024-09-12 RX ORDER — FUROSEMIDE 10 MG/ML
40 INJECTION INTRAMUSCULAR; INTRAVENOUS ONCE
Status: COMPLETED | OUTPATIENT
Start: 2024-09-12 | End: 2024-09-12

## 2024-09-12 RX ORDER — MIRTAZAPINE 30 MG/1
15 TABLET, FILM COATED ORAL NIGHTLY
Status: DISCONTINUED | OUTPATIENT
Start: 2024-09-12 | End: 2024-09-14 | Stop reason: HOSPADM

## 2024-09-12 RX ADMIN — FUROSEMIDE 40 MG: 10 INJECTION, SOLUTION INTRAMUSCULAR; INTRAVENOUS at 19:11

## 2024-09-12 RX ADMIN — HEPARIN SODIUM 7 UNITS/KG/HR: 10000 INJECTION, SOLUTION INTRAVENOUS at 19:16

## 2024-09-12 RX ADMIN — HEPARIN SODIUM 4000 UNITS: 1000 INJECTION INTRAVENOUS; SUBCUTANEOUS at 19:14

## 2024-09-12 RX ADMIN — GABAPENTIN 300 MG: 300 CAPSULE ORAL at 19:09

## 2024-09-12 RX ADMIN — IOHEXOL 100 ML: 350 INJECTION, SOLUTION INTRAVENOUS at 17:39

## 2024-09-13 LAB
ANION GAP SERPL CALC-SCNC: 10 MMOL/L (ref 7–19)
APTT PPP: 28 SEC (ref 22–32)
APTT PPP: 32 SEC (ref 22–32)
APTT PPP: 34 SEC (ref 22–32)
APTT PPP: 38 SEC (ref 22–32)
BUN SERPL-MCNC: 17 MG/DL (ref 6–20)
BUN/CREAT SERPL: 13 (ref 7–25)
CALCIUM SERPL-MCNC: 9 MG/DL (ref 8.4–10.2)
CHLORIDE SERPL-SCNC: 99 MMOL/L (ref 97–110)
CO2 SERPL-SCNC: 34 MMOL/L (ref 21–32)
CREAT SERPL-MCNC: 1.29 MG/DL (ref 0.67–1.17)
DEPRECATED RDW RBC: 52.1 FL (ref 39–50)
EGFRCR SERPLBLD CKD-EPI 2021: 61 ML/MIN/{1.73_M2}
ERYTHROCYTE [DISTWIDTH] IN BLOOD: 14.5 % (ref 11–15)
FASTING DURATION TIME PATIENT: ABNORMAL H
GLUCOSE BLDC GLUCOMTR-MCNC: 109 MG/DL (ref 70–99)
GLUCOSE BLDC GLUCOMTR-MCNC: 125 MG/DL (ref 70–99)
GLUCOSE BLDC GLUCOMTR-MCNC: 91 MG/DL (ref 70–99)
GLUCOSE SERPL-MCNC: 127 MG/DL (ref 70–99)
HCT VFR BLD CALC: 38.5 % (ref 39–51)
HGB BLD-MCNC: 12.1 G/DL (ref 13–17)
INR PPP: 1.4
MCH RBC QN AUTO: 31.5 PG (ref 26–34)
MCHC RBC AUTO-ENTMCNC: 31.4 G/DL (ref 32–36.5)
MCV RBC AUTO: 100.3 FL (ref 78–100)
NRBC BLD MANUAL-RTO: 0 /100 WBC
PLATELET # BLD AUTO: 152 K/MCL (ref 140–450)
POTASSIUM SERPL-SCNC: 3.8 MMOL/L (ref 3.4–5.1)
PROTHROMBIN TIME: 14.7 SEC (ref 9.7–11.8)
RBC # BLD: 3.84 MIL/MCL (ref 4.5–5.9)
SODIUM SERPL-SCNC: 139 MMOL/L (ref 135–145)
WBC # BLD: 6.4 K/MCL (ref 4.2–11)

## 2024-09-13 PROCEDURE — G0378 HOSPITAL OBSERVATION PER HR: HCPCS

## 2024-09-13 PROCEDURE — 85730 THROMBOPLASTIN TIME PARTIAL: CPT | Performed by: STUDENT IN AN ORGANIZED HEALTH CARE EDUCATION/TRAINING PROGRAM

## 2024-09-13 PROCEDURE — 85730 THROMBOPLASTIN TIME PARTIAL: CPT | Performed by: INTERNAL MEDICINE

## 2024-09-13 PROCEDURE — 10002803 HB RX 637: Performed by: INTERNAL MEDICINE

## 2024-09-13 PROCEDURE — 85610 PROTHROMBIN TIME: CPT | Performed by: INTERNAL MEDICINE

## 2024-09-13 PROCEDURE — 10002800 HB RX 250 W HCPCS: Performed by: INTERNAL MEDICINE

## 2024-09-13 PROCEDURE — 36415 COLL VENOUS BLD VENIPUNCTURE: CPT | Performed by: STUDENT IN AN ORGANIZED HEALTH CARE EDUCATION/TRAINING PROGRAM

## 2024-09-13 PROCEDURE — 10006031 HB ROOM CHARGE TELEMETRY

## 2024-09-13 PROCEDURE — 10002800 HB RX 250 W HCPCS: Performed by: STUDENT IN AN ORGANIZED HEALTH CARE EDUCATION/TRAINING PROGRAM

## 2024-09-13 PROCEDURE — 85027 COMPLETE CBC AUTOMATED: CPT | Performed by: STUDENT IN AN ORGANIZED HEALTH CARE EDUCATION/TRAINING PROGRAM

## 2024-09-13 PROCEDURE — 82962 GLUCOSE BLOOD TEST: CPT

## 2024-09-13 PROCEDURE — 80048 BASIC METABOLIC PNL TOTAL CA: CPT | Performed by: INTERNAL MEDICINE

## 2024-09-13 RX ORDER — WARFARIN SODIUM 5 MG/1
5 TABLET ORAL ONCE
Status: COMPLETED | OUTPATIENT
Start: 2024-09-13 | End: 2024-09-13

## 2024-09-13 RX ORDER — DIGOXIN 125 MCG
250 TABLET ORAL DAILY
Status: DISCONTINUED | OUTPATIENT
Start: 2024-09-13 | End: 2024-09-14

## 2024-09-13 RX ORDER — FUROSEMIDE 10 MG/ML
40 INJECTION INTRAMUSCULAR; INTRAVENOUS ONCE
Status: COMPLETED | OUTPATIENT
Start: 2024-09-13 | End: 2024-09-13

## 2024-09-13 RX ORDER — DILTIAZEM HYDROCHLORIDE 180 MG/1
180 CAPSULE, EXTENDED RELEASE ORAL DAILY
Status: DISCONTINUED | OUTPATIENT
Start: 2024-09-13 | End: 2024-09-14 | Stop reason: HOSPADM

## 2024-09-13 RX ORDER — BUPROPION HYDROCHLORIDE 150 MG/1
150 TABLET ORAL DAILY
Status: DISCONTINUED | OUTPATIENT
Start: 2024-09-13 | End: 2024-09-14 | Stop reason: HOSPADM

## 2024-09-13 RX ORDER — METOPROLOL SUCCINATE 50 MG/1
50 TABLET, EXTENDED RELEASE ORAL DAILY
Status: DISCONTINUED | OUTPATIENT
Start: 2024-09-13 | End: 2024-09-14 | Stop reason: HOSPADM

## 2024-09-13 RX ADMIN — GABAPENTIN 300 MG: 300 CAPSULE ORAL at 16:51

## 2024-09-13 RX ADMIN — GABAPENTIN 300 MG: 300 CAPSULE ORAL at 09:44

## 2024-09-13 RX ADMIN — MIRTAZAPINE 15 MG: 15 TABLET, FILM COATED ORAL at 20:12

## 2024-09-13 RX ADMIN — HEPARIN SODIUM 16 UNITS/KG/HR: 10000 INJECTION, SOLUTION INTRAVENOUS at 14:11

## 2024-09-13 RX ADMIN — ATORVASTATIN CALCIUM 10 MG: 10 TABLET, FILM COATED ORAL at 20:12

## 2024-09-13 RX ADMIN — WARFARIN SODIUM 5 MG: 5 TABLET ORAL at 18:47

## 2024-09-13 RX ADMIN — WARFARIN SODIUM 5 MG: 5 TABLET ORAL at 01:26

## 2024-09-13 RX ADMIN — METOPROLOL SUCCINATE 50 MG: 50 TABLET, EXTENDED RELEASE ORAL at 14:17

## 2024-09-13 RX ADMIN — FUROSEMIDE 40 MG: 10 INJECTION, SOLUTION INTRAMUSCULAR; INTRAVENOUS at 09:44

## 2024-09-13 RX ADMIN — LEVOTHYROXINE SODIUM 100 MCG: 0.1 TABLET ORAL at 07:09

## 2024-09-13 RX ADMIN — MIRTAZAPINE 15 MG: 15 TABLET, FILM COATED ORAL at 01:26

## 2024-09-13 RX ADMIN — BUPROPION HYDROCHLORIDE 150 MG: 150 TABLET, EXTENDED RELEASE ORAL at 14:17

## 2024-09-13 RX ADMIN — HEPARIN SODIUM 4000 UNITS: 1000 INJECTION, SOLUTION INTRAVENOUS; SUBCUTANEOUS at 14:12

## 2024-09-13 RX ADMIN — HEPARIN SODIUM 4000 UNITS: 1000 INJECTION, SOLUTION INTRAVENOUS; SUBCUTANEOUS at 07:09

## 2024-09-13 RX ADMIN — DILTIAZEM HYDROCHLORIDE 180 MG: 180 CAPSULE, EXTENDED RELEASE ORAL at 16:01

## 2024-09-13 RX ADMIN — HEPARIN SODIUM 4000 UNITS: 1000 INJECTION, SOLUTION INTRAVENOUS; SUBCUTANEOUS at 02:10

## 2024-09-13 RX ADMIN — DIGOXIN 250 MCG: 125 TABLET ORAL at 14:17

## 2024-09-13 RX ADMIN — HEPARIN SODIUM 2000 UNITS: 1000 INJECTION, SOLUTION INTRAVENOUS; SUBCUTANEOUS at 21:05

## 2024-09-13 RX ADMIN — ALLOPURINOL 300 MG: 300 TABLET ORAL at 09:44

## 2024-09-13 RX ADMIN — HEPARIN SODIUM 10 UNITS/KG/HR: 10000 INJECTION, SOLUTION INTRAVENOUS at 02:14

## 2024-09-13 SDOH — ECONOMIC STABILITY: HOUSING INSECURITY: DO YOU HAVE PROBLEMS WITH ANY OF THE FOLLOWING?: NONE OF THE ABOVE

## 2024-09-13 SDOH — ECONOMIC STABILITY: HOUSING INSECURITY: WHAT IS YOUR LIVING SITUATION TODAY?: I HAVE A STEADY PLACE TO LIVE

## 2024-09-13 SDOH — ECONOMIC STABILITY: INCOME INSECURITY: IN THE PAST 12 MONTHS, HAS THE ELECTRIC, GAS, OIL, OR WATER COMPANY THREATENED TO SHUT OFF SERVICE IN YOUR HOME?: NO

## 2024-09-13 SDOH — ECONOMIC STABILITY: GENERAL

## 2024-09-13 SDOH — SOCIAL STABILITY: SOCIAL INSECURITY: HOW OFTEN DOES ANYONE, INCLUDING FAMILY AND FRIENDS, SCREAM OR CURSE AT YOU?: NEVER

## 2024-09-13 SDOH — SOCIAL STABILITY: SOCIAL INSECURITY: HOW OFTEN DOES ANYONE, INCLUDING FAMILY AND FRIENDS, PHYSICALLY HURT YOU?: NEVER

## 2024-09-13 SDOH — ECONOMIC STABILITY: HOUSING INSECURITY: WHAT IS YOUR LIVING SITUATION TODAY?: APARTMENT

## 2024-09-13 SDOH — SOCIAL STABILITY: SOCIAL INSECURITY: HOW OFTEN DOES ANYONE, INCLUDING FAMILY AND FRIENDS, THREATEN YOU WITH HARM?: NEVER

## 2024-09-13 SDOH — HEALTH STABILITY: PHYSICAL HEALTH: DO YOU HAVE DIFFICULTY DRESSING OR BATHING?: NO

## 2024-09-13 SDOH — SOCIAL STABILITY: SOCIAL NETWORK: SUPPORT SYSTEMS: FAMILY MEMBERS

## 2024-09-13 SDOH — HEALTH STABILITY: GENERAL: BECAUSE OF A PHYSICAL, MENTAL, OR EMOTIONAL CONDITION, DO YOU HAVE DIFFICULTY DOING ERRANDS ALONE?: NO

## 2024-09-13 SDOH — ECONOMIC STABILITY: FOOD INSECURITY: WITHIN THE PAST 12 MONTHS, THE FOOD YOU BOUGHT JUST DIDN'T LAST AND YOU DIDN'T HAVE MONEY TO GET MORE.: NEVER TRUE

## 2024-09-13 SDOH — ECONOMIC STABILITY: GENERAL: WOULD YOU LIKE HELP WITH ANY OF THE FOLLOWING NEEDS?: I DON'T WANT HELP WITH ANY OF THESE

## 2024-09-13 SDOH — HEALTH STABILITY: PHYSICAL HEALTH: DO YOU HAVE SERIOUS DIFFICULTY WALKING OR CLIMBING STAIRS?: NO

## 2024-09-13 SDOH — ECONOMIC STABILITY: HOUSING INSECURITY: WHAT IS YOUR LIVING SITUATION TODAY?: ALONE

## 2024-09-13 SDOH — SOCIAL STABILITY: SOCIAL INSECURITY: HOW OFTEN DOES ANYONE, INCLUDING FAMILY AND FRIENDS, INSULT OR TALK DOWN TO YOU?: NEVER

## 2024-09-13 ASSESSMENT — ORIENTATION MEMORY CONCENTRATION TEST (OMCT)
WHAT YEAR IS IT NOW (MUST BE EXACT): CORRECT
WHAT MONTH IS IT NOW: CORRECT
OMCT SCORE: 0
SAY THE MONTHS IN REVERSE ORDER STARTING WITH LAST MONTH: CORRECT
REPEAT THE NAME AND ADDRESS I ASKED YOU TO REMEMBER: CORRECT
COUNT BACKWARDS FROM 20 TO 1: CORRECT
OMCT INTERPRETATION: 0-6: NO SIGNIFICANT IMPAIRMENT
WHAT TIME IS IT (NO WATCH OR CLOCK): CORRECT

## 2024-09-13 ASSESSMENT — LIFESTYLE VARIABLES
HOW MANY STANDARD DRINKS CONTAINING ALCOHOL DO YOU HAVE ON A TYPICAL DAY: 0,1 OR 2
ALCOHOL_USE_STATUS: NO OR LOW RISK WITH VALIDATED TOOL
HOW OFTEN DO YOU HAVE A DRINK CONTAINING ALCOHOL: MONTHLY OR LESS
HOW OFTEN DO YOU HAVE 6 OR MORE DRINKS ON ONE OCCASION: NEVER
AUDIT-C TOTAL SCORE: 1

## 2024-09-13 ASSESSMENT — PAIN SCALES - GENERAL
PAINLEVEL_OUTOF10: 0

## 2024-09-13 ASSESSMENT — COGNITIVE AND FUNCTIONAL STATUS - GENERAL
BECAUSE OF A PHYSICAL, MENTAL, OR EMOTIONAL CONDITION, DO YOU HAVE SERIOUS DIFFICULTY CONCENTRATING, REMEMBERING OR MAKING DECISIONS: NO
DO YOU HAVE DIFFICULTY DRESSING OR BATHING: NO

## 2024-09-13 ASSESSMENT — ACTIVITIES OF DAILY LIVING (ADL)
ADL_SCORE: 12
ADL_SHORT_OF_BREATH: YES
ADL_BEFORE_ADMISSION: INDEPENDENT
RECENT_DECLINE_ADL: YES, ACUTE ILLNESS WITHOUT THERAPY NEEDS

## 2024-09-13 ASSESSMENT — PATIENT HEALTH QUESTIONNAIRE - PHQ9
SUM OF ALL RESPONSES TO PHQ9 QUESTIONS 1 AND 2: 1
CLINICAL INTERPRETATION OF PHQ2 SCORE: NO FURTHER SCREENING NEEDED
IS PATIENT ABLE TO COMPLETE PHQ2 OR PHQ9: YES

## 2024-09-14 VITALS
HEIGHT: 73 IN | TEMPERATURE: 98.4 F | HEART RATE: 48 BPM | DIASTOLIC BLOOD PRESSURE: 73 MMHG | RESPIRATION RATE: 16 BRPM | BODY MASS INDEX: 39.33 KG/M2 | OXYGEN SATURATION: 94 % | SYSTOLIC BLOOD PRESSURE: 119 MMHG | WEIGHT: 296.74 LBS

## 2024-09-14 LAB
ANION GAP SERPL CALC-SCNC: 8 MMOL/L (ref 7–19)
APTT PPP: 43 SEC (ref 22–32)
BUN SERPL-MCNC: 18 MG/DL (ref 6–20)
BUN/CREAT SERPL: 14 (ref 7–25)
CALCIUM SERPL-MCNC: 9.1 MG/DL (ref 8.4–10.2)
CHLORIDE SERPL-SCNC: 97 MMOL/L (ref 97–110)
CO2 SERPL-SCNC: 33 MMOL/L (ref 21–32)
CREAT SERPL-MCNC: 1.28 MG/DL (ref 0.67–1.17)
DEPRECATED RDW RBC: 52.2 FL (ref 39–50)
EGFRCR SERPLBLD CKD-EPI 2021: 62 ML/MIN/{1.73_M2}
ERYTHROCYTE [DISTWIDTH] IN BLOOD: 14.5 % (ref 11–15)
FASTING DURATION TIME PATIENT: ABNORMAL H
GLUCOSE SERPL-MCNC: 117 MG/DL (ref 70–99)
HCT VFR BLD CALC: 38.6 % (ref 39–51)
HGB BLD-MCNC: 12.2 G/DL (ref 13–17)
INR PPP: 2.1
MCH RBC QN AUTO: 31.2 PG (ref 26–34)
MCHC RBC AUTO-ENTMCNC: 31.6 G/DL (ref 32–36.5)
MCV RBC AUTO: 98.7 FL (ref 78–100)
NRBC BLD MANUAL-RTO: 0 /100 WBC
PLATELET # BLD AUTO: 177 K/MCL (ref 140–450)
POTASSIUM SERPL-SCNC: 3.4 MMOL/L (ref 3.4–5.1)
PROTHROMBIN TIME: 21.5 SEC (ref 9.7–11.8)
RBC # BLD: 3.91 MIL/MCL (ref 4.5–5.9)
SODIUM SERPL-SCNC: 135 MMOL/L (ref 135–145)
WBC # BLD: 6.9 K/MCL (ref 4.2–11)

## 2024-09-14 PROCEDURE — 10002803 HB RX 637: Performed by: INTERNAL MEDICINE

## 2024-09-14 PROCEDURE — 10004651 HB RX, NO CHARGE ITEM: Performed by: INTERNAL MEDICINE

## 2024-09-14 PROCEDURE — 97161 PT EVAL LOW COMPLEX 20 MIN: CPT

## 2024-09-14 PROCEDURE — 85610 PROTHROMBIN TIME: CPT | Performed by: INTERNAL MEDICINE

## 2024-09-14 PROCEDURE — 10002800 HB RX 250 W HCPCS: Performed by: STUDENT IN AN ORGANIZED HEALTH CARE EDUCATION/TRAINING PROGRAM

## 2024-09-14 PROCEDURE — 85027 COMPLETE CBC AUTOMATED: CPT | Performed by: STUDENT IN AN ORGANIZED HEALTH CARE EDUCATION/TRAINING PROGRAM

## 2024-09-14 PROCEDURE — 36415 COLL VENOUS BLD VENIPUNCTURE: CPT | Performed by: INTERNAL MEDICINE

## 2024-09-14 PROCEDURE — 97116 GAIT TRAINING THERAPY: CPT

## 2024-09-14 PROCEDURE — 85730 THROMBOPLASTIN TIME PARTIAL: CPT | Performed by: STUDENT IN AN ORGANIZED HEALTH CARE EDUCATION/TRAINING PROGRAM

## 2024-09-14 PROCEDURE — 80048 BASIC METABOLIC PNL TOTAL CA: CPT | Performed by: INTERNAL MEDICINE

## 2024-09-14 RX ORDER — TORSEMIDE 10 MG/1
10 TABLET ORAL DAILY
Status: DISCONTINUED | OUTPATIENT
Start: 2024-09-14 | End: 2024-09-14 | Stop reason: HOSPADM

## 2024-09-14 RX ORDER — WARFARIN SODIUM 5 MG/1
5 TABLET ORAL ONCE
Status: DISCONTINUED | OUTPATIENT
Start: 2024-09-14 | End: 2024-09-14 | Stop reason: HOSPADM

## 2024-09-14 RX ADMIN — DIGOXIN 250 MCG: 125 TABLET ORAL at 08:15

## 2024-09-14 RX ADMIN — ALLOPURINOL 300 MG: 300 TABLET ORAL at 08:15

## 2024-09-14 RX ADMIN — METOPROLOL SUCCINATE 50 MG: 50 TABLET, EXTENDED RELEASE ORAL at 08:15

## 2024-09-14 RX ADMIN — ACETAMINOPHEN 650 MG: 325 TABLET ORAL at 08:15

## 2024-09-14 RX ADMIN — GABAPENTIN 300 MG: 300 CAPSULE ORAL at 08:15

## 2024-09-14 RX ADMIN — BUPROPION HYDROCHLORIDE 150 MG: 150 TABLET, EXTENDED RELEASE ORAL at 08:15

## 2024-09-14 RX ADMIN — TORSEMIDE 10 MG: 10 TABLET ORAL at 12:44

## 2024-09-14 RX ADMIN — LEVOTHYROXINE SODIUM 100 MCG: 0.1 TABLET ORAL at 06:36

## 2024-09-14 RX ADMIN — DILTIAZEM HYDROCHLORIDE 180 MG: 180 CAPSULE, EXTENDED RELEASE ORAL at 08:15

## 2024-09-14 RX ADMIN — HEPARIN SODIUM 18 UNITS/KG/HR: 10000 INJECTION, SOLUTION INTRAVENOUS at 02:43

## 2024-09-14 ASSESSMENT — ACTIVITIES OF DAILY LIVING (ADL): PRIOR_ADL: INDEPENDENT

## 2024-09-14 ASSESSMENT — PAIN SCALES - GENERAL
PAINLEVEL_OUTOF10: 0
PAINLEVEL_OUTOF10: 7

## 2024-09-14 ASSESSMENT — COGNITIVE AND FUNCTIONAL STATUS - GENERAL
BASIC_MOBILITY_RAW_SCORE: 23
BASIC_MOBILITY_CONVERTED_SCORE: 50.88

## 2024-09-17 ENCOUNTER — TELEPHONE (OUTPATIENT)
Dept: CARE COORDINATION | Age: 64
End: 2024-09-17

## 2024-09-26 ENCOUNTER — HOSPITAL ENCOUNTER (EMERGENCY)
Age: 64
Discharge: LEFT BEFORE DISCHARGE | End: 2024-09-26

## 2024-09-26 VITALS
OXYGEN SATURATION: 96 % | SYSTOLIC BLOOD PRESSURE: 151 MMHG | HEIGHT: 75 IN | BODY MASS INDEX: 37.09 KG/M2 | RESPIRATION RATE: 18 BRPM | HEART RATE: 73 BPM | DIASTOLIC BLOOD PRESSURE: 81 MMHG | TEMPERATURE: 97.9 F

## 2024-10-09 ENCOUNTER — APPOINTMENT (OUTPATIENT)
Dept: PHYSICAL MEDICINE AND REHAB | Age: 64
End: 2024-10-09

## 2024-10-30 ENCOUNTER — APPOINTMENT (OUTPATIENT)
Dept: PHYSICAL MEDICINE AND REHAB | Age: 64
End: 2024-10-30

## 2024-11-05 ENCOUNTER — HOSPITAL ENCOUNTER (OUTPATIENT)
Dept: PHYSICAL MEDICINE AND REHAB | Age: 64
Discharge: STILL ADMITTED OR ADMINISTRATIVE | End: 2024-11-06

## 2024-11-05 PROCEDURE — 97162 PT EVAL MOD COMPLEX 30 MIN: CPT | Performed by: PHYSICAL THERAPIST

## 2024-11-05 PROCEDURE — 97110 THERAPEUTIC EXERCISES: CPT | Performed by: PHYSICAL THERAPIST

## 2024-11-05 ASSESSMENT — MOVEMENT AND STRENGTH ASSESSMENTS
PUTTING ON YOUR SHOES OR SOCKS: A LITTLE BIT OF DIFFICULTY
GETTING INTO OR OUT OF THE BATH: NO DIFFICULTY
WALKING A MILE: EXTREME DIFFICULTY OR UNABLE TO PERFORM ACTIVITY
ROLLING OVER IN BED: NO DIFFICULTY
RUNNING ON UNEVEN GROUND: EXTREME DIFFICULTY OR UNABLE TO PERFORM ACTIVITY
LIFTING AN OBJECT, LIKE A BAG OF GROCERIES, FROM THE FLOOR: QUITE A BIT OF DIFFICULTY
PERFORMING HEAVY ACTIVITIES AROUND YOUR HOME: QUITE A BIT OF DIFFICULTY
ANY OF YOUR USUAL WORK, HOUSEWORK OR SCHOOL ACTIVITIES: MODERATE DIFFICULTY
TOTAL SCORE: 40
PERFORMING LIGHT ACTIVITES AROUND YOUR HOME: A LITTLE BIT OF DIFFICULTY
HOPPING: EXTREME DIFFICULTY OR UNABLE TO PERFORM ACTIVITY
GETTING INTO OR OUT OF A CAR: A LITTLE BIT OF DIFFICULTY
SITTING FOR 1 HOUR: NO DIFFICULTY
RUNNING ON EVEN GROUND: EXTREME DIFFICULTY OR UNABLE TO PERFORM ACTIVITY
STANDING FOR 1 HOUR: QUITE A BIT OF DIFFICULTY
GOING UP OR DOWN 10 STAIRS (ABOUT 1 FLIGHT OF STAIRS): QUITE A BIT OF DIFFICULTY
WALKING 2 BLOCKS: EXTREME DIFFICULTY OR UNABLE TO PERFORM ACTIVITY
MAKING SHARP TURNS WHILE RUNNING FAST: EXTREME DIFFICULTY OR UNABLE TO PERFORM ACTIVITY
SQUATTING: QUITE A BIT OF DIFFICULTY
YOUR USUAL HOBBIES, RECREATIONAL OR SPORTING ACTIVIITIES: QUITE A BIT OF DIFFICULTY
WALKING BETWEEN ROOMS: A LITTLE BIT OF DIFFICULTY

## 2024-11-05 ASSESSMENT — GAIT ASSESSMENTS
TUG TIME: QUAD CANE
TUG TURNS: UNSTABLE ON TURNS
TUG TIME: 16

## 2024-11-05 ASSESSMENT — ENCOUNTER SYMPTOMS: PAIN: 1

## 2024-11-06 DIAGNOSIS — R26.9 GAIT ABNORMALITY: Primary | ICD-10-CM

## 2024-11-13 ENCOUNTER — APPOINTMENT (OUTPATIENT)
Dept: PHYSICAL MEDICINE AND REHAB | Age: 64
End: 2024-11-13

## 2024-11-13 PROCEDURE — 97110 THERAPEUTIC EXERCISES: CPT | Performed by: PHYSICAL THERAPIST

## 2024-11-13 ASSESSMENT — ENCOUNTER SYMPTOMS: PAIN SEVERITY NOW: 5

## 2024-11-15 ENCOUNTER — APPOINTMENT (OUTPATIENT)
Dept: PHYSICAL MEDICINE AND REHAB | Age: 64
End: 2024-11-15

## 2024-11-17 ENCOUNTER — APPOINTMENT (OUTPATIENT)
Dept: GENERAL RADIOLOGY | Age: 64
DRG: 065 | End: 2024-11-17
Attending: EMERGENCY MEDICINE

## 2024-11-17 ENCOUNTER — HOSPITAL ENCOUNTER (INPATIENT)
Age: 64
LOS: 1 days | Discharge: HOME | DRG: 065 | End: 2024-11-19
Attending: EMERGENCY MEDICINE | Admitting: HOSPITALIST

## 2024-11-17 ENCOUNTER — APPOINTMENT (OUTPATIENT)
Dept: MRI IMAGING | Age: 64
DRG: 065 | End: 2024-11-17
Attending: PSYCHIATRY & NEUROLOGY

## 2024-11-17 ENCOUNTER — APPOINTMENT (OUTPATIENT)
Dept: CT IMAGING | Age: 64
DRG: 065 | End: 2024-11-17
Attending: EMERGENCY MEDICINE

## 2024-11-17 ENCOUNTER — APPOINTMENT (OUTPATIENT)
Dept: CARDIOLOGY | Age: 64
DRG: 065 | End: 2024-11-17
Attending: HOSPITALIST

## 2024-11-17 DIAGNOSIS — R27.0 ATAXIA: ICD-10-CM

## 2024-11-17 DIAGNOSIS — R42 DIZZINESS: Primary | ICD-10-CM

## 2024-11-17 DIAGNOSIS — R27.8 DYSMETRIA: ICD-10-CM

## 2024-11-17 DIAGNOSIS — R47.1 DYSARTHRIA: ICD-10-CM

## 2024-11-17 DIAGNOSIS — R47.9 DIFFICULTY WITH SPEECH: ICD-10-CM

## 2024-11-17 LAB
ALBUMIN SERPL-MCNC: 3 G/DL (ref 3.4–5)
ALBUMIN/GLOB SERPL: 0.9 {RATIO} (ref 1–2.4)
ALP SERPL-CCNC: 92 UNITS/L (ref 45–117)
ALT SERPL-CCNC: 17 UNITS/L
ANION GAP SERPL CALC-SCNC: 8 MMOL/L (ref 7–19)
APTT PPP: 24 SEC (ref 22–32)
AST SERPL-CCNC: 15 UNITS/L
AV MEAN GRADIENT (AVMG): 7
AV MEAN VELOCITY (AVMV): 1.24
AV PEAK GRADIENT (AVPG): 15
AV PEAK VELOCITY (AVPV): 1.91
AV STENOSIS SEVERITY TEXT: NORMAL
AVI LVOT PEAK GRADIENT (LVOTMG): 1.3
BASOPHILS # BLD: 0 K/MCL (ref 0–0.3)
BASOPHILS # BLD: 0.1 K/MCL (ref 0–0.3)
BASOPHILS NFR BLD: 1 %
BASOPHILS NFR BLD: 1 %
BILIRUB SERPL-MCNC: 0.8 MG/DL (ref 0.2–1)
BUN SERPL-MCNC: 21 MG/DL (ref 6–20)
BUN SERPL-MCNC: 22 MG/DL (ref 6–20)
BUN SERPL-MCNC: 23 MG/DL (ref 6–20)
BUN/CREAT SERPL: 19 (ref 7–25)
BUN/CREAT SERPL: 19 (ref 7–25)
BUN/CREAT SERPL: 21 (ref 7–25)
CALCIUM SERPL-MCNC: 8.8 MG/DL (ref 8.4–10.2)
CALCIUM SERPL-MCNC: 8.9 MG/DL (ref 8.4–10.2)
CALCIUM SERPL-MCNC: 8.9 MG/DL (ref 8.4–10.2)
CHLORIDE SERPL-SCNC: 105 MMOL/L (ref 97–110)
CHLORIDE SERPL-SCNC: 106 MMOL/L (ref 97–110)
CHLORIDE SERPL-SCNC: 106 MMOL/L (ref 97–110)
CHOLEST SERPL-MCNC: 114 MG/DL
CHOLEST/HDLC SERPL: 3.4 {RATIO}
CO2 SERPL-SCNC: 29 MMOL/L (ref 21–32)
CO2 SERPL-SCNC: 30 MMOL/L (ref 21–32)
CO2 SERPL-SCNC: 31 MMOL/L (ref 21–32)
CREAT SERPL-MCNC: 1.08 MG/DL (ref 0.67–1.17)
CREAT SERPL-MCNC: 1.1 MG/DL (ref 0.67–1.17)
CREAT SERPL-MCNC: 1.13 MG/DL (ref 0.67–1.17)
DEPRECATED RDW RBC: 51.7 FL (ref 39–50)
DEPRECATED RDW RBC: 52.2 FL (ref 39–50)
EGFRCR SERPLBLD CKD-EPI 2021: 72 ML/MIN/{1.73_M2}
EGFRCR SERPLBLD CKD-EPI 2021: 74 ML/MIN/{1.73_M2}
EGFRCR SERPLBLD CKD-EPI 2021: 76 ML/MIN/{1.73_M2}
EOSINOPHIL # BLD: 0.1 K/MCL (ref 0–0.5)
EOSINOPHIL # BLD: 0.2 K/MCL (ref 0–0.5)
EOSINOPHIL NFR BLD: 1 %
EOSINOPHIL NFR BLD: 2 %
ERYTHROCYTE [DISTWIDTH] IN BLOOD: 14.6 % (ref 11–15)
ERYTHROCYTE [DISTWIDTH] IN BLOOD: 14.6 % (ref 11–15)
FASTING DURATION TIME PATIENT: ABNORMAL H
FOLATE SERPL-MCNC: 15 NG/ML
GLOBULIN SER-MCNC: 3.3 G/DL (ref 2–4)
GLUCOSE BLDC GLUCOMTR-MCNC: 194 MG/DL (ref 70–99)
GLUCOSE SERPL-MCNC: 129 MG/DL (ref 70–99)
GLUCOSE SERPL-MCNC: 146 MG/DL (ref 70–99)
GLUCOSE SERPL-MCNC: 188 MG/DL (ref 70–99)
HBA1C MFR BLD: 5.8 % (ref 4.5–5.6)
HCT VFR BLD CALC: 37.5 % (ref 39–51)
HCT VFR BLD CALC: 38.3 % (ref 39–51)
HDLC SERPL-MCNC: 34 MG/DL
HGB BLD-MCNC: 12.1 G/DL (ref 13–17)
HGB BLD-MCNC: 12.1 G/DL (ref 13–17)
IMM GRANULOCYTES # BLD AUTO: 0 K/MCL (ref 0–0.2)
IMM GRANULOCYTES # BLD AUTO: 0.1 K/MCL (ref 0–0.2)
IMM GRANULOCYTES # BLD: 0 %
IMM GRANULOCYTES # BLD: 1 %
INR PPP: 1.1
LDLC SERPL CALC-MCNC: 26 MG/DL
LEFT INTERNAL DIMENSION IN SYSTOLE (LVSD): 1.3
LEFT VENTRICULAR INTERNAL DIMENSION IN DIASTOLE (LVDD): 3.2
LEFT VENTRICULAR POSTERIOR WALL IN END DIASTOLE (LVPW): 4.6
LV EF: NORMAL %
LYMPHOCYTES # BLD: 0.9 K/MCL (ref 1–4)
LYMPHOCYTES # BLD: 1.7 K/MCL (ref 1–4)
LYMPHOCYTES NFR BLD: 11 %
LYMPHOCYTES NFR BLD: 18 %
MCH RBC QN AUTO: 31.8 PG (ref 26–34)
MCH RBC QN AUTO: 32.4 PG (ref 26–34)
MCHC RBC AUTO-ENTMCNC: 31.6 G/DL (ref 32–36.5)
MCHC RBC AUTO-ENTMCNC: 32.3 G/DL (ref 32–36.5)
MCV RBC AUTO: 100.3 FL (ref 78–100)
MCV RBC AUTO: 100.5 FL (ref 78–100)
MONOCYTES # BLD: 0.5 K/MCL (ref 0.3–0.9)
MONOCYTES # BLD: 0.8 K/MCL (ref 0.3–0.9)
MONOCYTES NFR BLD: 6 %
MONOCYTES NFR BLD: 8 %
NEUTROPHILS # BLD: 6.4 K/MCL (ref 1.8–7.7)
NEUTROPHILS # BLD: 6.6 K/MCL (ref 1.8–7.7)
NEUTROPHILS NFR BLD: 70 %
NEUTROPHILS NFR BLD: 81 %
NONHDLC SERPL-MCNC: 80 MG/DL
NRBC BLD MANUAL-RTO: 0 /100 WBC
NRBC BLD MANUAL-RTO: 0 /100 WBC
PLATELET # BLD AUTO: 205 K/MCL (ref 140–450)
PLATELET # BLD AUTO: 213 K/MCL (ref 140–450)
POTASSIUM SERPL-SCNC: 3.9 MMOL/L (ref 3.4–5.1)
POTASSIUM SERPL-SCNC: 4.6 MMOL/L (ref 3.4–5.1)
POTASSIUM SERPL-SCNC: 4.7 MMOL/L (ref 3.4–5.1)
PROT SERPL-MCNC: 6.3 G/DL (ref 6.4–8.2)
PROTHROMBIN TIME: 11.2 SEC (ref 9.7–11.8)
QRS-INTERVAL (MSEC): 86
QT-INTERVAL (MSEC): 418
QTC: 389
R AXIS (DEGREES): 110
RAINBOW EXTRA TUBES HOLD SPECIMEN: NORMAL
RAINBOW EXTRA TUBES HOLD SPECIMEN: NORMAL
RBC # BLD: 3.74 MIL/MCL (ref 4.5–5.9)
RBC # BLD: 3.81 MIL/MCL (ref 4.5–5.9)
REPORT TEXT: NORMAL
RV END SYSTOLIC LONGITUDINAL STRAIN FREE WALL (RVGS): 2.3
SODIUM SERPL-SCNC: 139 MMOL/L (ref 135–145)
T AXIS (DEGREES): 43
TRICUSPID VALVE PEAK REGURGITATION VELOCITY (TRPV): 3.2
TRIGL SERPL-MCNC: 271 MG/DL
TROPONIN I SERPL DL<=0.01 NG/ML-MCNC: 16 NG/L
TSH SERPL-ACNC: 4.64 MCUNITS/ML (ref 0.35–5)
TV ESTIMATED RIGHT ARTERIAL PRESSURE (RAP): 9.96
VENTRICULAR RATE EKG/MIN (BPM): 52
VIT B12 SERPL-MCNC: 525 PG/ML (ref 211–911)
WBC # BLD: 7.8 K/MCL (ref 4.2–11)
WBC # BLD: 9.4 K/MCL (ref 4.2–11)

## 2024-11-17 PROCEDURE — 10002805 HB CONTRAST AGENT: Performed by: PSYCHIATRY & NEUROLOGY

## 2024-11-17 PROCEDURE — 10002805 HB CONTRAST AGENT: Performed by: HOSPITALIST

## 2024-11-17 PROCEDURE — 93005 ELECTROCARDIOGRAM TRACING: CPT | Performed by: EMERGENCY MEDICINE

## 2024-11-17 PROCEDURE — 97162 PT EVAL MOD COMPLEX 30 MIN: CPT

## 2024-11-17 PROCEDURE — 84484 ASSAY OF TROPONIN QUANT: CPT | Performed by: EMERGENCY MEDICINE

## 2024-11-17 PROCEDURE — 85730 THROMBOPLASTIN TIME PARTIAL: CPT | Performed by: EMERGENCY MEDICINE

## 2024-11-17 PROCEDURE — 82746 ASSAY OF FOLIC ACID SERUM: CPT | Performed by: PSYCHIATRY & NEUROLOGY

## 2024-11-17 PROCEDURE — 10002803 HB RX 637: Performed by: HOSPITALIST

## 2024-11-17 PROCEDURE — 10002803 HB RX 637: Performed by: PSYCHIATRY & NEUROLOGY

## 2024-11-17 PROCEDURE — 84443 ASSAY THYROID STIM HORMONE: CPT | Performed by: EMERGENCY MEDICINE

## 2024-11-17 PROCEDURE — G0378 HOSPITAL OBSERVATION PER HR: HCPCS

## 2024-11-17 PROCEDURE — A9585 GADOBUTROL INJECTION: HCPCS | Performed by: PSYCHIATRY & NEUROLOGY

## 2024-11-17 PROCEDURE — 97530 THERAPEUTIC ACTIVITIES: CPT

## 2024-11-17 PROCEDURE — 10002805 HB CONTRAST AGENT: Performed by: EMERGENCY MEDICINE

## 2024-11-17 PROCEDURE — 82962 GLUCOSE BLOOD TEST: CPT

## 2024-11-17 PROCEDURE — 70553 MRI BRAIN STEM W/O & W/DYE: CPT

## 2024-11-17 PROCEDURE — 71045 X-RAY EXAM CHEST 1 VIEW: CPT

## 2024-11-17 PROCEDURE — 97535 SELF CARE MNGMENT TRAINING: CPT

## 2024-11-17 PROCEDURE — 85610 PROTHROMBIN TIME: CPT | Performed by: EMERGENCY MEDICINE

## 2024-11-17 PROCEDURE — 93306 TTE W/DOPPLER COMPLETE: CPT

## 2024-11-17 PROCEDURE — 80048 BASIC METABOLIC PNL TOTAL CA: CPT | Performed by: HOSPITALIST

## 2024-11-17 PROCEDURE — 83036 HEMOGLOBIN GLYCOSYLATED A1C: CPT | Performed by: EMERGENCY MEDICINE

## 2024-11-17 PROCEDURE — 36415 COLL VENOUS BLD VENIPUNCTURE: CPT | Performed by: HOSPITALIST

## 2024-11-17 PROCEDURE — 80053 COMPREHEN METABOLIC PANEL: CPT | Performed by: EMERGENCY MEDICINE

## 2024-11-17 PROCEDURE — 92610 EVALUATE SWALLOWING FUNCTION: CPT

## 2024-11-17 PROCEDURE — 10004651 HB RX, NO CHARGE ITEM: Performed by: HOSPITALIST

## 2024-11-17 PROCEDURE — 85025 COMPLETE CBC W/AUTO DIFF WBC: CPT | Performed by: HOSPITALIST

## 2024-11-17 PROCEDURE — 10002803 HB RX 637: Performed by: INTERNAL MEDICINE

## 2024-11-17 PROCEDURE — 70450 CT HEAD/BRAIN W/O DYE: CPT

## 2024-11-17 PROCEDURE — 96374 THER/PROPH/DIAG INJ IV PUSH: CPT

## 2024-11-17 PROCEDURE — 99285 EMERGENCY DEPT VISIT HI MDM: CPT

## 2024-11-17 PROCEDURE — 10002800 HB RX 250 W HCPCS: Performed by: EMERGENCY MEDICINE

## 2024-11-17 PROCEDURE — 97166 OT EVAL MOD COMPLEX 45 MIN: CPT

## 2024-11-17 PROCEDURE — 10002800 HB RX 250 W HCPCS: Performed by: PSYCHIATRY & NEUROLOGY

## 2024-11-17 PROCEDURE — 80061 LIPID PANEL: CPT | Performed by: EMERGENCY MEDICINE

## 2024-11-17 PROCEDURE — 10004651 HB RX, NO CHARGE ITEM: Performed by: EMERGENCY MEDICINE

## 2024-11-17 PROCEDURE — 85025 COMPLETE CBC W/AUTO DIFF WBC: CPT | Performed by: EMERGENCY MEDICINE

## 2024-11-17 PROCEDURE — 70498 CT ANGIOGRAPHY NECK: CPT

## 2024-11-17 RX ORDER — ACETAMINOPHEN 325 MG/1
650 TABLET ORAL EVERY 4 HOURS PRN
Status: DISCONTINUED | OUTPATIENT
Start: 2024-11-17 | End: 2024-11-19 | Stop reason: HOSPADM

## 2024-11-17 RX ORDER — DILTIAZEM HYDROCHLORIDE 180 MG/1
180 CAPSULE, EXTENDED RELEASE ORAL DAILY
Status: DISCONTINUED | OUTPATIENT
Start: 2024-11-17 | End: 2024-11-19 | Stop reason: HOSPADM

## 2024-11-17 RX ORDER — METOLAZONE 2.5 MG/1
2.5 TABLET ORAL
COMMUNITY

## 2024-11-17 RX ORDER — MIRTAZAPINE 15 MG/1
15 TABLET, FILM COATED ORAL NIGHTLY
Status: DISCONTINUED | OUTPATIENT
Start: 2024-11-17 | End: 2024-11-19 | Stop reason: HOSPADM

## 2024-11-17 RX ORDER — ALLOPURINOL 100 MG/1
300 TABLET ORAL DAILY
Status: DISCONTINUED | OUTPATIENT
Start: 2024-11-17 | End: 2024-11-19 | Stop reason: HOSPADM

## 2024-11-17 RX ORDER — ONDANSETRON 2 MG/ML
4 INJECTION INTRAMUSCULAR; INTRAVENOUS EVERY 12 HOURS PRN
Status: DISCONTINUED | OUTPATIENT
Start: 2024-11-17 | End: 2024-11-19 | Stop reason: HOSPADM

## 2024-11-17 RX ORDER — HEPARIN SODIUM 5000 [USP'U]/ML
5000 INJECTION, SOLUTION INTRAVENOUS; SUBCUTANEOUS EVERY 8 HOURS SCHEDULED
Status: DISCONTINUED | OUTPATIENT
Start: 2024-11-17 | End: 2024-11-17

## 2024-11-17 RX ORDER — 0.9 % SODIUM CHLORIDE 0.9 %
10 VIAL (ML) INJECTION PRN
Status: DISCONTINUED | OUTPATIENT
Start: 2024-11-17 | End: 2024-11-19 | Stop reason: HOSPADM

## 2024-11-17 RX ORDER — DIGOXIN 250 MCG
250 TABLET ORAL DAILY
Status: ON HOLD | COMMUNITY
End: 2024-11-19 | Stop reason: HOSPADM

## 2024-11-17 RX ORDER — ASPIRIN 81 MG/1
81 TABLET, CHEWABLE ORAL DAILY
Status: DISCONTINUED | OUTPATIENT
Start: 2024-11-17 | End: 2024-11-19 | Stop reason: HOSPADM

## 2024-11-17 RX ORDER — TORSEMIDE 10 MG/1
10 TABLET ORAL DAILY
Status: DISCONTINUED | OUTPATIENT
Start: 2024-11-17 | End: 2024-11-19 | Stop reason: HOSPADM

## 2024-11-17 RX ORDER — THIAMINE HYDROCHLORIDE 100 MG/ML
400 INJECTION, SOLUTION INTRAMUSCULAR; INTRAVENOUS 3 TIMES DAILY
Status: DISCONTINUED | OUTPATIENT
Start: 2024-11-17 | End: 2024-11-19 | Stop reason: HOSPADM

## 2024-11-17 RX ORDER — POLYETHYLENE GLYCOL 3350 17 G/17G
17 POWDER, FOR SOLUTION ORAL DAILY PRN
Status: DISCONTINUED | OUTPATIENT
Start: 2024-11-17 | End: 2024-11-19 | Stop reason: HOSPADM

## 2024-11-17 RX ORDER — GABAPENTIN 300 MG/1
300 CAPSULE ORAL 2 TIMES DAILY
Status: DISCONTINUED | OUTPATIENT
Start: 2024-11-17 | End: 2024-11-19 | Stop reason: HOSPADM

## 2024-11-17 RX ORDER — LEVOTHYROXINE SODIUM 100 UG/1
100 TABLET ORAL
Status: DISCONTINUED | OUTPATIENT
Start: 2024-11-17 | End: 2024-11-19 | Stop reason: HOSPADM

## 2024-11-17 RX ORDER — BUPROPION HYDROCHLORIDE 150 MG/1
150 TABLET ORAL DAILY
Status: DISCONTINUED | OUTPATIENT
Start: 2024-11-17 | End: 2024-11-19 | Stop reason: HOSPADM

## 2024-11-17 RX ORDER — ACETAMINOPHEN 650 MG/1
650 SUPPOSITORY RECTAL EVERY 4 HOURS PRN
Status: DISCONTINUED | OUTPATIENT
Start: 2024-11-17 | End: 2024-11-19 | Stop reason: HOSPADM

## 2024-11-17 RX ORDER — ONDANSETRON 4 MG/1
4 TABLET, ORALLY DISINTEGRATING ORAL EVERY 12 HOURS PRN
Status: DISCONTINUED | OUTPATIENT
Start: 2024-11-17 | End: 2024-11-19 | Stop reason: HOSPADM

## 2024-11-17 RX ORDER — 0.9 % SODIUM CHLORIDE 0.9 %
2 VIAL (ML) INJECTION EVERY 12 HOURS SCHEDULED
Status: DISCONTINUED | OUTPATIENT
Start: 2024-11-17 | End: 2024-11-19 | Stop reason: HOSPADM

## 2024-11-17 RX ORDER — ONDANSETRON 2 MG/ML
4 INJECTION INTRAMUSCULAR; INTRAVENOUS ONCE
Status: COMPLETED | OUTPATIENT
Start: 2024-11-17 | End: 2024-11-17

## 2024-11-17 RX ORDER — METOPROLOL SUCCINATE 50 MG/1
50 TABLET, EXTENDED RELEASE ORAL DAILY
Status: DISCONTINUED | OUTPATIENT
Start: 2024-11-17 | End: 2024-11-19 | Stop reason: HOSPADM

## 2024-11-17 RX ORDER — LORAZEPAM 0.5 MG/1
0.5 TABLET ORAL EVERY 4 HOURS PRN
Status: DISCONTINUED | OUTPATIENT
Start: 2024-11-17 | End: 2024-11-19 | Stop reason: HOSPADM

## 2024-11-17 RX ORDER — LANOLIN ALCOHOL/MO/W.PET/CERES
400 CREAM (GRAM) TOPICAL DAILY
Status: DISCONTINUED | OUTPATIENT
Start: 2024-11-17 | End: 2024-11-19 | Stop reason: HOSPADM

## 2024-11-17 RX ORDER — GADOBUTROL 604.72 MG/ML
10 INJECTION INTRAVENOUS ONCE
Status: COMPLETED | OUTPATIENT
Start: 2024-11-17 | End: 2024-11-17

## 2024-11-17 RX ORDER — ATORVASTATIN CALCIUM 10 MG/1
10 TABLET, FILM COATED ORAL DAILY
Status: DISCONTINUED | OUTPATIENT
Start: 2024-11-17 | End: 2024-11-19 | Stop reason: HOSPADM

## 2024-11-17 RX ORDER — GABAPENTIN 300 MG/1
300 CAPSULE ORAL ONCE
Status: COMPLETED | OUTPATIENT
Start: 2024-11-17 | End: 2024-11-17

## 2024-11-17 RX ORDER — BISACODYL 10 MG
10 SUPPOSITORY, RECTAL RECTAL DAILY PRN
Status: DISCONTINUED | OUTPATIENT
Start: 2024-11-17 | End: 2024-11-19 | Stop reason: HOSPADM

## 2024-11-17 RX ORDER — AMOXICILLIN 250 MG
2 CAPSULE ORAL 2 TIMES DAILY PRN
Status: DISCONTINUED | OUTPATIENT
Start: 2024-11-17 | End: 2024-11-19 | Stop reason: HOSPADM

## 2024-11-17 RX ADMIN — ASPIRIN 81 MG CHEWABLE TABLET 81 MG: 81 TABLET CHEWABLE at 10:03

## 2024-11-17 RX ADMIN — SODIUM CHLORIDE, PRESERVATIVE FREE 2 ML: 5 INJECTION INTRAVENOUS at 20:33

## 2024-11-17 RX ADMIN — GABAPENTIN 300 MG: 300 CAPSULE ORAL at 20:32

## 2024-11-17 RX ADMIN — GADOBUTROL 10 ML: 604.72 INJECTION INTRAVENOUS at 12:37

## 2024-11-17 RX ADMIN — SODIUM CHLORIDE, PRESERVATIVE FREE 2 ML: 5 INJECTION INTRAVENOUS at 10:09

## 2024-11-17 RX ADMIN — ALLOPURINOL 300 MG: 100 TABLET ORAL at 10:03

## 2024-11-17 RX ADMIN — LORAZEPAM 0.5 MG: 0.5 TABLET ORAL at 12:01

## 2024-11-17 RX ADMIN — GABAPENTIN 300 MG: 300 CAPSULE ORAL at 10:00

## 2024-11-17 RX ADMIN — PERFLUTREN 2 ML: 6.52 INJECTION, SUSPENSION INTRAVENOUS at 15:23

## 2024-11-17 RX ADMIN — BUPROPION HYDROCHLORIDE 150 MG: 150 TABLET, EXTENDED RELEASE ORAL at 10:26

## 2024-11-17 RX ADMIN — THIAMINE HYDROCHLORIDE 400 MG: 100 INJECTION, SOLUTION INTRAMUSCULAR; INTRAVENOUS at 14:52

## 2024-11-17 RX ADMIN — ONDANSETRON 4 MG: 2 INJECTION INTRAMUSCULAR; INTRAVENOUS at 02:33

## 2024-11-17 RX ADMIN — LORAZEPAM 0.5 MG: 0.5 TABLET ORAL at 11:19

## 2024-11-17 RX ADMIN — IOHEXOL 80 ML: 350 INJECTION, SOLUTION INTRAVENOUS at 01:58

## 2024-11-17 RX ADMIN — THIAMINE HYDROCHLORIDE 400 MG: 100 INJECTION, SOLUTION INTRAMUSCULAR; INTRAVENOUS at 20:34

## 2024-11-17 RX ADMIN — Medication 400 MG: at 10:03

## 2024-11-17 RX ADMIN — TORSEMIDE 10 MG: 10 TABLET ORAL at 10:03

## 2024-11-17 RX ADMIN — APIXABAN 5 MG: 5 TABLET, FILM COATED ORAL at 20:32

## 2024-11-17 RX ADMIN — MIRTAZAPINE 15 MG: 15 TABLET, FILM COATED ORAL at 20:32

## 2024-11-17 RX ADMIN — ATORVASTATIN CALCIUM 10 MG: 10 TABLET, FILM COATED ORAL at 10:03

## 2024-11-17 SDOH — HEALTH STABILITY: PHYSICAL HEALTH: DO YOU HAVE SERIOUS DIFFICULTY WALKING OR CLIMBING STAIRS?: NO

## 2024-11-17 SDOH — ECONOMIC STABILITY: HOUSING INSECURITY: WHAT IS YOUR LIVING SITUATION TODAY?: ALONE

## 2024-11-17 SDOH — ECONOMIC STABILITY: HOUSING INSECURITY: DO YOU HAVE PROBLEMS WITH ANY OF THE FOLLOWING?: NONE OF THE ABOVE

## 2024-11-17 SDOH — SOCIAL STABILITY: SOCIAL INSECURITY: HOW OFTEN DOES ANYONE, INCLUDING FAMILY AND FRIENDS, SCREAM OR CURSE AT YOU?: NEVER

## 2024-11-17 SDOH — ECONOMIC STABILITY: HOUSING INSECURITY: WHAT IS YOUR LIVING SITUATION TODAY?: I HAVE A STEADY PLACE TO LIVE

## 2024-11-17 SDOH — SOCIAL STABILITY: SOCIAL NETWORK: SUPPORT SYSTEMS: FAMILY MEMBERS;FRIENDS

## 2024-11-17 SDOH — ECONOMIC STABILITY: FOOD INSECURITY: WITHIN THE PAST 12 MONTHS, THE FOOD YOU BOUGHT JUST DIDN'T LAST AND YOU DIDN'T HAVE MONEY TO GET MORE.: NEVER TRUE

## 2024-11-17 SDOH — ECONOMIC STABILITY: INCOME INSECURITY: IN THE PAST 12 MONTHS, HAS THE ELECTRIC, GAS, OIL, OR WATER COMPANY THREATENED TO SHUT OFF SERVICE IN YOUR HOME?: NO

## 2024-11-17 SDOH — SOCIAL STABILITY: SOCIAL INSECURITY: HOW OFTEN DOES ANYONE, INCLUDING FAMILY AND FRIENDS, THREATEN YOU WITH HARM?: NEVER

## 2024-11-17 SDOH — SOCIAL STABILITY: SOCIAL INSECURITY: HOW OFTEN DOES ANYONE, INCLUDING FAMILY AND FRIENDS, INSULT OR TALK DOWN TO YOU?: NEVER

## 2024-11-17 SDOH — SOCIAL STABILITY: SOCIAL INSECURITY: HOW OFTEN DOES ANYONE, INCLUDING FAMILY AND FRIENDS, PHYSICALLY HURT YOU?: NEVER

## 2024-11-17 SDOH — HEALTH STABILITY: PHYSICAL HEALTH: DO YOU HAVE DIFFICULTY DRESSING OR BATHING?: NO

## 2024-11-17 SDOH — HEALTH STABILITY: GENERAL: BECAUSE OF A PHYSICAL, MENTAL, OR EMOTIONAL CONDITION, DO YOU HAVE DIFFICULTY DOING ERRANDS ALONE?: NO

## 2024-11-17 SDOH — ECONOMIC STABILITY: GENERAL

## 2024-11-17 SDOH — ECONOMIC STABILITY: GENERAL: WOULD YOU LIKE HELP WITH ANY OF THE FOLLOWING NEEDS?: I DON'T WANT HELP WITH ANY OF THESE

## 2024-11-17 SDOH — ECONOMIC STABILITY: HOUSING INSECURITY: WHAT IS YOUR LIVING SITUATION TODAY?: HOUSE

## 2024-11-17 ASSESSMENT — ENCOUNTER SYMPTOMS
LIGHT-HEADEDNESS: 0
PAIN SEVERITY NOW: 0
SEIZURES: 0
NUMBNESS: 0
VOMITING: 0
DIARRHEA: 0
NAUSEA: 0
SHORTNESS OF BREATH: 0
DIZZINESS: 1
TREMORS: 0
AGITATION: 0
WEAKNESS: 0
BACK PAIN: 0
SPEECH DIFFICULTY: 1
FEVER: 0
SORE THROAT: 0
COUGH: 0
HEADACHES: 1
CHILLS: 0
ABDOMINAL PAIN: 0

## 2024-11-17 ASSESSMENT — ACTIVITIES OF DAILY LIVING (ADL)
HOME_MANAGEMENT_TIME_ENTRY: 15
PRIOR_ADL: MODIFIED INDEPENDENT
ADL_SCORE: 12
ADL_BEFORE_ADMISSION: INDEPENDENT
ADL_SHORT_OF_BREATH: NO
RECENT_DECLINE_ADL: NO

## 2024-11-17 ASSESSMENT — COLUMBIA-SUICIDE SEVERITY RATING SCALE - C-SSRS
IS THE PATIENT ABLE TO COMPLETE C-SSRS: YES
1. WITHIN THE PAST MONTH, HAVE YOU WISHED YOU WERE DEAD OR WISHED YOU COULD GO TO SLEEP AND NOT WAKE UP?: NO
6. HAVE YOU EVER DONE ANYTHING, STARTED TO DO ANYTHING, OR PREPARED TO DO ANYTHING TO END YOUR LIFE?: NO
2. HAVE YOU ACTUALLY HAD ANY THOUGHTS OF KILLING YOURSELF?: NO

## 2024-11-17 ASSESSMENT — COGNITIVE AND FUNCTIONAL STATUS - GENERAL
DAILY_ACTIVITY_CONVERTED_SCORE: 35.96
HELP NEEDED FOR TOILETING: A LOT
DAILY_ACTIVITY_RAW_SCORE: 16
HELP NEEDED FOR BATHING: A LOT
HELP NEEDED DRESSING REGULAR UPPER BODY CLOTHING: A LITTLE
APPLIED_COGNITIVE_RAW_SCORE: 24
HELP NEEDED FOR PERSONAL GROOMING: A LITTLE
BASIC_MOBILITY_RAW_SCORE: 11
APPLIED_COGNITIVE_CONVERTED_SCORE: 62.21
HELP NEEDED DRESSING REGULAR LOWER BODY CLOTHING: A LOT
BASIC_MOBILITY_CONVERTED_SCORE: 30.25

## 2024-11-17 ASSESSMENT — LIFESTYLE VARIABLES
AUDIT-C TOTAL SCORE: 0
HOW OFTEN DO YOU HAVE A DRINK CONTAINING ALCOHOL: NEVER
HOW OFTEN DO YOU HAVE 6 OR MORE DRINKS ON ONE OCCASION: NEVER
HOW MANY STANDARD DRINKS CONTAINING ALCOHOL DO YOU HAVE ON A TYPICAL DAY: 0,1 OR 2
ALCOHOL_USE_STATUS: NO OR LOW RISK WITH VALIDATED TOOL

## 2024-11-17 ASSESSMENT — PATIENT HEALTH QUESTIONNAIRE - PHQ9
SUM OF ALL RESPONSES TO PHQ9 QUESTIONS 1 AND 2: 0
IS PATIENT ABLE TO COMPLETE PHQ2 OR PHQ9: YES
SUM OF ALL RESPONSES TO PHQ9 QUESTIONS 1 AND 2: 0
CLINICAL INTERPRETATION OF PHQ2 SCORE: NO FURTHER SCREENING NEEDED
1. LITTLE INTEREST OR PLEASURE IN DOING THINGS: NOT AT ALL
2. FEELING DOWN, DEPRESSED OR HOPELESS: NOT AT ALL

## 2024-11-17 ASSESSMENT — PAIN SCALES - GENERAL
PAINLEVEL_OUTOF10: 0

## 2024-11-18 PROBLEM — R47.1 DYSARTHRIA: Status: ACTIVE | Noted: 2024-11-18

## 2024-11-18 PROBLEM — I63.9 CEREBELLAR STROKE  (CMD): Status: ACTIVE | Noted: 2024-11-18

## 2024-11-18 PROBLEM — Z78.9 IMPAIRED MOBILITY AND ADLS: Status: ACTIVE | Noted: 2024-11-18

## 2024-11-18 PROBLEM — I50.32 CHRONIC HEART FAILURE WITH PRESERVED EJECTION FRACTION  (CMD): Status: ACTIVE | Noted: 2024-11-18

## 2024-11-18 PROBLEM — Z74.09 IMPAIRED MOBILITY AND ADLS: Status: ACTIVE | Noted: 2024-11-18

## 2024-11-18 LAB
APPEARANCE UR: CLEAR
BACTERIA #/AREA URNS HPF: NORMAL /HPF
BASOPHILS # BLD: 0.1 K/MCL (ref 0–0.3)
BASOPHILS NFR BLD: 1 %
BILIRUB UR QL STRIP: NEGATIVE
COLOR UR: NORMAL
DEPRECATED RDW RBC: 54.6 FL (ref 39–50)
EOSINOPHIL # BLD: 0.2 K/MCL (ref 0–0.5)
EOSINOPHIL NFR BLD: 2 %
ERYTHROCYTE [DISTWIDTH] IN BLOOD: 14.6 % (ref 11–15)
GLUCOSE UR STRIP-MCNC: NEGATIVE MG/DL
HCT VFR BLD CALC: 37.3 % (ref 39–51)
HGB BLD-MCNC: 11.5 G/DL (ref 13–17)
HGB UR QL STRIP: NEGATIVE
HYALINE CASTS #/AREA URNS LPF: NORMAL /LPF
IMM GRANULOCYTES # BLD AUTO: 0.1 K/MCL (ref 0–0.2)
IMM GRANULOCYTES # BLD: 1 %
KETONES UR STRIP-MCNC: NEGATIVE MG/DL
LEUKOCYTE ESTERASE UR QL STRIP: NEGATIVE
LYMPHOCYTES # BLD: 1 K/MCL (ref 1–4)
LYMPHOCYTES NFR BLD: 13 %
MCH RBC QN AUTO: 31.7 PG (ref 26–34)
MCHC RBC AUTO-ENTMCNC: 30.8 G/DL (ref 32–36.5)
MCV RBC AUTO: 102.8 FL (ref 78–100)
MONOCYTES # BLD: 0.7 K/MCL (ref 0.3–0.9)
MONOCYTES NFR BLD: 9 %
NEUTROPHILS # BLD: 5.7 K/MCL (ref 1.8–7.7)
NEUTROPHILS NFR BLD: 74 %
NITRITE UR QL STRIP: NEGATIVE
NRBC BLD MANUAL-RTO: 0 /100 WBC
PH UR STRIP: 5 [PH] (ref 5–7)
PLATELET # BLD AUTO: 176 K/MCL (ref 140–450)
PROT UR STRIP-MCNC: NEGATIVE MG/DL
RAINBOW EXTRA TUBES HOLD SPECIMEN: NORMAL
RBC # BLD: 3.63 MIL/MCL (ref 4.5–5.9)
RBC #/AREA URNS HPF: NORMAL /HPF
SP GR UR STRIP: 1.01 (ref 1–1.03)
SQUAMOUS #/AREA URNS HPF: NORMAL /HPF
UROBILINOGEN UR STRIP-MCNC: 0.2 MG/DL
WBC # BLD: 7.6 K/MCL (ref 4.2–11)
WBC #/AREA URNS HPF: NORMAL /HPF

## 2024-11-18 PROCEDURE — 81001 URINALYSIS AUTO W/SCOPE: CPT | Performed by: HOSPITALIST

## 2024-11-18 PROCEDURE — 92526 ORAL FUNCTION THERAPY: CPT

## 2024-11-18 PROCEDURE — 10002800 HB RX 250 W HCPCS: Performed by: PSYCHIATRY & NEUROLOGY

## 2024-11-18 PROCEDURE — 10004651 HB RX, NO CHARGE ITEM: Performed by: EMERGENCY MEDICINE

## 2024-11-18 PROCEDURE — 85025 COMPLETE CBC W/AUTO DIFF WBC: CPT | Performed by: HOSPITALIST

## 2024-11-18 PROCEDURE — 99223 1ST HOSP IP/OBS HIGH 75: CPT | Performed by: PHYSICAL MEDICINE & REHABILITATION

## 2024-11-18 PROCEDURE — 10002803 HB RX 637: Performed by: HOSPITALIST

## 2024-11-18 PROCEDURE — 10006031 HB ROOM CHARGE TELEMETRY

## 2024-11-18 PROCEDURE — G0378 HOSPITAL OBSERVATION PER HR: HCPCS

## 2024-11-18 PROCEDURE — 36415 COLL VENOUS BLD VENIPUNCTURE: CPT | Performed by: HOSPITALIST

## 2024-11-18 PROCEDURE — 97116 GAIT TRAINING THERAPY: CPT

## 2024-11-18 PROCEDURE — 10002803 HB RX 637: Performed by: INTERNAL MEDICINE

## 2024-11-18 PROCEDURE — 10004651 HB RX, NO CHARGE ITEM: Performed by: HOSPITALIST

## 2024-11-18 PROCEDURE — 97530 THERAPEUTIC ACTIVITIES: CPT

## 2024-11-18 RX ADMIN — Medication 400 MG: at 09:30

## 2024-11-18 RX ADMIN — ALLOPURINOL 300 MG: 100 TABLET ORAL at 09:29

## 2024-11-18 RX ADMIN — THIAMINE HYDROCHLORIDE 400 MG: 100 INJECTION, SOLUTION INTRAMUSCULAR; INTRAVENOUS at 20:52

## 2024-11-18 RX ADMIN — SODIUM CHLORIDE, PRESERVATIVE FREE 2 ML: 5 INJECTION INTRAVENOUS at 09:31

## 2024-11-18 RX ADMIN — ATORVASTATIN CALCIUM 10 MG: 10 TABLET, FILM COATED ORAL at 09:28

## 2024-11-18 RX ADMIN — THIAMINE HYDROCHLORIDE 400 MG: 100 INJECTION, SOLUTION INTRAMUSCULAR; INTRAVENOUS at 09:39

## 2024-11-18 RX ADMIN — APIXABAN 5 MG: 5 TABLET, FILM COATED ORAL at 09:30

## 2024-11-18 RX ADMIN — SODIUM CHLORIDE, PRESERVATIVE FREE 2 ML: 5 INJECTION INTRAVENOUS at 20:52

## 2024-11-18 RX ADMIN — MIRTAZAPINE 15 MG: 15 TABLET, FILM COATED ORAL at 20:52

## 2024-11-18 RX ADMIN — TORSEMIDE 10 MG: 10 TABLET ORAL at 09:29

## 2024-11-18 RX ADMIN — LEVOTHYROXINE SODIUM 100 MCG: 0.1 TABLET ORAL at 05:44

## 2024-11-18 RX ADMIN — GABAPENTIN 300 MG: 300 CAPSULE ORAL at 20:52

## 2024-11-18 RX ADMIN — THIAMINE HYDROCHLORIDE 400 MG: 100 INJECTION, SOLUTION INTRAMUSCULAR; INTRAVENOUS at 17:41

## 2024-11-18 RX ADMIN — BUPROPION HYDROCHLORIDE 150 MG: 150 TABLET, EXTENDED RELEASE ORAL at 09:30

## 2024-11-18 RX ADMIN — ASPIRIN 81 MG CHEWABLE TABLET 81 MG: 81 TABLET CHEWABLE at 09:29

## 2024-11-18 RX ADMIN — APIXABAN 5 MG: 5 TABLET, FILM COATED ORAL at 20:52

## 2024-11-18 RX ADMIN — GABAPENTIN 300 MG: 300 CAPSULE ORAL at 09:28

## 2024-11-18 ASSESSMENT — ENCOUNTER SYMPTOMS
SHORTNESS OF BREATH: 0
CHILLS: 0
DIZZINESS: 1
WEAKNESS: 1
FEVER: 0

## 2024-11-18 ASSESSMENT — PAIN SCALES - GENERAL
PAINLEVEL_OUTOF10: 0
PAINLEVEL_OUTOF10: 0

## 2024-11-18 ASSESSMENT — COGNITIVE AND FUNCTIONAL STATUS - GENERAL
BASIC_MOBILITY_CONVERTED_SCORE: 39.67
BASIC_MOBILITY_RAW_SCORE: 17

## 2024-11-19 VITALS
DIASTOLIC BLOOD PRESSURE: 70 MMHG | RESPIRATION RATE: 16 BRPM | HEART RATE: 90 BPM | HEIGHT: 75 IN | WEIGHT: 306.66 LBS | SYSTOLIC BLOOD PRESSURE: 130 MMHG | TEMPERATURE: 98.2 F | BODY MASS INDEX: 38.13 KG/M2 | OXYGEN SATURATION: 94 %

## 2024-11-19 PROCEDURE — 10002800 HB RX 250 W HCPCS: Performed by: PSYCHIATRY & NEUROLOGY

## 2024-11-19 PROCEDURE — 10002803 HB RX 637: Performed by: HOSPITALIST

## 2024-11-19 PROCEDURE — 10004651 HB RX, NO CHARGE ITEM: Performed by: EMERGENCY MEDICINE

## 2024-11-19 PROCEDURE — 10004651 HB RX, NO CHARGE ITEM: Performed by: HOSPITALIST

## 2024-11-19 PROCEDURE — 10002803 HB RX 637: Performed by: INTERNAL MEDICINE

## 2024-11-19 RX ORDER — ASPIRIN 81 MG/1
81 TABLET, CHEWABLE ORAL DAILY
Qty: 30 TABLET | Refills: 0 | Status: SHIPPED | OUTPATIENT
Start: 2024-11-20 | End: 2024-12-20

## 2024-11-19 RX ADMIN — Medication 400 MG: at 08:31

## 2024-11-19 RX ADMIN — SODIUM CHLORIDE, PRESERVATIVE FREE 2 ML: 5 INJECTION INTRAVENOUS at 08:32

## 2024-11-19 RX ADMIN — ATORVASTATIN CALCIUM 10 MG: 10 TABLET, FILM COATED ORAL at 08:31

## 2024-11-19 RX ADMIN — ASPIRIN 81 MG CHEWABLE TABLET 81 MG: 81 TABLET CHEWABLE at 08:30

## 2024-11-19 RX ADMIN — TORSEMIDE 10 MG: 10 TABLET ORAL at 08:30

## 2024-11-19 RX ADMIN — APIXABAN 5 MG: 5 TABLET, FILM COATED ORAL at 08:31

## 2024-11-19 RX ADMIN — ALLOPURINOL 300 MG: 100 TABLET ORAL at 08:31

## 2024-11-19 RX ADMIN — GABAPENTIN 300 MG: 300 CAPSULE ORAL at 08:32

## 2024-11-19 RX ADMIN — SODIUM CHLORIDE, PRESERVATIVE FREE 2 ML: 5 INJECTION INTRAVENOUS at 08:33

## 2024-11-19 RX ADMIN — THIAMINE HYDROCHLORIDE 400 MG: 100 INJECTION, SOLUTION INTRAMUSCULAR; INTRAVENOUS at 08:37

## 2024-11-19 RX ADMIN — BUPROPION HYDROCHLORIDE 150 MG: 150 TABLET, EXTENDED RELEASE ORAL at 08:30

## 2024-11-19 RX ADMIN — LEVOTHYROXINE SODIUM 100 MCG: 0.1 TABLET ORAL at 05:43

## 2024-11-19 ASSESSMENT — ORIENTATION MEMORY CONCENTRATION TEST (OMCT)
SAY THE MONTHS IN REVERSE ORDER STARTING WITH LAST MONTH: CORRECT
COUNT BACKWARDS FROM 20 TO 1: CORRECT
WHAT YEAR IS IT NOW (MUST BE EXACT): CORRECT
OMCT INTERPRETATION: 0-6: NO SIGNIFICANT IMPAIRMENT
OMCT SCORE: 0
REPEAT THE NAME AND ADDRESS I ASKED YOU TO REMEMBER: CORRECT
WHAT MONTH IS IT NOW: CORRECT
WHAT TIME IS IT (NO WATCH OR CLOCK): CORRECT

## 2024-11-19 ASSESSMENT — PAIN SCALES - GENERAL: PAINLEVEL_OUTOF10: 0

## 2024-11-20 ENCOUNTER — APPOINTMENT (OUTPATIENT)
Dept: PHYSICAL MEDICINE AND REHAB | Age: 64
End: 2024-11-20

## 2024-11-22 ENCOUNTER — APPOINTMENT (OUTPATIENT)
Dept: PHYSICAL MEDICINE AND REHAB | Age: 64
End: 2024-11-22

## 2024-11-25 ENCOUNTER — APPOINTMENT (OUTPATIENT)
Dept: PHYSICAL MEDICINE AND REHAB | Age: 64
End: 2024-11-25

## 2024-12-14 ENCOUNTER — LAB ENCOUNTER (OUTPATIENT)
Dept: LAB | Age: 64
End: 2024-12-14
Attending: ORTHOPAEDIC SURGERY
Payer: MEDICARE

## 2024-12-14 DIAGNOSIS — Z01.818 PREOP TESTING: ICD-10-CM

## 2024-12-14 PROCEDURE — 87641 MR-STAPH DNA AMP PROBE: CPT

## 2024-12-15 LAB — MRSA DNA SPEC QL NAA+PROBE: NEGATIVE

## 2025-01-02 ENCOUNTER — TELEPHONE (OUTPATIENT)
Dept: BARIATRICS/WEIGHT MGMT | Age: 65
End: 2025-01-02

## 2025-01-21 ENCOUNTER — APPOINTMENT (OUTPATIENT)
Dept: BARIATRICS/WEIGHT MGMT | Age: 65
End: 2025-01-21

## 2025-01-28 ENCOUNTER — APPOINTMENT (OUTPATIENT)
Dept: BARIATRICS/WEIGHT MGMT | Age: 65
End: 2025-01-28

## 2025-01-28 VITALS
WEIGHT: 297.5 LBS | BODY MASS INDEX: 36.99 KG/M2 | HEART RATE: 84 BPM | TEMPERATURE: 97.5 F | OXYGEN SATURATION: 96 % | SYSTOLIC BLOOD PRESSURE: 131 MMHG | HEIGHT: 75 IN | DIASTOLIC BLOOD PRESSURE: 89 MMHG

## 2025-01-28 DIAGNOSIS — I63.9 CEREBELLAR STROKE  (CMD): ICD-10-CM

## 2025-01-28 DIAGNOSIS — I50.32 CHRONIC HEART FAILURE WITH PRESERVED EJECTION FRACTION  (CMD): ICD-10-CM

## 2025-01-28 DIAGNOSIS — E66.813 CLASS 3 OBESITY: ICD-10-CM

## 2025-01-28 DIAGNOSIS — I10 ESSENTIAL (PRIMARY) HYPERTENSION: ICD-10-CM

## 2025-01-28 DIAGNOSIS — E03.9 ACQUIRED HYPOTHYROIDISM: ICD-10-CM

## 2025-01-28 DIAGNOSIS — Z98.84 HX OF GASTRIC BYPASS: Primary | ICD-10-CM

## 2025-01-28 RX ORDER — TOPIRAMATE 25 MG/1
TABLET, FILM COATED ORAL
Qty: 60 TABLET | Refills: 0 | Status: SHIPPED | OUTPATIENT
Start: 2025-01-28

## 2025-01-29 PROBLEM — E03.9 ACQUIRED HYPOTHYROIDISM: Status: ACTIVE | Noted: 2025-01-29

## 2025-01-29 PROBLEM — Z98.84 HX OF GASTRIC BYPASS: Status: ACTIVE | Noted: 2025-01-29

## 2025-01-29 PROBLEM — E66.813 CLASS 3 OBESITY: Status: ACTIVE | Noted: 2025-01-29

## 2025-02-10 ENCOUNTER — APPOINTMENT (OUTPATIENT)
Dept: BARIATRICS/WEIGHT MGMT | Age: 65
End: 2025-02-10

## 2025-02-10 ENCOUNTER — TELEPHONE (OUTPATIENT)
Dept: BARIATRICS/WEIGHT MGMT | Age: 65
End: 2025-02-10

## 2025-02-10 VITALS
OXYGEN SATURATION: 97 % | DIASTOLIC BLOOD PRESSURE: 71 MMHG | HEART RATE: 81 BPM | SYSTOLIC BLOOD PRESSURE: 120 MMHG | BODY MASS INDEX: 36.99 KG/M2 | WEIGHT: 297.5 LBS | TEMPERATURE: 97.5 F | HEIGHT: 75 IN

## 2025-02-10 DIAGNOSIS — R63.5 ABNORMAL WEIGHT GAIN: Primary | ICD-10-CM

## 2025-02-10 PROCEDURE — 94690 O2 UPTK REST INDIRECT: CPT

## 2025-03-19 ENCOUNTER — APPOINTMENT (OUTPATIENT)
Dept: BARIATRICS/WEIGHT MGMT | Age: 65
End: 2025-03-19

## 2025-04-28 ENCOUNTER — LAB ENCOUNTER (OUTPATIENT)
Dept: LAB | Facility: HOSPITAL | Age: 65
End: 2025-04-28
Attending: ORTHOPAEDIC SURGERY
Payer: MEDICARE

## 2025-04-28 DIAGNOSIS — Z01.818 PREOP TESTING: ICD-10-CM

## 2025-04-28 LAB
MRSA DNA SPEC QL NAA+PROBE: NEGATIVE
Q-T INTERVAL: 374 MS
QRS DURATION: 82 MS
QTC CALCULATION (BEZET): 409 MS
R AXIS: 78 DEGREES
T AXIS: 10 DEGREES
VENTRICULAR RATE: 72 BPM

## 2025-04-28 PROCEDURE — 86901 BLOOD TYPING SEROLOGIC RH(D): CPT | Performed by: ORTHOPAEDIC SURGERY

## 2025-04-28 PROCEDURE — 87641 MR-STAPH DNA AMP PROBE: CPT

## 2025-04-28 PROCEDURE — 93005 ELECTROCARDIOGRAM TRACING: CPT

## 2025-04-28 PROCEDURE — 86900 BLOOD TYPING SEROLOGIC ABO: CPT | Performed by: ORTHOPAEDIC SURGERY

## 2025-04-28 PROCEDURE — 93010 ELECTROCARDIOGRAM REPORT: CPT | Performed by: INTERNAL MEDICINE

## 2025-04-28 PROCEDURE — 86850 RBC ANTIBODY SCREEN: CPT | Performed by: ORTHOPAEDIC SURGERY

## 2025-04-28 PROCEDURE — 36415 COLL VENOUS BLD VENIPUNCTURE: CPT | Performed by: ORTHOPAEDIC SURGERY

## 2025-05-01 ENCOUNTER — OFFICE VISIT (OUTPATIENT)
Dept: WOUND CARE | Facility: HOSPITAL | Age: 65
End: 2025-05-01
Attending: FAMILY MEDICINE
Payer: MEDICARE

## 2025-05-01 VITALS
HEART RATE: 72 BPM | SYSTOLIC BLOOD PRESSURE: 121 MMHG | HEIGHT: 75 IN | RESPIRATION RATE: 16 BRPM | BODY MASS INDEX: 37.3 KG/M2 | DIASTOLIC BLOOD PRESSURE: 72 MMHG | WEIGHT: 300 LBS | TEMPERATURE: 97 F

## 2025-05-01 DIAGNOSIS — I87.333 CHRONIC VENOUS HYPERTENSION (IDIOPATHIC) WITH ULCER AND INFLAMMATION OF BILATERAL LOWER EXTREMITY (HCC): Primary | ICD-10-CM

## 2025-05-01 DIAGNOSIS — I48.0 PAROXYSMAL ATRIAL FIBRILLATION (HCC): ICD-10-CM

## 2025-05-01 DIAGNOSIS — I10 PRIMARY HYPERTENSION: ICD-10-CM

## 2025-05-01 DIAGNOSIS — Z89.412 HISTORY OF PARTIAL RAY AMPUTATION OF LEFT GREAT TOE (HCC): Chronic | ICD-10-CM

## 2025-05-01 RX ORDER — MULTIVITAMIN
1 TABLET ORAL DAILY
COMMUNITY

## 2025-05-01 NOTE — PROGRESS NOTES
Weekly Wound Education Note    Teaching Provided To: Patient  Training Topics: Cleasing and general instructions, Discharge instructions, Dressing  Training Method: Demonstration, Explain/Verbal  Training Response: Reinforcement needed        Notes: Patient is here for an initial consult to right lateral lower leg wound. Pt states that he has been getting small blisters on his legs that pop and turn into wounds for the past 3-4 years. He denies any pain to the wound. Provider requesting staff to open left lower leg in the computer system to monitor area. Provider recommending folded xeroform to both wounds covered with 4x4 bordered gauze dressing. Applied BLE spandagrip (E). RN to place order for compression garments. Pt to follow up on 5/13/25.

## 2025-05-01 NOTE — PATIENT INSTRUCTIONS
PATIENT INSTRUCTIONS      FOR Reynaldo Meyer Damienbob , ELEAZAR 1960    DATE OF SERVICE: 2025    Apply a piece of Xeroform gauze to the wounds on both the right and left leg    Cover this with border gauze bandage or similar bandage    Change on a daily basis    Wear the spandagrip stocking on both legs all day every day and you may remove at bedtime when legs are elevated    Elevate the legs whenever possible      Control of leg swelling is very important to prevent new wounds from developing    We will order compression garments for you that you can bring to the clinic at your next visit      Follow up with Dr. Erazo       MISCELLANEOUS INSTRUCTIONS  Supplement with a daily multivitamin   Low salt diet  Intense blood sugar control - Goal Blood sugar below 180 at all times recommended.  Increase protein intake / consider protein supplements - see below  Elevate extremities at all times when sitting / laying down.  No tobacco use     DIETARY MODIFICATIONS TO HELP WITH WOUND HEALING:          Please follow any restrictions to diet given by your other doctors     Protein: meats, beans, eggs, milk and yogurt particularly Greek yogurt), tofu, soy nuts, soy protein products     Vitamin C: citrus fruits and juices, strawberries, tomatoes, tomato juice, peppers, baked potatoes, spinach, broccoli, cauliflower, [place] sprouts, cabbage     Vitamin A: dark greens, leafy vegetables, orange or yellow vegetables, cantaloupe, fortified dairy products, liver, fortified cereals     Zinc: fortified cereals, red meats, seafood     Consider Devon by [employer] (These are essential branch chain amino acids that help with tissue building and wound healing) and take 2 packets/day. You can order online at [employer] or [employer].      Available at hospital pharmacy as well.      ADDITIONAL REMINDERS:     The treatment plan has been discussed at length with you and your provider. Follow all instructions carefully, it is very  important. If you do not follow all instructions, you are at risk of your wound not healing, infection, possible loss of limb and even loss of life.  Please call the clinic at [phone] during regular business hours ( 7:30 AM - 5:30 PM) if you notice increased redness, warmth, pain or pus like drainage or start running a fever greater than 100.3.    For after hour emergencies, please call your primary physician or go to the nearest emergency room.  If your blood pressure is elevated, follow up with your primary care doctor and/or cardiologist as soon as possible.

## 2025-05-02 NOTE — PROGRESS NOTES
Chief Complaint   Patient presents with    Wound Care     Patient is here for an initial consult. He presents with wounds on his right lateral lower leg. He states that he has been getting small blisters on his legs that pop and turn into wounds for the past 3-4 years. He denies any pain to the wound.       HPI:     64-year-old new patient here for evaluation of wounds on the right anterior leg which started out as small blisters and then they pop and sometimes they lead to an open wound.  He gets this on the left leg as well and he has had this off and on for the past 3 to 4 years.  He is not sure why he gets these.  He does not wear any compression.  Past medical history significant for atrial fibrillation, type 2 diabetes, obesity, hypertension, CHF.    Lab Results   Component Value Date    BUN 18 08/25/2022    CREATSERUM 1.34 (H) 08/25/2022    ALB 2.6 (L) 08/23/2022    TP 6.5 08/23/2022    A1C 4.5 10/28/2020       Medications - Current[1]    Allergies[2]         REVIEW OF SYSTEMS:     Review of Systems (ROS)  This information was obtained from the patient, chart    A comprehensive 10 point review of systems was completed.  Pertinent positives and negatives noted in the the HPI.     Past medical, surgical, family and social history updated where appropriate.    PHYSICAL EXAM:   /72   Pulse 72   Temp 97.3 °F (36.3 °C)   Resp 16   Ht 75\"   Wt 300 lb (136.1 kg)   BMI 37.50 kg/m²    Estimated body mass index is 37.5 kg/m² as calculated from the following:    Height as of this encounter: 75\".    Weight as of this encounter: 300 lb (136.1 kg).   Vital signs reviewed.Appears stated age, well groomed.        Calf  Point of Measurement - Left Calf: 38  Point of Measurement - Right Calf: 38  Left Calf from:: Heel  Calf Left cm:: 40  Right Calf from:: Heel  Right Calf cm:: 41    Ankle  Point of Measurement - Left Ankle: 13  Point of Measurement - Right Ankle: 13  Left Ankle from:: Heel  Left Ankle cm:: 24      Right Ankle from:: Heel  Right Ankle cm:: 24.5       Foot           Left Heel to Knee: 46           Right Heel to Knee: 47       Wound 05/01/25 #1 Right lateral lower leg Leg Right;Lateral (Active)   Date First Assessed/Time First Assessed: 05/01/25 1327    Wound Number (Wound Clinic Only): #1 Right lateral lower leg  Primary Wound Type: Venous Ulcer  Location: Leg  Wound Location Orientation: Right;Lateral      Assessments 5/1/2025  1:27 PM   Wound Image     Drainage Amount Unable to assess   Treatments Compression (spandagrip (E))   Wound Length (cm) 3.9 cm   Wound Width (cm) 1 cm   Wound Surface Area (cm^2) 3.06 cm^2   Wound Depth (cm) 0.1 cm   Wound Volume (cm^3) 0.204 cm^3   Margins Well-defined edges   Non-staged Wound Description Full thickness   Juliette-wound Assessment Edema;Dry;Hemosiderin staining   Wound Granulation Tissue Firm;Pink   Wound Bed Granulation (%) 80 %   Wound Bed Epithelium (%) 20 %   Wound Odor None   Tunneling? No   Undermining? No   Sinus Tracts? No       No associated orders.       Wound 05/01/25 #2 Left Lower Anterior Leg Leg Left (Active)   Date First Assessed/Time First Assessed: 05/01/25 1357    Wound Number (Wound Clinic Only): #2 Left Lower Anterior Leg  Primary Wound Type: Venous Ulcer  Location: Leg  Wound Location Orientation: Left      Assessments 5/1/2025  1:58 PM   Wound Image     Drainage Amount None   Treatments Compression (spandagrip (e))   Wound Length (cm) 0 cm   Wound Width (cm) 0 cm   Wound Surface Area (cm^2) 0 cm^2   Wound Depth (cm) 0 cm   Wound Volume (cm^3) 0 cm^3   Margins Flat and Intact   Juliette-wound Assessment Edema   Wound Bed Epithelium (%) 100 %   Wound Odor None       No associated orders.              ASSESSMENT AND PLAN:      1. Chronic venous hypertension (idiopathic) with ulcer and inflammation of bilateral lower extremity (HCC)    2. Paroxysmal atrial fibrillation (HCC)    3. 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 Primary  hypertension    I had a long discussion with patient regarding the nature of his wounds and likely related to swelling of the legs or edema that is likely due to underlying venous insufficiency.  I would recommend that he see a vein specialist.  Also compression is highly recommended.  Patient does have history of congestive heart failure and compression can worsen congestive heart failure and this was discussed with patient.  We did discuss compression wrap precautions at great length.  His wounds are very superficial and minor at this point and I would recommend using Xeroform gauze to the wounds and border gauze bandage or similar bandage.  Change on daily basis.  For now we will apply spandagrip single-layer to both legs.  He does have biphasic Doppler pulses posterior tibial and dorsalis pedis bilateral.  Blood sugar has been under control and CHF is currently stable.      Risks, benefits, and alternatives of current treatment plan discussed in detail.  Questions and concerns addressed. Red flags to RTC or ED reviewed.  Patient (or parent) agrees to plan.      Return in about 12 days (around 2025).    Also refer to patient instructions.     I spent a total of 60 minutes with this patient's care.  This time included preparing to see the patient (eg, review notes and recent diagnostics), seeing the patient, performing a medically appropriate examination and/or evaluation, counseling and educating the patient, and documenting in the record.          Rashid Erazo M.D.   [facility]   2025    Patient Instructions       PATIENT INSTRUCTIONS      FOR Reynaldo Huffman , ELEAZAR 1960    DATE OF SERVICE: 2025    Apply a piece of Xeroform gauze to the wounds on both the right and left leg    Cover this with border gauze bandage or similar bandage    Change on a daily basis    Wear the spandagrip stocking on both legs all day every day and you may remove at bedtime when legs are  elevated    Elevate the legs whenever possible      Control of leg swelling is very important to prevent new wounds from developing    We will order compression garments for you that you can bring to the clinic at your next visit      Follow up with Dr. Erazo 5/13, Tuesday      MISCELLANEOUS INSTRUCTIONS  Supplement with a daily multivitamin   Low salt diet  Intense blood sugar control - Goal Blood sugar below 180 at all times recommended.  Increase protein intake / consider protein supplements - see below  Elevate extremities at all times when sitting / laying down.  No tobacco use     DIETARY MODIFICATIONS TO HELP WITH WOUND HEALING:          Please follow any restrictions to diet given by your other doctors     Protein: meats, beans, eggs, milk and yogurt particularly Greek yogurt), tofu, soy nuts, soy protein products     Vitamin C: citrus fruits and juices, strawberries, tomatoes, tomato juice, peppers, baked potatoes, spinach, broccoli, cauliflower, [place] sprouts, cabbage     Vitamin A: dark greens, leafy vegetables, orange or yellow vegetables, cantaloupe, fortified dairy products, liver, fortified cereals     Zinc: fortified cereals, red meats, seafood     Consider Devon by [employer] (These are essential branch chain amino acids that help with tissue building and wound healing) and take 2 packets/day. You can order online at [employer] or [employer].      Available at hospital pharmacy as well.      ADDITIONAL REMINDERS:     The treatment plan has been discussed at length with you and your provider. Follow all instructions carefully, it is very important. If you do not follow all instructions, you are at risk of your wound not healing, infection, possible loss of limb and even loss of life.  Please call the clinic at [phone] during regular business hours ( 7:30 AM - 5:30 PM) if you notice increased redness, warmth, pain or pus like drainage or start running a fever greater than 100.3.    For after hour  emergencies, please call your primary physician or go to the nearest emergency room.  If your blood pressure is elevated, follow up with your primary care doctor and/or cardiologist as soon as possible.                   MISCELLANEOUS INSTRUCTIONS  Supplement with a daily multivitamin   Low salt diet  Intense blood sugar control - Goal Blood sugar below 180 at all times recommended.  Increase protein intake / consider protein supplements - see below  Elevate extremities at all times when sitting / laying down.  No tobacco use     DIETARY MODIFICATIONS TO HELP WITH WOUND HEALING:          Please follow any restrictions to diet given by your other doctors     Protein: meats, beans, eggs, milk and yogurt particularly Greek yogurt), tofu, soy nuts, soy protein products     Vitamin C: citrus fruits and juices, strawberries, tomatoes, tomato juice, peppers, baked potatoes, spinach, broccoli, cauliflower, [place] sprouts, cabbage     Vitamin A: dark greens, leafy vegetables, orange or yellow vegetables, cantaloupe, fortified dairy products, liver, fortified cereals     Zinc: fortified cereals, red meats, seafood     Consider Devon by [employer] (These are essential branch chain amino acids that help with tissue building and wound healing) and take 2 packets/day. You can order online at [employer] or [employer]     ADDITIONAL REMINDERS:     The treatment plan has been discussed at length with you and your provider. Follow all instructions carefully, it is very important. If you do not follow all instructions, you are at risk of your wound not healing, infection, possible loss of limb and even loss of life.  Please call the clinic at [phone] during regular business hours ( 7:30 AM - 5:30 PM) if you notice increased redness, warmth, pain or pus like drainage or start running a fever greater than 100.3.    For after hour emergencies, please call your primary physician or go to the nearest emergency room.  If your blood pressure  is elevated, follow up with your primary care doctor and/or cardiologist as soon as possible.     FOR LEG SWELLING,  LOWER EXTREMITY WOUNDS AND IF USING COMPRESSION:   Managing your edema:    Avoid prolonged standing in one place. It is better to have your calf muscles moving to pump fluid out of the legs.  Elevate leg(s) above the level of the heart when sitting or as much as possible.  Take you diuretics as directed by your physician. Do not skip doses or change doses unless instructed to do so by your physician.  Decrease salt intake, follow recommended 2 grams daily.  Do not get leg(s) with compression wrap wet. If wraps are too tight as indicated by pain, numbness/tingling or discoloration of toes remove wrap completely and call the wound center @ [phone].       The treatment plan has been discussed at length between you and your provider. Follow all instructions carefully, it is very important. If you do not follow all instructions you are at risk of your wound not healing, infection, possible loss of limb and even loss of life.    COMPRESSION WRAP PATIENT CARE INSTRUCTIONS  DO NOT get compression wrap wet. When showering, use a shower boot.   Please contact wound clinic and if not able to reach, then go to emergency room if ANY of the following occur:   Tingling or numbness in the foot or toes on leg with compression wrap.  Severe pain that cannot be relieved with elevation and any medication instructed per your doctor.  A fever of 100.4°F (38°C) or higher.  Swelling, coldness, or blue-gray discoloration of the toes.  If the compression wrap feels too tight or too loose.  If the compression wrap is damaged or has rough edges that hurt.  If the compression wrap gets wet, you must cut wrap off.   Drainage from compression wrap that smells different than usual.                        [1]   Current Outpatient Medications:     Multiple Vitamin (ONE-DAILY MULTI VITAMINS) Oral Tab, Take 1 tablet by mouth  daily., Disp: , Rfl:     allopurinol 300 MG Oral Tab, Take 1 tablet (300 mg total) by mouth daily., Disp: , Rfl:     Magnesium 200 MG Oral Tab, Take 2 tablets (400 mg total) by mouth daily., Disp: , Rfl:     Ferrous Sulfate 325 (65 Fe) MG Oral Tab, Take 1 tablet (325 mg total) by mouth daily., Disp: , Rfl:     digoxin 0.25 MG Oral Tab, Take 0.25 mg by mouth daily., Disp: , Rfl:     atorvastatin 10 MG Oral Tab, Take 1 tablet (10 mg total) by mouth nightly., Disp: , Rfl:     ELIQUIS 5 MG Oral Tab, Take 1 tablet (5 mg total) by mouth in the morning and 1 tablet (5 mg total) before bedtime., Disp: , Rfl:     torsemide 20 MG Oral Tab, torsemide 20 mg tablet  take one qd if weight above 293 lbs, Disp: , Rfl:     acetaminophen 500 MG Oral Tab, Take 1-2 tablets (500-1,000 mg total) by mouth every 6 (six) hours as needed for Pain., Disp: 90 tablet, Rfl: 0    buPROPion HCl ER, XL, 300 MG Oral Tablet 24 Hr, Take 1 tablet (300 mg total) by mouth every morning., Disp: , Rfl:     Levothyroxine Sodium 50 MCG Oral Tab, Take 2 tablets (100 mcg total) by mouth before breakfast., Disp: , Rfl:     Cholecalciferol 50 MCG (2000 UT) Oral Tab, Take 50 mcg by mouth daily. (Patient not taking: Reported on 5/1/2025), Disp: , Rfl:   [2]   Allergies  Allergen Reactions    Ancef [Cefazolin] RENAL INSUFFICIENCY     Interstitial nephritis    Vancomycin HYPOTENSION     Per patient, he tolerated last treatment with Vancomycin.    Tigecycline FACE FLUSHING    Naproxen UNKNOWN     Patient cannot take d/t kidney dse.     Clindamycin RASH    Daptomycin RASH     Tolerating course of dapto in 8/2022

## 2025-05-07 RX ORDER — SILDENAFIL 100 MG/1
100 TABLET, FILM COATED ORAL
COMMUNITY

## 2025-05-07 RX ORDER — FENOFIBRATE 160 MG/1
160 TABLET ORAL DAILY
COMMUNITY
Start: 2025-04-24

## 2025-05-12 RX ORDER — GABAPENTIN 100 MG/1
300 CAPSULE ORAL 2 TIMES DAILY
COMMUNITY
Start: 2023-10-13

## 2025-05-12 RX ORDER — METOPROLOL SUCCINATE 50 MG/1
50 TABLET, EXTENDED RELEASE ORAL DAILY
COMMUNITY

## 2025-05-12 RX ORDER — CHLORAL HYDRATE 500 MG
2400 CAPSULE ORAL DAILY
COMMUNITY

## 2025-05-12 RX ORDER — TORSEMIDE 10 MG/1
10 TABLET ORAL DAILY
COMMUNITY

## 2025-05-12 RX ORDER — BUPROPION HYDROCHLORIDE 150 MG/1
150 TABLET, EXTENDED RELEASE ORAL DAILY
COMMUNITY

## 2025-05-12 RX ORDER — VALSARTAN 80 MG/1
80 TABLET ORAL DAILY
COMMUNITY
Start: 2024-12-03

## 2025-05-12 RX ORDER — ENOXAPARIN SODIUM 150 MG/ML
1.5 INJECTION SUBCUTANEOUS EVERY 12 HOURS
COMMUNITY
End: 2025-05-22

## 2025-05-12 RX ORDER — MIRTAZAPINE 15 MG/1
15 TABLET, FILM COATED ORAL NIGHTLY
COMMUNITY
Start: 2023-06-08

## 2025-05-13 ENCOUNTER — APPOINTMENT (OUTPATIENT)
Dept: WOUND CARE | Facility: HOSPITAL | Age: 65
End: 2025-05-13
Attending: FAMILY MEDICINE
Payer: MEDICARE

## 2025-05-13 VITALS
HEART RATE: 82 BPM | DIASTOLIC BLOOD PRESSURE: 59 MMHG | RESPIRATION RATE: 16 BRPM | SYSTOLIC BLOOD PRESSURE: 109 MMHG | TEMPERATURE: 98 F

## 2025-05-13 DIAGNOSIS — Z89.412 HISTORY OF PARTIAL RAY AMPUTATION OF LEFT GREAT TOE (HCC): ICD-10-CM

## 2025-05-13 DIAGNOSIS — I10 PRIMARY HYPERTENSION: ICD-10-CM

## 2025-05-13 DIAGNOSIS — I48.0 PAROXYSMAL ATRIAL FIBRILLATION (HCC): ICD-10-CM

## 2025-05-13 DIAGNOSIS — I87.333 CHRONIC VENOUS HYPERTENSION (IDIOPATHIC) WITH ULCER AND INFLAMMATION OF BILATERAL LOWER EXTREMITY (HCC): Primary | ICD-10-CM

## 2025-05-13 PROCEDURE — 99215 OFFICE O/P EST HI 40 MIN: CPT | Performed by: FAMILY MEDICINE

## 2025-05-13 NOTE — PROGRESS NOTES
Chief Complaint   Patient presents with    Wound Care     Follow-up for wounds to BLE, arrives wearing spandagrips to both legs. Denies pain or concerns at this time.       HPI:     Patient here for follow-up of bilateral lower extremity wounds.  He states he bumped the front part of his left leg and had a superficial wound on that leg.  There was a previous area of wound which was almost healed.    Lab Results   Component Value Date    BUN 18 08/25/2022    CREATSERUM 1.34 (H) 08/25/2022    ALB 2.6 (L) 08/23/2022    TP 6.5 08/23/2022    A1C 4.5 10/28/2020       Medications - Current[1]    Allergies[2]         REVIEW OF SYSTEMS:     Review of Systems (ROS)  This information was obtained from the patient, chart    A comprehensive 10 point review of systems was completed.  Pertinent positives and negatives noted in the the HPI.     Past medical, surgical, family and social history updated where appropriate.    PHYSICAL EXAM:   /59   Pulse 82   Temp 98.4 °F (36.9 °C)   Resp 16    Estimated body mass index is 37.5 kg/m² as calculated from the following:    Height as of 5/12/25: 75\".    Weight as of 5/12/25: 300 lb (136.1 kg).   Vital signs reviewed.Appears stated age, well groomed.        Calf  Point of Measurement - Left Calf: 38  Point of Measurement - Right Calf: 38  Left Calf from:: Heel  Calf Left cm:: 41  Right Calf from:: Heel  Right Calf cm:: 42    Ankle  Point of Measurement - Left Ankle: 13  Point of Measurement - Right Ankle: 13  Left Ankle from:: Heel  Left Ankle cm:: 24.8     Right Ankle from:: Heel  Right Ankle cm:: 24.5       Foot                               Wound 05/01/25 #1 Right lateral lower leg Leg Right;Lateral (Active)   Date First Assessed/Time First Assessed: 05/01/25 1327    Wound Number (Wound Clinic Only): #1 Right lateral lower leg  Primary Wound Type: Venous Ulcer  Location: Leg  Wound Location Orientation: Right;Lateral      Assessments 5/1/2025  1:27 PM 5/13/2025  1:32 PM   Wound  Image        Drainage Amount Unable to assess Unable to assess   Treatments Compression (spandagrip (E)) --   Wound Length (cm) 3.9 cm 1.2 cm   Wound Width (cm) 1 cm 1.5 cm   Wound Surface Area (cm^2) 3.06 cm^2 1.41 cm^2   Wound Depth (cm) 0.1 cm 0.1 cm   Wound Volume (cm^3) 0.204 cm^3 0.094 cm^3   Wound Healing % -- 54   Margins Well-defined edges Well-defined edges   Non-staged Wound Description Full thickness Full thickness   Juliette-wound Assessment Edema;Dry;Hemosiderin staining Edema;Dry;Hemosiderin staining;Blanchable erythema   Wound Granulation Tissue Firm;Pink Firm;Pink;Red   Wound Bed Granulation (%) 80 % 100 %   Wound Bed Epithelium (%) 20 % --   Wound Odor None None   Tunneling? No No   Undermining? No No   Sinus Tracts? No No       No associated orders.       Wound 05/01/25 #2 Left Lower Anterior Leg Leg Left (Active)   Date First Assessed/Time First Assessed: 05/01/25 1357    Wound Number (Wound Clinic Only): #2 Left Lower Anterior Leg  Primary Wound Type: Venous Ulcer  Location: Leg  Wound Location Orientation: Left      Assessments 5/1/2025  1:58 PM 5/13/2025  1:31 PM   Wound Image       Drainage Amount None Unable to assess   Treatments Compression (spandagrip (e)) --   Wound Length (cm) 0 cm 1.7 cm   Wound Width (cm) 0 cm 1.8 cm   Wound Surface Area (cm^2) 0 cm^2 2.4 cm^2   Wound Depth (cm) 0 cm 0.1 cm   Wound Volume (cm^3) 0 cm^3 0.16 cm^3   Margins Flat and Intact Well-defined edges   Non-staged Wound Description -- Full thickness   Juliette-wound Assessment Edema Edema;Hemosiderin staining;Blanchable erythema;Dry   Wound Granulation Tissue -- Pink;Red;Firm   Wound Bed Granulation (%) -- 5 %   Wound Bed Epithelium (%) 100 % --   Wound Odor None None   Shape -- 95% scabbed   Tunneling? -- No   Undermining? -- No   Sinus Tracts? -- No       No associated orders.              ASSESSMENT AND PLAN:      1. Chronic venous hypertension (idiopathic) with ulcer and inflammation of bilateral lower extremity  (HCC)    2. Paroxysmal atrial fibrillation (HCC)    3. 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 Primary hypertension    Patient did receive his compression garments and I would recommend that he use Xeroform gauze to the wound on both the right and left legs and cover with a border gauze dressing or similar bandage.  Change on daily basis.  Wear spandagrip to both legs.  Elevate the leg whenever possible.  Patient states that he is going to be having cervical at neck surgery and he will be able to come back for quite some time in the wound clinic.  He states he knows how to take care of the wounds and is willing to do the dressing until the wounds are healed.  Patient told that he should use the dressings on a daily basis until the wounds are healed and if they do not then return to wound clinic.  Patient agrees with management and plan.      Risks, benefits, and alternatives of current treatment plan discussed in detail.  Questions and concerns addressed. Red flags to RTC or ED reviewed.  Patient (or parent) agrees to plan.      No follow-ups on file.    Also refer to patient instructions.     I spent a total of 40 minutes with this patient's care.  This time included preparing to see the patient (eg, review notes and recent diagnostics), seeing the patient, performing a medically appropriate examination and/or evaluation, counseling and educating the patient, and documenting in the record.          Rashid Erazo M.D.   [facility]   2025    Patient Instructions       PATIENT INSTRUCTIONS      FOR Reynaldo Huffman ,  1960    DATE OF SERVICE: 2025    Apply a piece of Xeroform gauze to the wounds on both the right and left leg    Cover this with border gauze bandage or similar bandage    Change on a daily basis    Wear the spandagrip stocking on both legs all day every day and you may remove at bedtime when legs are elevated    Elevate the legs  whenever possible      Control of leg swelling is very important to prevent new wounds from developing    We will order compression garments for you that you can bring to the clinic at your next visit      Follow up as needed      MISCELLANEOUS INSTRUCTIONS  Supplement with a daily multivitamin   Low salt diet  Intense blood sugar control - Goal Blood sugar below 180 at all times recommended.  Increase protein intake / consider protein supplements - see below  Elevate extremities at all times when sitting / laying down.  No tobacco use     DIETARY MODIFICATIONS TO HELP WITH WOUND HEALING:          Please follow any restrictions to diet given by your other doctors     Protein: meats, beans, eggs, milk and yogurt particularly Greek yogurt), tofu, soy nuts, soy protein products     Vitamin C: citrus fruits and juices, strawberries, tomatoes, tomato juice, peppers, baked potatoes, spinach, broccoli, cauliflower, [place] sprouts, cabbage     Vitamin A: dark greens, leafy vegetables, orange or yellow vegetables, cantaloupe, fortified dairy products, liver, fortified cereals     Zinc: fortified cereals, red meats, seafood     Consider Devon by [employer] (These are essential branch chain amino acids that help with tissue building and wound healing) and take 2 packets/day. You can order online at [employer] or [employer].      Available at hospital pharmacy as well.      ADDITIONAL REMINDERS:     The treatment plan has been discussed at length with you and your provider. Follow all instructions carefully, it is very important. If you do not follow all instructions, you are at risk of your wound not healing, infection, possible loss of limb and even loss of life.  Please call the clinic at [phone] during regular business hours ( 7:30 AM - 5:30 PM) if you notice increased redness, warmth, pain or pus like drainage or start running a fever greater than 100.3.    For after hour emergencies, please call your primary physician or  go to the nearest emergency room.  If your blood pressure is elevated, follow up with your primary care doctor and/or cardiologist as soon as possible.                 MISCELLANEOUS INSTRUCTIONS  Supplement with a daily multivitamin   Low salt diet  Intense blood sugar control - Goal Blood sugar below 180 at all times recommended.  Increase protein intake / consider protein supplements - see below  Elevate extremities at all times when sitting / laying down.  No tobacco use     DIETARY MODIFICATIONS TO HELP WITH WOUND HEALING:          Please follow any restrictions to diet given by your other doctors     Protein: meats, beans, eggs, milk and yogurt particularly Greek yogurt), tofu, soy nuts, soy protein products     Vitamin C: citrus fruits and juices, strawberries, tomatoes, tomato juice, peppers, baked potatoes, spinach, broccoli, cauliflower, [place] sprouts, cabbage     Vitamin A: dark greens, leafy vegetables, orange or yellow vegetables, cantaloupe, fortified dairy products, liver, fortified cereals     Zinc: fortified cereals, red meats, seafood     Consider Devon by [employer] (These are essential branch chain amino acids that help with tissue building and wound healing) and take 2 packets/day. You can order online at [employer] or [employer]     ADDITIONAL REMINDERS:     The treatment plan has been discussed at length with you and your provider. Follow all instructions carefully, it is very important. If you do not follow all instructions, you are at risk of your wound not healing, infection, possible loss of limb and even loss of life.  Please call the clinic at [phone] during regular business hours ( 7:30 AM - 5:30 PM) if you notice increased redness, warmth, pain or pus like drainage or start running a fever greater than 100.3.    For after hour emergencies, please call your primary physician or go to the nearest emergency room.  If your blood pressure is elevated, follow up with your primary care doctor  and/or cardiologist as soon as possible.     FOR LEG SWELLING,  LOWER EXTREMITY WOUNDS AND IF USING COMPRESSION:   Managing your edema:    Avoid prolonged standing in one place. It is better to have your calf muscles moving to pump fluid out of the legs.  Elevate leg(s) above the level of the heart when sitting or as much as possible.  Take you diuretics as directed by your physician. Do not skip doses or change doses unless instructed to do so by your physician.  Decrease salt intake, follow recommended 2 grams daily.  Do not get leg(s) with compression wrap wet. If wraps are too tight as indicated by pain, numbness/tingling or discoloration of toes remove wrap completely and call the wound center @ [phone].       The treatment plan has been discussed at length between you and your provider. Follow all instructions carefully, it is very important. If you do not follow all instructions you are at risk of your wound not healing, infection, possible loss of limb and even loss of life.    COMPRESSION WRAP PATIENT CARE INSTRUCTIONS  DO NOT get compression wrap wet. When showering, use a shower boot.   Please contact wound clinic and if not able to reach, then go to emergency room if ANY of the following occur:   Tingling or numbness in the foot or toes on leg with compression wrap.  Severe pain that cannot be relieved with elevation and any medication instructed per your doctor.  A fever of 100.4°F (38°C) or higher.  Swelling, coldness, or blue-gray discoloration of the toes.  If the compression wrap feels too tight or too loose.  If the compression wrap is damaged or has rough edges that hurt.  If the compression wrap gets wet, you must cut wrap off.   Drainage from compression wrap that smells different than usual.                        [1]   Current Outpatient Medications:     valsartan 80 MG Oral Tab, Take 2 tablets (160 mg total) by mouth daily., Disp: , Rfl:     gabapentin 100 MG Oral Cap, Take 3 capsules  (300 mg total) by mouth 2 (two) times daily., Disp: , Rfl:     metoprolol succinate ER 50 MG Oral Tablet 24 Hr, Take 1 tablet (50 mg total) by mouth daily., Disp: , Rfl:     mirtazapine 15 MG Oral Tab, Take 1 tablet (15 mg total) by mouth nightly., Disp: , Rfl:     buPROPion  MG Oral Tablet 12 Hr, Take 1 tablet (150 mg total) by mouth daily., Disp: , Rfl:     torsemide 10 MG Oral Tab, Take 1 tablet (10 mg total) by mouth daily., Disp: , Rfl:     Omega-3 1000 MG Oral Cap, Take 2,400 mg by mouth daily., Disp: , Rfl:     enoxaparin 150 MG/ML Injection Solution Prefilled Syringe, Inject 1.5 mg/kg into the skin every 12 (twelve) hours., Disp: , Rfl:     Fenofibrate 160 MG Oral Tab, Take 1 tablet (160 mg total) by mouth daily., Disp: , Rfl:     Sildenafil Citrate 100 MG Oral Tab, Take 1 tablet (100 mg total) by mouth daily as needed., Disp: , Rfl:     Multiple Vitamin (ONE-DAILY MULTI VITAMINS) Oral Tab, Take 1 tablet by mouth daily., Disp: , Rfl:     allopurinol 300 MG Oral Tab, Take 1 tablet (300 mg total) by mouth in the morning., Disp: , Rfl:     Ferrous Sulfate 325 (65 Fe) MG Oral Tab, Take 1 tablet (325 mg total) by mouth in the morning., Disp: , Rfl:     digoxin 0.25 MG Oral Tab, Take 1 tablet (0.25 mg total) by mouth in the morning., Disp: , Rfl:     atorvastatin 10 MG Oral Tab, Take 1 tablet (10 mg total) by mouth nightly., Disp: , Rfl:     ELIQUIS 5 MG Oral Tab, Take 1 tablet (5 mg total) by mouth in the morning and 1 tablet (5 mg total) before bedtime., Disp: , Rfl:     acetaminophen 500 MG Oral Tab, Take 1-2 tablets (500-1,000 mg total) by mouth every 6 (six) hours as needed for Pain., Disp: 90 tablet, Rfl: 0    Levothyroxine Sodium 50 MCG Oral Tab, Take 2 tablets (100 mcg total) by mouth before breakfast., Disp: , Rfl:   [2]   Allergies  Allergen Reactions    Ancef [Cefazolin] RENAL INSUFFICIENCY     Interstitial nephritis    Vancomycin HYPOTENSION     Per patient, he tolerated last treatment with  Vancomycin.    Tigecycline FACE FLUSHING    Naproxen UNKNOWN     Patient cannot take d/t kidney dse.     Clindamycin RASH    Daptomycin RASH     Tolerating course of dapto in 8/2022

## 2025-05-13 NOTE — PATIENT INSTRUCTIONS
PATIENT INSTRUCTIONS      FOR Reynaldo Huffman ,  1960    DATE OF SERVICE: 2025    Apply a piece of Xeroform gauze to the wounds on both the right and left leg    Cover this with border gauze bandage or similar bandage    Change on a daily basis    Wear the spandagrip stocking on both legs all day every day and you may remove at bedtime when legs are elevated    Elevate the legs whenever possible      Control of leg swelling is very important to prevent new wounds from developing    We will order compression garments for you that you can bring to the clinic at your next visit      Follow up as needed      MISCELLANEOUS INSTRUCTIONS  Supplement with a daily multivitamin   Low salt diet  Intense blood sugar control - Goal Blood sugar below 180 at all times recommended.  Increase protein intake / consider protein supplements - see below  Elevate extremities at all times when sitting / laying down.  No tobacco use     DIETARY MODIFICATIONS TO HELP WITH WOUND HEALING:          Please follow any restrictions to diet given by your other doctors     Protein: meats, beans, eggs, milk and yogurt particularly Greek yogurt), tofu, soy nuts, soy protein products     Vitamin C: citrus fruits and juices, strawberries, tomatoes, tomato juice, peppers, baked potatoes, spinach, broccoli, cauliflower, [place] sprouts, cabbage     Vitamin A: dark greens, leafy vegetables, orange or yellow vegetables, cantaloupe, fortified dairy products, liver, fortified cereals     Zinc: fortified cereals, red meats, seafood     Consider Devon by [employer] (These are essential branch chain amino acids that help with tissue building and wound healing) and take 2 packets/day. You can order online at [employer] or [employer].      Available at hospital pharmacy as well.      ADDITIONAL REMINDERS:     The treatment plan has been discussed at length with you and your provider. Follow all instructions carefully, it is very important. If you do  not follow all instructions, you are at risk of your wound not healing, infection, possible loss of limb and even loss of life.  Please call the clinic at [phone] during regular business hours ( 7:30 AM - 5:30 PM) if you notice increased redness, warmth, pain or pus like drainage or start running a fever greater than 100.3.    For after hour emergencies, please call your primary physician or go to the nearest emergency room.  If your blood pressure is elevated, follow up with your primary care doctor and/or cardiologist as soon as possible.

## 2025-05-13 NOTE — PROGRESS NOTES
Weekly Wound Education Note    Teaching Provided To: Patient  Training Topics: Cleasing and general instructions, Discharge instructions, Edema control, Dressing, Compression  Training Method: Demonstration, Explain/Verbal  Training Response: Reinforcement needed        Notes: Patient here for follow up wound care visit to right lateral lower leg and left anterior lower leg wounds. Edema measurement slightly increased, right lower leg wound measurement decreased, left lower leg wound measurement increased. Provider applied silver nitrate to both wounds, treatment to continue using xeroform, 4x4 bordered foma dressing daily.Applied BLE spandagrip (E). Educated patient verbally on how to apply compression wraps/garment that was ordered for him- pt did state he recieved compresssion in the mail. RN informed pt to bring compression garments to next visit so staff can demonstrate to patient how to apply. Pt did mention his is having back/neck surgery this week on Thursday and he is unsure when he will return to clinic to see provider. Provider states if patient feels comfortable and wounds are healed no need to return, if patient feels wounds are still open and draining after he recovered from surgery then he can call clinic and set up follow up visit with provider, pt agreebale and states understanding.

## 2025-05-15 ENCOUNTER — HOSPITAL ENCOUNTER (INPATIENT)
Facility: HOSPITAL | Age: 65
LOS: 7 days | Discharge: HOME HEALTH | End: 2025-05-22
Attending: ORTHOPAEDIC SURGERY | Admitting: ORTHOPAEDIC SURGERY
Payer: MEDICARE

## 2025-05-15 ENCOUNTER — APPOINTMENT (OUTPATIENT)
Dept: GENERAL RADIOLOGY | Facility: HOSPITAL | Age: 65
End: 2025-05-15
Attending: ORTHOPAEDIC SURGERY
Payer: MEDICARE

## 2025-05-15 ENCOUNTER — ANESTHESIA (OUTPATIENT)
Dept: SURGERY | Facility: HOSPITAL | Age: 65
End: 2025-05-15
Payer: MEDICARE

## 2025-05-15 ENCOUNTER — ANESTHESIA EVENT (OUTPATIENT)
Dept: SURGERY | Facility: HOSPITAL | Age: 65
End: 2025-05-15
Payer: MEDICARE

## 2025-05-15 PROBLEM — M48.02 CERVICAL SPINAL STENOSIS: Status: ACTIVE | Noted: 2025-05-15

## 2025-05-15 PROBLEM — M54.12 RADICULOPATHY OF CERVICAL SPINE: Status: ACTIVE | Noted: 2025-05-15

## 2025-05-15 PROCEDURE — 00NW0ZZ RELEASE CERVICAL SPINAL CORD, OPEN APPROACH: ICD-10-PCS | Performed by: ORTHOPAEDIC SURGERY

## 2025-05-15 PROCEDURE — 76000 FLUOROSCOPY <1 HR PHYS/QHP: CPT | Performed by: ORTHOPAEDIC SURGERY

## 2025-05-15 DEVICE — EXPANDABLE LAMINOPLASTY PLATE
Type: IMPLANTABLE DEVICE | Site: SPINE CERVICAL | Status: FUNCTIONAL
Brand: ESCALATE

## 2025-05-15 DEVICE — SELF DRILLING SCREW
Type: IMPLANTABLE DEVICE | Site: SPINE CERVICAL | Status: FUNCTIONAL
Brand: ESCALATE

## 2025-05-15 RX ORDER — BUPROPION HYDROCHLORIDE 150 MG/1
150 TABLET, EXTENDED RELEASE ORAL DAILY
Status: DISCONTINUED | OUTPATIENT
Start: 2025-05-16 | End: 2025-05-22

## 2025-05-15 RX ORDER — HYDROMORPHONE HYDROCHLORIDE 1 MG/ML
0.4 INJECTION, SOLUTION INTRAMUSCULAR; INTRAVENOUS; SUBCUTANEOUS EVERY 5 MIN PRN
Status: DISCONTINUED | OUTPATIENT
Start: 2025-05-15 | End: 2025-05-15 | Stop reason: HOSPADM

## 2025-05-15 RX ORDER — GABAPENTIN 300 MG/1
300 CAPSULE ORAL 2 TIMES DAILY
Status: DISCONTINUED | OUTPATIENT
Start: 2025-05-15 | End: 2025-05-22

## 2025-05-15 RX ORDER — DIPHENHYDRAMINE HYDROCHLORIDE 50 MG/ML
25 INJECTION, SOLUTION INTRAMUSCULAR; INTRAVENOUS EVERY 4 HOURS PRN
Status: DISCONTINUED | OUTPATIENT
Start: 2025-05-15 | End: 2025-05-22

## 2025-05-15 RX ORDER — LIDOCAINE HYDROCHLORIDE 10 MG/ML
INJECTION, SOLUTION EPIDURAL; INFILTRATION; INTRACAUDAL; PERINEURAL AS NEEDED
Status: DISCONTINUED | OUTPATIENT
Start: 2025-05-15 | End: 2025-05-15 | Stop reason: SURG

## 2025-05-15 RX ORDER — POLYETHYLENE GLYCOL 3350 17 G/17G
17 POWDER, FOR SOLUTION ORAL DAILY PRN
Status: DISCONTINUED | OUTPATIENT
Start: 2025-05-15 | End: 2025-05-22

## 2025-05-15 RX ORDER — TORSEMIDE 5 MG/1
10 TABLET ORAL DAILY
Status: DISCONTINUED | OUTPATIENT
Start: 2025-05-16 | End: 2025-05-21

## 2025-05-15 RX ORDER — DIAZEPAM 2 MG/1
2 TABLET ORAL EVERY 6 HOURS PRN
Status: DISCONTINUED | OUTPATIENT
Start: 2025-05-15 | End: 2025-05-22

## 2025-05-15 RX ORDER — HYDROMORPHONE HYDROCHLORIDE 1 MG/ML
0.2 INJECTION, SOLUTION INTRAMUSCULAR; INTRAVENOUS; SUBCUTANEOUS EVERY 5 MIN PRN
Status: DISCONTINUED | OUTPATIENT
Start: 2025-05-15 | End: 2025-05-15 | Stop reason: HOSPADM

## 2025-05-15 RX ORDER — METHOCARBAMOL 100 MG/ML
1000 INJECTION, SOLUTION INTRAMUSCULAR; INTRAVENOUS ONCE
Status: COMPLETED | OUTPATIENT
Start: 2025-05-15 | End: 2025-05-15

## 2025-05-15 RX ORDER — DIGOXIN 125 MCG
0.25 TABLET ORAL DAILY
Status: DISCONTINUED | OUTPATIENT
Start: 2025-05-16 | End: 2025-05-18

## 2025-05-15 RX ORDER — ATORVASTATIN CALCIUM 10 MG/1
10 TABLET, FILM COATED ORAL NIGHTLY
Status: DISCONTINUED | OUTPATIENT
Start: 2025-05-15 | End: 2025-05-22

## 2025-05-15 RX ORDER — SODIUM CHLORIDE 9 MG/ML
INJECTION, SOLUTION INTRAVENOUS CONTINUOUS PRN
Status: DISCONTINUED | OUTPATIENT
Start: 2025-05-15 | End: 2025-05-15 | Stop reason: SURG

## 2025-05-15 RX ORDER — SODIUM CHLORIDE, SODIUM LACTATE, POTASSIUM CHLORIDE, CALCIUM CHLORIDE 600; 310; 30; 20 MG/100ML; MG/100ML; MG/100ML; MG/100ML
INJECTION, SOLUTION INTRAVENOUS CONTINUOUS
Status: DISCONTINUED | OUTPATIENT
Start: 2025-05-15 | End: 2025-05-15 | Stop reason: HOSPADM

## 2025-05-15 RX ORDER — ACETAMINOPHEN 10 MG/ML
1000 INJECTION, SOLUTION INTRAVENOUS ONCE
Status: COMPLETED | OUTPATIENT
Start: 2025-05-15 | End: 2025-05-15

## 2025-05-15 RX ORDER — LOSARTAN POTASSIUM 50 MG/1
100 TABLET ORAL DAILY
Status: DISCONTINUED | OUTPATIENT
Start: 2025-05-16 | End: 2025-05-22

## 2025-05-15 RX ORDER — SENNOSIDES 8.6 MG
17.2 TABLET ORAL NIGHTLY
Status: DISCONTINUED | OUTPATIENT
Start: 2025-05-15 | End: 2025-05-22

## 2025-05-15 RX ORDER — HYDROMORPHONE HYDROCHLORIDE 1 MG/ML
0.4 INJECTION, SOLUTION INTRAMUSCULAR; INTRAVENOUS; SUBCUTANEOUS EVERY 2 HOUR PRN
Status: DISCONTINUED | OUTPATIENT
Start: 2025-05-15 | End: 2025-05-20

## 2025-05-15 RX ORDER — FAMOTIDINE 10 MG/ML
20 INJECTION, SOLUTION INTRAVENOUS ONCE
Status: COMPLETED | OUTPATIENT
Start: 2025-05-15 | End: 2025-05-15

## 2025-05-15 RX ORDER — ROCURONIUM BROMIDE 10 MG/ML
INJECTION, SOLUTION INTRAVENOUS AS NEEDED
Status: DISCONTINUED | OUTPATIENT
Start: 2025-05-15 | End: 2025-05-15 | Stop reason: SURG

## 2025-05-15 RX ORDER — NALOXONE HYDROCHLORIDE 0.4 MG/ML
0.08 INJECTION, SOLUTION INTRAMUSCULAR; INTRAVENOUS; SUBCUTANEOUS AS NEEDED
Status: DISCONTINUED | OUTPATIENT
Start: 2025-05-15 | End: 2025-05-15 | Stop reason: HOSPADM

## 2025-05-15 RX ORDER — METOPROLOL TARTRATE 1 MG/ML
2.5 INJECTION, SOLUTION INTRAVENOUS ONCE
Status: DISCONTINUED | OUTPATIENT
Start: 2025-05-15 | End: 2025-05-15 | Stop reason: HOSPADM

## 2025-05-15 RX ORDER — METOPROLOL TARTRATE 25 MG/1
25 TABLET, FILM COATED ORAL ONCE AS NEEDED
Status: DISCONTINUED | OUTPATIENT
Start: 2025-05-15 | End: 2025-05-15 | Stop reason: HOSPADM

## 2025-05-15 RX ORDER — ONDANSETRON 2 MG/ML
4 INJECTION INTRAMUSCULAR; INTRAVENOUS EVERY 6 HOURS PRN
Status: DISCONTINUED | OUTPATIENT
Start: 2025-05-15 | End: 2025-05-15 | Stop reason: HOSPADM

## 2025-05-15 RX ORDER — LEVOTHYROXINE SODIUM 100 UG/1
100 TABLET ORAL
Status: DISCONTINUED | OUTPATIENT
Start: 2025-05-16 | End: 2025-05-22

## 2025-05-15 RX ORDER — DOCUSATE SODIUM 100 MG/1
100 CAPSULE, LIQUID FILLED ORAL 2 TIMES DAILY
Status: DISCONTINUED | OUTPATIENT
Start: 2025-05-15 | End: 2025-05-22

## 2025-05-15 RX ORDER — FAMOTIDINE 20 MG/1
20 TABLET, FILM COATED ORAL ONCE
Status: COMPLETED | OUTPATIENT
Start: 2025-05-15 | End: 2025-05-15

## 2025-05-15 RX ORDER — MIRTAZAPINE 7.5 MG/1
15 TABLET, FILM COATED ORAL NIGHTLY
Status: DISCONTINUED | OUTPATIENT
Start: 2025-05-15 | End: 2025-05-22

## 2025-05-15 RX ORDER — BUPIVACAINE HYDROCHLORIDE AND EPINEPHRINE 5; 5 MG/ML; UG/ML
INJECTION, SOLUTION PERINEURAL AS NEEDED
Status: DISCONTINUED | OUTPATIENT
Start: 2025-05-15 | End: 2025-05-15 | Stop reason: HOSPADM

## 2025-05-15 RX ORDER — SODIUM CHLORIDE, SODIUM LACTATE, POTASSIUM CHLORIDE, CALCIUM CHLORIDE 600; 310; 30; 20 MG/100ML; MG/100ML; MG/100ML; MG/100ML
INJECTION, SOLUTION INTRAVENOUS CONTINUOUS
Status: DISCONTINUED | OUTPATIENT
Start: 2025-05-15 | End: 2025-05-17

## 2025-05-15 RX ORDER — OXYCODONE HYDROCHLORIDE 5 MG/1
5 TABLET ORAL EVERY 4 HOURS PRN
Status: DISCONTINUED | OUTPATIENT
Start: 2025-05-15 | End: 2025-05-20

## 2025-05-15 RX ORDER — ONDANSETRON 2 MG/ML
4 INJECTION INTRAMUSCULAR; INTRAVENOUS EVERY 6 HOURS PRN
Status: DISCONTINUED | OUTPATIENT
Start: 2025-05-15 | End: 2025-05-22

## 2025-05-15 RX ORDER — METOPROLOL SUCCINATE 50 MG/1
50 TABLET, EXTENDED RELEASE ORAL DAILY
Status: DISCONTINUED | OUTPATIENT
Start: 2025-05-16 | End: 2025-05-22

## 2025-05-15 RX ORDER — MORPHINE SULFATE 4 MG/ML
2 INJECTION, SOLUTION INTRAMUSCULAR; INTRAVENOUS EVERY 10 MIN PRN
Status: DISCONTINUED | OUTPATIENT
Start: 2025-05-15 | End: 2025-05-15 | Stop reason: HOSPADM

## 2025-05-15 RX ORDER — SODIUM PHOSPHATE, DIBASIC AND SODIUM PHOSPHATE, MONOBASIC 7; 19 G/230ML; G/230ML
1 ENEMA RECTAL ONCE AS NEEDED
Status: DISCONTINUED | OUTPATIENT
Start: 2025-05-15 | End: 2025-05-22

## 2025-05-15 RX ORDER — MORPHINE SULFATE 4 MG/ML
4 INJECTION, SOLUTION INTRAMUSCULAR; INTRAVENOUS EVERY 10 MIN PRN
Status: DISCONTINUED | OUTPATIENT
Start: 2025-05-15 | End: 2025-05-15 | Stop reason: HOSPADM

## 2025-05-15 RX ORDER — BISACODYL 10 MG
10 SUPPOSITORY, RECTAL RECTAL
Status: DISCONTINUED | OUTPATIENT
Start: 2025-05-15 | End: 2025-05-22

## 2025-05-15 RX ORDER — METOCLOPRAMIDE HYDROCHLORIDE 5 MG/ML
10 INJECTION INTRAMUSCULAR; INTRAVENOUS EVERY 8 HOURS PRN
Status: DISCONTINUED | OUTPATIENT
Start: 2025-05-15 | End: 2025-05-17

## 2025-05-15 RX ORDER — ALLOPURINOL 300 MG/1
300 TABLET ORAL DAILY
Status: DISCONTINUED | OUTPATIENT
Start: 2025-05-16 | End: 2025-05-22

## 2025-05-15 RX ORDER — ACETAMINOPHEN 500 MG
1000 TABLET ORAL ONCE
Status: COMPLETED | OUTPATIENT
Start: 2025-05-15 | End: 2025-05-15

## 2025-05-15 RX ORDER — HYDROMORPHONE HYDROCHLORIDE 1 MG/ML
0.6 INJECTION, SOLUTION INTRAMUSCULAR; INTRAVENOUS; SUBCUTANEOUS EVERY 5 MIN PRN
Refills: 0 | Status: DISCONTINUED | OUTPATIENT
Start: 2025-05-15 | End: 2025-05-15 | Stop reason: HOSPADM

## 2025-05-15 RX ORDER — HYDROMORPHONE HYDROCHLORIDE 1 MG/ML
0.2 INJECTION, SOLUTION INTRAMUSCULAR; INTRAVENOUS; SUBCUTANEOUS EVERY 5 MIN PRN
Refills: 0 | Status: DISCONTINUED | OUTPATIENT
Start: 2025-05-15 | End: 2025-05-15 | Stop reason: HOSPADM

## 2025-05-15 RX ORDER — OXYCODONE HYDROCHLORIDE 5 MG/1
10 TABLET ORAL EVERY 4 HOURS PRN
Status: DISCONTINUED | OUTPATIENT
Start: 2025-05-15 | End: 2025-05-20

## 2025-05-15 RX ORDER — EPHEDRINE SULFATE 50 MG/ML
INJECTION, SOLUTION INTRAVENOUS AS NEEDED
Status: DISCONTINUED | OUTPATIENT
Start: 2025-05-15 | End: 2025-05-15 | Stop reason: SURG

## 2025-05-15 RX ORDER — DIPHENHYDRAMINE HCL 25 MG
25 CAPSULE ORAL EVERY 4 HOURS PRN
Status: DISCONTINUED | OUTPATIENT
Start: 2025-05-15 | End: 2025-05-22

## 2025-05-15 RX ORDER — INSULIN ASPART 100 [IU]/ML
INJECTION, SOLUTION INTRAVENOUS; SUBCUTANEOUS ONCE
Status: DISCONTINUED | OUTPATIENT
Start: 2025-05-15 | End: 2025-05-15 | Stop reason: HOSPADM

## 2025-05-15 RX ORDER — HYDROMORPHONE HYDROCHLORIDE 1 MG/ML
0.8 INJECTION, SOLUTION INTRAMUSCULAR; INTRAVENOUS; SUBCUTANEOUS EVERY 2 HOUR PRN
Status: DISCONTINUED | OUTPATIENT
Start: 2025-05-15 | End: 2025-05-20

## 2025-05-15 RX ORDER — MORPHINE SULFATE 10 MG/ML
6 INJECTION, SOLUTION INTRAMUSCULAR; INTRAVENOUS EVERY 10 MIN PRN
Refills: 0 | Status: DISCONTINUED | OUTPATIENT
Start: 2025-05-15 | End: 2025-05-15 | Stop reason: HOSPADM

## 2025-05-15 RX ORDER — HYDROMORPHONE HYDROCHLORIDE 1 MG/ML
0.4 INJECTION, SOLUTION INTRAMUSCULAR; INTRAVENOUS; SUBCUTANEOUS EVERY 5 MIN PRN
Refills: 0 | Status: DISCONTINUED | OUTPATIENT
Start: 2025-05-15 | End: 2025-05-15 | Stop reason: HOSPADM

## 2025-05-15 RX ORDER — NALOXONE HYDROCHLORIDE 0.4 MG/ML
80 INJECTION, SOLUTION INTRAMUSCULAR; INTRAVENOUS; SUBCUTANEOUS AS NEEDED
Status: DISCONTINUED | OUTPATIENT
Start: 2025-05-15 | End: 2025-05-15 | Stop reason: HOSPADM

## 2025-05-15 RX ADMIN — EPHEDRINE SULFATE 10 MG: 50 INJECTION, SOLUTION INTRAVENOUS at 14:50:00

## 2025-05-15 RX ADMIN — SODIUM CHLORIDE, SODIUM LACTATE, POTASSIUM CHLORIDE, CALCIUM CHLORIDE: 600; 310; 30; 20 INJECTION, SOLUTION INTRAVENOUS at 14:29:00

## 2025-05-15 RX ADMIN — ROCURONIUM BROMIDE 20 MG: 10 INJECTION, SOLUTION INTRAVENOUS at 14:20:00

## 2025-05-15 RX ADMIN — SODIUM CHLORIDE: 9 INJECTION, SOLUTION INTRAVENOUS at 14:29:00

## 2025-05-15 RX ADMIN — EPHEDRINE SULFATE 10 MG: 50 INJECTION, SOLUTION INTRAVENOUS at 14:15:00

## 2025-05-15 RX ADMIN — ROCURONIUM BROMIDE 10 MG: 10 INJECTION, SOLUTION INTRAVENOUS at 12:49:00

## 2025-05-15 RX ADMIN — SODIUM CHLORIDE: 9 INJECTION, SOLUTION INTRAVENOUS at 13:12:00

## 2025-05-15 RX ADMIN — EPHEDRINE SULFATE 5 MG: 50 INJECTION, SOLUTION INTRAVENOUS at 14:23:00

## 2025-05-15 RX ADMIN — ROCURONIUM BROMIDE 10 MG: 10 INJECTION, SOLUTION INTRAVENOUS at 12:43:00

## 2025-05-15 RX ADMIN — LIDOCAINE HYDROCHLORIDE 50 MG: 10 INJECTION, SOLUTION EPIDURAL; INFILTRATION; INTRACAUDAL; PERINEURAL at 12:42:00

## 2025-05-15 RX ADMIN — ROCURONIUM BROMIDE 30 MG: 10 INJECTION, SOLUTION INTRAVENOUS at 13:39:00

## 2025-05-15 RX ADMIN — SODIUM CHLORIDE, SODIUM LACTATE, POTASSIUM CHLORIDE, CALCIUM CHLORIDE: 600; 310; 30; 20 INJECTION, SOLUTION INTRAVENOUS at 12:37:00

## 2025-05-15 NOTE — ANESTHESIA POSTPROCEDURE EVALUATION
Patient: Reynaldo Huffman    Procedure Summary       Date: 05/15/25 Room / Location: [facility] MAIN OR  / [facility] MAIN OR    Anesthesia Start: 1235 Anesthesia Stop: 1609    Procedure: C3-7 cervical laminoplasty (Spine Cervical) Diagnosis: (Severe cervical stenosis, cervical radiculopathy)    Surgeons: Papito Arcos MD Anesthesiologist: Vicente Dutton MD    Anesthesia Type: general ASA Status: 4            Anesthesia Type: general    Vitals Value Taken Time   /74 05/15/25 16:04   Temp 97.4 05/15/25 16:09   Pulse 87 05/15/25 16:08   Resp 29 05/15/25 16:08   SpO2 91 % 05/15/25 16:07   Vitals shown include unfiled device data.    [facility] AN Post Evaluation:   Patient Evaluated in PACU  Patient Participation: complete - patient participated  Level of Consciousness: awake  Pain Score: 4  Pain Management: adequate  Airway Patency:patent  Yes    Nausea/Vomiting: none  Cardiovascular Status: acceptable  Respiratory Status: acceptable  Postoperative Hydration acceptable      Vicente Dutton MD  5/15/2025 4:09 PM

## 2025-05-15 NOTE — H&P
[facility]  part of [facility]    History & Physical    Reynaldo Huffman Patient Status:  Inpatient    1960 MRN [id number]   Location [facility] OPERATING ROOM Attending Papito Arcos MD   Hosp Day # 0 PCP Antonio Martinez MD     Date:  5/15/2025  Date of Admission:  5/15/2025    History:  Past Medical History[1]  Past Surgical History[2]  Family History[3]   reports that he has never smoked. He has never used smokeless tobacco. He reports current alcohol use of about 2.0 standard drinks of alcohol per week. He reports that he does not use drugs.    Allergies:  Allergies[4]    Home Medications:  Prescriptions Prior to Admission[5]    Review of Systems:  12 point ROS reviewed without pertinent positives.     HPI: The patient presents with complaints of neck stiffness with paresthesias.  The patient reports minimal neck pain but is having weakness and tingling to the hands and fingers.  He reports dropping things and having difficulty with use of his hands and fingers. He denies prior cervical spine surgery.  They deny current fevers, chills, infection, rashes/bruises on the body, gross bowel/bladder incontinence or saddle anesthesia.     Physical Exam:  The patient is well developed, no acute distress, alert and oriented x3, skin intact to the anterior cervical without erythema or edema, motor exam with 4-/5 bilateral upper extremities, 2+ radial pulses, left elbow in fixed flexion, decreased sensation to light touch to the bilateral forearms, hands and fingers      Assessment:  Problem List[6]  Cervical radiculopathy  Severe cervical stenosis    Plan:  C3-7 cervical laminoplasty      Sofi Ruby PA-C  5/15/2025  12:45 PM        [1]   Past Medical History:   Arrhythmia    A.Fib    Atrial fibrillation (HCC)    Calculus of kidney    Depression    Disorder of thyroid    Essential hypertension    Gout    High blood pressure    High cholesterol    Hyperlipidemia    Neuropathy     bilateral feet    Numbness and tingling in both hands    Osteoarthritis    Stroke (HCC)    mini stroke-    Visual impairment    eyeglasses   [2]   Past Surgical History:  Procedure Laterality Date    Anesth,achilles tendon surg Bilateral     Appendectomy      Colonoscopy      Fracture surgery Left     wrist    Gastric bypass,obesity,sb reconstruc  2000    Total knee replacement Bilateral 2021    Total shoulder arthroplasty Right 07/13/2022    Right total shoulder arthroplasty --Christiano   [3]   Family History  Problem Relation Age of Onset    Hypertension Father     Heart Disorder Father     Hypertension Mother    [4]   Allergies  Allergen Reactions    Ancef [Cefazolin] RENAL INSUFFICIENCY     Interstitial nephritis    Vancomycin HYPOTENSION     Per patient, he tolerated last treatment with Vancomycin.    Tigecycline FACE FLUSHING    Naproxen UNKNOWN     Patient cannot take d/t kidney dse.     Clindamycin RASH    Daptomycin RASH     Tolerating course of dapto in 8/2022   [5]   Medications Prior to Admission   Medication Sig Dispense Refill Last Dose/Taking    valsartan 80 MG Oral Tab Take 2 tablets (160 mg total) by mouth daily.   5/12/2025    gabapentin 100 MG Oral Cap Take 3 capsules (300 mg total) by mouth 2 (two) times daily.   5/15/2025 at  7:30 AM    metoprolol succinate ER 50 MG Oral Tablet 24 Hr Take 1 tablet (50 mg total) by mouth daily.   5/12/2025    mirtazapine 15 MG Oral Tab Take 1 tablet (15 mg total) by mouth nightly.   5/12/2025    buPROPion  MG Oral Tablet 12 Hr Take 1 tablet (150 mg total) by mouth daily.   5/14/2025 at  8:00 AM    torsemide 10 MG Oral Tab Take 1 tablet (10 mg total) by mouth daily.   5/12/2025    Omega-3 1000 MG Oral Cap Take 2,400 mg by mouth daily.   5/12/2025    enoxaparin 150 MG/ML Injection Solution Prefilled Syringe Inject 1.5 mg/kg into the skin every 12 (twelve) hours.   5/14/2025 at  8:00 PM    Fenofibrate 160 MG Oral Tab Take 1 tablet (160 mg total) by mouth  daily.   5/12/2025    Multiple Vitamin (ONE-DAILY MULTI VITAMINS) Oral Tab Take 1 tablet by mouth daily.   5/12/2025    allopurinol 300 MG Oral Tab Take 1 tablet (300 mg total) by mouth in the morning.   5/12/2025    digoxin 0.25 MG Oral Tab Take 1 tablet (0.25 mg total) by mouth in the morning.   5/12/2025    atorvastatin 10 MG Oral Tab Take 1 tablet (10 mg total) by mouth nightly.   5/12/2025    ELIQUIS 5 MG Oral Tab Take 1 tablet (5 mg total) by mouth in the morning and 1 tablet (5 mg total) before bedtime.   5/8/2025    Levothyroxine Sodium 50 MCG Oral Tab Take 2 tablets (100 mcg total) by mouth before breakfast.   5/13/2025    Sildenafil Citrate 100 MG Oral Tab Take 1 tablet (100 mg total) by mouth daily as needed.   More than a month    Ferrous Sulfate 325 (65 Fe) MG Oral Tab Take 1 tablet (325 mg total) by mouth in the morning.   More than a month    acetaminophen 500 MG Oral Tab Take 1-2 tablets (500-1,000 mg total) by mouth every 6 (six) hours as needed for Pain. 90 tablet 0 More than a month   [6]   Patient Active Problem List  Diagnosis    Paroxysmal atrial fibrillation (HCC)    Hypertension    Thyroid disease    Osteoarthritis of right knee    History of constipation    History of partial ray amputation of left great toe ([facility])    Encounter for current long-term use of anticoagulants    S/P total knee arthroplasty, right    Primary osteoarthritis of left knee    BMI 36.0-36.9,adult    Excoriation    Hyperlipidemia    Hypothyroidism due to Hashimoto's thyroiditis    Preop testing    Status post left knee replacement    Uric acid kidney stone    LESLY (obstructive sleep apnea)    Hypokalemia    Primary osteoarthritis of right shoulder    Arthritis of right glenohumeral joint    Septic joint of right shoulder region (HCC)    Pyogenic arthritis of right shoulder region, due to unspecified organism ([facility])    Cellulitis

## 2025-05-15 NOTE — BRIEF OP NOTE
Pre-Operative Diagnosis: Severe cervical stenosis, cervical radiculopathy     Post-Operative Diagnosis: * No post-op diagnosis entered *      Procedure Performed:   C3-7 cervical laminoplasty    Surgeons and Role:     * Papito Arcos MD - Primary     * Cole Reddy MD - Assisting Surgeon    Assistant(s):  PA: Sofi Ruby PA-C     Surgical Findings: C3-7 laminoplasty      Specimen: na     Estimated Blood Loss: No data recorded      Cole Reddy MD  5/15/2025  3:50 PM

## 2025-05-15 NOTE — ANESTHESIA PROCEDURE NOTES
Airway  Date/Time: 5/15/2025 12:46 PM  Reason: Elective    Difficult airway    General Information and Staff   Patient location during procedure: OR  Anesthesiologist: Helen Salazar MD  Performed: anesthesiologist   Performed by: Helen Salazar MD  Authorized by: Helen Salazar MD        Indications and Patient Condition  Indications for airway management: anesthesia  Sedation level: deep      Preoxygenated: yesPatient position: ramp    Mask difficulty assessment: 3 - difficult mask (inadequate, unstable or two providers) +/- NMBA  Planned trial extubation    Final Airway Details    Final airway type: endotracheal airway    Successful airway: ETT  Cuffed: yes   Successful intubation technique: Video laryngoscopy  Facilitating devices/methods: intubating stylet and cricoid pressure  Endotracheal tube insertion site: oral  Blade type: Mason.  Blade size: #4  ETT size (mm): 7.5    Cormack-Lehane Classification: grade IIA - partial view of glottis  Placement verified by: capnometry   Measured from: lips  ETT to lips (cm): 24  Number of attempts at approach: 1    Additional Comments  Large neck, small mouth opening.

## 2025-05-15 NOTE — ANESTHESIA PROCEDURE NOTES
Peripheral IV  Date/Time: 5/15/2025 12:53 PM  Inserted by: Helen Salazar MD    Placement  Needle size: 18 G  Laterality: right  Location: hand  Site prep: alcohol  Technique: anatomical landmarks  Attempts: 1

## 2025-05-15 NOTE — PLAN OF CARE
Reynaldo is POD #0 of a C3-7 laminoplasty with Carolann Arcos and Barry. Pt is aox4, ambulating as tolerated, not yet up with this RN. Check void by midnight. SCD's and amalia brare for DVT prophylaxis. Dressing telfa and tegaderm to posterior neck. Oxycodone for pain and Dilaudid for breakthrough. Orders for soft collar for comfort only, does not properly fit patient per PACU report. Pt plans to d/c Home pending clearance from all services. Disease process discussed with pt. Bed in lowest position, call light and personal possessions within reach. Pt instructed to call for assistance before getting up.     Problem: Patient Centered Care  Goal: Patient preferences are identified and integrated in the patient's plan of care  Description: Interventions:  - What would you like us to know as we care for you?   - Provide timely, complete, and accurate information to patient/family  - Incorporate patient and family knowledge, values, beliefs, and cultural backgrounds into the planning and delivery of care  - Encourage patient/family to participate in care and decision-making at the level they choose  - Honor patient and family perspectives and choices  Outcome: Progressing     Problem: Patient/Family Goals  Goal: Patient/Family Long Term Goal  Description: Patient's Long Term Goal:     Interventions:  -   - See additional Care Plan goals for specific interventions  Outcome: Progressing  Goal: Patient/Family Short Term Goal  Description: Patient's Short Term Goal:     Interventions:   -   - See additional Care Plan goals for specific interventions  Outcome: Progressing     Problem: PAIN - ADULT  Goal: Verbalizes/displays adequate comfort level or patient's stated pain goal  Description: INTERVENTIONS:  - Encourage pt to monitor pain and request assistance  - Assess pain using appropriate pain scale  - Administer analgesics based on type and severity of pain and evaluate response  - Implement non-pharmacological measures as  appropriate and evaluate response  - Consider cultural and social influences on pain and pain management  - Manage/alleviate anxiety  - Utilize distraction and/or relaxation techniques  - Monitor for opioid side effects  - Notify MD/LIP if interventions unsuccessful or patient reports new pain  - Anticipate increased pain with activity and pre-medicate as appropriate  Outcome: Progressing     Problem: RISK FOR INFECTION - ADULT  Goal: Absence of fever/infection during anticipated neutropenic period  Description: INTERVENTIONS  - Monitor WBC  - Administer growth factors as ordered  - Implement neutropenic guidelines  Outcome: Progressing     Problem: SAFETY ADULT - FALL  Goal: Free from fall injury  Description: INTERVENTIONS:  - Assess pt frequently for physical needs  - Identify cognitive and physical deficits and behaviors that affect risk of falls.  - [place] fall precautions as indicated by assessment.  - Educate pt/family on patient safety including physical limitations  - Instruct pt to call for assistance with activity based on assessment  - Modify environment to reduce risk of injury  - Provide assistive devices as appropriate  - Consider OT/PT consult to assist with strengthening/mobility  - Encourage toileting schedule  Outcome: Progressing     Problem: DISCHARGE PLANNING  Goal: Discharge to home or other facility with appropriate resources  Description: INTERVENTIONS:  - Identify barriers to discharge w/pt and caregiver  - Include patient/family/discharge partner in discharge planning  - Arrange for needed discharge resources and transportation as appropriate  - Identify discharge learning needs (meds, wound care, etc)  - Arrange for interpreters to assist at discharge as needed  - Consider post-discharge preferences of patient/family/discharge partner  - Complete POLST form as appropriate  - Assess patient's ability to be responsible for managing their own health  - Refer to Case Management Department  for coordinating discharge planning if the patient needs post-hospital services based on physician/LIP order or complex needs related to functional status, cognitive ability or social support system  Outcome: Progressing

## 2025-05-15 NOTE — ANESTHESIA PROCEDURE NOTES
Arterial Line    Date/Time: 5/15/2025 12:50 PM    Performed by: Helen Salazar MD  Authorized by: Helen Salazar MD    General Information and Staff    Procedure Start:  5/15/2025 12:50 PM  Procedure End:  5/15/2025 1:12 PM  Anesthesiologist:  Helen Salazar MD  Performed By:  Anesthesiologist  Patient Location:  OR  Indication: continuous blood pressure monitoring and blood sampling needed    Site Identification: real time ultrasound guided and surface landmarks    Preanesthetic Checklist: 2 patient identifiers, IV checked, risks and benefits discussed, monitors and equipment checked, pre-op evaluation, timeout performed, anesthesia consent and sterile technique used    Procedure Details    Catheter Size:  20 G  Catheter Length:  1 and 3/4 inch  Catheter Type:  Arrow  Seldinger Technique?: Yes    Laterality:  Left  Site:  Radial artery  Site Prep: chlorhexidine    Line Secured:  Wrist Brace, tape and Tegaderm    Assessment    Events: patient tolerated procedure well with no complications      Medications  5/15/2025 12:50 PM      Additional Comments

## 2025-05-15 NOTE — ANESTHESIA PREPROCEDURE EVALUATION
Anesthesia PreOp Note    HPI:     Reynaldo Huffman is a 64 year old male who presents for preoperative consultation requested by: Papito Arcos MD    Date of Surgery: 5/15/2025    Procedure(s):  C3-7 cervical laminoplasty  Indication: Severe cervical stenosis, cervical radiculopathy    Relevant Problems   No relevant active problems       NPO:  Last Liquid Consumption Date: 05/15/25  Last Liquid Consumption Time: 0730 (sip of water with meds)  Last Solid Consumption Date: 05/14/25  Last Solid Consumption Time: 1900  Last Liquid Consumption Date: 05/15/25          History Review:  Patient Active Problem List    Diagnosis Date Noted    Pyogenic arthritis of right shoulder region, due to unspecified organism (HCC) 08/23/2022    Cellulitis 08/23/2022    Septic joint of right shoulder region (HCC) 07/25/2022    Primary osteoarthritis of right shoulder 07/13/2022    Arthritis of right glenohumeral joint 07/13/2022    Preop testing 11/03/2020    Status post left knee replacement 11/03/2020    Uric acid kidney stone 11/03/2020    LESLY (obstructive sleep apnea) 11/03/2020    Hypokalemia 11/03/2020    Hyperlipidemia 10/28/2020    Hypothyroidism due to Hashimoto's thyroiditis 10/28/2020    Primary osteoarthritis of left knee 09/01/2020    BMI 36.0-36.9,adult 09/01/2020    Excoriation 09/01/2020    Encounter for current long-term use of anticoagulants 06/01/2020    S/P total knee arthroplasty, right 06/01/2020    History of constipation 05/28/2020    History of partial ray amputation of left great toe (HCC) 05/28/2020    Osteoarthritis of right knee 05/22/2020    Paroxysmal atrial fibrillation (HCC) 05/17/2020    Hypertension 05/17/2020    Thyroid disease 05/17/2020       Past Medical History[1]    Past Surgical History[2]    Prescriptions Prior to Admission[3]  Current Medications and Prescriptions Ordered in Epic[4]    Allergies[5]    Family History[6]  Social Hx on file[7]    Available pre-op labs reviewed.              Vital Signs:  Body mass index is 38.62 kg/m².   height is 1.905 m (6' 3\") and weight is 140.2 kg (309 lb) (abnormal). His oral temperature is 97.5 °F (36.4 °C). His blood pressure is 148/71 and his pulse is 73. His respiration is 16 and oxygen saturation is 95%.   Vitals:    05/12/25 1604 05/15/25 1014 05/15/25 1022   BP:   148/71   Pulse:   73   Resp:   16   Temp:   97.5 °F (36.4 °C)   TempSrc:   Oral   SpO2:   95%   Weight: 136.1 kg (300 lb) (!) 140.2 kg (309 lb)    Height: 1.905 m (6' 3\") 1.905 m (6' 3\")         Anesthesia Evaluation     Patient summary reviewed and Nursing notes reviewed    No history of anesthetic complications   Airway   Mallampati: IV  TM distance: >3 FB  Neck ROM: full  Dental - Dentition appears grossly intact     Pulmonary - normal exam   (+) sleep apnea    ROS comment: + smokes cigars occasionally, states no marijuana use  + listed in Epic as having LESLY but pt denies having this   Cardiovascular   (+) hypertension, dysrhythmias (afib)    Rhythm: irregular  ROS comment: TTE: 11/2024 Congregation  1. Left ventricle: The cavity size is normal. Wall thickness is mildly      increased. Systolic function is normal. The ejection fraction was measured      by single plane method of disks. The ejection fraction is 60%.   2. Regional wall motion: There is hypokinesis of the mid inferoseptal, mid and apical inferior, and apical septal walls.   3. Mitral valve: There is trivial regurgitation.   4. Left atrium: The atrium is severely dilated.   5. Right ventricle: The cavity size is dilated. Systolic function is normal. Systolic pressure is indeterminant due to the absence of tricuspid regurgitation.   6. Baseline ECG: Atrial fibrillation.         Stress Test: 1/2024 Congregation  1. Abnormal SPECT Myocardial Perfusion    -Evidence of previous transmural MI in the anterior segments with mild van-infarct ischemia. Level of certainty however is reduced;  soft tissue attenuation could not be completely ruled  out given technical quality of the study and patient's BMI.   -Consider cardiac CT for better evaluation    2. Normal Gated SPECT left ventricular systolic function, estimated EF 73% without wall motion abnormality    3. Stress ECG nondiagnostic for ischemia. Patient had appropriate hemodynamic response to stress.    4. There are no prior examinations available for comparison purposes.     Denies new chest pain today      Neuro/Psych    (+)  CVA (no residual deficits),  depression      Comments: 11/2024. Cerebellar stroke.    GI/Hepatic/Renal - negative ROS     Comments: History of gastric bypass    Endo/Other    (+) hypothyroidism, arthritis  Abdominal   (+) obese                 Anesthesia Plan:   ASA:  4  Plan:   General  Monitors and Lines:   BIS, Additonal IV and A-line  Airway:  ETT and Video laryngoscope  Post-op Pain Management: IV analgesics  Informed Consent Plan and Risks Discussed With:  Patient  Use of Blood Products Discussed With:  Patient  Blood Product Use Consented    Discussed plan with:  Surgeon      I have informed Reynaldo Huffman and/or legal guardian or family member of the nature of the anesthetic plan, benefits, risks including possible dental damage if relevant, major complications, and any alternative forms of anesthetic management.   All of the patient's questions were answered to the best of my ability. The patient desires the anesthetic management as planned.  Helen Salazar MD  5/15/2025 10:58 AM  Present on Admission:  **None**           [1]   Past Medical History:   Arrhythmia    A.Fib    Atrial fibrillation (HCC)    Calculus of kidney    Depression    Disorder of thyroid    Essential hypertension    Gout    High blood pressure    High cholesterol    Hyperlipidemia    Neuropathy    bilateral feet    Numbness and tingling in both hands    Osteoarthritis    Stroke (HCC)    mini stroke-    Visual impairment    eyeglasses   [2]   Past Surgical History:  Procedure Laterality Date     Anesth,achilles tendon surg Bilateral     Appendectomy      Colonoscopy      Fracture surgery Left     wrist    Gastric bypass,obesity,sb reconstruc  2000    Total knee replacement Bilateral 2021    Total shoulder arthroplasty Right 07/13/2022    Right total shoulder arthroplasty --Christiano   [3]   Medications Prior to Admission   Medication Sig Dispense Refill Last Dose/Taking    valsartan 80 MG Oral Tab Take 2 tablets (160 mg total) by mouth daily.   5/12/2025    gabapentin 100 MG Oral Cap Take 3 capsules (300 mg total) by mouth 2 (two) times daily.   5/15/2025 at  7:30 AM    metoprolol succinate ER 50 MG Oral Tablet 24 Hr Take 1 tablet (50 mg total) by mouth daily.   5/12/2025    mirtazapine 15 MG Oral Tab Take 1 tablet (15 mg total) by mouth nightly.   5/12/2025    buPROPion  MG Oral Tablet 12 Hr Take 1 tablet (150 mg total) by mouth daily.   5/14/2025 at  8:00 AM    torsemide 10 MG Oral Tab Take 1 tablet (10 mg total) by mouth daily.   5/12/2025    Omega-3 1000 MG Oral Cap Take 2,400 mg by mouth daily.   5/12/2025    enoxaparin 150 MG/ML Injection Solution Prefilled Syringe Inject 1.5 mg/kg into the skin every 12 (twelve) hours.   5/14/2025 at  8:00 PM    Fenofibrate 160 MG Oral Tab Take 1 tablet (160 mg total) by mouth daily.   5/12/2025    Multiple Vitamin (ONE-DAILY MULTI VITAMINS) Oral Tab Take 1 tablet by mouth daily.   5/12/2025    allopurinol 300 MG Oral Tab Take 1 tablet (300 mg total) by mouth in the morning.   5/12/2025    digoxin 0.25 MG Oral Tab Take 1 tablet (0.25 mg total) by mouth in the morning.   5/12/2025    atorvastatin 10 MG Oral Tab Take 1 tablet (10 mg total) by mouth nightly.   5/12/2025    ELIQUIS 5 MG Oral Tab Take 1 tablet (5 mg total) by mouth in the morning and 1 tablet (5 mg total) before bedtime.   5/8/2025    Levothyroxine Sodium 50 MCG Oral Tab Take 2 tablets (100 mcg total) by mouth before breakfast.   5/13/2025    Sildenafil Citrate 100 MG Oral Tab Take 1 tablet (100 mg  total) by mouth daily as needed.   More than a month    Ferrous Sulfate 325 (65 Fe) MG Oral Tab Take 1 tablet (325 mg total) by mouth in the morning.   More than a month    acetaminophen 500 MG Oral Tab Take 1-2 tablets (500-1,000 mg total) by mouth every 6 (six) hours as needed for Pain. 90 tablet 0 More than a month   [4]   Current Facility-Administered Medications Ordered in Epic   Medication Dose Route Frequency Provider Last Rate Last Admin    lactated ringers infusion   Intravenous Continuous Papito Arcos MD        metoprolol tartrate (Lopressor) tab 25 mg  25 mg Oral Once PRN Papito Arcos MD        vancomycin (Vancocin) 1,000 mg in sodium chloride 0.9% 250 mL IVPB-ADDV  1,000 mg Intravenous Once Papito Arcos  mL/hr at 05/15/25 1050 1,000 mg at 05/15/25 1050     No current [facility]-ordered outpatient medications on file.   [5]   Allergies  Allergen Reactions    Ancef [Cefazolin] RENAL INSUFFICIENCY     Interstitial nephritis    Vancomycin HYPOTENSION     Per patient, he tolerated last treatment with Vancomycin.    Tigecycline FACE FLUSHING    Naproxen UNKNOWN     Patient cannot take d/t kidney dse.     Clindamycin RASH    Daptomycin RASH     Tolerating course of dapto in 8/2022   [6]   Family History  Problem Relation Age of Onset    Hypertension Father     Heart Disorder Father     Hypertension Mother    [7]   Social History  Socioeconomic History    Marital status:    Tobacco Use    Smoking status: Never    Smokeless tobacco: Never   Vaping Use    Vaping status: Never Used   Substance and Sexual Activity    Alcohol use: Yes     Alcohol/week: 2.0 standard drinks of alcohol     Types: 2 Standard drinks or equivalent per week    Drug use: Never

## 2025-05-16 ENCOUNTER — APPOINTMENT (OUTPATIENT)
Dept: CV DIAGNOSTICS | Facility: HOSPITAL | Age: 65
End: 2025-05-16
Attending: INTERNAL MEDICINE
Payer: MEDICARE

## 2025-05-16 ENCOUNTER — APPOINTMENT (OUTPATIENT)
Dept: GENERAL RADIOLOGY | Facility: HOSPITAL | Age: 65
End: 2025-05-16
Attending: INTERNAL MEDICINE
Payer: MEDICARE

## 2025-05-16 LAB
ANION GAP SERPL CALC-SCNC: 6 MMOL/L (ref 0–18)
ANION GAP SERPL CALC-SCNC: 8 MMOL/L (ref 0–18)
BASE EXCESS BLD CALC-SCNC: -1.4 MMOL/L (ref ?–2)
BUN BLD-MCNC: 24 MG/DL (ref 9–23)
BUN BLD-MCNC: 28 MG/DL (ref 9–23)
BUN/CREAT SERPL: 13.8 (ref 10–20)
BUN/CREAT SERPL: 15.2 (ref 10–20)
CALCIUM BLD-MCNC: 8 MG/DL (ref 8.7–10.4)
CALCIUM BLD-MCNC: 8.5 MG/DL (ref 8.7–10.4)
CHLORIDE SERPL-SCNC: 106 MMOL/L (ref 98–112)
CHLORIDE SERPL-SCNC: 107 MMOL/L (ref 98–112)
CO2 SERPL-SCNC: 24 MMOL/L (ref 21–32)
CO2 SERPL-SCNC: 26 MMOL/L (ref 21–32)
CREAT BLD-MCNC: 1.74 MG/DL (ref 0.7–1.3)
CREAT BLD-MCNC: 1.84 MG/DL (ref 0.7–1.3)
DEPRECATED RDW RBC AUTO: 56.3 FL (ref 35.1–46.3)
DIGOXIN SERPL-MCNC: 1.7 NG/ML (ref 0.8–2)
EGFRCR SERPLBLD CKD-EPI 2021: 40 ML/MIN/1.73M2 (ref 60–?)
EGFRCR SERPLBLD CKD-EPI 2021: 43 ML/MIN/1.73M2 (ref 60–?)
ERYTHROCYTE [DISTWIDTH] IN BLOOD BY AUTOMATED COUNT: 14 % (ref 11–15)
GLUCOSE BLD-MCNC: 118 MG/DL (ref 70–99)
GLUCOSE BLD-MCNC: 126 MG/DL (ref 70–99)
HCO3 BLDA-SCNC: 23.8 MEQ/L (ref 21–27)
HCT VFR BLD AUTO: 40.6 % (ref 39–53)
HGB BLD-MCNC: 12.2 G/DL (ref 13–17.5)
LACTATE SERPL-SCNC: 1.7 MMOL/L (ref 0.5–2)
MCH RBC QN AUTO: 32.8 PG (ref 26–34)
MCHC RBC AUTO-ENTMCNC: 30 G/DL (ref 31–37)
MCV RBC AUTO: 109.1 FL (ref 80–100)
MODIFIED ALLEN TEST: POSITIVE
O2 CT BLD-SCNC: 14.3 VOL% (ref 15–23)
OSMOLALITY SERPL CALC.SUM OF ELEC: 293 MOSM/KG (ref 275–295)
OSMOLALITY SERPL CALC.SUM OF ELEC: 293 MOSM/KG (ref 275–295)
OXYGEN LITERS/MINUTE: 2 L/MIN
PCO2 BLDA: 57 MM HG (ref 35–45)
PH BLDA: 7.27 [PH] (ref 7.35–7.45)
PLATELET # BLD AUTO: 142 10(3)UL (ref 150–450)
PO2 BLDA: 80 MM HG (ref 80–100)
POTASSIUM SERPL-SCNC: 4.8 MMOL/L (ref 3.5–5.1)
POTASSIUM SERPL-SCNC: 5.7 MMOL/L (ref 3.5–5.1)
PUNCTURE CHARGE: YES
RBC # BLD AUTO: 3.72 X10(6)UL (ref 3.8–5.8)
SAO2 % BLDA: 96.6 % (ref 94–100)
SODIUM SERPL-SCNC: 138 MMOL/L (ref 136–145)
SODIUM SERPL-SCNC: 139 MMOL/L (ref 136–145)
TROPONIN I SERPL HS-MCNC: 21 NG/L (ref ?–53)
WBC # BLD AUTO: 7.5 X10(3) UL (ref 4–11)

## 2025-05-16 PROCEDURE — 99223 1ST HOSP IP/OBS HIGH 75: CPT | Performed by: INTERNAL MEDICINE

## 2025-05-16 PROCEDURE — 93306 TTE W/DOPPLER COMPLETE: CPT | Performed by: INTERNAL MEDICINE

## 2025-05-16 PROCEDURE — 5A09357 ASSISTANCE WITH RESPIRATORY VENTILATION, LESS THAN 24 CONSECUTIVE HOURS, CONTINUOUS POSITIVE AIRWAY PRESSURE: ICD-10-PCS | Performed by: INTERNAL MEDICINE

## 2025-05-16 PROCEDURE — 71045 X-RAY EXAM CHEST 1 VIEW: CPT | Performed by: INTERNAL MEDICINE

## 2025-05-16 RX ORDER — SODIUM CHLORIDE 9 MG/ML
INJECTION, SOLUTION INTRAVENOUS CONTINUOUS
Status: DISCONTINUED | OUTPATIENT
Start: 2025-05-16 | End: 2025-05-18

## 2025-05-16 RX ORDER — NALOXONE HYDROCHLORIDE 0.4 MG/ML
0.4 INJECTION, SOLUTION INTRAMUSCULAR; INTRAVENOUS; SUBCUTANEOUS ONCE
Status: COMPLETED | OUTPATIENT
Start: 2025-05-16 | End: 2025-05-16

## 2025-05-16 RX ORDER — MIDODRINE HYDROCHLORIDE 2.5 MG/1
2.5 TABLET ORAL 3 TIMES DAILY
Status: DISCONTINUED | OUTPATIENT
Start: 2025-05-16 | End: 2025-05-17

## 2025-05-16 RX ORDER — ACETAMINOPHEN 500 MG
1000 TABLET ORAL EVERY 6 HOURS PRN
Status: DISCONTINUED | OUTPATIENT
Start: 2025-05-16 | End: 2025-05-17

## 2025-05-16 NOTE — PLAN OF CARE
Alert and oriented x4 baseline -- Pt more drowsy today. Post-op day #1. Dressing in place to back neck. Receiving IV fluids per MD order. Voiding via urinal. Hemovac In place. SCDs for DVT prophylaxis. Pain medication provided as needed. Pt. on 2L NC. Up with 1 assist and a walker. Encouraged frequent ambulation and use of incentive spirometer. Fall precautions maintained- bed alarm on, bed locked in lowest position, call light and personal belongings within reach, non-skid socks in place to bilateral feet. Frequent rounding by nursing staff. Plan to move more with PT/OT.-- Pt is to use the Bipap at night for sleeping     Problem: Patient Centered Care  Goal: Patient preferences are identified and integrated in the patient's plan of care  Description: Interventions:  - What would you like us to know as we care for you?   - Provide timely, complete, and accurate information to patient/family  - Incorporate patient and family knowledge, values, beliefs, and cultural backgrounds into the planning and delivery of care  - Encourage patient/family to participate in care and decision-making at the level they choose  - Honor patient and family perspectives and choices  Outcome: Progressing       Problem: PAIN - ADULT  Goal: Verbalizes/displays adequate comfort level or patient's stated pain goal  Description: INTERVENTIONS:  - Encourage pt to monitor pain and request assistance  - Assess pain using appropriate pain scale  - Administer analgesics based on type and severity of pain and evaluate response  - Implement non-pharmacological measures as appropriate and evaluate response  - Consider cultural and social influences on pain and pain management  - Manage/alleviate anxiety  - Utilize distraction and/or relaxation techniques  - Monitor for opioid side effects  - Notify MD/LIP if interventions unsuccessful or patient reports new pain  - Anticipate increased pain with activity and pre-medicate as appropriate  Outcome:  Progressing     Problem: RISK FOR INFECTION - ADULT  Goal: Absence of fever/infection during anticipated neutropenic period  Description: INTERVENTIONS  - Monitor WBC  - Administer growth factors as ordered  - Implement neutropenic guidelines  Outcome: Progressing     Problem: SAFETY ADULT - FALL  Goal: Free from fall injury  Description: INTERVENTIONS:  - Assess pt frequently for physical needs  - Identify cognitive and physical deficits and behaviors that affect risk of falls.  - [place] fall precautions as indicated by assessment.  - Educate pt/family on patient safety including physical limitations  - Instruct pt to call for assistance with activity based on assessment  - Modify environment to reduce risk of injury  - Provide assistive devices as appropriate  - Consider OT/PT consult to assist with strengthening/mobility  - Encourage toileting schedule  Outcome: Progressing     Problem: DISCHARGE PLANNING  Goal: Discharge to home or other facility with appropriate resources  Description: INTERVENTIONS:  - Identify barriers to discharge w/pt and caregiver  - Include patient/family/discharge partner in discharge planning  - Arrange for needed discharge resources and transportation as appropriate  - Identify discharge learning needs (meds, wound care, etc)  - Arrange for interpreters to assist at discharge as needed  - Consider post-discharge preferences of patient/family/discharge partner  - Complete POLST form as appropriate  - Assess patient's ability to be responsible for managing their own health  - Refer to Case Management Department for coordinating discharge planning if the patient needs post-hospital services based on physician/LIP order or complex needs related to functional status, cognitive ability or social support system  Outcome: Progressing

## 2025-05-16 NOTE — PLAN OF CARE
Post op day 1. Hemovac in place to posterior neck. No acute changes overnight. Pain medication given as needed. IV fluids running per MD order. Voiding freely. Safety precautions maintained, bed locked and in lowest position, call light in reach. Frequent rounding by nursing staff. Plan for PT/OT and pain management.     Problem: Patient Centered Care  Goal: Patient preferences are identified and integrated in the patient's plan of care  Description: Interventions:  - What would you like us to know as we care for you?   - Provide timely, complete, and accurate information to patient/family  - Incorporate patient and family knowledge, values, beliefs, and cultural backgrounds into the planning and delivery of care  - Encourage patient/family to participate in care and decision-making at the level they choose  - Honor patient and family perspectives and choices  Outcome: Progressing     Problem: PAIN - ADULT  Goal: Verbalizes/displays adequate comfort level or patient's stated pain goal  Description: INTERVENTIONS:  - Encourage pt to monitor pain and request assistance  - Assess pain using appropriate pain scale  - Administer analgesics based on type and severity of pain and evaluate response  - Implement non-pharmacological measures as appropriate and evaluate response  - Consider cultural and social influences on pain and pain management  - Manage/alleviate anxiety  - Utilize distraction and/or relaxation techniques  - Monitor for opioid side effects  - Notify MD/LIP if interventions unsuccessful or patient reports new pain  - Anticipate increased pain with activity and pre-medicate as appropriate  Outcome: Not Progressing     Problem: RISK FOR INFECTION - ADULT  Goal: Absence of fever/infection during anticipated neutropenic period  Description: INTERVENTIONS  - Monitor WBC  - Administer growth factors as ordered  - Implement neutropenic guidelines  Outcome: Progressing     Problem: SAFETY ADULT - FALL  Goal: Free  from fall injury  Description: INTERVENTIONS:  - Assess pt frequently for physical needs  - Identify cognitive and physical deficits and behaviors that affect risk of falls.  - [place] fall precautions as indicated by assessment.  - Educate pt/family on patient safety including physical limitations  - Instruct pt to call for assistance with activity based on assessment  - Modify environment to reduce risk of injury  - Provide assistive devices as appropriate  - Consider OT/PT consult to assist with strengthening/mobility  - Encourage toileting schedule  Outcome: Progressing     Problem: DISCHARGE PLANNING  Goal: Discharge to home or other facility with appropriate resources  Description: INTERVENTIONS:  - Identify barriers to discharge w/pt and caregiver  - Include patient/family/discharge partner in discharge planning  - Arrange for needed discharge resources and transportation as appropriate  - Identify discharge learning needs (meds, wound care, etc)  - Arrange for interpreters to assist at discharge as needed  - Consider post-discharge preferences of patient/family/discharge partner  - Complete POLST form as appropriate  - Assess patient's ability to be responsible for managing their own health  - Refer to Case Management Department for coordinating discharge planning if the patient needs post-hospital services based on physician/LIP order or complex needs related to functional status, cognitive ability or social support system  Outcome: Progressing

## 2025-05-16 NOTE — CM/SW NOTE
Patient s/p C3-7 cervical laminoplasty. PT/OT evaluations pending.    CM sent tentative home healthcare referrals via [employer].  Orders and F2F entered.    / to remain available for support and/or discharge planning.     Plan: TBD - pending PT/OT, course of stay, medical clearance    Zaina Gates RN, BSN  Nurse   [phone]

## 2025-05-16 NOTE — PROGRESS NOTES
05/16/25 1215   BiPAP   Mode Spontaneous/Timed   Interface Full face mask   Mask Size Medium   Control Settings   Set Rate 12 breaths/min   Set IPAP 14   Set EPAP 5   Oxygen Percent 35 %     O2 sat 96%, -550 ml ,RR 20.

## 2025-05-16 NOTE — PROGRESS NOTES
Spine Surgery Progress Note     Interval history     Afebrile vital signs stable         Objective   Lab Results   Component Value Date    WBC 7.5 05/16/2025    HGB 12.2 05/16/2025    HCT 40.6 05/16/2025    .0 05/16/2025    CREATSERUM 1.74 05/16/2025    BUN 24 05/16/2025     05/16/2025    K 5.7 05/16/2025     05/16/2025    CO2 26.0 05/16/2025     05/16/2025    CA 8.5 05/16/2025        Vitals:    05/16/25 0923   BP:    Pulse: 67   Resp:    Temp:        Examination     D  B WE T FF HI        R  5 5 5 5 5 4+      L  5 5 5 5 5 4+       Sensation intact to light touch UE dermatomes bilateral    Drain to suction with ss output     Assessment  POD 1 cervical laminoplasty     Plan   -DVT ppx with SCD, stockings, and ambulation  -continue PT/OT eval and treatment  -postoperative radiograph  -regular diet   -pain control  -progress toward discharge   - drain to be dc after 3x shifts <30CC

## 2025-05-16 NOTE — H&P
[facility] Hospitalist H&P       CC: No chief complaint on file.       PCP: Antonio Martinez MD    Date of Admission: 5/15/2025 10:01 AM    ASSESSMENT / PLAN:     Mr. Huffman is a 65 yo M with PMH of Afib on Eliquis, TIA, HTN, HL, who presented for cervical surgery.     S/p C3-C& cervical laminoplasty  - PRN pain meds, Transition to PO when able  - monitor for acute blood loss anemia, pre-op Hg 12.9  - Bowel reg, antiemetics  - DVT Prophy- SCD  - PT/OT/SW   - as per surgery     Hypotension  - hypotensive this AM with sitting in chair  - s/p 500cc bolus x 2, getting 2nd liter bolus now  - BMP and ABG reviewed  - concern for hypovolemia + CO2 retention, no hx of LESLY  - if not improving, will transfer to ICU for possible pressors     Respiratory acidosis   - place on BIPAP now  - will give dose of narcan now  - pulm consult  - may need to transfer to PCU    FERNANDA  Hyperkalemia  - Cr 1.7 post op, pre op labs 0.7  - K 5.7   - s/p 2L IVF bolus  - will repeat labs    HTN  - hold home meds: losartan, metoprolol, torsemide    Afib  - hold Eliquis  - BB On hold due to hypotension    Hx TIA  - November 2024, had dysarthria at that time    Hypothyroidism  - synthroid 50 mcg daily    ACP  - CODE- FULL  - POA- son- Hitesh  - lives alone  - does have a fiance Maddi Lakhani who lives near by- asked that she be the  as she lives closer    FN:  - IVF: NS  - Diet: ADAT    DVT Prophy: SCD  Lines: PIV    Dispo: pending clinical course, may need transfer to PCU if remains hypotensive    Outpatient records or previous hospital records reviewed.     Further recommendations pending patient's clinical course.  [facility] hospitalist to continue to follow patient while in house    Patient and/or patient's family given opportunity to ask questions and note understanding and agreeing with therapeutic plan as outlined    Jeniffer Mcdonnell MD  [facility] Hospitalist  Answering Service number: [phone]    Critical care > 35 mins    HPI     History of  Present Illness:     Mr. Huffman is a 63 yo M with PMH of Afib on Eliquis, TIA, HTN, HL, who presented for cervical surgery. Patient seen on POD#1, noted to be hypotensive this AM after sitting up in chair and more lethargic. Patient able to answer questions, states he did not sleep well overnight. Denied chest pain, shortness of breath. Did feel a little dizzy with standing with PT this AM. Did mention he had a TIA in November 2024 and had difficulties with speech at that time, does not feel similar to TIA. Denies hx of LESLY. Lives alone. Dianna does think he snores and thought he had undiagnosed LESLY.      PMH  Past Medical History:    Arrhythmia    A.Fib    Atrial fibrillation (HCC)    Calculus of kidney    Depression    Disorder of thyroid    Essential hypertension    Gout    High blood pressure    High cholesterol    Hyperlipidemia    Neuropathy    bilateral feet    Numbness and tingling in both hands    Osteoarthritis    Stroke (HCC)    mini stroke-    Visual impairment    eyeglasses        PSH  Past Surgical History[1]     ALL:  Allergies[2]     Home Medications:  Medications Taking[3]      Soc Hx  Social History     Tobacco Use    Smoking status: Never    Smokeless tobacco: Never   Substance Use Topics    Alcohol use: Yes     Alcohol/week: 2.0 standard drinks of alcohol     Types: 2 Standard drinks or equivalent per week        Fam Hx  Family History[4]    Review of Systems  Comprehensive ROS reviewed and negative except for what's stated above.     OBJECTIVE:  BP (!) 80/41 (BP Location: Left arm)   Pulse 67   Temp 98.9 °F (37.2 °C) (Oral)   Resp 18   Ht 6' 3\" (1.905 m)   Wt (!) 309 lb (140.2 kg)   SpO2 94%   BMI 38.62 kg/m²     GEN: obese male in NAD, sleepy, able to answer questions but sleepy  HEENT: EOMI, PERRLA  Pulm: CTAB, no crackles or wheezes  CV: RRR, no murmurs  ABD: Soft, non-tender, non-distended, +BS  MSK: strength 5/5 in all extremities  Neuro: Grossly normal, CN intact, sensory  intact  SKIN: warm, dry  EXT: no edema    Diagnostic Data:    CBC/Chem    Recent Labs   Lab 05/16/25  0551   RBC 3.72*   HGB 12.2*   HCT 40.6   .1*   MCH 32.8   MCHC 30.0*   RDW 14.0   WBC 7.5   .0*         Recent Labs   Lab 05/16/25  0615   *   BUN 24*   CREATSERUM 1.74*   EGFRCR 43*   CA 8.5*      K 5.7*      CO2 26.0          No results for input(s): \"TROP\" in the last 168 hours.    Additional Diagnostics:      Radiology: No results found.           [1]   Past Surgical History:  Procedure Laterality Date    Anesth,achilles tendon surg Bilateral     Appendectomy      Colonoscopy      Fracture surgery Left     wrist    Gastric bypass,obesity,sb reconstruc  2000    Total knee replacement Bilateral 2021    Total shoulder arthroplasty Right 07/13/2022    Right total shoulder arthroplasty --Christiano   [2]   Allergies  Allergen Reactions    Ancef [Cefazolin] RENAL INSUFFICIENCY     Interstitial nephritis    Vancomycin HYPOTENSION     Per patient, he tolerated last treatment with Vancomycin.    Tigecycline FACE FLUSHING    Naproxen UNKNOWN     Patient cannot take d/t kidney dse.     Clindamycin RASH    Daptomycin RASH     Tolerating course of dapto in 8/2022   [3]   Outpatient Medications Marked as Taking for the 5/15/25 encounter (Hospital Encounter)   Medication Sig Dispense Refill    valsartan 80 MG Oral Tab Take 1 tablet (80 mg total) by mouth daily.      gabapentin 100 MG Oral Cap Take 3 capsules (300 mg total) by mouth 2 (two) times daily.      metoprolol succinate ER 50 MG Oral Tablet 24 Hr Take 1 tablet (50 mg total) by mouth daily.      mirtazapine 15 MG Oral Tab Take 1 tablet (15 mg total) by mouth nightly.      buPROPion  MG Oral Tablet 12 Hr Take 1 tablet (150 mg total) by mouth daily.      torsemide 10 MG Oral Tab Take 1 tablet (10 mg total) by mouth daily.      Omega-3 1000 MG Oral Cap Take 2,400 mg by mouth daily.      enoxaparin 150 MG/ML Injection Solution Prefilled  Syringe Inject 1.5 mg/kg into the skin every 12 (twelve) hours.      Fenofibrate 160 MG Oral Tab Take 1 tablet (160 mg total) by mouth daily.      Multiple Vitamin (ONE-DAILY MULTI VITAMINS) Oral Tab Take 1 tablet by mouth daily.      allopurinol 300 MG Oral Tab Take 1 tablet (300 mg total) by mouth in the morning.      digoxin 0.25 MG Oral Tab Take 1 tablet (0.25 mg total) by mouth in the morning.      atorvastatin 10 MG Oral Tab Take 1 tablet (10 mg total) by mouth nightly.      ELIQUIS 5 MG Oral Tab Take 1 tablet (5 mg total) by mouth in the morning and 1 tablet (5 mg total) before bedtime.      Levothyroxine Sodium 50 MCG Oral Tab Take 2 tablets (100 mcg total) by mouth before breakfast.     [4]   Family History  Problem Relation Age of Onset    Hypertension Father     Heart Disorder Father     Hypertension Mother

## 2025-05-16 NOTE — PHYSICAL THERAPY NOTE
PT orders received, chart reviewed. Spoke with MACHELLE Hook, pt receiving second bolus d/t low BP. Will reattempt later today as able.    Reattempted around 1130, pt still hypotensive in the 70s. Will continue to follow.

## 2025-05-16 NOTE — OCCUPATIONAL THERAPY NOTE
Orders received, chart reviewed for occupational therapy evaluation. Spoke with MACHELLE Hook  - two attempts this morning - pt continues to be hypotensive after x 2 bolus. SBP in 70s and not appropriate for activity at this time. Will continue to follow.

## 2025-05-17 ENCOUNTER — APPOINTMENT (OUTPATIENT)
Dept: ULTRASOUND IMAGING | Facility: HOSPITAL | Age: 65
End: 2025-05-17
Attending: HOSPITALIST
Payer: MEDICARE

## 2025-05-17 LAB
ANION GAP SERPL CALC-SCNC: 8 MMOL/L (ref 0–18)
BASE EXCESS BLD CALC-SCNC: -4.2 MMOL/L (ref ?–2)
BLOOD GAS EPAP: 5 CM H2O
BLOOD GAS IPAP: 14 CM H2O
BUN BLD-MCNC: 33 MG/DL (ref 9–23)
BUN/CREAT SERPL: 15.9 (ref 10–20)
CALCIUM BLD-MCNC: 7.6 MG/DL (ref 8.7–10.4)
CHLORIDE SERPL-SCNC: 106 MMOL/L (ref 98–112)
CO2 SERPL-SCNC: 22 MMOL/L (ref 21–32)
CREAT BLD-MCNC: 2.08 MG/DL (ref 0.7–1.3)
CREAT UR-SCNC: 115.4 MG/DL
DEPRECATED RDW RBC AUTO: 58.3 FL (ref 35.1–46.3)
DEPRECATED RDW RBC AUTO: 58.7 FL (ref 35.1–46.3)
EGFRCR SERPLBLD CKD-EPI 2021: 34 ML/MIN/1.73M2 (ref 60–?)
ERYTHROCYTE [DISTWIDTH] IN BLOOD BY AUTOMATED COUNT: 14.5 % (ref 11–15)
ERYTHROCYTE [DISTWIDTH] IN BLOOD BY AUTOMATED COUNT: 14.6 % (ref 11–15)
GLUCOSE BLD-MCNC: 196 MG/DL (ref 70–99)
HCO3 BLDA-SCNC: 21.6 MEQ/L (ref 21–27)
HCT VFR BLD AUTO: 33.2 % (ref 39–53)
HCT VFR BLD AUTO: 35.1 % (ref 39–53)
HGB BLD-MCNC: 10.3 G/DL (ref 13–17.5)
HGB BLD-MCNC: 9.7 G/DL (ref 13–17.5)
MCH RBC QN AUTO: 31.9 PG (ref 26–34)
MCH RBC QN AUTO: 32 PG (ref 26–34)
MCHC RBC AUTO-ENTMCNC: 29.2 G/DL (ref 31–37)
MCHC RBC AUTO-ENTMCNC: 29.3 G/DL (ref 31–37)
MCV RBC AUTO: 109 FL (ref 80–100)
MCV RBC AUTO: 109.2 FL (ref 80–100)
MODIFIED ALLEN TEST: POSITIVE
O2 CT BLD-SCNC: 13.4 VOL% (ref 15–23)
O2/TOTAL GAS SETTING VFR VENT: 30 %
OSMOLALITY SERPL CALC.SUM OF ELEC: 295 MOSM/KG (ref 275–295)
PCO2 BLDA: 45 MM HG (ref 35–45)
PH BLDA: 7.3 [PH] (ref 7.35–7.45)
PLATELET # BLD AUTO: 143 10(3)UL (ref 150–450)
PLATELET # BLD AUTO: 149 10(3)UL (ref 150–450)
PO2 BLDA: 144 MM HG (ref 80–100)
POTASSIUM SERPL-SCNC: 4.3 MMOL/L (ref 3.5–5.1)
PUNCTURE CHARGE: YES
RBC # BLD AUTO: 3.04 X10(6)UL (ref 3.8–5.8)
RBC # BLD AUTO: 3.22 X10(6)UL (ref 3.8–5.8)
SAO2 % BLDA: 97.3 % (ref 94–100)
SODIUM SERPL-SCNC: 136 MMOL/L (ref 136–145)
SODIUM SERPL-SCNC: 18 MMOL/L
WBC # BLD AUTO: 7.9 X10(3) UL (ref 4–11)
WBC # BLD AUTO: 8.1 X10(3) UL (ref 4–11)

## 2025-05-17 PROCEDURE — 99233 SBSQ HOSP IP/OBS HIGH 50: CPT | Performed by: INTERNAL MEDICINE

## 2025-05-17 PROCEDURE — 76770 US EXAM ABDO BACK WALL COMP: CPT | Performed by: HOSPITALIST

## 2025-05-17 RX ORDER — TRAMADOL HYDROCHLORIDE 50 MG/1
50 TABLET ORAL EVERY 6 HOURS PRN
Status: DISCONTINUED | OUTPATIENT
Start: 2025-05-17 | End: 2025-05-22

## 2025-05-17 RX ORDER — MIDODRINE HYDROCHLORIDE 5 MG/1
5 TABLET ORAL 3 TIMES DAILY
Status: DISCONTINUED | OUTPATIENT
Start: 2025-05-17 | End: 2025-05-22

## 2025-05-17 RX ORDER — ACETAMINOPHEN 500 MG
1000 TABLET ORAL EVERY 6 HOURS
Status: DISCONTINUED | OUTPATIENT
Start: 2025-05-17 | End: 2025-05-22

## 2025-05-17 RX ORDER — METOCLOPRAMIDE HYDROCHLORIDE 5 MG/ML
5 INJECTION INTRAMUSCULAR; INTRAVENOUS EVERY 8 HOURS PRN
Status: DISCONTINUED | OUTPATIENT
Start: 2025-05-17 | End: 2025-05-22

## 2025-05-17 RX ORDER — MIDODRINE HYDROCHLORIDE 5 MG/1
5 TABLET ORAL ONCE
Status: COMPLETED | OUTPATIENT
Start: 2025-05-17 | End: 2025-05-17

## 2025-05-17 NOTE — PHYSICAL THERAPY NOTE
Patient transferred to CCU for bradycardia and hypotension, will defer mobility evaluation this date. Thank you.

## 2025-05-17 NOTE — CONSULTS
Cardiology Consultation  [facility]    Reynaldo Huffman Patient Status:  Inpatient    1960 MRN [id number]   Location [facility] 4W/SW/SE Attending Papito Arcos MD   Hosp Day # 2 PCP Antonio Martinez MD     Primary Cardiologist: Dr. Ervin Skelton    Reason for Consultation:  bradycardia    History of Present Illness:  Reynaldo Huffman is a a(n) 64 year old male with Afib on Eliquis and dig, TIA, HTN, HL who presented for cervical surgery with post-op course complicated by hyhpotension, AMS, FERNANDA, bradycardia.  Digoxin level normal.  Beta-blocker has been held.  Patient denies chest pain, chest pressure, shortness of breath, LE edema.      Assessment/Plan:    Hypotension, less likely cardiogenic- warm in extremities. May be hypovolemia.  Afib, with slow ventricular response. On eliquis 5mg BID  - [id number] 5 (age, CVA, HTN, CAD of at least 50%).  CAD, non obstructive  TIA  HTN  HDL    - Hold dig, metoprolol. Can stop digoxin going forward.  - Hold home torsemide 10mg, metolazone 2.5mg weekly  - Agree with volume resucitation.  - If HR does not improve, can start dopamine gtt   - continue statin  - Tele  - Echo reviewed    Cardiac imaging:    TTE 25  1. Left ventricle: The cavity size was normal. Wall thickness was mildly      increased. Systolic function was probably normal. The estimated ejection      fraction was 55%, by visual assessment. No diagnostic evidence for      regional wall motion abnormalities. Unable to assess LV diastolic      function due to heart rhythm.   2. Right ventricle: The cavity size was increased. Systolic function was      reduced.   3. Ventricular septum: There was diastolic flattening.   4. Left atrium: The left atrial volume was markedly increased.   5. Right atrium: The atrium was dilated.   6. Pulmonary arteries: Systolic pressure was within the normal range,      estimated to be 15mm Hg.     Stress Test: 2024 [employer]    CONCLUSIONS   1. Abnormal  SPECT Myocardial Perfusion   -Evidence of previous transmural MI in the anterior segments with mild van-infarct ischemia. Level of certainty however is reduced; soft tissue attenuation could not be completely ruled out given technical quality of the study and patient's BMI.   -Consider cardiac CT for better evaluation     Coronary angiogram 2019  20% Proximal LAD, 30% Mid LAD, Right Dominant, beyond first septal  , another 50% LAD beyond D2, 10% prox. CX, 20% mid RCA, LVEDP 20-25, Right radial artery approach     History:  Past Medical History[1]  Past Surgical History[2]  Family History[3]   reports that he has never smoked. He has never used smokeless tobacco. He reports current alcohol use of about 2.0 standard drinks of alcohol per week. He reports that he does not use drugs.    Allergies:  Allergies[4]    Medications:  Medications Ordered Prior to Encounter[5]    Review of Systems:  Constitutional: denies fevers, chills, night sweats  HEENT: denies headache, vision changes, trouble or pain with swallowing  Cardiac: denies chest pain, palpitations, edema  Pulm: denies dyspnea, cough, wheeze  GI: denies n/v, abd pain, diarrhea or constipation  : denies hematuria, dysuria, incontinence  MSK: denies muscle or joint pains  Neuro: denies numbness, weakness, paresthesias  Psych: denies anxiety, depression  Integument: denies skin rashes or lesions  Heme: denies easy bruising or bleeding  Endo: denies heat/cold intolerance, skin or nail changes      Physical Exam:  Blood pressure 94/59, pulse (!) 46, temperature 98.9 °F (37.2 °C), temperature source Oral, resp. rate 20, height 6' 3\" (1.905 m), weight (!) 309 lb (140.2 kg), SpO2 98%.  Wt Readings from Last 3 Encounters:   05/15/25 (!) 309 lb (140.2 kg)   05/01/25 300 lb (136.1 kg)   12/13/24 297 lb (134.7 kg)       General: awake, alert, oriented x 3, no acute distress  HEENT: at/nc, perrl, eomi  Neck: No JVD, carotids 2+ no bruits.  Cardiac: seng and  irregularly irregular , S1, S2 normal, no murmur, rub or gallop.  Lungs: Clear without wheezes, rales, rhonchi or dullness.  Normal excursions and effort.  Abdomen: Soft, non-tender, non-distended, normal bowel sounds   Extremities: Without clubbing, cyanosis or edema.  Peripheral pulses are 2+.  Neurologic: Alert and oriented, normal affect.  Psych: normal mood and affect  Skin: Warm and dry.       Laboratories and Data:  Diagnostics:    Labs:   CBC:    Lab Results   Component Value Date    WBC 7.5 05/16/2025    WBC 5.5 08/25/2022    WBC 5.4 08/24/2022     Lab Results   Component Value Date    HGB 12.2 (L) 05/16/2025    HGB 8.6 (L) 08/25/2022    HGB 9.0 (L) 08/24/2022      Lab Results   Component Value Date    .0 (L) 05/16/2025    .0 08/25/2022    .0 08/24/2022     BMP:   No results found for: \"GLUCOSE\"  Lab Results   Component Value Date    K 4.3 05/17/2025    K 4.8 05/16/2025    K 5.7 (H) 05/16/2025     Lab Results   Component Value Date    BUN 33 (H) 05/17/2025    BUN 28 (H) 05/16/2025    BUN 24 (H) 05/16/2025     Lab Results   Component Value Date    CREATSERUM 2.08 (H) 05/17/2025    CREATSERUM 1.84 (H) 05/16/2025    CREATSERUM 1.74 (H) 05/16/2025     Cholesterol:   No results found for: \"CHOLEST\"No results found for: \"HDL\"No results found for: \"TRIG\", \"TRIGLY\"No results found for: \"LDL\"  Lab Results   Component Value Date    AST 15 08/23/2022    AST 19 07/24/2022    AST 34 10/28/2020     Lab Results   Component Value Date    ALT 11 (L) 08/23/2022    ALT 18 07/24/2022    ALT 16 10/28/2020           Dhiraj Sainz MD  Interventional Cardiology  [place]  5/17/2025  12:03 PM         [1]   Past Medical History:   Arrhythmia    A.Fib    Atrial fibrillation (HCC)    Calculus of kidney    Depression    Disorder of thyroid    Essential hypertension    Gout    High blood pressure    High cholesterol    Hyperlipidemia    Neuropathy    bilateral feet    Numbness and tingling in both hands     Osteoarthritis    Stroke (HCC)    mini stroke-    Visual impairment    eyeglasses   [2]   Past Surgical History:  Procedure Laterality Date    Anesth,achilles tendon surg Bilateral     Appendectomy      Colonoscopy      Fracture surgery Left     wrist    Gastric bypass,obesity,sb reconstruc  2000    Total knee replacement Bilateral 2021    Total shoulder arthroplasty Right 07/13/2022    Right total shoulder arthroplasty --Christiano   [3]   Family History  Problem Relation Age of Onset    Hypertension Father     Heart Disorder Father     Hypertension Mother    [4]   Allergies  Allergen Reactions    Ancef [Cefazolin] RENAL INSUFFICIENCY     Interstitial nephritis    Vancomycin HYPOTENSION     Per patient, he tolerated last treatment with Vancomycin.    Tigecycline FACE FLUSHING    Naproxen UNKNOWN     Patient cannot take d/t kidney dse.     Clindamycin RASH    Daptomycin RASH     Tolerating course of dapto in 8/2022   [5]   No current facility-administered medications on file prior to encounter.     Current Outpatient Medications on File Prior to Encounter   Medication Sig Dispense Refill    valsartan 80 MG Oral Tab Take 1 tablet (80 mg total) by mouth daily.      gabapentin 100 MG Oral Cap Take 3 capsules (300 mg total) by mouth 2 (two) times daily.      metoprolol succinate ER 50 MG Oral Tablet 24 Hr Take 1 tablet (50 mg total) by mouth daily.      mirtazapine 15 MG Oral Tab Take 1 tablet (15 mg total) by mouth nightly.      buPROPion  MG Oral Tablet 12 Hr Take 1 tablet (150 mg total) by mouth daily.      torsemide 10 MG Oral Tab Take 1 tablet (10 mg total) by mouth daily.      Omega-3 1000 MG Oral Cap Take 2,400 mg by mouth daily.      enoxaparin 150 MG/ML Injection Solution Prefilled Syringe Inject 1.5 mg/kg into the skin every 12 (twelve) hours.      Fenofibrate 160 MG Oral Tab Take 1 tablet (160 mg total) by mouth daily.      allopurinol 300 MG Oral Tab Take 1 tablet (300 mg total) by mouth in the morning.       digoxin 0.25 MG Oral Tab Take 1 tablet (0.25 mg total) by mouth in the morning.      atorvastatin 10 MG Oral Tab Take 1 tablet (10 mg total) by mouth nightly.      ELIQUIS 5 MG Oral Tab Take 1 tablet (5 mg total) by mouth in the morning and 1 tablet (5 mg total) before bedtime.      Levothyroxine Sodium 50 MCG Oral Tab Take 2 tablets (100 mcg total) by mouth before breakfast.      Sildenafil Citrate 100 MG Oral Tab Take 1 tablet (100 mg total) by mouth daily as needed.      Ferrous Sulfate 325 (65 Fe) MG Oral Tab Take 1 tablet (325 mg total) by mouth in the morning.      acetaminophen 500 MG Oral Tab Take 1-2 tablets (500-1,000 mg total) by mouth every 6 (six) hours as needed for Pain. 90 tablet 0

## 2025-05-17 NOTE — PROGRESS NOTES
[facility] Hospitalist Progress Note     CC: Hospital Follow up    PCP: Antonio Martinez MD       Assessment/Plan:     Principal Problem:    Cervical spinal stenosis  Active Problems:    Paroxysmal atrial fibrillation (HCC)    Hypertension    Thyroid disease    Hyperlipidemia    Gout    Hypothyroidism due to Hashimoto's thyroiditis    LESLY (obstructive sleep apnea)    Radiculopathy of cervical spine      Mr. Huffman is a 65 yo M with PMH of Afib on Eliquis, TIA, HTN, HL, who presented for cervical surgery.      S/p C3-C& cervical laminoplasty  - PRN pain meds, Transition to PO when able  - monitor for acute blood loss anemia, pre-op Hg 12.9  - Bowel reg, antiemetics  - DVT Prophy- SCD  - PT/OT/SW   - as per surgery      Hypotension  - hypotensive this AM with sitting in chair  - s/p 500cc bolus x 2, getting 2nd liter bolus now  - BMP and ABG reviewed  - concern for hypovolemia + CO2 retention, no hx of LESLY  - if not improving, will transfer to ICU for possible pressors      Respiratory acidosis   - s/p BIPAP; will use BIPAP with sleep and naps  - s/p narcan x 1 5/16  - pulm consult    AMS- improved  - likely 2/2 CO2 retention 2/2 undiagnosed LESLY/OHS + narcotics in the setting of FERNANDA  - strength 5/5, no focal neuro deficits     FERNANDA  Hyperkalemia  - pre-renal dehydration + ATN   - Cr 1.7 post op, pre op labs 0.7  - K 5.7, repeat improved after fluids  - s/p 2.5L IVF bolus  - Cr worsening, renal consult  - renal US, urine electroytes     HTN  - hold home meds: losartan, metoprolol, torsemide     Afib  - hold Eliquis- restart likely on POD#5 if ok with spine surgery  - BB On hold due to hypotension     Hx TIA  - November 2024, had dysarthria at that time  - restart AC when ok with surgery, likely POD#5     Hypothyroidism  - synthroid 50 mcg daily     ACP  - CODE- FULL  - POA- son- Hitesh  - lives alone  - does have a fiance Maddi Lkahani who lives near by- asked that she be the  as she lives closer     FN:  - IVF:  NS  - Diet: ADAT     DVT Prophy: SCD  Lines: PIV     Dispo: pending clinical course, may need transfer to PCU if remains hypotensive     Outpatient records or previous hospital records reviewed.      Further recommendations pending patient's clinical course.  [facility] hospitalist to continue to follow patient while in house     Patient and/or patient's family given opportunity to ask questions and note understanding and agreeing with therapeutic plan as outlined     Jeniffer Mcdonnell MD  [facility]  Answering Service number: [phone]     Subjective:     More awake today, BP low again overnight, refusing to wear BIPAP, states he will try to do it tonight    OBJECTIVE:    Blood pressure 95/47, pulse 63, temperature 98.9 °F (37.2 °C), temperature source Oral, resp. rate 20, height 6' 3\" (1.905 m), weight (!) 309 lb (140.2 kg), SpO2 95%.    Temp:  [98.3 °F (36.8 °C)-98.9 °F (37.2 °C)] 98.9 °F (37.2 °C)  Pulse:  [53-89] 63  Resp:  [16-22] 20  BP: ()/(41-61) 95/47  SpO2:  [95 %-100 %] 95 %      Intake/Output:    Intake/Output Summary (Last 24 hours) at 5/17/2025 1006  Last data filed at 5/17/2025 0500  Gross per 24 hour   Intake 720 ml   Output 965 ml   Net -245 ml       Last 3 Weights   05/15/25 1014 (!) 309 lb (140.2 kg)   05/12/25 1604 300 lb (136.1 kg)   05/01/25 1316 300 lb (136.1 kg)   12/13/24 0904 297 lb (134.7 kg)       Exam   GEN: obese male in NAD, more awake than yesterday, but sleepy at times, A&Ox3  HEENT: EOMI, PERRLA  Pulm: CTAB, no crackles or wheezes  CV: RRR, no murmurs  ABD: Soft, non-tender, non-distended, +BS  MSK: strength 5/5 in all extremities  Neuro: Grossly normal, CN intact, sensory intact  SKIN: warm, dry  EXT: no edema    Data Review:       Labs:     Recent Labs   Lab 05/16/25  0551   RBC 3.72*   HGB 12.2*   HCT 40.6   .1*   MCH 32.8   MCHC 30.0*   RDW 14.0   WBC 7.5   .0*         Recent Labs   Lab 05/16/25  0615 05/16/25  1210   * 126*   BUN 24* 28*    CREATSERUM 1.74* 1.84*   EGFRCR 43* 40*   CA 8.5* 8.0*    138   K 5.7* 4.8    106   CO2 26.0 24.0       No results for input(s): \"ALT\", \"AST\", \"ALB\", \"AMYLASE\", \"LIPASE\", \"LDH\" in the last 168 hours.    Invalid input(s): \"ALPHOS\", \"TBIL\", \"DBIL\", \"TPROT\"      Imaging:  XR CHEST AP PORTABLE  (CPT=71045)  Result Date: 5/16/2025  CONCLUSION:  1. Suboptimal inspiration.  Mild pulmonary vascular redistribution.  Otherwise negative     Dictated by (CST): Harshil Garcia MD on 5/16/2025 at 3:38 PM     Finalized by (CST): Harshil Garcia MD on 5/16/2025 at 3:40 PM          XR FLUOROSCOPY C-ARM TIME LESS THAN 1 HOUR (CPT=76000)  Result Date: 5/16/2025  CONCLUSION: Intraoperative exam. Please see operative report for further details.     Dictated by (CST): Barry Rutherford MD on 5/16/2025 at 10:00 AM     Finalized by (CST): Barry Rutherford MD on 5/16/2025 at 10:01 AM              Meds:     INPATIENT MEDICATIONS    Scheduled Medications:      Scheduled Meds[1]        Drips:  IV Meds[2]    PRN Medications  PRN Meds[3]                            [1] midodrine, 5 mg, TID  acetaminophen, 1,000 mg, q6h  sennosides, 17.2 mg, Nightly  docusate sodium, 100 mg, BID  allopurinol, 300 mg, Daily  atorvastatin, 10 mg, Nightly  buPROPion SR, 150 mg, Daily  digoxin, 0.25 mg, Daily  gabapentin, 300 mg, BID  levothyroxine, 100 mcg, Before breakfast  [Held by provider] metoprolol succinate ER, 50 mg, Daily  mirtazapine, 15 mg, Nightly  [Held by provider] torsemide, 10 mg, Daily  [Held by provider] losartan, 100 mg, Daily  [2] sodium chloride, Last Rate: 100 mL/hr at 05/17/25 0224  lactated ringers, Last Rate: Stopped (05/15/25 7074)  [3] traMADol, 50 mg, Q6H PRN  polyethylene glycol (PEG 3350), 17 g, Daily PRN  magnesium hydroxide, 30 mL, Daily PRN  bisacodyl, 10 mg, Daily PRN  fleet enema, 1 enema, Once PRN  ondansetron, 4 mg, Q6H PRN  metoclopramide, 10 mg, Q8H PRN  diphenhydrAMINE, 25 mg, Q4H PRN   Or  diphenhydrAMINE, 25 mg, Q4H  PRN  benzocaine-menthol, 1 lozenge, Q15 Min PRN  oxyCODONE, 5 mg, Q4H PRN   Or  oxyCODONE, 10 mg, Q4H PRN  [Held by provider] HYDROmorphone, 0.4 mg, Q2H PRN   Or  [Held by provider] HYDROmorphone, 0.8 mg, Q2H PRN  tiZANidine, 4 mg, Q8H PRN  diazePAM, 2 mg, Q6H PRN

## 2025-05-17 NOTE — PROGRESS NOTES
Pulmonary/ICU/Critical Care Progress Note        Reason for Consultation: lethargic and hypotensive  Referring Physician: Dr. Arcos      Subjective:  Transferred to CCU for bradycardia?  Was given dig this am  Wore BIPAP overnight  When seen in the CCU is alert awake, complains of neck pain. No other complaints      From the initial consultation  HPI: 63 yo male with s/p POD #1 C3-C7 laminoplasty on pain meds (last oxycodone 10 mg at 3 am), A-fib on eliquis (being held), TIA, HTN, HLP  This am was found to be hypotensive and somnolent this am s/p 1500 ml fluid bolus and IVF @ 100 ml/hr  Pt reports he didn't sleep last night  Per fiance, pt snores but never been tested for sleep apnea  He denies previous hx of smoking and not on inhalers, nebs, supp 02  Afebrile  BP meds held this am  ABG earlier showed hypercapnia started on BIPAP but pt removed now to speak with family at bedside  Was also given narcan earlier this am when he was somnolent  When seen this afternoon, he is much more awake and answering questions appropriately.   Repeat BP with SBP in mid 90s. Pt reports his BP runs on the lower side normally.      REVIEW OF SYSTEMS:  Positives and negatives as noted in HPI. All other review of systems otherwise are either limited (due to pt/family inability to provide) or negative.      PAST MEDICAL HISTORY:  Past Medical History[1]      PAST SURGICAL HISTORY:  Past Surgical History[2]      PAST SOCIAL HISTORY:  Social Hx on file[3]      PAST FAMILY HISTORY:  Family History[4]      ALLERGIES:  Allergies[5]      MEDS:  Home Medications:  Medications Taking[6]    Scheduled Medication:  Scheduled Medications[7]  Continuous Infusing Medication:  Medication Infusions[8]  PRN Medications:  PRN Medications[9]       PHYSICAL EXAM:  BP 94/53 (BP Location: Right arm)   Pulse 59   Temp 98.9 °F (37.2 °C) (Oral)   Resp 21   Ht 6' 3\" (1.905 m)   Wt (!) 309 lb (140.2 kg)   SpO2 98%   BMI 38.62 kg/m²      CONSTITUTIONAL:  alert, oriented, no apparent distress  HEENT: atraumatic normocephalic  MOUTH: on BIPAP (14/5)  NECK/THROAT: no JVD. Trachea midline. No obvious thyromegaly. + drain   LUNG: clear b/l no wheezing, crackles. Chest symmetric with respiratory motion  HEART: seng, regular rate and rhythm, no obvious murmers or gallops noted  ABD: soft non tender. + bowel sounds. No organomegaly noted  EXT: no clubbing, cyanosis, or edema noted. Pulses intact grossly  NEURO/MUSCULOSKELETAL: no gross deficits  SKIN: warm, dry. No obvious lesions noted  LYMPH: no obvious LAD      IMAGES:   CXR 5/16/25  1. Suboptimal inspiration.  Mild pulmonary vascular redistribution.  Otherwise negative        LABS:  Recent Labs   Lab 05/16/25  0551   RBC 3.72*   HGB 12.2*   HCT 40.6   .1*   MCH 32.8   MCHC 30.0*   RDW 14.0   WBC 7.5   .0*       Recent Labs   Lab 05/16/25  0615 05/16/25  1210 05/17/25  1026   * 126* 196*   BUN 24* 28* 33*   CREATSERUM 1.74* 1.84* 2.08*   EGFRCR 43* 40* 34*   CA 8.5* 8.0* 7.6*    138 136   K 5.7* 4.8 4.3    106 106   CO2 26.0 24.0 22.0       ASSESSMENT/PLAN:  Hypotension   -possibly secondary to meds vs hypovolemia. Less likely sepsis   -checked LA was normal   -checked troponin, TTE  -monitor on tele   -responded to IVF  -hold blood pressure meds and pain meds    Hx of afib  on eliquis  -eliquis being held  -check TTE  -pt on digoxin at home. Checked level  -now transferred to CCU for bradycardia. Hold dig and bb  -cards following     Acute hypercapnic respiratory failure  -possibly d/t pain meds and underlying LESLY?  -repeat ABG this am is improving  -continue PRN BIPAP and at night time/with naps as concern for LESLY   -outpt PSG    S/p POD c3-c7 laminoplasty  -ortho following  -minimize pain meds    FERNANDA on CKD?  -IVF  -monitor renal function and UO  -recommend renal consult     Proph  -DVT: SCDs    Dispo  -full code  -in CCU for bradycardia    Thank you for the opportunity to care for  Reynaldo Rodriguez DO, MPH  Pulmonary Critical Care Medicine  [facility] Critical [facility]                         [1]   Past Medical History:  Diagnosis Date    Arrhythmia     A.Fib    Atrial fibrillation (HCC)     Calculus of kidney     Depression     Disorder of thyroid     Essential hypertension     Gout     High blood pressure     High cholesterol     Hyperlipidemia     Neuropathy     bilateral feet    Numbness and tingling in both hands     Osteoarthritis     Stroke (HCC) 11/18/2024    mini stroke-    Visual impairment     eyeglasses   [2]   Past Surgical History:  Procedure Laterality Date    Anesth,achilles tendon surg Bilateral     Appendectomy      Colonoscopy      Fracture surgery Left     wrist    Gastric bypass,obesity,sb reconstruc  2000    Total knee replacement Bilateral 2021    Total shoulder arthroplasty Right 07/13/2022    Right total shoulder arthroplasty --Christiano   [3]   Social History  Socioeconomic History    Marital status:    Tobacco Use    Smoking status: Never    Smokeless tobacco: Never   Vaping Use    Vaping status: Never Used   Substance and Sexual Activity    Alcohol use: Yes     Alcohol/week: 2.0 standard drinks of alcohol     Types: 2 Standard drinks or equivalent per week    Drug use: Never   [4]   Family History  Problem Relation Age of Onset    Hypertension Father     Heart Disorder Father     Hypertension Mother    [5]   Allergies  Allergen Reactions    Ancef [Cefazolin] RENAL INSUFFICIENCY     Interstitial nephritis    Vancomycin HYPOTENSION     Per patient, he tolerated last treatment with Vancomycin.    Tigecycline FACE FLUSHING    Naproxen UNKNOWN     Patient cannot take d/t kidney dse.     Clindamycin RASH    Daptomycin RASH     Tolerating course of dapto in 8/2022   [6]   Outpatient Medications Marked as Taking for the 5/15/25 encounter (Hospital Encounter)   Medication Sig Dispense Refill    valsartan 80 MG Oral Tab  Take 1 tablet (80 mg total) by mouth daily.      gabapentin 100 MG Oral Cap Take 3 capsules (300 mg total) by mouth 2 (two) times daily.      metoprolol succinate ER 50 MG Oral Tablet 24 Hr Take 1 tablet (50 mg total) by mouth daily.      mirtazapine 15 MG Oral Tab Take 1 tablet (15 mg total) by mouth nightly.      buPROPion  MG Oral Tablet 12 Hr Take 1 tablet (150 mg total) by mouth daily.      torsemide 10 MG Oral Tab Take 1 tablet (10 mg total) by mouth daily.      Omega-3 1000 MG Oral Cap Take 2,400 mg by mouth daily.      enoxaparin 150 MG/ML Injection Solution Prefilled Syringe Inject 1.5 mg/kg into the skin every 12 (twelve) hours.      Fenofibrate 160 MG Oral Tab Take 1 tablet (160 mg total) by mouth daily.      Multiple Vitamin (ONE-DAILY MULTI VITAMINS) Oral Tab Take 1 tablet by mouth daily.      allopurinol 300 MG Oral Tab Take 1 tablet (300 mg total) by mouth in the morning.      digoxin 0.25 MG Oral Tab Take 1 tablet (0.25 mg total) by mouth in the morning.      atorvastatin 10 MG Oral Tab Take 1 tablet (10 mg total) by mouth nightly.      ELIQUIS 5 MG Oral Tab Take 1 tablet (5 mg total) by mouth in the morning and 1 tablet (5 mg total) before bedtime.      Levothyroxine Sodium 50 MCG Oral Tab Take 2 tablets (100 mcg total) by mouth before breakfast.     [7]    midodrine  5 mg Oral TID    acetaminophen  1,000 mg Oral q6h    sennosides  17.2 mg Oral Nightly    docusate sodium  100 mg Oral BID    allopurinol  300 mg Oral Daily    atorvastatin  10 mg Oral Nightly    buPROPion SR  150 mg Oral Daily    digoxin  0.25 mg Oral Daily    gabapentin  300 mg Oral BID    levothyroxine  100 mcg Oral Before breakfast    [Held by provider] metoprolol succinate ER  50 mg Oral Daily    mirtazapine  15 mg Oral Nightly    [Held by provider] torsemide  10 mg Oral Daily    [Held by provider] losartan  100 mg Oral Daily   [8]    sodium chloride 100 mL/hr at 05/17/25 0224    lactated ringers Stopped (05/15/25 1608)   [9]    traMADol    polyethylene glycol (PEG 3350)    magnesium hydroxide    bisacodyl    fleet enema    ondansetron    metoclopramide    diphenhydrAMINE **OR** diphenhydrAMINE    benzocaine-menthol    oxyCODONE **OR** oxyCODONE    [Held by provider] HYDROmorphone **OR** [Held by provider] HYDROmorphone    tiZANidine    diazePAM

## 2025-05-17 NOTE — PLAN OF CARE
Problem: Patient Centered Care  Goal: Patient preferences are identified and integrated in the patient's plan of care  Description: Interventions:  - What would you like us to know as we care for you?   - Provide timely, complete, and accurate information to patient/family  - Incorporate patient and family knowledge, values, beliefs, and cultural backgrounds into the planning and delivery of care  - Encourage patient/family to participate in care and decision-making at the level they choose  - Honor patient and family perspectives and choices  Outcome: Progressing     Problem: Patient/Family Goals  Goal: Patient/Family Long Term Goal  Description: Patient's Long Term Goal:     Interventions:  -   - See additional Care Plan goals for specific interventions  Outcome: Progressing  Goal: Patient/Family Short Term Goal  Description: Patient's Short Term Goal:     Interventions:     - See additional Care Plan goals for specific interventions  Outcome: Progressing     Problem: PAIN - ADULT  Goal: Verbalizes/displays adequate comfort level or patient's stated pain goal  Description: INTERVENTIONS:  - Encourage pt to monitor pain and request assistance  - Assess pain using appropriate pain scale  - Administer analgesics based on type and severity of pain and evaluate response  - Implement non-pharmacological measures as appropriate and evaluate response  - Consider cultural and social influences on pain and pain management  - Manage/alleviate anxiety  - Utilize distraction and/or relaxation techniques  - Monitor for opioid side effects  - Notify MD/LIP if interventions unsuccessful or patient reports new pain  - Anticipate increased pain with activity and pre-medicate as appropriate  Outcome: Progressing     Problem: RISK FOR INFECTION - ADULT  Goal: Absence of fever/infection during anticipated neutropenic period  Description: INTERVENTIONS  - Monitor WBC  - Administer growth factors as ordered  - Implement neutropenic  guidelines  Outcome: Progressing     Problem: SAFETY ADULT - FALL  Goal: Free from fall injury  Description: INTERVENTIONS:  - Assess pt frequently for physical needs  - Identify cognitive and physical deficits and behaviors that affect risk of falls.  - [place] fall precautions as indicated by assessment.  - Educate pt/family on patient safety including physical limitations  - Instruct pt to call for assistance with activity based on assessment  - Modify environment to reduce risk of injury  - Provide assistive devices as appropriate  - Consider OT/PT consult to assist with strengthening/mobility  - Encourage toileting schedule  Outcome: Progressing     Problem: DISCHARGE PLANNING  Goal: Discharge to home or other facility with appropriate resources  Description: INTERVENTIONS:  - Identify barriers to discharge w/pt and caregiver  - Include patient/family/discharge partner in discharge planning  - Arrange for needed discharge resources and transportation as appropriate  - Identify discharge learning needs (meds, wound care, etc)  - Arrange for interpreters to assist at discharge as needed  - Consider post-discharge preferences of patient/family/discharge partner  - Complete POLST form as appropriate  - Assess patient's ability to be responsible for managing their own health  - Refer to Case Management Department for coordinating discharge planning if the patient needs post-hospital services based on physician/LIP order or complex needs related to functional status, cognitive ability or social support system  Outcome: Progressing     Problem: CARDIOVASCULAR - ADULT  Goal: Maintains optimal cardiac output and hemodynamic stability  Description: INTERVENTIONS:  - Monitor vital signs, rhythm, and trends  - Monitor for bleeding, hypotension and signs of decreased cardiac output  - Evaluate effectiveness of vasoactive medications to optimize hemodynamic stability  - Monitor arterial and/or venous puncture sites for  bleeding and/or hematoma  - Assess quality of pulses, skin color and temperature  - Assess for signs of decreased coronary artery perfusion - ex. Angina  - Evaluate fluid balance, assess for edema, trend weights  Outcome: Progressing  Goal: Absence of cardiac arrhythmias or at baseline  Description: INTERVENTIONS:  - Continuous cardiac monitoring, monitor vital signs, obtain 12 lead EKG if indicated  - Evaluate effectiveness of antiarrhythmic and heart rate control medications as ordered  - Initiate emergency measures for life threatening arrhythmias  - Monitor electrolytes and administer replacement therapy as ordered  Outcome: Progressing     Problem: SKIN/TISSUE INTEGRITY - ADULT  Goal: Incision(s), wounds(s) or drain site(s) healing without S/S of infection  Description: INTERVENTIONS:  - Assess and document risk factors for pressure ulcer development  - Assess and document skin integrity  - Assess and document dressing/incision, wound bed, drain sites and surrounding tissue  - Implement wound care per orders  - Initiate isolation precautions as appropriate  - Initiate Pressure Ulcer prevention bundle as indicated  Outcome: Progressing     Problem: RESPIRATORY - ADULT  Goal: Achieves optimal ventilation and oxygenation  Description: INTERVENTIONS:  - Assess for changes in respiratory status  - Assess for changes in mentation and behavior  - Position to facilitate oxygenation and minimize respiratory effort  - Oxygen supplementation based on oxygen saturation or ABGs  - Provide Smoking Cessation handout, if applicable  - Encourage broncho-pulmonary hygiene including cough, deep breathe, Incentive Spirometry  - Assess the need for suctioning and perform as needed  - Assess and instruct to report SOB or any respiratory difficulty  - Respiratory Therapy support as indicated  - Manage/alleviate anxiety  - Monitor for signs/symptoms of CO2 retention  Outcome: Progressing

## 2025-05-17 NOTE — PROGRESS NOTES
Spine Surgery Progress Note     Interval history     Afebrile vital signs stable         Objective   Lab Results   Component Value Date    CREATSERUM 1.84 05/16/2025    BUN 28 05/16/2025     05/16/2025    K 4.8 05/16/2025     05/16/2025    CO2 24.0 05/16/2025     05/16/2025    CA 8.0 05/16/2025    TROPHS 21 05/16/2025        Vitals:    05/17/25 0615   BP: 102/60   Pulse:    Resp:    Temp:        Examination     D  B WE T FF HI        R  5 5 5 5 4+ 5      L  5 5 5 5 4+ 5       Sensation intact to light touch UE dermatomes bilateral    Drain to suction with ss output     Assessment  POD 1 cervical laminoplasty     Plan   -DVT ppx with SCD, stockings, and ambulation  -continue PT/OT eval and treatment  -postoperative radiograph  -regular diet   -pain control  -progress toward discharge   - keep  drain for today pending mobilization

## 2025-05-17 NOTE — PLAN OF CARE
Alert and oriented x4. 1L NC. BI-pap at night but refused and place on 4L high flow NC. Urinal. Tylenol and muscle relaxer for pain. Blood pressure low, MD notified. 1L bolus given and order for midodrine 2.5 three times a day. Hemovac q 8. Plan for home once medically cleared.  Problem: Patient Centered Care  Goal: Patient preferences are identified and integrated in the patient's plan of care  Description: Interventions:  - What would you like us to know as we care for you? Home alone  - Provide timely, complete, and accurate information to patient/family  - Incorporate patient and family knowledge, values, beliefs, and cultural backgrounds into the planning and delivery of care  - Encourage patient/family to participate in care and decision-making at the level they choose  - Honor patient and family perspectives and choices  Outcome: Progressing     Problem: Patient/Family Goals  Goal: Patient/Family Long Term Goal  Description: Patient's Long Term Goal: pain management    Interventions:  - pain administration  - See additional Care Plan goals for specific interventions  Outcome: Progressing  Goal: Patient/Family Short Term Goal  Description: Patient's Short Term Goal: calling for assistance to bathroom     Interventions:   - safe ambulation  - See additional Care Plan goals for specific interventions  Outcome: Progressing     Problem: PAIN - ADULT  Goal: Verbalizes/displays adequate comfort level or patient's stated pain goal  Description: INTERVENTIONS:  - Encourage pt to monitor pain and request assistance  - Assess pain using appropriate pain scale  - Administer analgesics based on type and severity of pain and evaluate response  - Implement non-pharmacological measures as appropriate and evaluate response  - Consider cultural and social influences on pain and pain management  - Manage/alleviate anxiety  - Utilize distraction and/or relaxation techniques  - Monitor for opioid side effects  - Notify MD/LIP if  interventions unsuccessful or patient reports new pain  - Anticipate increased pain with activity and pre-medicate as appropriate  Outcome: Progressing     Problem: RISK FOR INFECTION - ADULT  Goal: Absence of fever/infection during anticipated neutropenic period  Description: INTERVENTIONS  - Monitor WBC  - Administer growth factors as ordered  - Implement neutropenic guidelines  Outcome: Progressing     Problem: SAFETY ADULT - FALL  Goal: Free from fall injury  Description: INTERVENTIONS:  - Assess pt frequently for physical needs  - Identify cognitive and physical deficits and behaviors that affect risk of falls.  - [place] fall precautions as indicated by assessment.  - Educate pt/family on patient safety including physical limitations  - Instruct pt to call for assistance with activity based on assessment  - Modify environment to reduce risk of injury  - Provide assistive devices as appropriate  - Consider OT/PT consult to assist with strengthening/mobility  - Encourage toileting schedule  Outcome: Progressing     Problem: DISCHARGE PLANNING  Goal: Discharge to home or other facility with appropriate resources  Description: INTERVENTIONS:  - Identify barriers to discharge w/pt and caregiver  - Include patient/family/discharge partner in discharge planning  - Arrange for needed discharge resources and transportation as appropriate  - Identify discharge learning needs (meds, wound care, etc)  - Arrange for interpreters to assist at discharge as needed  - Consider post-discharge preferences of patient/family/discharge partner  - Complete POLST form as appropriate  - Assess patient's ability to be responsible for managing their own health  - Refer to Case Management Department for coordinating discharge planning if the patient needs post-hospital services based on physician/LIP order or complex needs related to functional status, cognitive ability or social support system  Outcome: Progressing

## 2025-05-17 NOTE — PLAN OF CARE
Problem: CARDIOVASCULAR - ADULT  Goal: Maintains optimal cardiac output and hemodynamic stability  Description: INTERVENTIONS:  - Monitor vital signs, rhythm, and trends  - Monitor for bleeding, hypotension and signs of decreased cardiac output  - Evaluate effectiveness of vasoactive medications to optimize hemodynamic stability  - Monitor arterial and/or venous puncture sites for bleeding and/or hematoma  - Assess quality of pulses, skin color and temperature  - Assess for signs of decreased coronary artery perfusion - ex. Angina  - Evaluate fluid balance, assess for edema, trend weights  Outcome: Progressing  Goal: Absence of cardiac arrhythmias or at baseline  Description: INTERVENTIONS:  - Continuous cardiac monitoring, monitor vital signs, obtain 12 lead EKG if indicated  - Evaluate effectiveness of antiarrhythmic and heart rate control medications as ordered  - Initiate emergency measures for life threatening arrhythmias  - Monitor electrolytes and administer replacement therapy as ordered  Outcome: Progressing     Problem: RESPIRATORY - ADULT  Goal: Achieves optimal ventilation and oxygenation  Description: INTERVENTIONS:  - Assess for changes in respiratory status  - Assess for changes in mentation and behavior  - Position to facilitate oxygenation and minimize respiratory effort  - Oxygen supplementation based on oxygen saturation or ABGs  - Provide Smoking Cessation handout, if applicable  - Encourage broncho-pulmonary hygiene including cough, deep breathe, Incentive Spirometry  - Assess the need for suctioning and perform as needed  - Assess and instruct to report SOB or any respiratory difficulty  - Respiratory Therapy support as indicated  - Manage/alleviate anxiety  - Monitor for signs/symptoms of CO2 retention  Outcome: Progressing   At 9:42am patient's HR dropped briefly to 28bpm. At that time patient was eating, and was choking on a dry toast. Patient was assisted to sit upright. Patient was able  to get the piece down. Patient remained awake, alert and oriented at all times. HR went up to 80s, O2 sats remained above 90% on 2L per NC. MD notified. SBP in the high 90s/low 100s. Midodrine and IVF administered as ordered.   Patient weaned off the O2 to RA while awake. Patient needs O2 per NC or BiPAP while sleeping. Patient denies having LESLY, and is refusing BiPAP at times. Educated and reinforced need for O2 or BiPAP while sleeping.   Dressing to posterior neck and hemovac in place. Pre-existing numbness and tingling to deni hands unchanged. Tylenol and gabapentin or pain.   Cervical XR to be completed when patient able to stand up.     Order to transfer to a higher level of care received d/t HR sustaining in the 30-40bpm and pauses on the monitor. Prior the transfer EKG, ABG and BMP completed.   Report given to Fariba CARTY. Patient transferred with all personal belongings. Friend Maddi aware of the transfer.

## 2025-05-17 NOTE — CONSULTS
[facility] - Nephrology  Report of Consultation    Reynaldo Huffman Patient Status:  Inpatient    1960 MRN [id number]   Location [facility] 2W/SW Attending Papito Arcos MD   Hosp Day # 2 PCP Antonio Martinez MD     Reason for consult: FERNANDA   Requesting physician: Papito Arocs MD   Date of consultation: 2025     HISTORY OF PRESENT ILLNESS:     Reynaldo Huffman is a 64 year old male with past medical history significant for CHF, atrial fibrillation, hypertension who is status post cervical laminoplasty .  Patient noted to have elevated serum creatinine which prompted nephrology consultation.  This morning the patient was noted to be hypotensive.  His blood pressure appears to have responded to IV fluid resuscitation.  On my visit the patient has no acute complaints.      REVIEW OF SYSTEMS:     Please see HPI for pertinent positives. 10 point review of systems otherwise reviewed and negative.     HISTORY:     Past Medical History[1]  Past Surgical History[2]  Family History[3]   reports that he has never smoked. He has never used smokeless tobacco. He reports current alcohol use of about 2.0 standard drinks of alcohol per week. He reports that he does not use drugs.    ALLERGIES:     Allergies[4]    MEDICATIONS:     Current Hospital Medications[5]    Prescriptions Prior to Admission[6]       PHYSICAL EXAM:     Vital Signs: /53 (BP Location: Right arm)   Pulse 57   Temp 98.9 °F (37.2 °C) (Oral)   Resp 20   Ht 6' 3\" (1.905 m)   Wt (!) 309 lb (140.2 kg)   SpO2 98%   BMI 38.62 kg/m²   Temp (24hrs), Av.5 °F (36.9 °C), Min:98.3 °F (36.8 °C), Max:98.9 °F (37.2 °C)       Intake/Output Summary (Last 24 hours) at 2025 1348  Last data filed at 2025 0500  Gross per 24 hour   Intake 240 ml   Output 465 ml   Net -225 ml     Wt Readings from Last 3 Encounters:   05/15/25 (!) 309 lb (140.2 kg)   25 300 lb (136.1 kg)   24 297 lb (134.7 kg)        General: NAD  HEENT: NCAT  Neck: Supple  Cardiac: Regular rate and rhythm, no murmurs  Lungs: CTAB, no crackles   Abdomen: Soft, non-tender, non-distended  Extremities: no LE edema   Neurologic/Psych: mentating well    LABORATORY DATA:       Lab Results   Component Value Date     (H) 05/17/2025    BUN 33 (H) 05/17/2025    BUNCREA 15.9 05/17/2025    CREATSERUM 2.08 (H) 05/17/2025    ANIONGAP 8 05/17/2025    GFRNAA 18 (L) 08/01/2022    GFRAA 21 (L) 08/01/2022    CA 7.6 (L) 05/17/2025    OSMOCALC 295 05/17/2025    ALKPHO 117 08/23/2022    AST 15 08/23/2022    ALT 11 (L) 08/23/2022    BILT 0.5 08/23/2022    TP 6.5 08/23/2022    ALB 2.6 (L) 08/23/2022    GLOBULIN 3.9 08/23/2022     05/17/2025    K 4.3 05/17/2025     05/17/2025    CO2 22.0 05/17/2025     Lab Results   Component Value Date    WBC 7.5 05/16/2025    RBC 3.72 (L) 05/16/2025    HGB 12.2 (L) 05/16/2025    HCT 40.6 05/16/2025    .0 (L) 05/16/2025    .1 (H) 05/16/2025    MCH 32.8 05/16/2025    MCHC 30.0 (L) 05/16/2025    RDW 14.0 05/16/2025    NEPRELIM 3.10 08/25/2022    NEUTABS 18.24 (H) 07/31/2022    LYMPHABS 0.38 (L) 07/31/2022    EOSABS 0.00 07/31/2022    BASABS 0.00 07/31/2022    NEUT 93 07/31/2022    LYMPH 2 07/31/2022    MON 0 07/31/2022    EOS 0 07/31/2022    BASO 0 07/31/2022    NEPERCENT 56.1 08/25/2022    LYPERCENT 26.4 08/25/2022    MOPERCENT 13.9 08/25/2022    EOPERCENT 2.5 08/25/2022    BAPERCENT 0.2 08/25/2022    NE 3.10 08/25/2022    LYMABS 1.46 08/25/2022    MOABSO 0.77 08/25/2022    EOABSO 0.14 08/25/2022    BAABSO 0.01 08/25/2022     Lab Results   Component Value Date    CREUR 63.00 07/28/2022     Lab Results   Component Value Date    COLORUR Yellow 07/30/2022    CLARITY Clear 07/30/2022    SPECGRAVITY 1.010 07/30/2022    GLUUR Negative 07/30/2022    BILUR Negative 07/30/2022    KETUR Negative 07/30/2022    BLOODURINE Trace-lysed (A) 07/30/2022    PHURINE 5.5 07/30/2022    PROUR 30 mg/dL (A) 07/30/2022     UROBILINOGEN 0.2 07/30/2022    NITRITE Negative 07/30/2022    LEUUR Negative 07/30/2022    NMIC Microscopic not indicated 07/28/2022    WBCUR 1-5 07/30/2022    WBCUR 1-5 07/30/2022    RBCUR 0-2 07/30/2022    RBCUR 0-2 07/30/2022    EPIUR Few (A) 07/30/2022    EPIUR Few (A) 07/30/2022    BACUR Rare (A) 07/30/2022    BACUR Rare (A) 07/30/2022    HYLUR 13 (H) 05/19/2020           Impression:     #.  FERNANDA - ATN   - intraoperative hypotension 5/15 noted, this was intermittently sustained through today morning     #.  CKD stage 3A -cardiorenal  - Baseline serum creatinine appears to be around 1.4 to 1.5 mg/dL    #.  Hyperkalemia   - Due to FERNANDA, hypotension, and resultant diminished kaliuresis    #.  Acidosis   - Initially respiratory, now likely mild metabolic in patient with likely chronic CO2 retention    #.  Proteinuria   - Minimal when last checked 60mg/g; outpatient f/u   - Consider future SGLT2    #.  HFpEF  #.  HTN   - PTA regimen: Losartan, metoprolol, torsemide    #.  Anemia       Recommendations:    Supportive care for ATN  Continue IV fluid hydration with 0.9NS  Check PVR  Renal US pending  Check CBC to ensure blood loss not contributing to hypotension   Check urine lytes   Hold BP RX   Strict I/Os   Avoid IV contrast unless emergent       Juan Huang, DO   [facility]   Nephrology             [1]   Past Medical History:   Arrhythmia    A.Fib    Atrial fibrillation (HCC)    Calculus of kidney    Depression    Disorder of thyroid    Essential hypertension    Gout    High blood pressure    High cholesterol    Hyperlipidemia    Neuropathy    bilateral feet    Numbness and tingling in both hands    Osteoarthritis    Stroke (HCC)    mini stroke-    Visual impairment    eyeglasses   [2]   Past Surgical History:  Procedure Laterality Date    Anesth,achilles tendon surg Bilateral     Appendectomy      Colonoscopy      Fracture surgery Left     wrist    Gastric bypass,obesity,sb reconstruc  2000    Total knee  replacement Bilateral 2021    Total shoulder arthroplasty Right 07/13/2022    Right total shoulder arthroplasty --Christiano   [3]   Family History  Problem Relation Age of Onset    Hypertension Father     Heart Disorder Father     Hypertension Mother    [4]   Allergies  Allergen Reactions    Ancef [Cefazolin] RENAL INSUFFICIENCY     Interstitial nephritis    Vancomycin HYPOTENSION     Per patient, he tolerated last treatment with Vancomycin.    Tigecycline FACE FLUSHING    Naproxen UNKNOWN     Patient cannot take d/t kidney dse.     Clindamycin RASH    Daptomycin RASH     Tolerating course of dapto in 8/2022   [5]   Current Facility-Administered Medications:     midodrine (ProAmatine) tab 5 mg, 5 mg, Oral, TID    traMADol (Ultram) tab 50 mg, 50 mg, Oral, Q6H PRN    acetaminophen (Tylenol Extra Strength) tab 1,000 mg, 1,000 mg, Oral, q6h    metoclopramide (Reglan) 5 mg/mL injection 5 mg, 5 mg, Intravenous, Q8H PRN    sodium chloride 0.9% infusion, , Intravenous, Continuous    lactated ringers infusion, , Intravenous, Continuous    sennosides (Senokot) tab 17.2 mg, 17.2 mg, Oral, Nightly    docusate sodium (Colace) cap 100 mg, 100 mg, Oral, BID    polyethylene glycol (PEG 3350) (Miralax) 17 g oral packet 17 g, 17 g, Oral, Daily PRN    magnesium hydroxide (Milk of Magnesia) 400 MG/5ML oral suspension 30 mL, 30 mL, Oral, Daily PRN    bisacodyl (Dulcolax) 10 MG rectal suppository 10 mg, 10 mg, Rectal, Daily PRN    fleet enema (Fleet) rectal enema 133 mL, 1 enema, Rectal, Once PRN    ondansetron (Zofran) 4 MG/2ML injection 4 mg, 4 mg, Intravenous, Q6H PRN    diphenhydrAMINE (Benadryl) cap/tab 25 mg, 25 mg, Oral, Q4H PRN **OR** diphenhydrAMINE (Benadryl) 50 mg/mL  injection 25 mg, 25 mg, Intravenous, Q4H PRN    benzocaine-menthol (Cepacol) lozenge 1 lozenge, 1 lozenge, Buccal, Q15 Min PRN    oxyCODONE immediate release tab 5 mg, 5 mg, Oral, Q4H PRN **OR** oxyCODONE immediate release tab 10 mg, 10 mg, Oral, Q4H PRN    [Held  by provider] HYDROmorphone (Dilaudid) 1 MG/ML injection 0.4 mg, 0.4 mg, Intravenous, Q2H PRN **OR** [Held by provider] HYDROmorphone (Dilaudid) 1 MG/ML injection 0.8 mg, 0.8 mg, Intravenous, Q2H PRN    tiZANidine (Zanaflex) tab 4 mg, 4 mg, Oral, Q8H PRN    diazePAM (Valium) tab 2 mg, 2 mg, Oral, Q6H PRN    allopurinol (Zyloprim) tab 300 mg, 300 mg, Oral, Daily    atorvastatin (Lipitor) tab 10 mg, 10 mg, Oral, Nightly    buPROPion SR (WELLBUTRIN SR) 12 hr tab 150 mg, 150 mg, Oral, Daily    [Held by provider] digoxin (Lanoxin) tab 0.25 mg, 0.25 mg, Oral, Daily    gabapentin (Neurontin) cap 300 mg, 300 mg, Oral, BID    levothyroxine (Synthroid) tab 100 mcg, 100 mcg, Oral, Before breakfast    [Held by provider] metoprolol succinate ER (Toprol XL) 24 hr tab 50 mg, 50 mg, Oral, Daily    mirtazapine (Remeron) tab 15 mg, 15 mg, Oral, Nightly    [Held by provider] torsemide (Demadex) tab 10 mg, 10 mg, Oral, Daily    [Held by provider] losartan (Cozaar) tab 100 mg, 100 mg, Oral, Daily  [6]   Medications Prior to Admission   Medication Sig Dispense Refill Last Dose/Taking    valsartan 80 MG Oral Tab Take 1 tablet (80 mg total) by mouth daily.   5/12/2025    gabapentin 100 MG Oral Cap Take 3 capsules (300 mg total) by mouth 2 (two) times daily.   5/15/2025 at  7:30 AM    metoprolol succinate ER 50 MG Oral Tablet 24 Hr Take 1 tablet (50 mg total) by mouth daily.   5/12/2025    mirtazapine 15 MG Oral Tab Take 1 tablet (15 mg total) by mouth nightly.   5/12/2025    buPROPion  MG Oral Tablet 12 Hr Take 1 tablet (150 mg total) by mouth daily.   5/14/2025 at  8:00 AM    torsemide 10 MG Oral Tab Take 1 tablet (10 mg total) by mouth daily.   5/12/2025    Omega-3 1000 MG Oral Cap Take 2,400 mg by mouth daily.   5/12/2025    enoxaparin 150 MG/ML Injection Solution Prefilled Syringe Inject 1.5 mg/kg into the skin every 12 (twelve) hours.   5/14/2025 at  8:00 PM    Fenofibrate 160 MG Oral Tab Take 1 tablet (160 mg total) by mouth  daily.   5/12/2025    Multiple Vitamin (ONE-DAILY MULTI VITAMINS) Oral Tab Take 1 tablet by mouth daily.   5/12/2025    allopurinol 300 MG Oral Tab Take 1 tablet (300 mg total) by mouth in the morning.   5/12/2025    digoxin 0.25 MG Oral Tab Take 1 tablet (0.25 mg total) by mouth in the morning.   5/12/2025    atorvastatin 10 MG Oral Tab Take 1 tablet (10 mg total) by mouth nightly.   5/12/2025    ELIQUIS 5 MG Oral Tab Take 1 tablet (5 mg total) by mouth in the morning and 1 tablet (5 mg total) before bedtime.   5/8/2025    Levothyroxine Sodium 50 MCG Oral Tab Take 2 tablets (100 mcg total) by mouth before breakfast.   5/13/2025    Sildenafil Citrate 100 MG Oral Tab Take 1 tablet (100 mg total) by mouth daily as needed.   More than a month    Ferrous Sulfate 325 (65 Fe) MG Oral Tab Take 1 tablet (325 mg total) by mouth in the morning.   More than a month    acetaminophen 500 MG Oral Tab Take 1-2 tablets (500-1,000 mg total) by mouth every 6 (six) hours as needed for Pain. 90 tablet 0 More than a month

## 2025-05-18 LAB
ANION GAP SERPL CALC-SCNC: 9 MMOL/L (ref 0–18)
BUN BLD-MCNC: 31 MG/DL (ref 9–23)
BUN/CREAT SERPL: 15.3 (ref 10–20)
CALCIUM BLD-MCNC: 7.9 MG/DL (ref 8.7–10.4)
CHLORIDE SERPL-SCNC: 107 MMOL/L (ref 98–112)
CO2 SERPL-SCNC: 22 MMOL/L (ref 21–32)
CREAT BLD-MCNC: 2.03 MG/DL (ref 0.7–1.3)
EGFRCR SERPLBLD CKD-EPI 2021: 35 ML/MIN/1.73M2 (ref 60–?)
GLUCOSE BLD-MCNC: 127 MG/DL (ref 70–99)
GLUCOSE BLDC GLUCOMTR-MCNC: 136 MG/DL (ref 70–99)
OSMOLALITY SERPL CALC.SUM OF ELEC: 294 MOSM/KG (ref 275–295)
POTASSIUM SERPL-SCNC: 4.3 MMOL/L (ref 3.5–5.1)
Q-T INTERVAL: 402 MS
QRS DURATION: 88 MS
QTC CALCULATION (BEZET): 347 MS
R AXIS: 100 DEGREES
SODIUM SERPL-SCNC: 138 MMOL/L (ref 136–145)
T AXIS: 66 DEGREES
TROPONIN I SERPL HS-MCNC: 16 NG/L (ref ?–53)
VENTRICULAR RATE: 45 BPM

## 2025-05-18 PROCEDURE — 99233 SBSQ HOSP IP/OBS HIGH 50: CPT | Performed by: INTERNAL MEDICINE

## 2025-05-18 NOTE — PROGRESS NOTES
After Visit Summary   1/15/2018    Noe Florence    MRN: [id number]           Patient Information     Date Of Birth          1937        Visit Information        Provider Department      1/15/2018 9:40 AM Roberto Sarmiento MD [facility]        Today's Diagnoses     Left foot drop    -  1    Hypertension, unspecified type        Coronary artery disease involving native coronary artery, angina presence unspecified, unspecified whether native or transplanted heart        Anemia, unspecified type        Elevated sed rate        Hyperlipidemia, unspecified hyperlipidemia type        Essential hypertension        Coronary artery disease involving native heart without angina pectoris, unspecified vessel or lesion type        Balance problems        Physical deconditioning        Impacted cerumen of right ear        Sensorineural hearing loss (SNHL) of both ears        Tinnitus, bilateral          Care Instructions    Primary Care Center: [phone]     Primary Care Center Medication Refill Request Information:  * Please contact your pharmacy regarding ANY request for medication refills.  ** [facility] Prescription Fax = [phone]  * Please allow 3 business days for routine medication refills.  * Please allow 5 business days for controlled substance medication refills.     Primary Care Center Test Result notification information:  *You will be notified with in 7-10 days of your appointment day regarding the results of your test.  If you are on MyChart you will be notified as soon as the provider has reviewed the results and signed off on them.    Audiology [phone] ([facility] 4th Floor S)   Orthotics/Prosthetics [phone] ([address], [address])   Primary Care Center [phone] ([facility], 4th Floor N) Follow up in 6 months.     Please go to the lab on the first floor before you leave today.               Follow-ups after your visit        Additional Services     AUDIOLOGY  [facility] Hospitalist Progress Note     CC: Hospital Follow up    PCP: Antonio Martinez MD       Assessment/Plan:     Principal Problem:    Cervical spinal stenosis  Active Problems:    Paroxysmal atrial fibrillation (HCC)    Hypertension    Thyroid disease    Hyperlipidemia    Gout    Hypothyroidism due to Hashimoto's thyroiditis    LESLY (obstructive sleep apnea)    Radiculopathy of cervical spine    Mr. Huffman is a 65 yo M with PMH of Afib on Eliquis, TIA, HTN, HL, who presented for cervical surgery.      S/p C3-C& cervical laminoplasty  - PRN pain meds, Transition to PO when able  - monitor for acute blood loss anemia, pre-op Hg 12.9  - Bowel reg, antiemetics  - DVT Prophy- SCD  - PT/OT/SW   - as per surgery      Hypotension- improved  - hypotensive on POD#1 with sitting in chair  - s/p 2.5L IVF bolus     Respiratory acidosis   - s/p BIPAP; will use BIPAP with sleep and naps  - s/p narcan x 1 5/16  - pulm consult  - outpatient sleep study    AMS- improved  - likely 2/2 CO2 retention 2/2 undiagnosed LESLY/OHS + narcotics in the setting of FERNANDA  - strength 5/5, no focal neuro deficits     FERNANDA on CKD3  Hyperkalemia  - pre-renal dehydration + ATN   - Cr 1.7 post op, pre op labs 1.4  - K 5.7, repeat improved after fluids  - s/p 2.5L IVF bolus  - Cr worsening, renal consult  - renal US, urine electroytes     HTN  - hold home meds: losartan, metoprolol, torsemide     Afib with SVR  - hold Eliquis- restart likely on POD#5 if ok with spine surgery  - BB On hold due to hypotension  - hold digoxin  - cardiology consulted, recommend discontinuing digoxin      Hx TIA  - November 2024, had dysarthria at that time  - restart AC when ok with surgery, likely POD#5     Hypothyroidism  - synthroid 50 mcg daily     ACP  - CODE- FULL  - POA- son- Hitesh  - lives alone  - does have a fiance Maddi Lakhani who lives near by- asked that she be the  as she lives closer     FN:  - IVF: NS  - Diet: ADAT     DVT Prophy: SCD  Lines: PIV      Dispo: transfer to [facility]     Outpatient records or previous hospital records reviewed.      Further recommendations pending patient's clinical course.  DM hospitalist to continue to follow patient while in house     Patient and/or patient's family given opportunity to ask questions and note understanding and agreeing with therapeutic plan as outlined     Jeniffer Mcdonnell MD  [facility] Hospitalist  Answering Service number: [phone]     Subjective:     Transferred to PCU yesterday due to bradycardia, more awake today, BP improving.     OBJECTIVE:    Blood pressure 114/56, pulse 57, temperature 97.7 °F (36.5 °C), temperature source Temporal, resp. rate 20, height 6' 3\" (1.905 m), weight (!) 367 lb 15.2 oz (166.9 kg), SpO2 96%.    Temp:  [96.7 °F (35.9 °C)-98.4 °F (36.9 °C)] 97.7 °F (36.5 °C)  Pulse:  [46-72] 57  Resp:  [15-25] 20  BP: ()/(48-78) 114/56  SpO2:  [90 %-100 %] 96 %      Intake/Output:    Intake/Output Summary (Last 24 hours) at 5/18/2025 0941  Last data filed at 5/18/2025 0659  Gross per 24 hour   Intake 1934.5 ml   Output 1007 ml   Net 927.5 ml       Last 3 Weights   05/18/25 0400 (!) 367 lb 15.2 oz (166.9 kg)   05/15/25 1014 (!) 309 lb (140.2 kg)   05/12/25 1604 300 lb (136.1 kg)   05/01/25 1316 300 lb (136.1 kg)   12/13/24 0904 297 lb (134.7 kg)       Exam   GEN: obese male in NAD, awake and alert   HEENT: EOMI, PERRLA  Pulm: CTAB, no crackles or wheezes  CV: RRR, no murmurs  ABD: Soft, non-tender, non-distended, +BS  MSK: strength 5/5 in all extremities  Neuro: Grossly normal, CN intact, sensory intact  SKIN: warm, dry  EXT: no edema    Data Review:       Labs:     Recent Labs   Lab 05/16/25  0551 05/17/25  1026 05/17/25  1727   RBC 3.72* 3.04* 3.22*   HGB 12.2* 9.7* 10.3*   HCT 40.6 33.2* 35.1*   .1* 109.2* 109.0*   MCH 32.8 31.9 32.0   MCHC 30.0* 29.2* 29.3*   RDW 14.0 14.6 14.5   WBC 7.5 7.9 8.1   .0* 149.0* 143.0*         Recent Labs   Lab 05/16/25  1210 05/17/25  1026  ADULT REFERRAL       Your provider [facility] to: [facility] [place] [phone]   [email or web address]    Specialty Testing:  Audiogram w/Tymps and Reflexes (Comprehensive Audiology Evaluation)            ORTHOTICS REFERRAL       **This referral order prints off in the [facility]  (Orthotics & Prosthetics) Central Scheduling Office**    The [facility] Central Scheduling Staff will contact the patient to schedule appointments.     Central Scheduling Contact Information: [phone] ([place])    Orthotics: Left AFO (Ankle Foot Orthosis)     Please be aware that coverage of these services is subject to the terms and limitations of your health insurance plan.  Call member services at your health plan with any benefit or coverage questions.      Please bring the following to your appointment:    >>   Any x-rays, CTs or MRIs which have been performed.  Contact the facility where they were done to arrange for  prior to your scheduled appointment.    >>   List of current medications   >>   This referral request   >>   Any documents/labs given to you for this referral            PHYSICAL THERAPY REFERRAL       *This therapy referral will be filtered to a centralized scheduling office at [facility] and the patient will receive a call to schedule an appointment at a [facility] location most convenient for them. *     [facility] provides Physical Therapy evaluation and treatment and many specialty services across the [facility] system.  If requesting a specialty program, please choose from the list below.    If you have not heard from the scheduling office within 2 business days, please call [phone] for all locations, with the exception of [place], please call [phone].  Treatment: Evaluation & Treatment  Special Instructions/Modalities:  "Evaluate and treat per PT     Special Programs: strenghtening    Please be aware that coverage of these services is subject to the terms and limitations of your health insurance plan.  Call member services at your health plan with any benefit or coverage questions.      **Note to Provider:  If you are referring outside of [facility] for the therapy appointment, please list the name of the location in the \"special instructions\" above, print the referral and give to the patient to schedule the appointment.                  Follow-up notes from your care team     Return in about 6 months (around 7/15/2018).      Your next 10 appointments already scheduled     Quentin 15, 2018 11:45 AM CST   LAB with  LAB    Health Lab ([facility])    [address] [place] [address]  1st [facility] [address] [phone]           Please do not eat 10-12 hours before your appointment if you are coming in fasting for labs on lipids, cholesterol, or glucose (sugar). This does not apply to pregnant women. Water, hot tea and black coffee (with nothing added) are okay. Do not drink other fluids, diet soda or chew gum.            Feb 12, 2018  3:30 PM CST   (Arrive by 3:15 PM)   Return Visit with Lashawn Jackson MD   [facility] Dermatology ([facility])    [address] [place] [address]  3rd [facility] [id number] [phone]            Feb 13, 2018  9:00 AM CST   (Arrive by 8:45 AM)   Hearing Evaluation with Nick Frazier   [facility] Audiology ([facility])    [address] [place] [address]  4th [facility] [id number] [phone]           Please see your medical professional for ear cleaning prior to this appointment if you believe wax buildup may be an issue. All patients are required to have a physician's order stating the medical reason for the hearing test. Your doctor can send an electronic order, use their own form or we have provided a form " 05/18/25  0354   * 196* 127*   BUN 28* 33* 31*   CREATSERUM 1.84* 2.08* 2.03*   EGFRCR 40* 34* 35*   CA 8.0* 7.6* 7.9*    136 138   K 4.8 4.3 4.3    106 107   CO2 24.0 22.0 22.0       No results for input(s): \"ALT\", \"AST\", \"ALB\", \"AMYLASE\", \"LIPASE\", \"LDH\" in the last 168 hours.    Invalid input(s): \"ALPHOS\", \"TBIL\", \"DBIL\", \"TPROT\"      Imaging:  XR CHEST AP PORTABLE  (CPT=71045)  Result Date: 5/16/2025  CONCLUSION:  1. Suboptimal inspiration.  Mild pulmonary vascular redistribution.  Otherwise negative     Dictated by (CST): Harshil Garcia MD on 5/16/2025 at 3:38 PM     Finalized by (CST): Harshil Garcia MD on 5/16/2025 at 3:40 PM          XR FLUOROSCOPY C-ARM TIME LESS THAN 1 HOUR (CPT=76000)  Result Date: 5/16/2025  CONCLUSION: Intraoperative exam. Please see operative report for further details.     Dictated by (CST): Barry Rutherford MD on 5/16/2025 at 10:00 AM     Finalized by (CST): Barry Rutherford MD on 5/16/2025 at 10:01 AM              Meds:     INPATIENT MEDICATIONS    Scheduled Medications:      Scheduled Meds[1]        Drips:  IV Meds[2]    PRN Medications  PRN Meds[3]                            [1] midodrine, 5 mg, TID  acetaminophen, 1,000 mg, q6h  sennosides, 17.2 mg, Nightly  docusate sodium, 100 mg, BID  allopurinol, 300 mg, Daily  atorvastatin, 10 mg, Nightly  buPROPion SR, 150 mg, Daily  [Held by provider] digoxin, 0.25 mg, Daily  gabapentin, 300 mg, BID  levothyroxine, 100 mcg, Before breakfast  [Held by provider] metoprolol succinate ER, 50 mg, Daily  mirtazapine, 15 mg, Nightly  [Held by provider] torsemide, 10 mg, Daily  [Held by provider] losartan, 100 mg, Daily     [2] sodium chloride, Last Rate: 100 mL/hr at 05/18/25 0715     [3] traMADol, 50 mg, Q6H PRN  metoclopramide, 5 mg, Q8H PRN  polyethylene glycol (PEG 3350), 17 g, Daily PRN  magnesium hydroxide, 30 mL, Daily PRN  bisacodyl, 10 mg, Daily PRN  fleet enema, 1 enema, Once PRN  ondansetron, 4 mg, Q6H PRN  diphenhydrAMINE,  (called Physician's Order for Audiology Services). It states that there is a medical reason for your exam. Without an order you may need to be rescheduled until the order can be obtained.            Mar 30, 2018 11:15 AM CDT   LAB with  LAB   [facility] Lab ([facility])    [address] [place] [id number] [phone]           Please do not eat 10-12 hours before your appointment if you are coming in fasting for labs on lipids, cholesterol, or glucose (sugar). This does not apply to pregnant women. Water, hot tea and black coffee (with nothing added) are okay. Do not drink other fluids, diet soda or chew gum.            Mar 30, 2018 11:40 AM CDT   (Arrive by 11:25 AM)   CT CHEST/ABDOMEN/PELVIS W CONTRAST with UCCT2   [facility] CT ([facility])    [address] [place] [id number] [phone]           Please bring any scans or X-rays taken at other hospitals, if similar tests were done. Also bring a list of your medicines, including vitamins, minerals and over-the-counter drugs. It is safest to leave personal items at home.  Be sure to tell your doctor:   If you have any allergies.   If there s any chance you are pregnant.   If you are breastfeeding.   If you have any special needs.  You may have contrast for this exam. To prepare:   Do not eat or drink for 2 hours before your exam. If you need to take medicine, you may take it with small sips of water. (We may ask you to take liquid medicine as well.)   The day before your exam, drink extra fluids at least six 8-ounce glasses (unless your doctor tells you to restrict your fluids).  Patients over 70 or patients with diabetes or kidney problems:   If you haven t had a blood test (creatinine test) within the last 30 days, go to your clinic or Diagnostic Imaging Department for this test.  If you have diabetes:   If your kidney function is normal,  25 mg, Q4H PRN   Or  diphenhydrAMINE, 25 mg, Q4H PRN  benzocaine-menthol, 1 lozenge, Q15 Min PRN  oxyCODONE, 5 mg, Q4H PRN   Or  oxyCODONE, 10 mg, Q4H PRN  [Held by provider] HYDROmorphone, 0.4 mg, Q2H PRN   Or  [Held by provider] HYDROmorphone, 0.8 mg, Q2H PRN  tiZANidine, 4 mg, Q8H PRN  diazePAM, 2 mg, Q6H PRN        continue taking your metformin (Avandamet, Glucophage, Glucovance, Metaglip) on the day of your exam.   If your kidney function is abnormal, wait 48 hours before restarting this medicine.  You will have oral contrast for this exam:   You will drink the contrast at home. Get this from your clinic or Diagnostic Imaging Department. Please follow the directions given.  Please wear loose clothing, such as a sweat suit or jogging clothes. Avoid snaps, zippers and other metal. We may ask you to undress and put on a hospital gown.  If you have any questions, please call the Imaging Department where you will have your exam.            Apr 02, 2018 10:45 AM CDT   (Arrive by 10:30 AM)   Return Visit with Francisco Gilliam MD   [facility] ([facility])    [address] [place] [address]  [address]  [facility] [address]   [phone]            May 29, 2018  4:00 PM CDT   (Arrive by 3:45 PM)   Return Vascular Visit with Frida Desai MD   [facility])    [address] [place] [address]  [address]  [facility] [address]   [phone]            Jun 04, 2018  9:00 AM CDT   (Arrive by 8:45 AM)   Implanted Defibulator with Uc Cv Device 1   [facility])    [address] [place] [address]  [address]  [facility] [address]   [phone]            Aug 06, 2018 10:00 AM CDT   (Arrive by 9:45 AM)   Return Visit with Roberto Sarmiento MD   [facility] ([facility])    [address] [place] [address]  4th Floor  [facility] [address]   [phone]              Future tests that were ordered for you today     Open Future Orders        Priority Expected Expires Ordered    Erythrocyte sedimentation rate auto Routine  1/15/2019 1/15/2018    CRP inflammation Routine  1/15/2019 1/15/2018    Protein electrophoresis Routine 1/15/2018 1/15/2019 1/15/2018    Lipid Profile Routine   1/15/2019 1/15/2018    Vitamin B12 Routine  1/15/2019 1/15/2018    Reticulocyte count Routine  1/15/2019 1/15/2018    Folate Routine  1/15/2019 1/15/2018    Methylmalonic acid Routine  1/15/2019 1/15/2018    CBC with platelets differential Routine  1/15/2019 1/15/2018            Who to contact     Please call your clinic at [phone] to:    Ask questions about your health    Make or cancel appointments    Discuss your medicines    Learn about your test results    Speak to your doctor   If you have compliments or concerns about an experience at your clinic, or if you wish to file a complaint, please contact [facility] Patient Relations at [phone] or email us at [email or web address]         Additional Information About Your Visit        [employer] Information     [employer] gives you secure access to your electronic health record. If you see a primary care provider, you can also send messages to your care team and make appointments. If you have questions, please call your primary care clinic.  If you do not have a primary care provider, please call [phone] and they will assist you.      [employer] is an electronic gateway that provides easy, online access to your medical records. With [employer], you can request a clinic appointment, read your test results, renew a prescription or communicate with your care team.     To access your existing account, please contact your [facility] or call [phone] for assistance.        Care EveryWhere ID     This is your Care EveryWhere ID. This could be used by other organizations to access your [facility] medical records  [id number]        Your Vitals Were     Pulse Respirations BMI (Body Mass Index)             79 16 23.42 kg/m2          Blood Pressure from Last 3 Encounters:   01/15/18 123/67   12/21/17 105/64   12/05/17 130/66    Weight from Last 3 Encounters:   01/15/18 67.8 kg (149 lb 8 oz)   12/21/17  68.2 kg (150 lb 6.4 oz)   12/05/17 68.9 kg (152 lb)              We Performed the Following     AUDIOLOGY ADULT REFERRAL     ORTHOTICS REFERRAL     PHYSICAL THERAPY REFERRAL     REMOVE IMPACTED CERUMEN          Where to get your medicines      These medications were sent to [facility] [place], [place] - [address]  [address] [facility] [address]     Phone:  [phone]     atorvastatin 40 MG tablet    doxazosin 2 MG tablet    metoprolol tartrate 25 MG tablet          Primary Care Provider Office Phone # Fax #    Roberto Sarmiento [phone]       [address] [place] [address]  [facility] [address]        Equal Access to Services     LEE CASTILLO : Hadii giovanny olsen hadasho Soarnoldali, waaxda luqadaha, qaybta kaalmada adeegyada, waxay flavioin hayalena minor. So [facility] [phone].    ATENCIÓN: Si habla español, tiene a aguirre disposición servicios gratuitos de asistencia lingüística. Llame al [phone].    We comply with applicable federal civil rights laws and [place] laws. We do not discriminate on the basis of race, color, national origin, age, disability, sex, sexual orientation, or gender identity.            Thank you!     Thank you for choosing [facility]  for your care. Our goal is always to provide you with excellent care. Hearing back from our patients is one way we can continue to improve our services. Please take a few minutes to complete the written survey that you may receive in the mail after your visit with us. Thank you!             Your Updated Medication List - Protect others around you: Learn how to safely use, store and throw away your medicines at www.disposemymeds.org.          This list is accurate as of: 1/15/18 11:44 AM.  Always use your most recent med list.                   Brand Name Dispense Instructions for use Diagnosis    aspirin 81 MG tablet      Take 1 tablet by mouth daily.         atorvastatin 40 MG tablet    LIPITOR    100 tablet    Take 1 tablet (40 mg) by mouth daily    Hyperlipidemia, unspecified hyperlipidemia type       bacitracin ointment     28 g    Apply topically 2 times daily APPLY TO LEFT LEG TWO TIMES PER DAY    Left leg cellulitis       Blood Pressure Monitor Kit     1 kit    1 Units daily    Hypertension       CENTRUM SILVER per tablet      Take 1 tablet by mouth daily. AM        ciclopirox 0.77 % cream    LOPROX    90 g    Apply topically 2 times daily To feet and toenails.    Dermatophytosis of nail, Tinea pedis of both feet       cyanocobalamin 1000 MCG tablet    vitamin  B-12    180 tablet    Take 2 tablets (2,000 mcg) by mouth daily    Anemia       doxazosin 2 MG tablet    CARDURA    90 tablet    Take 1/2 tab in the morning and 1/2 tab in the evening    Essential hypertension       fluticasone 50 MCG/ACT spray    FLONASE    3 Package    Spray 2 sprays into both nostrils daily    Unspecified sinusitis (chronic)       ketoconazole 2 % cream    NIZORAL    60 g    Apply topically daily On hold    Tinea corporis       loratadine 10 MG tablet    CLARITIN     Take 10 mg by mouth as needed        metoprolol tartrate 25 MG tablet    LOPRESSOR    180 tablet    Take 1 tablet (25 mg) by mouth 2 times daily    Coronary artery disease involving native heart without angina pectoris, unspecified vessel or lesion type       mirtazapine 15 MG tablet    REMERON     Take 15 mg by mouth At Bedtime.        order for DME     1 Units    20-30 mm Hg knee length compression stockings.  Measure and fit.  Style and color per patient preference.  Doff n Aracelis per patient need.    PVD (peripheral vascular disease) (H), Ulcer of left lower extremity, limited to breakdown of skin (H)       triamcinolone 0.1 % cream    KENALOG    80 g    Apply topically 2 times daily For up to two weeks in a row    Atopic eczema       warfarin 5 MG tablet    COUMADIN    35 tablet    7.5 mg on Wed; 5 mg all other days  or  as instructed by coumadin clinic.    Atrial flutter (H)       ZOLOFT 100 MG tablet   Generic drug:  sertraline      Take 100 mg by mouth every evening.

## 2025-05-18 NOTE — PROGRESS NOTES
Spine Surgery Progress Note     Interval history     Transferred to CCU for bradycardia        Objective   Lab Results   Component Value Date    WBC 8.1 05/17/2025    HGB 10.3 05/17/2025    HCT 35.1 05/17/2025    .0 05/17/2025    CREATSERUM 2.03 05/18/2025    BUN 31 05/18/2025     05/18/2025    K 4.3 05/18/2025     05/18/2025    CO2 22.0 05/18/2025     05/18/2025    CA 7.9 05/18/2025        Vitals:    05/18/25 0600   BP: 114/56   Pulse: 57   Resp: 20   Temp:        Examination     D  B WE T FF HI        R  5 5 5 5 4+ 5      L  5 5 5 5 4+ 5       Sensation intact to light touch UE dermatomes bilateral    Drain to suction with ss output     Assessment  POD 1 cervical laminoplasty     Plan   -DVT ppx with SCD, stockings, and ambulation  -continue PT/OT eval and treatment  -postoperative radiograph  -regular diet   -pain control  -progress toward discharge   - keep  drain for today pending mobilization

## 2025-05-18 NOTE — PROGRESS NOTES
Pulmonary/ICU/Critical Care Progress Note        Reason for Consultation: lethargic and hypotensive  Referring Physician: Dr. Arcos      Subjective:  HR better today   On RA  Sitting in a chair, soft collar on      From the initial consultation  HPI: 65 yo male with s/p POD #1 C3-C7 laminoplasty on pain meds (last oxycodone 10 mg at 3 am), A-fib on eliquis (being held), TIA, HTN, HLP  This am was found to be hypotensive and somnolent this am s/p 1500 ml fluid bolus and IVF @ 100 ml/hr  Pt reports he didn't sleep last night  Per fiance, pt snores but never been tested for sleep apnea  He denies previous hx of smoking and not on inhalers, nebs, supp 02  Afebrile  BP meds held this am  ABG earlier showed hypercapnia started on BIPAP but pt removed now to speak with family at bedside  Was also given narcan earlier this am when he was somnolent  When seen this afternoon, he is much more awake and answering questions appropriately.   Repeat BP with SBP in mid 90s. Pt reports his BP runs on the lower side normally.      REVIEW OF SYSTEMS:  Positives and negatives as noted in HPI. All other review of systems otherwise are either limited (due to pt/family inability to provide) or negative.      PAST MEDICAL HISTORY:  Past Medical History[1]      PAST SURGICAL HISTORY:  Past Surgical History[2]      PAST SOCIAL HISTORY:  Social Hx on file[3]      PAST FAMILY HISTORY:  Family History[4]      ALLERGIES:  Allergies[5]      MEDS:  Home Medications:  Medications Taking[6]    Scheduled Medication:  Scheduled Medications[7]  Continuous Infusing Medication:  Medication Infusions[8]  PRN Medications:  PRN Medications[9]       PHYSICAL EXAM:  /73 (BP Location: Right arm)   Pulse 75   Temp 97.8 °F (36.6 °C) (Temporal)   Resp 19   Ht 6' 3\" (1.905 m)   Wt (!) 367 lb 15.2 oz (166.9 kg)   SpO2 94%   BMI 45.99 kg/m²      CONSTITUTIONAL: alert, oriented, no apparent distress  HEENT: atraumatic normocephalic  MOUTH:  mmm  NECK/THROAT: + soft collar + drain   LUNG: clear b/l no wheezing, crackles. Chest symmetric with respiratory motion  HEART: regular rate and rhythm, no obvious murmers or gallops noted  ABD: soft non tender. + bowel sounds. No organomegaly noted  EXT: no clubbing, cyanosis, or edema noted. Pulses intact grossly  NEURO/MUSCULOSKELETAL: no gross deficits  SKIN: warm, dry. No obvious lesions noted  LYMPH: no obvious LAD      IMAGES:   CXR 5/16/25  1. Suboptimal inspiration.  Mild pulmonary vascular redistribution.  Otherwise negative        LABS:  Recent Labs   Lab 05/16/25  0551 05/17/25  1026 05/17/25  1727   RBC 3.72* 3.04* 3.22*   HGB 12.2* 9.7* 10.3*   HCT 40.6 33.2* 35.1*   .1* 109.2* 109.0*   MCH 32.8 31.9 32.0   MCHC 30.0* 29.2* 29.3*   RDW 14.0 14.6 14.5   WBC 7.5 7.9 8.1   .0* 149.0* 143.0*       Recent Labs   Lab 05/16/25  1210 05/17/25  1026 05/18/25  0354   * 196* 127*   BUN 28* 33* 31*   CREATSERUM 1.84* 2.08* 2.03*   EGFRCR 40* 34* 35*   CA 8.0* 7.6* 7.9*    136 138   K 4.8 4.3 4.3    106 107   CO2 24.0 22.0 22.0       ASSESSMENT/PLAN:  Hypotension   -possibly secondary to meds vs hypovolemia. Less likely sepsis   -checked LA was normal   -checked troponin, TTE  -monitor on tele   -responded to IVF  -hold blood pressure meds and pain meds    Hx of afib  on eliquis  -eliquis being held  -check TTE  -pt on digoxin at home. Checked level  -was transferred to CCU for bradycardia. Hold dig and bb  -cards following   -overall, vitals stable     Acute hypercapnic respiratory failure  -possibly d/t pain meds and underlying LESLY?  -continue PRN BIPAP and at night time/with naps as concern for LESLY. Pt refused last night  -outpt PSG    S/p POD c3-c7 laminoplasty  -ortho following  -minimize pain meds    FERNANDA on CKD?  -IVF  -monitor renal function and UO    Proph  -DVT: SCDs    Dispo  -full code  -can transfer out of ICU    Thank you for the opportunity to care for Reynaldo Meyer  Naif Rodriguez DO, MPH  Pulmonary Critical Care Medicine  [facility]monary [facility] Critical Care Medicine                         [1]   Past Medical History:  Diagnosis Date    Arrhythmia     A.Fib    Atrial fibrillation (HCC)     Calculus of kidney     Depression     Disorder of thyroid     Essential hypertension     Gout     High blood pressure     High cholesterol     Hyperlipidemia     Neuropathy     bilateral feet    Numbness and tingling in both hands     Osteoarthritis     Stroke (HCC) 11/18/2024    mini stroke-    Visual impairment     eyeglasses   [2]   Past Surgical History:  Procedure Laterality Date    Anesth,achilles tendon surg Bilateral     Appendectomy      Colonoscopy      Fracture surgery Left     wrist    Gastric bypass,obesity,sb reconstruc  2000    Total knee replacement Bilateral 2021    Total shoulder arthroplasty Right 07/13/2022    Right total shoulder arthroplasty --Christiano   [3]   Social History  Socioeconomic History    Marital status:    Tobacco Use    Smoking status: Never    Smokeless tobacco: Never   Vaping Use    Vaping status: Never Used   Substance and Sexual Activity    Alcohol use: Yes     Alcohol/week: 2.0 standard drinks of alcohol     Types: 2 Standard drinks or equivalent per week    Drug use: Never   [4]   Family History  Problem Relation Age of Onset    Hypertension Father     Heart Disorder Father     Hypertension Mother    [5]   Allergies  Allergen Reactions    Ancef [Cefazolin] RENAL INSUFFICIENCY     Interstitial nephritis    Vancomycin HYPOTENSION     Per patient, he tolerated last treatment with Vancomycin.    Tigecycline FACE FLUSHING    Naproxen UNKNOWN     Patient cannot take d/t kidney dse.     Clindamycin RASH    Daptomycin RASH     Tolerating course of dapto in 8/2022   [6]   Outpatient Medications Marked as Taking for the 5/15/25 encounter (Hospital Encounter)   Medication Sig Dispense Refill    valsartan 80 MG Oral Tab Take 1 tablet  (80 mg total) by mouth daily.      gabapentin 100 MG Oral Cap Take 3 capsules (300 mg total) by mouth 2 (two) times daily.      metoprolol succinate ER 50 MG Oral Tablet 24 Hr Take 1 tablet (50 mg total) by mouth daily.      mirtazapine 15 MG Oral Tab Take 1 tablet (15 mg total) by mouth nightly.      buPROPion  MG Oral Tablet 12 Hr Take 1 tablet (150 mg total) by mouth daily.      torsemide 10 MG Oral Tab Take 1 tablet (10 mg total) by mouth daily.      Omega-3 1000 MG Oral Cap Take 2,400 mg by mouth daily.      enoxaparin 150 MG/ML Injection Solution Prefilled Syringe Inject 1.5 mg/kg into the skin every 12 (twelve) hours.      Fenofibrate 160 MG Oral Tab Take 1 tablet (160 mg total) by mouth daily.      Multiple Vitamin (ONE-DAILY MULTI VITAMINS) Oral Tab Take 1 tablet by mouth daily.      allopurinol 300 MG Oral Tab Take 1 tablet (300 mg total) by mouth in the morning.      digoxin 0.25 MG Oral Tab Take 1 tablet (0.25 mg total) by mouth in the morning.      atorvastatin 10 MG Oral Tab Take 1 tablet (10 mg total) by mouth nightly.      ELIQUIS 5 MG Oral Tab Take 1 tablet (5 mg total) by mouth in the morning and 1 tablet (5 mg total) before bedtime.      Levothyroxine Sodium 50 MCG Oral Tab Take 2 tablets (100 mcg total) by mouth before breakfast.     [7]    midodrine  5 mg Oral TID    acetaminophen  1,000 mg Oral q6h    sennosides  17.2 mg Oral Nightly    docusate sodium  100 mg Oral BID    allopurinol  300 mg Oral Daily    atorvastatin  10 mg Oral Nightly    buPROPion SR  150 mg Oral Daily    [Held by provider] digoxin  0.25 mg Oral Daily    gabapentin  300 mg Oral BID    levothyroxine  100 mcg Oral Before breakfast    [Held by provider] metoprolol succinate ER  50 mg Oral Daily    mirtazapine  15 mg Oral Nightly    [Held by provider] torsemide  10 mg Oral Daily    [Held by provider] losartan  100 mg Oral Daily   [8]    sodium chloride 100 mL/hr at 05/18/25 0715   [9]   traMADol    metoclopramide     polyethylene glycol (PEG 3350)    magnesium hydroxide    bisacodyl    fleet enema    ondansetron    diphenhydrAMINE **OR** diphenhydrAMINE    benzocaine-menthol    oxyCODONE **OR** oxyCODONE    [Held by provider] HYDROmorphone **OR** [Held by provider] HYDROmorphone    tiZANidine    diazePAM

## 2025-05-18 NOTE — OCCUPATIONAL THERAPY NOTE
OCCUPATIONAL THERAPY EVALUATION - INPATIENT     Room Number: 233/233-A  Evaluation Date: 5/18/2025  Type of Evaluation: Initial  Presenting Problem: C3-7 lami  Transferred to CCU for hypotension, bradycardia, hypovolemia, respiratory failure    Physician Order: IP Consult to Occupational Therapy  Reason for Therapy: ADL/IADL Dysfunction and Discharge Planning    OCCUPATIONAL THERAPY ASSESSMENT   Patient is a 64 year old male admitted 5/15/2025.  Prior to admission, patient's baseline is mod I-I.  Patient is currently functioning below baseline with ADLS/mobility.  Patient is requiring minimal assist, maximum assist, and dependent as a result of the following impairments: decreased functional strength, decreased functional reach, decreased endurance, pain, impaired   balance, decreased muscular endurance, cognitive deficits ( ), medical status, limited   ROM, and decreased insight to deficits. Occupational Therapy will continue to follow for duration of hospitalization.    Patient will benefit from continued skilled OT Services at discharge to promote prior level of function and safety with additional support and return home with home health OT.vs GR pending medical status and progress towards goals    PLAN DURING HOSPITALIZATION  OT Device Recommendations: Reacher, Sock aid, Long-handled shoehorn, Long-handled sponge, Shower chair  OT Treatment Plan: Balance activities, Energy conservation/work simplification techniques, ADL training, Functional transfer training, UE strengthening/ROM, Endurance training, Patient/Family education, Patient/Family training, Equipment eval/education, Compensatory technique education, Fine motor coordination activities     OCCUPATIONAL THERAPY MEDICAL/SOCIAL HISTORY   Problem List   Principal Problem:    Cervical spinal stenosis  Active Problems:    Paroxysmal atrial fibrillation (HCC)    Hypertension    Thyroid disease    Hyperlipidemia    Gout    Hypothyroidism due to Hashimoto's  thyroiditis    LESLY (obstructive sleep apnea)    Radiculopathy of cervical spine    HOME SITUATION  Type of Home: Apartment  Home Layout: Elevator; One level  Lives With: Alone (his girlfriend will stay with him)  Toilet and Equipment: Comfort height toilet  Shower/Tub and Equipment: Walk-in shower; Grab bar  Occupation/Status: retired  Drives: Yes  Patient Regularly Uses: Glasses; 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[place])    Prior Level of [place]: mod I    SUBJECTIVE  \"I didn't know I was going\" upon standing up from EOB, pt unaware that he had a bowel movement    OCCUPATIONAL THERAPY EXAMINATION   OBJECTIVE  Precautions: Spine (soft collar prn)  Fall Risk: Standard fall risk       PAIN ASSESSMENT  Rating: 10  Location: neck  Management Techniques: Nurse notified (RN provided pain meds during session)        O2 SATURATIONS  Oxygen Therapy  SPO2% on Room Air at Rest: 94  SPO2% Ambulation on Room Air: 94    COGNITION  A&Ox3-4, following all commands; appears slightly forgetful with c-spine prec    RANGE OF MOTION   Upper extremity ROM is within functional limits; reported tingling in B hands has improved slightly since surgery    STRENGTH ASSESSMENT  Upper extremity strength NT with spine  prec    ACTIVITIES OF DAILY LIVING ASSESSMENT  AM-PAC ‘6-Clicks’ Inpatient Daily Activity Short Form  How much help from another person does the patient currently need…  -   Putting on and taking off regular lower body clothing?: A Lot  -   Bathing (including washing, rinsing, drying)?: A Lot  -   Toileting, which includes using toilet, bedpan or urinal? : A Lot  -   Putting on and taking off regular upper body clothing?: A Lot  -   Taking care of personal grooming such as brushing teeth?: A Little  -   Eating meals?: None    AM-PAC Score:  Score: 15  Approx Degree of Impairment: 56.46%  Standardized Score (AM-PAC Scale): 34.69  CMS Modifier (G-Code): CK    FUNCTIONAL TRANSFER ASSESSMENT  Sit to Stand: Edge of Bed  Edge of Bed: Moderate  Assist  Toilet Transfer: Moderate Assist (grab bar, cues for technique and to maintain proper head and neck positioning while seated on toilet as pt tends to sit with flexed posturing and he reported \"this is his baseline\")    BED MOBILITY  Supine to Sit : Moderate Assist    BALANCE ASSESSMENT  Static Sitting: Supervision  Static Standing: Minimal Assist  Min A and RW bathroom distances    FUNCTIONAL ADL ASSESSMENT  Eating: Modified Independent  Grooming Seated: Supervision  UB Dressing Seated: Maximum Assist (donning soft collar)  LB Dressing Seated: Maximum Assist (donning socks)  Toileting Standing: Dependent       Skilled Therapy Provided: RN approved session. Coordinated with PT, OT focusing on ADLs, functional transfers, spine prec during ADLs. Recalls 2/3 spine prec. Appears slightly forgetful at times. Lethargic upon entry, improved alertness with EOB/OOB activities. Upon standing, unaware that he had a BM, required encouragement to ambulate to the bathroom. Received patient in supine. Performed ADLs/functional mobility/transfers as stated above.  At the end of session, patient left in chair with needs met, and RN aware of session and present to provide pt with pain meds.    EDUCATION PROVIDED  Patient Education : Role of Occupational Therapy; Plan of Care; DME Recommendations; Functional Transfer Techniques; Fall Prevention; Surgical Precautions; Posture/Positioning; Bracing Education Provided; Other; Proper Body Mechanics (ADL training)  Patient's Response to Education: Verbalized Understanding; Returned Demonstration; Requires Further Education    The patient's Approx Degree of Impairment: 56.46% has been calculated based on documentation in the [facility] '6 clicks' Inpatient Daily Activity Short Form.  Research supports that patients with this level of impairment may benefit from rehab. Pt preferring home and reported his significant other will be able to assist him upon dc. If dc home will needs 24/7  SPV/assist. If progress towards goals is slow, may require GR prior to dc home, will monitor progress    Final disposition will be made by interdisciplinary medical team.    Patient End of Session: Up in chair, Needs met, Call light within reach, RN aware of session/findings, All patient questions and concerns addressed    OT Goals  Patient self-stated goal is: less pain, increased independence     Patient will complete LE dressing with CGA and AE as needed  Comment:     Patient will complete toilet transfer with CGA on raised toilet  Comment:     Patient will complete self care task at sink level with CGA   Comment:    Patient will independently recall/maintain spine precautions  Comment:         Goals  on: 2025  Frequency: 3-5x/wk    Patient Evaluation Complexity Level:   Occupational Profile/Medical History HIGH - Extensive review of history including review of medical or therapy records    Specific performance deficits impacting engagement in ADL/IADL HIGH  5+ performance deficits    Client Assessment/Performance Deficits HIGH - Comorbidities and significant modifications of tasks    Clinical Decision Making HIGH - Analysis of occupational profile, comprehensive assessments, multiple treatment options    Overall Complexity HIGH     OT Session Time: 40 minutes  Self-Care Home Management: 20 minutes  Therapeutic Activity: 10 minutes  Evaluation    Radha Molina OTR/L     General

## 2025-05-18 NOTE — PLAN OF CARE
Patient alert and oriented x4, follows commands, pain managed with as need medication. Atrial fibrillation at baseline. Incision sites monitored throughout shift. 2-3L nasal canula overnight, transitioned to room air by morning.     Problem: PAIN - ADULT  Goal: Verbalizes/displays adequate comfort level or patient's stated pain goal  Description: INTERVENTIONS:  - Encourage pt to monitor pain and request assistance  - Assess pain using appropriate pain scale  - Administer analgesics based on type and severity of pain and evaluate response  - Implement non-pharmacological measures as appropriate and evaluate response  - Consider cultural and social influences on pain and pain management  - Manage/alleviate anxiety  - Utilize distraction and/or relaxation techniques  - Monitor for opioid side effects  - Notify MD/LIP if interventions unsuccessful or patient reports new pain  - Anticipate increased pain with activity and pre-medicate as appropriate  Outcome: Progressing     Problem: RISK FOR INFECTION - ADULT  Goal: Absence of fever/infection during anticipated neutropenic period  Description: INTERVENTIONS  - Monitor WBC  - Administer growth factors as ordered  - Implement neutropenic guidelines  Outcome: Progressing     Problem: SAFETY ADULT - FALL  Goal: Free from fall injury  Description: INTERVENTIONS:  - Assess pt frequently for physical needs  - Identify cognitive and physical deficits and behaviors that affect risk of falls.  - [place] fall precautions as indicated by assessment.  - Educate pt/family on patient safety including physical limitations  - Instruct pt to call for assistance with activity based on assessment  - Modify environment to reduce risk of injury  - Provide assistive devices as appropriate  - Consider OT/PT consult to assist with strengthening/mobility  - Encourage toileting schedule  Outcome: Progressing     Problem: CARDIOVASCULAR - ADULT  Goal: Maintains optimal cardiac output and  hemodynamic stability  Description: INTERVENTIONS:  - Monitor vital signs, rhythm, and trends  - Monitor for bleeding, hypotension and signs of decreased cardiac output  - Evaluate effectiveness of vasoactive medications to optimize hemodynamic stability  - Monitor arterial and/or venous puncture sites for bleeding and/or hematoma  - Assess quality of pulses, skin color and temperature  - Assess for signs of decreased coronary artery perfusion - ex. Angina  - Evaluate fluid balance, assess for edema, trend weights  Outcome: Progressing     Problem: SKIN/TISSUE INTEGRITY - ADULT  Goal: Incision(s), wounds(s) or drain site(s) healing without S/S of infection  Description: INTERVENTIONS:  - Assess and document risk factors for pressure ulcer development  - Assess and document skin integrity  - Assess and document dressing/incision, wound bed, drain sites and surrounding tissue  - Implement wound care per orders  - Initiate isolation precautions as appropriate  - Initiate Pressure Ulcer prevention bundle as indicated  Outcome: Progressing     Problem: RESPIRATORY - ADULT  Goal: Achieves optimal ventilation and oxygenation  Description: INTERVENTIONS:  - Assess for changes in respiratory status  - Assess for changes in mentation and behavior  - Position to facilitate oxygenation and minimize respiratory effort  - Oxygen supplementation based on oxygen saturation or ABGs  - Provide Smoking Cessation handout, if applicable  - Encourage broncho-pulmonary hygiene including cough, deep breathe, Incentive Spirometry  - Assess the need for suctioning and perform as needed  - Assess and instruct to report SOB or any respiratory difficulty  - Respiratory Therapy support as indicated  - Manage/alleviate anxiety  - Monitor for signs/symptoms of CO2 retention  Outcome: Progressing     Problem: CARDIOVASCULAR - ADULT  Goal: Absence of cardiac arrhythmias or at baseline  Description: INTERVENTIONS:  - Continuous cardiac monitoring,  monitor vital signs, obtain 12 lead EKG if indicated  - Evaluate effectiveness of antiarrhythmic and heart rate control medications as ordered  - Initiate emergency measures for life threatening arrhythmias  - Monitor electrolytes and administer replacement therapy as ordered  Outcome: Not Progressing

## 2025-05-18 NOTE — PROGRESS NOTES
[facility]  [facility]  Cardiology Progress Note    Reynaldo Huffman Patient Status:  Inpatient    1960 MRN [id number]   Location [facility] 2W/SW Attending Papito Arcos MD   Hosp Day # 3 PCP Antonio Martinez MD       Impression:  Hypotension, less likely cardiogenic- warm in extremities. Improved with fluid resuscitation.  Afib, with slow ventricular response. On eliquis 5mg BID  - [id number] 5 (age, CVA, HTN, CAD of at least 50%).  CAD, non obstructive  TIA  HTN  HDL     - Hold dig, metoprolol. Can stop digoxin going forward.  - Hold home torsemide 10mg, metolazone 2.5mg weekly  - Agree with volume resucitation.  - continue statin  - Tele  - Echo reviewed     Subjective:   Sleeping comfortably. When awake, denies SOB, , CP.    Problem List[1]    Objective:   Temp: 97.8 °F (36.6 °C)  Pulse: 62  Resp: 25  BP: 111/66    Intake/Output:     Intake/Output Summary (Last 24 hours) at 2025 1136  Last data filed at 2025 1000  Gross per 24 hour   Intake 2229.5 ml   Output 1007 ml   Net 1222.5 ml       Last 3 Weights   25 0400 (!) 367 lb 15.2 oz (166.9 kg)   05/15/25 1014 (!) 309 lb (140.2 kg)   25 1604 300 lb (136.1 kg)   25 1316 300 lb (136.1 kg)   24 0904 297 lb (134.7 kg)       Tele: afib with PVCs    Physical Exam:    General: awake, alert, oriented x 3, no acute distress  HEENT: at/nc, perrl, eomi  Neck: No JVD, carotids 2+ no bruits.  Cardiac: seng and irregularly irregular , S1, S2 normal, no murmur, rub or gallop.  Lungs: Clear without wheezes, rales, rhonchi or dullness.  Normal excursions and effort.  Abdomen: Soft, non-tender, non-distended, normal bowel sounds   Extremities: Without clubbing, cyanosis or edema.  Peripheral pulses are 2+.  Neurologic: Alert and oriented, normal affect.  Psych: normal mood and affect  Skin: Warm and dry.     Laboratory/Data:    Labs:         Recent Labs   Lab 25  0551 25  1024  05/17/25  1727   WBC 7.5 7.9 8.1   HGB 12.2* 9.7* 10.3*   .1* 109.2* 109.0*   .0* 149.0* 143.0*       Recent Labs   Lab 05/16/25  0615 05/16/25  1210 05/17/25  1026 05/18/25  0354    138 136 138   K 5.7* 4.8 4.3 4.3    106 106 107   CO2 26.0 24.0 22.0 22.0   BUN 24* 28* 33* 31*   CREATSERUM 1.74* 1.84* 2.08* 2.03*   CA 8.5* 8.0* 7.6* 7.9*   * 126* 196* 127*       No results for input(s): \"ALT\", \"AST\", \"ALB\", \"AMYLASE\", \"LIPASE\", \"LDH\" in the last 168 hours.    Invalid input(s): \"ALPHOS\", \"TBIL\", \"DBIL\", \"TPROT\"    No results for input(s): \"TROP\" in the last 168 hours.    Allergies:   Allergies[2]    Medications:  Current Hospital Medications[3]    Dhiraj Sainz MD  5/18/2025  11:36 AM       [1]   Patient Active Problem List  Diagnosis    Paroxysmal atrial fibrillation (HCC)    Hypertension    Thyroid disease    Osteoarthritis of right knee    History of constipation    History of partial ray amputation of left great toe ([facility])    Encounter for current long-term use of anticoagulants    S/P total knee arthroplasty, right    Primary osteoarthritis of left knee    BMI 36.0-36.9,adult    Excoriation    Hyperlipidemia    Gout    Hypothyroidism due to Hashimoto's thyroiditis    Preop testing    Status post left knee replacement    Uric acid kidney stone    LESLY (obstructive sleep apnea)    Hypokalemia    Primary osteoarthritis of right shoulder    Arthritis of right glenohumeral joint    Septic joint of right shoulder region (HCC)    Pyogenic arthritis of right shoulder region, due to unspecified organism (HCC)    Cellulitis    Cervical spinal stenosis    Radiculopathy of cervical spine   [2]   Allergies  Allergen Reactions    Ancef [Cefazolin] RENAL INSUFFICIENCY     Interstitial nephritis    Vancomycin HYPOTENSION     Per patient, he tolerated last treatment with Vancomycin.    Tigecycline FACE FLUSHING    Naproxen UNKNOWN     Patient cannot take d/t kidney dse.     Clindamycin RASH     Daptomycin RASH     Tolerating course of dapto in 8/2022   [3]   Current Facility-Administered Medications   Medication Dose Route Frequency    midodrine (ProAmatine) tab 5 mg  5 mg Oral TID    traMADol (Ultram) tab 50 mg  50 mg Oral Q6H PRN    acetaminophen (Tylenol Extra Strength) tab 1,000 mg  1,000 mg Oral q6h    metoclopramide (Reglan) 5 mg/mL injection 5 mg  5 mg Intravenous Q8H PRN    sodium chloride 0.9% infusion   Intravenous Continuous    sennosides (Senokot) tab 17.2 mg  17.2 mg Oral Nightly    docusate sodium (Colace) cap 100 mg  100 mg Oral BID    polyethylene glycol (PEG 3350) (Miralax) 17 g oral packet 17 g  17 g Oral Daily PRN    magnesium hydroxide (Milk of Magnesia) 400 MG/5ML oral suspension 30 mL  30 mL Oral Daily PRN    bisacodyl (Dulcolax) 10 MG rectal suppository 10 mg  10 mg Rectal Daily PRN    fleet enema (Fleet) rectal enema 133 mL  1 enema Rectal Once PRN    ondansetron (Zofran) 4 MG/2ML injection 4 mg  4 mg Intravenous Q6H PRN    diphenhydrAMINE (Benadryl) cap/tab 25 mg  25 mg Oral Q4H PRN    Or    diphenhydrAMINE (Benadryl) 50 mg/mL  injection 25 mg  25 mg Intravenous Q4H PRN    benzocaine-menthol (Cepacol) lozenge 1 lozenge  1 lozenge Buccal Q15 Min PRN    oxyCODONE immediate release tab 5 mg  5 mg Oral Q4H PRN    Or    oxyCODONE immediate release tab 10 mg  10 mg Oral Q4H PRN    [Held by provider] HYDROmorphone (Dilaudid) 1 MG/ML injection 0.4 mg  0.4 mg Intravenous Q2H PRN    Or    [Held by provider] HYDROmorphone (Dilaudid) 1 MG/ML injection 0.8 mg  0.8 mg Intravenous Q2H PRN    tiZANidine (Zanaflex) tab 4 mg  4 mg Oral Q8H PRN    diazePAM (Valium) tab 2 mg  2 mg Oral Q6H PRN    allopurinol (Zyloprim) tab 300 mg  300 mg Oral Daily    atorvastatin (Lipitor) tab 10 mg  10 mg Oral Nightly    buPROPion SR (WELLBUTRIN SR) 12 hr tab 150 mg  150 mg Oral Daily    [Held by provider] digoxin (Lanoxin) tab 0.25 mg  0.25 mg Oral Daily    gabapentin (Neurontin) cap 300 mg  300 mg Oral BID     levothyroxine (Synthroid) tab 100 mcg  100 mcg Oral Before breakfast    [Held by provider] metoprolol succinate ER (Toprol XL) 24 hr tab 50 mg  50 mg Oral Daily    mirtazapine (Remeron) tab 15 mg  15 mg Oral Nightly    [Held by provider] torsemide (Demadex) tab 10 mg  10 mg Oral Daily    [Held by provider] losartan (Cozaar) tab 100 mg  100 mg Oral Daily

## 2025-05-18 NOTE — PLAN OF CARE
Problem: Patient Centered Care  Goal: Patient preferences are identified and integrated in the patient's plan of care  Description: Interventions:  - What would you like us to know as we care for you?   - Provide timely, complete, and accurate information to patient/family  - Incorporate patient and family knowledge, values, beliefs, and cultural backgrounds into the planning and delivery of care  - Encourage patient/family to participate in care and decision-making at the level they choose  - Honor patient and family perspectives and choices  Outcome: Progressing     Problem: Patient/Family Goals  Goal: Patient/Family Long Term Goal  Description: Patient's Long Term Goal:     Interventions:  -   - See additional Care Plan goals for specific interventions  Outcome: Progressing  Goal: Patient/Family Short Term Goal  Description: Patient's Short Term Goal:     Interventions:   -   - See additional Care Plan goals for specific interventions  Outcome: Progressing     Problem: PAIN - ADULT  Goal: Verbalizes/displays adequate comfort level or patient's stated pain goal  Description: INTERVENTIONS:  - Encourage pt to monitor pain and request assistance  - Assess pain using appropriate pain scale  - Administer analgesics based on type and severity of pain and evaluate response  - Implement non-pharmacological measures as appropriate and evaluate response  - Consider cultural and social influences on pain and pain management  - Manage/alleviate anxiety  - Utilize distraction and/or relaxation techniques  - Monitor for opioid side effects  - Notify MD/LIP if interventions unsuccessful or patient reports new pain  - Anticipate increased pain with activity and pre-medicate as appropriate  Outcome: Progressing     Problem: RISK FOR INFECTION - ADULT  Goal: Absence of fever/infection during anticipated neutropenic period  Description: INTERVENTIONS  - Monitor WBC  - Administer growth factors as ordered  - Implement neutropenic  guidelines  Outcome: Progressing     Problem: SAFETY ADULT - FALL  Goal: Free from fall injury  Description: INTERVENTIONS:  - Assess pt frequently for physical needs  - Identify cognitive and physical deficits and behaviors that affect risk of falls.  - [facility] fall precautions as indicated by assessment.  - Educate pt/family on patient safety including physical limitations  - Instruct pt to call for assistance with activity based on assessment  - Modify environment to reduce risk of injury  - Provide assistive devices as appropriate  - Consider OT/PT consult to assist with strengthening/mobility  - Encourage toileting schedule  Outcome: Progressing     Problem: DISCHARGE PLANNING  Goal: Discharge to home or other facility with appropriate resources  Description: INTERVENTIONS:  - Identify barriers to discharge w/pt and caregiver  - Include patient/family/discharge partner in discharge planning  - Arrange for needed discharge resources and transportation as appropriate  - Identify discharge learning needs (meds, wound care, etc)  - Arrange for interpreters to assist at discharge as needed  - Consider post-discharge preferences of patient/family/discharge partner  - Complete POLST form as appropriate  - Assess patient's ability to be responsible for managing their own health  - Refer to Case Management Department for coordinating discharge planning if the patient needs post-hospital services based on physician/LIP order or complex needs related to functional status, cognitive ability or social support system  Outcome: Progressing     Problem: CARDIOVASCULAR - ADULT  Goal: Maintains optimal cardiac output and hemodynamic stability  Description: INTERVENTIONS:  - Monitor vital signs, rhythm, and trends  - Monitor for bleeding, hypotension and signs of decreased cardiac output  - Evaluate effectiveness of vasoactive medications to optimize hemodynamic stability  - Monitor arterial and/or venous puncture sites for  bleeding and/or hematoma  - Assess quality of pulses, skin color and temperature  - Assess for signs of decreased coronary artery perfusion - ex. Angina  - Evaluate fluid balance, assess for edema, trend weights  Outcome: Progressing  Goal: Absence of cardiac arrhythmias or at baseline  Description: INTERVENTIONS:  - Continuous cardiac monitoring, monitor vital signs, obtain 12 lead EKG if indicated  - Evaluate effectiveness of antiarrhythmic and heart rate control medications as ordered  - Initiate emergency measures for life threatening arrhythmias  - Monitor electrolytes and administer replacement therapy as ordered  Outcome: Progressing     Problem: SKIN/TISSUE INTEGRITY - ADULT  Goal: Incision(s), wounds(s) or drain site(s) healing without S/S of infection  Description: INTERVENTIONS:  - Assess and document risk factors for pressure ulcer development  - Assess and document skin integrity  - Assess and document dressing/incision, wound bed, drain sites and surrounding tissue  - Implement wound care per orders  - Initiate isolation precautions as appropriate  - Initiate Pressure Ulcer prevention bundle as indicated  Outcome: Progressing     Problem: RESPIRATORY - ADULT  Goal: Achieves optimal ventilation and oxygenation  Description: INTERVENTIONS:  - Assess for changes in respiratory status  - Assess for changes in mentation and behavior  - Position to facilitate oxygenation and minimize respiratory effort  - Oxygen supplementation based on oxygen saturation or ABGs  - Provide Smoking Cessation handout, if applicable  - Encourage broncho-pulmonary hygiene including cough, deep breathe, Incentive Spirometry  - Assess the need for suctioning and perform as needed  - Assess and instruct to report SOB or any respiratory difficulty  - Respiratory Therapy support as indicated  - Manage/alleviate anxiety  - Monitor for signs/symptoms of CO2 retention  Outcome: Progressing

## 2025-05-18 NOTE — PROGRESS NOTES
Nephrology Progress Note    Reynaldo Huffman Attending:  Papito Arcos MD       SUBJECTIVE:     No acute events  No edema     PHYSICAL EXAM:     Vital Signs: /80 (BP Location: Right arm)   Pulse 63   Temp 97.7 °F (36.5 °C) (Temporal)   Resp 23   Ht 6' 3\" (1.905 m)   Wt (!) 367 lb 15.2 oz (166.9 kg)   SpO2 94%   BMI 45.99 kg/m²   Temp (24hrs), Av.6 °F (36.4 °C), Min:96.7 °F (35.9 °C), Max:98.4 °F (36.9 °C)       Intake/Output Summary (Last 24 hours) at 2025 1309  Last data filed at 2025 1000  Gross per 24 hour   Intake 2229.5 ml   Output 1007 ml   Net 1222.5 ml     Wt Readings from Last 3 Encounters:   25 (!) 367 lb 15.2 oz (166.9 kg)   25 300 lb (136.1 kg)   24 297 lb (134.7 kg)       General: pleasant, well appearing  HEENT: NCAT  Neck: Supple  Cardiac: Regular rate and rhythm, no murmurs  Lungs: CTAB  Abdomen: Soft, non-tender, non-distended  Extremities: no LE edema  Neurologic/Psych: mentating well       LABORATORY DATA:     Lab Results   Component Value Date     (H) 2025    BUN 31 (H) 2025    BUNCREA 15.3 2025    CREATSERUM 2.03 (H) 2025    ANIONGAP 9 2025    GFRNAA 18 (L) 2022    GFRAA 21 (L) 2022    CA 7.9 (L) 2025    OSMOCALC 294 2025    ALKPHO 117 2022    AST 15 2022    ALT 11 (L) 2022    BILT 0.5 2022    TP 6.5 2022    ALB 2.6 (L) 2022    GLOBULIN 3.9 2022     2025    K 4.3 2025     2025    CO2 22.0 2025     Lab Results   Component Value Date    WBC 8.1 2025    RBC 3.22 (L) 2025    HGB 10.3 (L) 2025    HCT 35.1 (L) 2025    .0 (L) 2025    .0 (H) 2025    MCH 32.0 2025    MCHC 29.3 (L) 2025    RDW 14.5 2025    NEPRELIM 3.10 2022    NEUTABS 18.24 (H) 2022    LYMPHABS 0.38 (L) 2022    EOSABS 0.00 2022    BASABS 0.00 2022    NEUT  93 07/31/2022    LYMPH 2 07/31/2022    MON 0 07/31/2022    EOS 0 07/31/2022    BASO 0 07/31/2022    NEPERCENT 56.1 08/25/2022    LYPERCENT 26.4 08/25/2022    MOPERCENT 13.9 08/25/2022    EOPERCENT 2.5 08/25/2022    BAPERCENT 0.2 08/25/2022    NE 3.10 08/25/2022    LYMABS 1.46 08/25/2022    MOABSO 0.77 08/25/2022    EOABSO 0.14 08/25/2022    BAABSO 0.01 08/25/2022     Lab Results   Component Value Date    CREUR 115.40 05/17/2025     Lab Results   Component Value Date    COLORUR Yellow 07/30/2022    CLARITY Clear 07/30/2022    SPECGRAVITY 1.010 07/30/2022    GLUUR Negative 07/30/2022    BILUR Negative 07/30/2022    KETUR Negative 07/30/2022    BLOODURINE Trace-lysed (A) 07/30/2022    PHURINE 5.5 07/30/2022    PROUR 30 mg/dL (A) 07/30/2022    UROBILINOGEN 0.2 07/30/2022    NITRITE Negative 07/30/2022    LEUUR Negative 07/30/2022    NMIC Microscopic not indicated 07/28/2022    WBCUR 1-5 07/30/2022    WBCUR 1-5 07/30/2022    RBCUR 0-2 07/30/2022    RBCUR 0-2 07/30/2022    EPIUR Few (A) 07/30/2022    EPIUR Few (A) 07/30/2022    BACUR Rare (A) 07/30/2022    BACUR Rare (A) 07/30/2022    HYLUR 13 (H) 05/19/2020       Impression:      #.  FERNANDA - early ATN   - intraoperative hypotension 5/15 noted, this was intermittently sustained through today morning      #.  CKD stage 3A -cardiorenal  - Baseline serum creatinine appears to be around 1.4 to 1.5 mg/dL     #.  Hyperkalemia   - Due to FERNANDA, hypotension, and resultant diminished kaliuresis     #.  Acidosis   - Initially respiratory, now likely mild metabolic in patient with likely chronic CO2 retention     #.  Proteinuria   - Minimal when last checked 60mg/g; outpatient f/u   - Consider future SGLT2     #.  HFpEF  #.  HTN   - PTA regimen: Losartan, metoprolol, torsemide     #.  Anemia         Recommendations:    Supportive care for ATN - appear to be near SCr peak   DC IVF   Renal US without hydro, did show urinary retention, subsequent PVR ok. Follow PVR q shift.    Hold BP RX    Strict I/Os   Avoid IV contrast unless emergent       Juan Damianal, DO  [facility]

## 2025-05-18 NOTE — PHYSICAL THERAPY NOTE
PHYSICAL THERAPY EVALUATION - INPATIENT     Room Number: 233/233-A  Evaluation Date: 5/18/2025  Type of Evaluation: Initial   Physician Order: PT Eval and Treat    Presenting Problem: cervical stenosis w/radiculopathy s/p C3-7 laminoplasty, soft collar for comfort  Co-Morbidities : a-fib on Eliquis, TIA11/2024, HTN, HLD  Reason for Therapy: Mobility Dysfunction and Discharge Planning    PHYSICAL THERAPY ASSESSMENT   Patient is a 64 year old male admitted 5/15/2025 for cervical stenosis w/radiculopathy BUE s/p C3-7 laminoplasty, soft cervical collar as needed for comfort.  Prior to admission, patient's baseline is independent without a device, driivng, shopping, living in an apartment alone.  Patient is currently functioning below baseline with bed mobility, transfers, gait, stair negotiation, maintaining seated position, standing prolonged periods, and performing household tasks.  Patient is requiring minimal assist and moderate assist as a result of the following impairments: decreased endurance/aerobic capacity, pain, impaired coordination, impaired motor planning, decreased muscular endurance, difficulty maintaining precautions, and medical status.  Physical Therapy will continue to follow for duration of hospitalization.    Patient will benefit from continued skilled PT Services at discharge to promote prior level of function and safety with additional support and return home with home health PT. Patient reports his girlfriend plans to stay with him at [place].    PLAN DURING HOSPITALIZATION  Nursing Mobility Recommendation : 1 Assist  PT Device Recommendation: Rolling walker  PT Treatment Plan: Bed mobility, Body mechanics, Don/doff brace, Endurance, Energy conservation, Patient education, Family education, Gait training, Stoop training, Transfer training  Rehab Potential : Good  Frequency (Obs): Daily     PHYSICAL THERAPY MEDICAL/SOCIAL HISTORY   History related to current admission: The patient presents with  complaints of neck stiffness with paresthesias.  The patient reports minimal neck pain but is having weakness and tingling to the hands and fingers.  He reports dropping things and having difficulty with use of his hands and fingers. He denies prior cervical spine surgery.  They deny current fevers, chills, infection, rashes/bruises on the body, gross bowel/bladder incontinence or saddle anesthesia      Problem List  Principal Problem:    Cervical spinal stenosis  Active Problems:    Paroxysmal atrial fibrillation (HCC)    Hypertension    Thyroid disease    Hyperlipidemia    Gout    Hypothyroidism due to Hashimoto's thyroiditis    LESLY (obstructive sleep apnea)    Radiculopathy of cervical spine      HOME SITUATION  Type of Home: Apartment  Home Layout: Elevator, One level  Stairs to Enter : 0        Stairs to Bedroom: 0         Lives With: Alone (his girlfriend will stay with him)    Drives: Yes   Patient Regularly Uses: Glasses, 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 He is retired.    SUBJECTIVE  \"I haven't had any pain medications yet. I was too nauseated\"    PHYSICAL THERAPY EXAMINATION   OBJECTIVE  Precautions: Spine (soft collar prn)  Fall Risk: Standard fall risk      PAIN ASSESSMENT  Ratin  Location: neck/incision site  Management Techniques: Body mechanics, Breathing techniques, Repositioning, Other (Comment) (medicated - has not been able to take many meds due to low BP)    COGNITION  Overall Cognitive Status:  WFL - within functional limits    RANGE OF MOTION AND STRENGTH ASSESSMENT  Upper extremity ROM and strength are within functional limits but limited at shoulder    Lower extremity ROM is within functional limits   Lower extremity strength is within functional limits     BALANCE  Static Sitting: Good  Dynamic Sitting:  Fair +  Static Standing: Poor +  Dynamic Standing: Poor      NEUROLOGICAL FINDINGS   Impaired FTN, finger opposition UE   Decreased sensation in fingers        ACTIVITY TOLERANCE  Pulse: 74  Heart Rate Source: Monitor     BP: 135/82  BP Location: Right arm  BP Method: Automatic  Patient Position: Sitting    O2 WALK  Oxygen Therapy  SPO2% on Room Air at Rest: 94  SPO2% Ambulation on Room Air: 94    AM-PAC '6-Clicks' INPATIENT SHORT FORM - BASIC MOBILITY  How much difficulty does the patient currently have...  Patient Difficulty: Turning over in bed (including adjusting bedclothes, sheets and blankets)?: A Lot   Patient Difficulty: Sitting down on and standing up from a chair with arms (e.g., wheelchair, bedside commode, etc.): A Lot   Patient Difficulty: Moving from lying on back to sitting on the side of the bed?: A Lot   How much help from another person does the patient currently need...   Help from Another: Moving to and from a bed to a chair (including a wheelchair)?: A Little   Help from Another: Need to walk in hospital room?: A Little   Help from Another: Climbing 3-5 steps with a railing?: A Lot     AM-PAC Score:  Raw Score: 14   Approx Degree of Impairment: 61.29%   Standardized Score (AM-PAC Scale): 38.1   CMS Modifier (G-Code): CL    FUNCTIONAL ABILITY STATUS  Functional Mobility/Gait Assessment  Gait Assistance: Minimum assistance  Distance (ft): 15'x2  Assistive Device: Rolling walker  Pattern: Shuffle, Comment (forward flexed at trunk and hips)  Rolling: moderate assist  Supine to Sit: moderate assist  Sit to Supine: moderate assist  Sit to Stand: moderate assist    Exercise/Education Provided:  Bed mobility  Body mechanics  Don/Doff ortho/prosthesis  Energy conservation  Functional activity tolerated  Gait training  Posture  Transfer training    Skilled Therapy Provided: MACHELLE Link approves participation in therapy session. BP has been better but pt did become nauseated after eating so lying down. He  presents in bed and agreeable to therapy, seen in coordination with OT. Patient reports pain is moderate and worsens as session progresses, improves when soft collar acquired from unit and placed on pt. He reports it takes the stress off his neck. He has significant forward head and flexed forward posture throughout spine. Initiated education and training in sitting posture/sit bones all the way up  to the neck. He needs back support and cervical soft collar for comfort in the chair. He is still limited today due to pain, BP is stable throughout. All questions answered and needs in reach, RN aware.     The patient's Approx Degree of Impairment: 61.29% has been calculated based on documentation in the [facility] '6 clicks' Inpatient Basic Mobility Short Form.  Research supports that patients with this level of impairment may benefit from inpatient rehab. However, anticipate he will progress adequately with improved pain control to return home with girlfriend to assist.  Final disposition will be made by interdisciplinary medical team.    Patient End of Session: Up in chair, Needs met, Call light within reach, RN aware of session/findings, Bracing education provided per handout, All patient questions and concerns addressed, Hospital anti-slip socks, Discussed recommendations with /    CURRENT GOALS  Goals to be met by: 5/25/25  Patient Goal Patient's self-stated goal is: to go home   Goal #1 Patient is able to demonstrate supine - sit EOB @ level: modified independent     Goal #1   Current Status    Goal #2 Patient is able to demonstrate transfers Sit to/from Stand at assistance level: modified independent with walker - rolling     Goal #2  Current Status    Goal #3 Patient is able to ambulate 150 feet with assist device: walker - rolling at assistance level: supervision   Goal #3   Current Status    Goal #4 Patient will negotiate one curb w/ assistive device and supervision   Goal #4   Current  Status    Goal #5 Patient to demonstrate independence with home activity/exercise instructions provided to patient in preparation for discharge.   Goal #5   Current Status    Goal #6    Goal #6  Current Status      Patient Evaluation Complexity Level:  History High - 3 or more personal factors and/or co-morbidities   Examination of body systems Moderate - addressing a total of 3 or more elements   Clinical Presentation  Moderate - Evolving   Clinical Decision Making  Moderate Complexity     Therapeutic Activity:  29 minutes

## 2025-05-19 LAB
ANION GAP SERPL CALC-SCNC: 9 MMOL/L (ref 0–18)
BUN BLD-MCNC: 31 MG/DL (ref 9–23)
BUN/CREAT SERPL: 14 (ref 10–20)
CALCIUM BLD-MCNC: 8.2 MG/DL (ref 8.7–10.4)
CHLORIDE SERPL-SCNC: 110 MMOL/L (ref 98–112)
CO2 SERPL-SCNC: 20 MMOL/L (ref 21–32)
CREAT BLD-MCNC: 2.22 MG/DL (ref 0.7–1.3)
DEPRECATED RDW RBC AUTO: 58.6 FL (ref 35.1–46.3)
EGFRCR SERPLBLD CKD-EPI 2021: 32 ML/MIN/1.73M2 (ref 60–?)
ERYTHROCYTE [DISTWIDTH] IN BLOOD BY AUTOMATED COUNT: 14.7 % (ref 11–15)
GLUCOSE BLD-MCNC: 131 MG/DL (ref 70–99)
HCT VFR BLD AUTO: 32.9 % (ref 39–53)
HGB BLD-MCNC: 9.7 G/DL (ref 13–17.5)
MAGNESIUM SERPL-MCNC: 2.1 MG/DL (ref 1.6–2.6)
MCH RBC QN AUTO: 32 PG (ref 26–34)
MCHC RBC AUTO-ENTMCNC: 29.5 G/DL (ref 31–37)
MCV RBC AUTO: 108.6 FL (ref 80–100)
OSMOLALITY SERPL CALC.SUM OF ELEC: 296 MOSM/KG (ref 275–295)
PHOSPHATE SERPL-MCNC: 3.6 MG/DL (ref 2.4–5.1)
PLATELET # BLD AUTO: 174 10(3)UL (ref 150–450)
POTASSIUM SERPL-SCNC: 4 MMOL/L (ref 3.5–5.1)
Q-T INTERVAL: 406 MS
QRS DURATION: 90 MS
QTC CALCULATION (BEZET): 385 MS
R AXIS: 99 DEGREES
RBC # BLD AUTO: 3.03 X10(6)UL (ref 3.8–5.8)
SODIUM SERPL-SCNC: 139 MMOL/L (ref 136–145)
T AXIS: 19 DEGREES
VENTRICULAR RATE: 54 BPM
WBC # BLD AUTO: 7.5 X10(3) UL (ref 4–11)

## 2025-05-19 PROCEDURE — 99233 SBSQ HOSP IP/OBS HIGH 50: CPT | Performed by: INTERNAL MEDICINE

## 2025-05-19 RX ORDER — SODIUM CHLORIDE 9 MG/ML
INJECTION, SOLUTION INTRAVENOUS CONTINUOUS
Status: DISCONTINUED | OUTPATIENT
Start: 2025-05-19 | End: 2025-05-20

## 2025-05-19 NOTE — PROGRESS NOTES
[facility] Hospitalist Progress Note     CC: Hospital Follow up    PCP: Antonio Martinez MD       Assessment/Plan:   Mr. Huffman is a 63 yo M with PMH of Afib on Eliquis, TIA, HTN, HL, who presented for cervical surgery.      C3-C7 cervical laminoplasty  -POD # 4  -orthopedic surgery following  -ok to resume eliquis today   -continue PT/OT  -final discharge plan is home with home care services     Hypotension- improved  - hypotensive on POD#1 with sitting in chair  - s/p 2.5L IVF bolus  - stable      Respiratory acidosis   - s/p BIPAP; will use BIPAP with sleep and naps  - s/p narcan x 1 5/16  - pulmonary was consulted  - outpatient sleep study    AMS- improved  - likely 2/2 CO2 retention 2/2 undiagnosed LESLY/OHS + narcotics in the setting of FERNANDA     FERNANDA on CKD3  Hyperkalemia  - pre-renal dehydration + ATN   - Cr 1.7 post op, pre op labs 1.4  - did get some bolus fluids earlier in admission  - Cr 2.2 this AM, has not peaked  - renal on consult   - will resume some fluids today discussed with renal      HTN  - home meds: losartan, metoprolol, torsemide, all are currently on hold      Afib with SVR  - resume eliquis this evening   - metoprolol was on hold due to low BP, suspect can resume soon   - hold digoxin now and going forward per cardiology      Hx TIA  - November 2024, had dysarthria at that time  - restart eliquis     Hypothyroidism  - synthroid 50 mcg daily     ACP  - CODE- FULL  - POA- son- Hitesh  - lives alone  - does have a fiance Maddi Lakhani who lives near by- asked that she be the  as she lives closer     DVT Prophy: resume eliquis this evening      Dispo: not medically ready giving rising Cr      [facility] Hospitalist      Subjective:     Feels ok he states, actually says he feels a lot better  Bps are better  Cr still rising a bit  Otherwise no new symptoms.     OBJECTIVE:    Blood pressure 136/76, pulse 62, temperature 97.2 °F (36.2 °C), temperature source Temporal,  resp. rate 16, height 6' 3\" (1.905 m), weight (!) 367 lb 15.2 oz (166.9 kg), SpO2 91%.    Temp:  [97 °F (36.1 °C)-97.9 °F (36.6 °C)] 97.2 °F (36.2 °C)  Pulse:  [] 62  Resp:  [15-24] 16  BP: ()/(42-96) 136/76  SpO2:  [90 %-98 %] 91 %      Intake/Output:    Intake/Output Summary (Last 24 hours) at 5/19/2025 1309  Last data filed at 5/19/2025 0800  Gross per 24 hour   Intake 1669.9 ml   Output 777 ml   Net 892.9 ml       Last 3 Weights   05/18/25 0400 (!) 367 lb 15.2 oz (166.9 kg)   05/15/25 1014 (!) 309 lb (140.2 kg)   05/12/25 1604 300 lb (136.1 kg)   05/01/25 1316 300 lb (136.1 kg)   12/13/24 0904 297 lb (134.7 kg)       Exam   GEN: obese male in NAD, awake and alert   Pulm: CTABL  CV: RRR, no murmurs  ABD: Soft  MSK: strength 5/5 in all extremities  Neuro: Grossly normal, CN intact, sensory intact  SKIN: warm, dry  EXT: no edema    Data Review:       Labs:     Recent Labs   Lab 05/17/25  1026 05/17/25  1727 05/19/25  0355   RBC 3.04* 3.22* 3.03*   HGB 9.7* 10.3* 9.7*   HCT 33.2* 35.1* 32.9*   .2* 109.0* 108.6*   MCH 31.9 32.0 32.0   MCHC 29.2* 29.3* 29.5*   RDW 14.6 14.5 14.7   WBC 7.9 8.1 7.5   .0* 143.0* 174.0         Recent Labs   Lab 05/17/25  1026 05/18/25  0354 05/19/25  0355   * 127* 131*   BUN 33* 31* 31*   CREATSERUM 2.08* 2.03* 2.22*   EGFRCR 34* 35* 32*   CA 7.6* 7.9* 8.2*    138 139   K 4.3 4.3 4.0    107 110   CO2 22.0 22.0 20.0*       No results for input(s): \"ALT\", \"AST\", \"ALB\", \"AMYLASE\", \"LIPASE\", \"LDH\" in the last 168 hours.    Invalid input(s): \"ALPHOS\", \"TBIL\", \"DBIL\", \"TPROT\"      Imaging:  XR CHEST AP PORTABLE  (CPT=71045)  Result Date: 5/16/2025  CONCLUSION:  1. Suboptimal inspiration.  Mild pulmonary vascular redistribution.  Otherwise negative     Dictated by (CST): Harshil Garcia MD on 5/16/2025 at 3:38 PM     Finalized by (CST): Harshil Garcia MD on 5/16/2025 at 3:40 PM              Meds:     INPATIENT MEDICATIONS    Scheduled  Medications:      Scheduled Meds[1]        Drips:  IV Meds[2]    PRN Medications  PRN Meds[3]                            [1] apixaban, 5 mg, BID  midodrine, 5 mg, TID  acetaminophen, 1,000 mg, q6h  sennosides, 17.2 mg, Nightly  docusate sodium, 100 mg, BID  allopurinol, 300 mg, Daily  atorvastatin, 10 mg, Nightly  buPROPion SR, 150 mg, Daily  gabapentin, 300 mg, BID  levothyroxine, 100 mcg, Before breakfast  [Held by provider] metoprolol succinate ER, 50 mg, Daily  mirtazapine, 15 mg, Nightly  [Held by provider] torsemide, 10 mg, Daily  [Held by provider] losartan, 100 mg, Daily     [2]    [3] traMADol, 50 mg, Q6H PRN  metoclopramide, 5 mg, Q8H PRN  polyethylene glycol (PEG 3350), 17 g, Daily PRN  magnesium hydroxide, 30 mL, Daily PRN  bisacodyl, 10 mg, Daily PRN  fleet enema, 1 enema, Once PRN  ondansetron, 4 mg, Q6H PRN  diphenhydrAMINE, 25 mg, Q4H PRN   Or  diphenhydrAMINE, 25 mg, Q4H PRN  benzocaine-menthol, 1 lozenge, Q15 Min PRN  oxyCODONE, 5 mg, Q4H PRN   Or  oxyCODONE, 10 mg, Q4H PRN  [Held by provider] HYDROmorphone, 0.4 mg, Q2H PRN   Or  [Held by provider] HYDROmorphone, 0.8 mg, Q2H PRN  tiZANidine, 4 mg, Q8H PRN  diazePAM, 2 mg, Q6H PRN

## 2025-05-19 NOTE — PAYOR COMM NOTE
--------------  CONTINUED STAY REVIEW  5/19  Payor: [employer]  Subscriber #:  [id number]  Authorization Number: [id number]    Admit date: 5/15/25  Admit time: 10:01 AM    REVIEW DOCUMENTATION:    5/19    C3-C7 cervical laminoplasty  -POD # 4  -orthopedic surgery following  -ok to resume eliquis today   -continue PT/OT  -final discharge plan is home with home care services     Hypotension- improved  - hypotensive on POD#1 with sitting in chair  - s/p 2.5L IVF bolus  - stable      Respiratory acidosis   - s/p BIPAP; will use BIPAP with sleep and naps  - s/p narcan x 1 5/16  - pulmonary was consulted  - outpatient sleep study     AMS- improved  - likely 2/2 CO2 retention 2/2 undiagnosed LESLY/OHS + narcotics in the setting of FERNANDA     FERNANDA on CKD3  Hyperkalemia  - pre-renal dehydration + ATN   - Cr 1.7 post op, pre op labs 1.4  - did get some bolus fluids earlier in admission  - Cr 2.2 this AM, has not peaked  - renal on consult   - will resume some fluids today discussed with renal      HTN  - home meds: losartan, metoprolol, torsemide, all are currently on hold      Afib with SVR  - resume eliquis this evening   - metoprolol was on hold due to low BP, suspect can resume soon   - hold digoxin now and going forward per cardiology      Hx TIA  - November 2024, had dysarthria at that time  - restart eliquis     Hypothyroidism  - synthroid 50 mcg daily     ACP  - CODE- FULL  - POA- son- Hitesh  - lives alone  - does have a fiance Maddi Lakhani who lives near by- asked that she be the  as she lives closer     DVT Prophy: resume eliquis this evening      Dispo: not medically ready giving rising Cr       Feels ok he states, actually says he feels a lot better  Bps are better  Cr still rising a bit      Lab 05/17/25  1026 05/17/25  1727 05/19/25  0355   RBC 3.04* 3.22* 3.03*   HGB 9.7* 10.3* 9.7*   HCT 33.2* 35.1* 32.9*   .2* 109.0* 108.6*   MCH 31.9 32.0 32.0   MCHC 29.2* 29.3* 29.5*   RDW 14.6 14.5  14.7   WBC 7.9 8.1 7.5   .0* 143.0* 174.0                  Recent Labs   Lab 05/17/25  1026 05/18/25  0354 05/19/25  0355   * 127* 131*   BUN 33* 31* 31*   CREATSERUM 2.08* 2.03* 2.22*   EGFRCR 34* 35* 32*   CA 7.6* 7.9* 8.2*    138 139   K 4.3 4.3 4.0    107 110   CO2 22.0 22.0 20.0*             MEDICATIONS ADMINISTERED IN LAST 1 DAY:  acetaminophen (Tylenol Extra Strength) tab 1,000 mg       Date Action Dose Route User    5/19/2025 1051 Given 1,000 mg Oral Leslie Hollins RN    5/19/2025 0524 Given 1,000 mg Oral Sheyla Robles RN    5/18/2025 2146 Given 1,000 mg Oral Sheyla Robles RN    5/18/2025 1715 Given 1,000 mg Oral Leah Goodrich RN          allopurinol (Zyloprim) tab 300 mg       Date Action Dose Route User    5/19/2025 0846 Given 300 mg Oral Leslie Hollins RN          atorvastatin (Lipitor) tab 10 mg       Date Action Dose Route User    5/18/2025 2146 Given 10 mg Oral Sheyla Robles RN          buPROPion SR (WELLBUTRIN SR) 12 hr tab 150 mg       Date Action Dose Route User    5/19/2025 0847 Given 150 mg Oral Leslie Hollins RN          gabapentin (Neurontin) cap 300 mg       Date Action Dose Route User    5/19/2025 0846 Given 300 mg Oral Leslie Hollins RN    5/18/2025 2146 Given 300 mg Oral Sheyla Robles RN          levothyroxine (Synthroid) tab 100 mcg       Date Action Dose Route User    5/19/2025 0524 Given 100 mcg Oral Sheyla Robles RN          midodrine (ProAmatine) tab 5 mg       Date Action Dose Route User    5/18/2025 1715 Given 5 mg Oral Leah Goodrich RN          oxyCODONE immediate release tab 10 mg       Date Action Dose Route User    5/19/2025 1130 Given 10 mg Oral Leslie Hollins RN    5/19/2025 0711 Given 10 mg Oral Sheyla Robles, MACHELLE          sodium chloride 0.9 % IV bolus 500 mL       Date Action Dose Route User    5/18/2025 2025 New Bag 500 mL Intravenous Sheyla Robles, RN          tiZANidine (Zanaflex) tab 4 mg       Date Action  Dose Route User    5/18/2025 1803 Given 4 mg Oral Laeh Goodrich RN          traMADol (Ultram) tab 50 mg       Date Action Dose Route User    5/19/2025 0847 Given 50 mg Oral Leslie Hollins RN    5/19/2025 0208 Given 50 mg Oral Sheyla Robles RN            Vitals (last day)       Date/Time Temp Pulse Resp BP SpO2 Weight O2 Device O2 Flow Rate (L/min) [facility]    05/19/25 1200 -- 62 16 136/76 91 % -- None (Room air) -- MT    05/19/25 1100 -- 70 17 128/66 92 % -- None (Room air) -- MT    05/19/25 1030 -- 100 -- 126/77 -- -- -- --     05/19/25 1000 -- 68 22 113/76 92 % -- None (Room air) -- MT    05/19/25 0900 -- 78 17 129/57 91 % -- None (Room air) -- MT    05/19/25 0800 97.2 °F (36.2 °C) 86 15 127/81 90 % -- None (Room air) -- MT    05/19/25 0700 -- 68 23 125/59 91 % -- None (Room air) -- MT    05/19/25 0600 -- 83 24 125/59 92 % -- None (Room air) -- AL    05/19/25 0400 97.4 °F (36.3 °C) 63 20 125/66 91 % -- None (Room air) -- AL    05/19/25 0300 -- 68 20 100/77 93 % -- None (Room air) -- AL    05/19/25 0200 -- 76 22 114/62 95 % -- None (Room air) -- AL    05/19/25 0000 97.3 °F (36.3 °C) 55 21 124/68 95 % -- None (Room air) -- AL    05/18/25 2300 -- 57 19 111/56 94 % -- None (Room air) -- AL    05/18/25 2200 -- 52 19 109/63 95 % -- None (Room air) -- AL    05/18/25 2100 -- 59 20 123/57 95 % -- None (Room air) -- AL    05/18/25 2010 -- 48 21 107/50 97 % -- None (Room air) -- AL    05/18/25 2000 97 °F (36.1 °C) 50 21 93/45 98 % -- None (Room air) -- AL    05/18/25 1900 -- 52 20 106/62 97 % -- None (Room air) -- AL    05/18/25 1800 -- 70 15 111/67 93 % -- None (Room air) -- MV    05/18/25 1700 -- 63 23 110/76 94 % -- None (Room air) -- MV    05/18/25 1600 97.9 °F (36.6 °C) 59 18 107/73 93 % -- None (Room air) -- MV    05/18/25 1500 -- 59 18 118/96 92 % -- None (Room air) -- MV    05/18/25 1400 -- 61 19 118/42 93 % -- None (Room air) -- MV    05/18/25 1300 -- 60 21 129/78 95 % -- None (Room air) -- MV    05/18/25  1200 97.7 °F (36.5 °C) 63 23 111/80 94 % -- None (Room air) -- MV    05/18/25 1100 -- 62 25 111/66 96 % -- None (Room air) -- MV    05/18/25 1000 -- 75 19 124/73 94 % -- None (Room air) -- MV    05/18/25 0925 -- 74 -- 135/82 -- -- -- -- MJ    05/18/25 0900 -- 67 23 128/68 93 % -- None (Room air) -- MV    05/18/25 0800 97.8 °F (36.6 °C) 72 17 132/70 93 % -- None (Room air) -- MV    05/18/25 0700 -- 66 22 127/91 95 % -- None (Room air) -- MV    05/18/25 0600 -- 57 20 114/56 96 % -- None (Room air) -- AL    05/18/25 0500 -- 57 19 134/55 100 % -- High flow nasal cannula 3 L/min AL    05/18/25 0400 97.7 °F (36.5 °C) 62 25 116/54 100 % 367 lb 15.2 oz (166.9 kg) High flow nasal cannula 3 L/min AL    05/18/25 0300 -- 56 19 132/78 99 % -- High flow nasal cannula 3 L/min AL    05/18/25 0200 -- 53 24 108/54 100 % -- High flow nasal cannula 3 L/min AL    05/18/25 0100 -- 54 25 102/59 100 % -- High flow nasal cannula 3 L/min AL    05/18/25 0045 -- -- -- -- 100 % -- High flow nasal cannula 3 L/min AL    05/18/25 0000 96.7 °F (35.9 °C) 57 23 92/75 90 % -- Nasal cannula 5 L/min AL          CIWA Scores (since admission)       None

## 2025-05-19 NOTE — PROGRESS NOTES
Spine Surgery Progress Note     Interval history     Transferred to CCU for bradycardia        Objective   Lab Results   Component Value Date    WBC 7.5 05/19/2025    HGB 9.7 05/19/2025    HCT 32.9 05/19/2025    .0 05/19/2025    CREATSERUM 2.22 05/19/2025    BUN 31 05/19/2025     05/19/2025    K 4.0 05/19/2025     05/19/2025    CO2 20.0 05/19/2025     05/19/2025    CA 8.2 05/19/2025    MG 2.1 05/19/2025    PHOS 3.6 05/19/2025    TROPHS 16 05/18/2025        Vitals:    05/19/25 0300   BP: 100/77   Pulse: 68   Resp: 20   Temp:        Examination     D  B WE T FF HI        R  5 5 5 5 4+ 5      L  5 5 5 5 4+ 5       Sensation intact to light touch UE dermatomes bilateral    Drain to suction with ss output     Assessment  Post op cervical laminoplasty     Plan   -DVT ppx with SCD, stockings, and ambulation  -continue PT/OT eval and treatment  -postoperative radiograph  -regular diet   -pain control  -progress toward discharge   - keep  drain for today pending mobilization   - medical care per hospitalist, pulm, nephro, cards   - plan to transfer to floor today

## 2025-05-19 NOTE — PROGRESS NOTES
Pulmonary/ICU/Critical Care Progress Note        Reason for Consultation: lethargic and hypotensive  Referring Physician: Dr. Arcos      Subjective:  HR much better today   On RA  Didn't wear CPAP overnight-stated didn't need it    From the initial consultation  HPI: 63 yo male with s/p POD #1 C3-C7 laminoplasty on pain meds (last oxycodone 10 mg at 3 am), A-fib on eliquis (being held), TIA, HTN, HLP  This am was found to be hypotensive and somnolent this am s/p 1500 ml fluid bolus and IVF @ 100 ml/hr  Pt reports he didn't sleep last night  Per fiance, pt snores but never been tested for sleep apnea  He denies previous hx of smoking and not on inhalers, nebs, supp 02  Afebrile  BP meds held this am  ABG earlier showed hypercapnia started on BIPAP but pt removed now to speak with family at bedside  Was also given narcan earlier this am when he was somnolent  When seen this afternoon, he is much more awake and answering questions appropriately.   Repeat BP with SBP in mid 90s. Pt reports his BP runs on the lower side normally.      REVIEW OF SYSTEMS:  Positives and negatives as noted in HPI. All other review of systems otherwise are either limited (due to pt/family inability to provide) or negative.      PAST MEDICAL HISTORY:  Past Medical History[1]      PAST SURGICAL HISTORY:  Past Surgical History[2]      PAST SOCIAL HISTORY:  Social Hx on file[3]      PAST FAMILY HISTORY:  Family History[4]      ALLERGIES:  Allergies[5]      MEDS:  Home Medications:  Medications Taking[6]    Scheduled Medication:  Scheduled Medications[7]  Continuous Infusing Medication:  Medication Infusions[8]  PRN Medications:  PRN Medications[9]       PHYSICAL EXAM:  /77 (BP Location: Right arm)   Pulse 68   Temp 97.3 °F (36.3 °C) (Temporal)   Resp 20   Ht 6' 3\" (1.905 m)   Wt (!) 367 lb 15.2 oz (166.9 kg)   SpO2 93%   BMI 45.99 kg/m²      CONSTITUTIONAL: alert, oriented, no apparent distress  HEENT: atraumatic  normocephalic  MOUTH: mmm  NECK/THROAT: + soft collar + drain   LUNG: clear b/l no wheezing, crackles. Chest symmetric with respiratory motion  HEART: regular rate and rhythm, no obvious murmers or gallops noted  ABD: soft non tender. + bowel sounds. No organomegaly noted  EXT: no clubbing, cyanosis, or edema noted. Pulses intact grossly  NEURO/MUSCULOSKELETAL: no gross deficits  SKIN: warm, dry. No obvious lesions noted  LYMPH: no obvious LAD      IMAGES:   CXR 5/16/25  1. Suboptimal inspiration.  Mild pulmonary vascular redistribution.  Otherwise negative        LABS:  Recent Labs   Lab 05/17/25  1026 05/17/25  1727 05/19/25  0355   RBC 3.04* 3.22* 3.03*   HGB 9.7* 10.3* 9.7*   HCT 33.2* 35.1* 32.9*   .2* 109.0* 108.6*   MCH 31.9 32.0 32.0   MCHC 29.2* 29.3* 29.5*   RDW 14.6 14.5 14.7   WBC 7.9 8.1 7.5   .0* 143.0* 174.0       Recent Labs   Lab 05/17/25  1026 05/18/25  0354 05/19/25  0355   * 127* 131*   BUN 33* 31* 31*   CREATSERUM 2.08* 2.03* 2.22*   EGFRCR 34* 35* 32*   CA 7.6* 7.9* 8.2*    138 139   K 4.3 4.3 4.0    107 110   CO2 22.0 22.0 20.0*       ASSESSMENT/PLAN:  Hypotension (resolved)  -possibly secondary to meds vs hypovolemia. Less likely sepsis   -checked LA was normal   -checked troponin, TTE with EF 55%. RFSF reduced, diastolic flattening  -monitor on tele   -responded to IVF  -holding blood pressure meds and pain meds    Hx of afib  on eliquis  -eliquis restarted  -ordered TTE as above  -pt on digoxin at home. Checked level  -was transferred to CCU for bradycardia. Hold dig and bb  -cards following   -overall, vitals stable     Acute hypercapnic respiratory failure  -possibly d/t pain meds and underlying LESLY?  -continue PRN BIPAP and at night time/with naps as concern for LESLY. Pt refused last night  -outpt PSG    S/p POD c3-c7 laminoplasty  -ortho following  -minimize pain meds    FERNANDA on CKD?  -IVF  -monitor renal function and UO    Proph  -DVT:  SCDs    Dispo  -full code  -can transfer out of ICU    Thank you for the opportunity to care for Reynaldo HuffmanTavares Rodriguez DO, MPH  Pulmonary Critical Care Medicine  [facility]monary and Critical [facility] Medicine                         [1]   Past Medical History:  Diagnosis Date    Arrhythmia     A.Fib    Atrial fibrillation (HCC)     Calculus of kidney     Depression     Disorder of thyroid     Essential hypertension     Gout     High blood pressure     High cholesterol     Hyperlipidemia     Neuropathy     bilateral feet    Numbness and tingling in both hands     Osteoarthritis     Stroke (HCC) 11/18/2024    mini stroke-    Visual impairment     eyeglasses   [2]   Past Surgical History:  Procedure Laterality Date    Anesth,achilles tendon surg Bilateral     Appendectomy      Colonoscopy      Fracture surgery Left     wrist    Gastric bypass,obesity,sb reconstruc  2000    Total knee replacement Bilateral 2021    Total shoulder arthroplasty Right 07/13/2022    Right total shoulder arthroplasty --Christiano   [3]   Social History  Socioeconomic History    Marital status:    Tobacco Use    Smoking status: Never    Smokeless tobacco: Never   Vaping Use    Vaping status: Never Used   Substance and Sexual Activity    Alcohol use: Yes     Alcohol/week: 2.0 standard drinks of alcohol     Types: 2 Standard drinks or equivalent per week    Drug use: Never   [4]   Family History  Problem Relation Age of Onset    Hypertension Father     Heart Disorder Father     Hypertension Mother    [5]   Allergies  Allergen Reactions    Ancef [Cefazolin] RENAL INSUFFICIENCY     Interstitial nephritis    Vancomycin HYPOTENSION     Per patient, he tolerated last treatment with Vancomycin.    Tigecycline FACE FLUSHING    Naproxen UNKNOWN     Patient cannot take d/t kidney dse.     Clindamycin RASH    Daptomycin RASH     Tolerating course of dapto in 8/2022   [6]   Outpatient Medications Marked as Taking for the  5/15/25 encounter (Hospital Encounter)   Medication Sig Dispense Refill    valsartan 80 MG Oral Tab Take 1 tablet (80 mg total) by mouth daily.      gabapentin 100 MG Oral Cap Take 3 capsules (300 mg total) by mouth 2 (two) times daily.      metoprolol succinate ER 50 MG Oral Tablet 24 Hr Take 1 tablet (50 mg total) by mouth daily.      mirtazapine 15 MG Oral Tab Take 1 tablet (15 mg total) by mouth nightly.      buPROPion  MG Oral Tablet 12 Hr Take 1 tablet (150 mg total) by mouth daily.      torsemide 10 MG Oral Tab Take 1 tablet (10 mg total) by mouth daily.      Omega-3 1000 MG Oral Cap Take 2,400 mg by mouth daily.      enoxaparin 150 MG/ML Injection Solution Prefilled Syringe Inject 1.5 mg/kg into the skin every 12 (twelve) hours.      Fenofibrate 160 MG Oral Tab Take 1 tablet (160 mg total) by mouth daily.      Multiple Vitamin (ONE-DAILY MULTI VITAMINS) Oral Tab Take 1 tablet by mouth daily.      allopurinol 300 MG Oral Tab Take 1 tablet (300 mg total) by mouth in the morning.      digoxin 0.25 MG Oral Tab Take 1 tablet (0.25 mg total) by mouth in the morning.      atorvastatin 10 MG Oral Tab Take 1 tablet (10 mg total) by mouth nightly.      ELIQUIS 5 MG Oral Tab Take 1 tablet (5 mg total) by mouth in the morning and 1 tablet (5 mg total) before bedtime.      Levothyroxine Sodium 50 MCG Oral Tab Take 2 tablets (100 mcg total) by mouth before breakfast.     [7]    midodrine  5 mg Oral TID    acetaminophen  1,000 mg Oral q6h    sennosides  17.2 mg Oral Nightly    docusate sodium  100 mg Oral BID    allopurinol  300 mg Oral Daily    atorvastatin  10 mg Oral Nightly    buPROPion SR  150 mg Oral Daily    gabapentin  300 mg Oral BID    levothyroxine  100 mcg Oral Before breakfast    [Held by provider] metoprolol succinate ER  50 mg Oral Daily    mirtazapine  15 mg Oral Nightly    [Held by provider] torsemide  10 mg Oral Daily    [Held by provider] losartan  100 mg Oral Daily   [8] [9]   traMADol     metoclopramide    polyethylene glycol (PEG 3350)    magnesium hydroxide    bisacodyl    fleet enema    ondansetron    diphenhydrAMINE **OR** diphenhydrAMINE    benzocaine-menthol    oxyCODONE **OR** oxyCODONE    [Held by provider] HYDROmorphone **OR** [Held by provider] HYDROmorphone    tiZANidine    diazePAM

## 2025-05-19 NOTE — PLAN OF CARE
Problem: PAIN - ADULT  Goal: Verbalizes/displays adequate comfort level or patient's stated pain goal  Description: INTERVENTIONS:  - Encourage pt to monitor pain and request assistance  - Assess pain using appropriate pain scale  - Administer analgesics based on type and severity of pain and evaluate response  - Implement non-pharmacological measures as appropriate and evaluate response  - Consider cultural and social influences on pain and pain management  - Manage/alleviate anxiety  - Utilize distraction and/or relaxation techniques  - Monitor for opioid side effects  - Notify MD/LIP if interventions unsuccessful or patient reports new pain  - Anticipate increased pain with activity and pre-medicate as appropriate  Outcome: Progressing     Problem: SAFETY ADULT - FALL  Goal: Free from fall injury  Description: INTERVENTIONS:  - Assess pt frequently for physical needs  - Identify cognitive and physical deficits and behaviors that affect risk of falls.  - [place] fall precautions as indicated by assessment.  - Educate pt/family on patient safety including physical limitations  - Instruct pt to call for assistance with activity based on assessment  - Modify environment to reduce risk of injury  - Provide assistive devices as appropriate  - Consider OT/PT consult to assist with strengthening/mobility  - Encourage toileting schedule  Outcome: Progressing     Problem: CARDIOVASCULAR - ADULT  Goal: Maintains optimal cardiac output and hemodynamic stability  Description: INTERVENTIONS:  - Monitor vital signs, rhythm, and trends  - Monitor for bleeding, hypotension and signs of decreased cardiac output  - Evaluate effectiveness of vasoactive medications to optimize hemodynamic stability  - Monitor arterial and/or venous puncture sites for bleeding and/or hematoma  - Assess quality of pulses, skin color and temperature  - Assess for signs of decreased coronary artery perfusion - ex. Angina  - Evaluate fluid balance,  assess for edema, trend weights  Outcome: Progressing  Goal: Absence of cardiac arrhythmias or at baseline  Description: INTERVENTIONS:  - Continuous cardiac monitoring, monitor vital signs, obtain 12 lead EKG if indicated  - Evaluate effectiveness of antiarrhythmic and heart rate control medications as ordered  - Initiate emergency measures for life threatening arrhythmias  - Monitor electrolytes and administer replacement therapy as ordered  Outcome: Progressing     Problem: RESPIRATORY - ADULT  Goal: Achieves optimal ventilation and oxygenation  Description: INTERVENTIONS:  - Assess for changes in respiratory status  - Assess for changes in mentation and behavior  - Position to facilitate oxygenation and minimize respiratory effort  - Oxygen supplementation based on oxygen saturation or ABGs  - Provide Smoking Cessation handout, if applicable  - Encourage broncho-pulmonary hygiene including cough, deep breathe, Incentive Spirometry  - Assess the need for suctioning and perform as needed  - Assess and instruct to report SOB or any respiratory difficulty  - Respiratory Therapy support as indicated  - Manage/alleviate anxiety  - Monitor for signs/symptoms of CO2 retention  Outcome: Progressing

## 2025-05-19 NOTE — PROGRESS NOTES
Nephrology Progress Note    Reynaldo Huffman Attending:  Papito Arcos MD       SUBJECTIVE:     No acute events  No edema     PHYSICAL EXAM:     Vital Signs: /84 (BP Location: Right arm)   Pulse 82   Temp 97.2 °F (36.2 °C) (Temporal)   Resp 16   Ht 6' 3\" (1.905 m)   Wt (!) 367 lb 15.2 oz (166.9 kg)   SpO2 90%   BMI 45.99 kg/m²   Temp (24hrs), Av.4 °F (36.3 °C), Min:97 °F (36.1 °C), Max:97.9 °F (36.6 °C)       Intake/Output Summary (Last 24 hours) at 2025 1435  Last data filed at 2025 1400  Gross per 24 hour   Intake 872 ml   Output 1249 ml   Net -377 ml     Wt Readings from Last 3 Encounters:   25 (!) 367 lb 15.2 oz (166.9 kg)   25 300 lb (136.1 kg)   24 297 lb (134.7 kg)       General: pleasant, well appearing  HEENT: NCAT  Neck: Supple  Cardiac: Regular rate and rhythm, no murmurs  Lungs: CTAB  Abdomen: Soft, non-tender, non-distended  Extremities: no LE edema  Neurologic/Psych: mentating well       LABORATORY DATA:     Lab Results   Component Value Date     (H) 2025    BUN 31 (H) 2025    BUNCREA 14.0 2025    CREATSERUM 2.22 (H) 2025    ANIONGAP 9 2025    GFRNAA 18 (L) 2022    GFRAA 21 (L) 2022    CA 8.2 (L) 2025    OSMOCALC 296 (H) 2025    ALKPHO 117 2022    AST 15 2022    ALT 11 (L) 2022    BILT 0.5 2022    TP 6.5 2022    ALB 2.6 (L) 2022    GLOBULIN 3.9 2022     2025    K 4.0 2025     2025    CO2 20.0 (L) 2025     Lab Results   Component Value Date    WBC 7.5 2025    RBC 3.03 (L) 2025    HGB 9.7 (L) 2025    HCT 32.9 (L) 2025    .0 2025    .6 (H) 2025    MCH 32.0 2025    MCHC 29.5 (L) 2025    RDW 14.7 2025    NEPRELIM 3.10 2022    NEUTABS 18.24 (H) 2022    LYMPHABS 0.38 (L) 2022    EOSABS 0.00 2022    BASABS 0.00 2022    NEUT 93  07/31/2022    LYMPH 2 07/31/2022    MON 0 07/31/2022    EOS 0 07/31/2022    BASO 0 07/31/2022    NEPERCENT 56.1 08/25/2022    LYPERCENT 26.4 08/25/2022    MOPERCENT 13.9 08/25/2022    EOPERCENT 2.5 08/25/2022    BAPERCENT 0.2 08/25/2022    NE 3.10 08/25/2022    LYMABS 1.46 08/25/2022    MOABSO 0.77 08/25/2022    EOABSO 0.14 08/25/2022    BAABSO 0.01 08/25/2022     Lab Results   Component Value Date    CREUR 115.40 05/17/2025     Lab Results   Component Value Date    COLORUR Yellow 07/30/2022    CLARITY Clear 07/30/2022    SPECGRAVITY 1.010 07/30/2022    GLUUR Negative 07/30/2022    BILUR Negative 07/30/2022    KETUR Negative 07/30/2022    BLOODURINE Trace-lysed (A) 07/30/2022    PHURINE 5.5 07/30/2022    PROUR 30 mg/dL (A) 07/30/2022    UROBILINOGEN 0.2 07/30/2022    NITRITE Negative 07/30/2022    LEUUR Negative 07/30/2022    NMIC Microscopic not indicated 07/28/2022    WBCUR 1-5 07/30/2022    WBCUR 1-5 07/30/2022    RBCUR 0-2 07/30/2022    RBCUR 0-2 07/30/2022    EPIUR Few (A) 07/30/2022    EPIUR Few (A) 07/30/2022    BACUR Rare (A) 07/30/2022    BACUR Rare (A) 07/30/2022    HYLUR 13 (H) 05/19/2020       Impression:      #.  FERNANDA - early ATN   - intraoperative hypotension 5/15 noted  -sCr rising     #.  CKD stage 3A -cardiorenal  - Baseline serum creatinine appears to be around 1.4 to 1.5 mg/dL     #.  Hyperkalemia   - Due to FERNANDA, hypotension, and resultant diminished kaliuresis  -resolved      #.  Acidosis   - Initially respiratory, now likely mild metabolic in patient with likely chronic CO2 retention     #.  Proteinuria   - Minimal when last checked 60mg/g; outpatient f/u   - Consider future SGLT2     #.  HFpEF  #.  HTN   - PTA regimen: Losartan, metoprolol, torsemide     #.  Anemia         Recommendations:    Supportive care for ATN - sCr appears to have not yet plateud   restart IVF  - NS @ 83ml/hr  Renal US without hydro, did show urinary retention, subsequent PVR ok. Follow PVR q shift.    Hold BP RX   Strict  I/Os   Avoid IV contrast unless emergent       Tamika Benedict MD  [facility]  Nephrology

## 2025-05-19 NOTE — PHYSICAL THERAPY NOTE
PHYSICAL THERAPY TREATMENT NOTE - INPATIENT     Room Number: 233/233-A       Presenting Problem: cervical stenosis w/radiculopathy s/p C3-7 laminoplasty, soft collar for comfort  Co-Morbidities : a-fib on Eliquis, TIA11/2024, HTN, HLD    Problem List  Principal Problem:    Cervical spinal stenosis  Active Problems:    Paroxysmal atrial fibrillation (HCC)    Hypertension    Thyroid disease    Hyperlipidemia    Gout    Hypothyroidism due to Hashimoto's thyroiditis    LESLY (obstructive sleep apnea)    Radiculopathy of cervical spine      PHYSICAL THERAPY ASSESSMENT   Patient demonstrates good  progress this session, goals  remain in progress.      Patient is requiring minimal assist and moderate assist as a result of the following impairments: decreased functional strength, decreased endurance/aerobic capacity, pain, impaired standing balance, decreased muscular endurance, difficulty maintaining precautions, medical status, and limited cervical/spinal ROM.     Patient continues to function below baseline with bed mobility, transfers, gait, stair negotiation, maintaining seated position, standing prolonged periods, and performing household tasks.  Next session anticipate patient to progress transfers, gait, and standing prolonged periods.  Physical Therapy will continue to follow patient for duration of hospitalization.    Patient continues to benefit from continued skilled PT services: at discharge to promote prior level of function and safety with additional support and return home with home health PT. He will need 24 hr care as well and he reports his girlfriend plans to stay with him at ID.    PLAN DURING HOSPITALIZATION  Nursing Mobility Recommendation : 1 Assist  PT Device Recommendation: Rolling walker  PT Treatment Plan: Bed mobility, Body mechanics, Don/doff brace, Endurance, Energy conservation, Patient education, Family education, Gait training, Stoop training, Transfer training  Frequency (Obs): Daily      SUBJECTIVE  \"It's just so hard to stand up\"    OBJECTIVE  Precautions: Spine (soft collar prn)    PAIN ASSESSMENT   Ratin  Location: neck/incision site  Management Techniques: Activity promotion, Breathing techniques, Repositioning, Other (Comment) (neck support, medicated)    BALANCE  Static Sitting: Good  Dynamic Sitting: Fair +  Static Standing: Poor +  Dynamic Standing: Poor    ACTIVITY TOLERANCE  Pulse: 100  Heart Rate Source: Monitor     BP: 126/77  BP Location: Right arm  BP Method: Automatic  Patient Position: Standing     O2 WALK  Oxygen Therapy  SPO2% on Room Air at Rest: 95  SPO2% Ambulation on Room Air: 93    AM-PAC '6-Clicks' INPATIENT SHORT FORM - BASIC MOBILITY  How much difficulty does the patient currently have...  Patient Difficulty: Turning over in bed (including adjusting bedclothes, sheets and blankets)?: A Lot   Patient Difficulty: Sitting down on and standing up from a chair with arms (e.g., wheelchair, bedside commode, etc.): A Lot   Patient Difficulty: Moving from lying on back to sitting on the side of the bed?: A Lot   How much help from another person does the patient currently need...   Help from Another: Moving to and from a bed to a chair (including a wheelchair)?: A Little   Help from Another: Need to walk in hospital room?: A Little   Help from Another: Climbing 3-5 steps with a railing?: A Lot     AM-PAC Score:  Raw Score: 14   Approx Degree of Impairment: 61.29%   Standardized Score (AM-PAC Scale): 38.1   CMS Modifier (G-Code): CL    FUNCTIONAL ABILITY STATUS  Functional Mobility/Gait Assessment  Gait Assistance: Minimum assistance  Distance (ft): 50'x2 with seated rest break  Assistive Device: Rolling walker  Pattern: Shuffle (cues for upright posture, step length)  Rolling: minimal assist  Supine to Sit: minimal assist  Sit to Supine: minimal assist  Sit to Stand: moderate assist    Skilled Therapy Provided: RN approves participation in therapy session. Patient presents  sitting up in chair and reports neck pain is better today, continues to wear soft collar for pain control/comfort, assisted to correctly wear/adjust brace to provide better support. Pt with ongoing need for significant assistance to stand from a regular chair, min A from elevated surface. Discussion about need to adapt chairs at home and have assistance for this tasks so he doesn't push too much with UE's as this is triggering significant pain. He is able to walk further today but then needs to rest, still with poor activity tolerance. Reviewed spinal precautions and brace wear/fit, all needs in reach and questions answered. Pain remains strong at 8/10 and limiting mobility. RN is notified, pt was fully medicated prior to session.    The patient's Approx Degree of Impairment: 61.29% has been calculated based on documentation in the [facility] '6 clicks' Inpatient Daily Activity Short Form.  Research supports that patients with this level of impairment may benefit from inpatient rehab. However, anticipate as pain improves he will be able to dc home with family support and HHPT for mobility. Final disposition will be made by interdisciplinary medical team.    THERAPEUTIC EXERCISES  Lower Extremity Ankle pumps  Glut sets  Heel raises  Quad sets  Toe raises  Mini-squats     Position Sitting & Standing       Patient End of Session: Up in chair, Needs met, Call light within reach, RN aware of session/findings, Bracing education provided per handout, All patient questions and concerns addressed, Hospital anti-slip socks    CURRENT GOALS     Goals to be met by: 5/25/25  Patient Goal Patient's self-stated goal is: to go home   Goal #1 Patient is able to demonstrate supine - sit EOB @ level: modified independent      Goal #1   Current Status  min A, cues for log roll   Goal #2 Patient is able to demonstrate transfers Sit to/from Stand at assistance level: modified independent with walker - rolling      Goal #2  Current Status  MOD  A - max cues and needs feet blocked - recommended to pt he increase seat height of all needed surfaces at home   Goal #3 Patient is able to ambulate 150 feet with assist device: walker - rolling at assistance level: supervision   Goal #3   Current Status Min A 50'x2 with RW, shuffle   Goal #4 Patient will negotiate one curb w/ assistive device and supervision   Goal #4   Current Status  NT   Goal #5 Patient to demonstrate independence with home activity/exercise instructions provided to patient in preparation for discharge.   Goal #5   Current Status  In progress   Goal #6     Goal #6  Current Status         Therapeutic Activity: 20 minutes  Therapeutic Exercise: 10 minutes

## 2025-05-19 NOTE — CM/SW NOTE
Anticipated therapy need: Home with Home Healthcare    CM met with patient at bedside to discuss anticipated therapy need above + provide list of accepting home health agencies for review.  Patient confirms he is agreeable to HH arrangement, and will confirm HHA choice prior to discharge.    / to remain available for support and/or discharge planning.     Plan: Home with Home Healthcare - pending HHA choice, medical clearance    Zaina Gates RN, BSN  Nurse   [phone]

## 2025-05-20 ENCOUNTER — APPOINTMENT (OUTPATIENT)
Dept: GENERAL RADIOLOGY | Facility: HOSPITAL | Age: 65
End: 2025-05-20
Attending: INTERNAL MEDICINE
Payer: MEDICARE

## 2025-05-20 LAB
ANION GAP SERPL CALC-SCNC: 11 MMOL/L (ref 0–18)
BASE EXCESS BLD CALC-SCNC: -7 MMOL/L (ref ?–2)
BASE EXCESS BLD CALC-SCNC: -8.2 MMOL/L (ref ?–2)
BUN BLD-MCNC: 29 MG/DL (ref 9–23)
BUN/CREAT SERPL: 13.2 (ref 10–20)
CALCIUM BLD-MCNC: 8.8 MG/DL (ref 8.7–10.4)
CHLORIDE SERPL-SCNC: 107 MMOL/L (ref 98–112)
CO2 SERPL-SCNC: 19 MMOL/L (ref 21–32)
CREAT BLD-MCNC: 2.2 MG/DL (ref 0.7–1.3)
DEPRECATED RDW RBC AUTO: 58.6 FL (ref 35.1–46.3)
EGFRCR SERPLBLD CKD-EPI 2021: 32 ML/MIN/1.73M2 (ref 60–?)
ERYTHROCYTE [DISTWIDTH] IN BLOOD BY AUTOMATED COUNT: 14.9 % (ref 11–15)
GLUCOSE BLD-MCNC: 152 MG/DL (ref 70–99)
HCO3 BLDA-SCNC: 18.3 MEQ/L (ref 21–27)
HCO3 BLDA-SCNC: 19.4 MEQ/L (ref 21–27)
HCT VFR BLD AUTO: 37 % (ref 39–53)
HGB BLD-MCNC: 11 G/DL (ref 13–17.5)
MCH RBC QN AUTO: 32 PG (ref 26–34)
MCHC RBC AUTO-ENTMCNC: 29.7 G/DL (ref 31–37)
MCV RBC AUTO: 107.6 FL (ref 80–100)
MODIFIED ALLEN TEST: POSITIVE
MODIFIED ALLEN TEST: POSITIVE
O2 CT BLD-SCNC: 13.5 VOL% (ref 15–23)
O2 CT BLD-SCNC: 14.6 VOL% (ref 15–23)
O2/TOTAL GAS SETTING VFR VENT: 45 %
OSMOLALITY SERPL CALC.SUM OF ELEC: 293 MOSM/KG (ref 275–295)
OXYGEN LITERS/MINUTE: 2 L/MIN
PCO2 BLDA: 60 MM HG (ref 35–45)
PCO2 BLDA: 69 MM HG (ref 35–45)
PH BLDA: 7.11 [PH] (ref 7.35–7.45)
PH BLDA: 7.17 [PH] (ref 7.35–7.45)
PLATELET # BLD AUTO: 218 10(3)UL (ref 150–450)
PO2 BLDA: 55 MM HG (ref 80–100)
PO2 BLDA: 88 MM HG (ref 80–100)
POTASSIUM SERPL-SCNC: 4.3 MMOL/L (ref 3.5–5.1)
PUNCTURE CHARGE: YES
PUNCTURE CHARGE: YES
RBC # BLD AUTO: 3.44 X10(6)UL (ref 3.8–5.8)
SAO2 % BLDA: 89.6 % (ref 94–100)
SAO2 % BLDA: 96.7 % (ref 94–100)
SODIUM SERPL-SCNC: 137 MMOL/L (ref 136–145)
WBC # BLD AUTO: 8.6 X10(3) UL (ref 4–11)

## 2025-05-20 PROCEDURE — 71045 X-RAY EXAM CHEST 1 VIEW: CPT | Performed by: INTERNAL MEDICINE

## 2025-05-20 PROCEDURE — 99233 SBSQ HOSP IP/OBS HIGH 50: CPT | Performed by: INTERNAL MEDICINE

## 2025-05-20 RX ORDER — DIAZEPAM 10 MG/2ML
5 INJECTION, SOLUTION INTRAMUSCULAR; INTRAVENOUS ONCE
Status: COMPLETED | OUTPATIENT
Start: 2025-05-20 | End: 2025-05-20

## 2025-05-20 RX ORDER — FUROSEMIDE 10 MG/ML
40 INJECTION INTRAMUSCULAR; INTRAVENOUS ONCE
Status: COMPLETED | OUTPATIENT
Start: 2025-05-20 | End: 2025-05-20

## 2025-05-20 RX ORDER — OXYCODONE HYDROCHLORIDE 5 MG/1
5 TABLET ORAL EVERY 4 HOURS PRN
Refills: 0 | Status: DISCONTINUED | OUTPATIENT
Start: 2025-05-20 | End: 2025-05-22

## 2025-05-20 NOTE — PROGRESS NOTES
[facility]  [facility]  Cardiology Progress Note    Reynaldo Huffman Patient Status:  Inpatient    1960 MRN [id number]   Location [facility] 2W/SW Attending Papito Arcos MD   Hosp Day # 5 PCP Antonio Martinez MD       Impression:  Hypotension, less likely cardiogenic- warm in extremities. Improved with fluid resuscitation.  Afib, with slow ventricular response. On eliquis 5mg BID  - [id number] 5 (age, CVA, HTN, CAD of at least 50%).  CAD, non obstructive  TIA  HTN  HDL     - Hold dig, metoprolol. Can stop digoxin going forward. Can restart metop pending stable HR and BP once patient is clinically stable.  - Hold home torsemide 10mg, metolazone 2.5mg weekly  - continue statin  - Tele  - Echo reviewed  - EKG reviewed: AF with slow VR overnight     Subjective:   Had respiratory distress with opiates, now reversed. Patient refusing bipap overnight. Today, he has no cardiac complaints.    Problem List[1]    Objective:   Temp: 97.8 °F (36.6 °C)  Pulse: 86  Resp: 21  BP: 149/53  FiO2 (%): 40 %    Intake/Output:     Intake/Output Summary (Last 24 hours) at 2025 1454  Last data filed at 2025 1015  Gross per 24 hour   Intake --   Output 1018 ml   Net -1018 ml       Last 3 Weights   25 0400 (!) 367 lb 15.2 oz (166.9 kg)   05/15/25 1014 (!) 309 lb (140.2 kg)   25 1604 300 lb (136.1 kg)   25 1316 300 lb (136.1 kg)   24 0904 297 lb (134.7 kg)       Tele: afib with PVCs    Physical Exam:    General: awake, alert, oriented x 3, no acute distress  HEENT: at/nc, perrl, eomi  Neck: No JVD, carotids 2+ no bruits.  Cardiac: normal rate and irregularly irregular , S1, S2 normal, no murmur, rub or gallop.  Lungs: Clear without wheezes, rales, rhonchi or dullness.  Normal excursions and effort.  Abdomen: Soft, non-tender, non-distended, normal bowel sounds   Extremities: Without clubbing, cyanosis or edema.  Peripheral pulses are 2+.  Neurologic: Alert and  oriented, normal affect.  Psych: normal mood and affect  Skin: Warm and dry.     Laboratory/Data:    Labs:         Recent Labs   Lab 05/16/25  0551 05/17/25  1026 05/17/25  1727 05/19/25  0355 05/20/25  0422   WBC 7.5 7.9 8.1 7.5 8.6   HGB 12.2* 9.7* 10.3* 9.7* 11.0*   .1* 109.2* 109.0* 108.6* 107.6*   .0* 149.0* 143.0* 174.0 218.0       Recent Labs   Lab 05/16/25  1210 05/17/25  1026 05/18/25  0354 05/19/25 0355 05/20/25  0422    136 138 139 137   K 4.8 4.3 4.3 4.0 4.3    106 107 110 107   CO2 24.0 22.0 22.0 20.0* 19.0*   BUN 28* 33* 31* 31* 29*   CREATSERUM 1.84* 2.08* 2.03* 2.22* 2.20*   CA 8.0* 7.6* 7.9* 8.2* 8.8   MG  --   --   --  2.1  --    PHOS  --   --   --  3.6  --    * 196* 127* 131* 152*       No results for input(s): \"ALT\", \"AST\", \"ALB\", \"AMYLASE\", \"LIPASE\", \"LDH\" in the last 168 hours.    Invalid input(s): \"ALPHOS\", \"TBIL\", \"DBIL\", \"TPROT\"    No results for input(s): \"TROP\" in the last 168 hours.    Allergies:   Allergies[2]    Medications:  Current Hospital Medications[3]    Dhiraj Saizn MD  Interventional Cardiology  05/20/25         [1]   Patient Active Problem List  Diagnosis    Paroxysmal atrial fibrillation (HCC)    Hypertension    Thyroid disease    Osteoarthritis of right knee    History of constipation    History of partial ray amputation of left great toe (HCC)    Encounter for current long-term use of anticoagulants    S/P total knee arthroplasty, right    Primary osteoarthritis of left knee    BMI 36.0-36.9,adult    Excoriation    Hyperlipidemia    Gout    Hypothyroidism due to Hashimoto's thyroiditis    Preop testing    Status post left knee replacement    Uric acid kidney stone    LESLY (obstructive sleep apnea)    Hypokalemia    Primary osteoarthritis of right shoulder    Arthritis of right glenohumeral joint    Septic joint of right shoulder region (HCC)    Pyogenic arthritis of right shoulder region, due to unspecified organism (HCC)    Cellulitis     Cervical spinal stenosis    Radiculopathy of cervical spine   [2]   Allergies  Allergen Reactions    Ancef [Cefazolin] RENAL INSUFFICIENCY     Interstitial nephritis    Vancomycin HYPOTENSION     Per patient, he tolerated last treatment with Vancomycin.    Tigecycline FACE FLUSHING    Naproxen UNKNOWN     Patient cannot take d/t kidney dse.     Clindamycin RASH    Daptomycin RASH     Tolerating course of dapto in 8/2022   [3]   Current Facility-Administered Medications   Medication Dose Route Frequency    oxyCODONE immediate release tab 5 mg  5 mg Oral Q4H PRN    apixaban (Eliquis) tab 5 mg  5 mg Oral BID    sodium chloride 0.9% infusion   Intravenous Continuous    midodrine (ProAmatine) tab 5 mg  5 mg Oral TID    traMADol (Ultram) tab 50 mg  50 mg Oral Q6H PRN    acetaminophen (Tylenol Extra Strength) tab 1,000 mg  1,000 mg Oral q6h    metoclopramide (Reglan) 5 mg/mL injection 5 mg  5 mg Intravenous Q8H PRN    sennosides (Senokot) tab 17.2 mg  17.2 mg Oral Nightly    docusate sodium (Colace) cap 100 mg  100 mg Oral BID    polyethylene glycol (PEG 3350) (Miralax) 17 g oral packet 17 g  17 g Oral Daily PRN    magnesium hydroxide (Milk of Magnesia) 400 MG/5ML oral suspension 30 mL  30 mL Oral Daily PRN    bisacodyl (Dulcolax) 10 MG rectal suppository 10 mg  10 mg Rectal Daily PRN    fleet enema (Fleet) rectal enema 133 mL  1 enema Rectal Once PRN    ondansetron (Zofran) 4 MG/2ML injection 4 mg  4 mg Intravenous Q6H PRN    diphenhydrAMINE (Benadryl) cap/tab 25 mg  25 mg Oral Q4H PRN    Or    diphenhydrAMINE (Benadryl) 50 mg/mL  injection 25 mg  25 mg Intravenous Q4H PRN    benzocaine-menthol (Cepacol) lozenge 1 lozenge  1 lozenge Buccal Q15 Min PRN    tiZANidine (Zanaflex) tab 4 mg  4 mg Oral Q8H PRN    diazePAM (Valium) tab 2 mg  2 mg Oral Q6H PRN    allopurinol (Zyloprim) tab 300 mg  300 mg Oral Daily    atorvastatin (Lipitor) tab 10 mg  10 mg Oral Nightly    buPROPion SR (WELLBUTRIN SR) 12 hr tab 150 mg  150 mg Oral  Daily    gabapentin (Neurontin) cap 300 mg  300 mg Oral BID    levothyroxine (Synthroid) tab 100 mcg  100 mcg Oral Before breakfast    metoprolol succinate ER (Toprol XL) 24 hr tab 50 mg  50 mg Oral Daily    mirtazapine (Remeron) tab 15 mg  15 mg Oral Nightly    [Held by provider] torsemide (Demadex) tab 10 mg  10 mg Oral Daily    [Held by provider] losartan (Cozaar) tab 100 mg  100 mg Oral Daily

## 2025-05-20 NOTE — PROGRESS NOTES
[facility] Hospitalist Progress Note     CC: Hospital Follow up    PCP: Antonio Martinez MD       Assessment/Plan:   Mr. Huffman is a 63 yo M with PMH of Afib on Eliquis, TIA, HTN, HL, who presented for cervical surgery.      C3-C7 cervical laminoplasty  -POD # 5  -orthopedic surgery following  -resumed eliquis 5/19   -continue PT/OT    Acute hypercapnic respiratory failure  -altered this AM  -STAT ABG done. pH 7.11 CO2 69  -start NIPPV   -transfer to higher level care   -patient did not use cpap overnight  -will need to be careful with narcotics, possibly 10mg oxycodone which he received at 500am contributed. Will change to 5mg and monitor      Hypotension- improved  - hypotensive on POD#1 with sitting in chair  - s/p 2.5L IVF bolus  - stable, resume his metoprolol today     FERNANDA on CKD3  Hyperkalemia  - pre-renal dehydration + ATN   - Cr 1.7 post op, pre op labs 1.4  - did get some bolus fluids earlier in admission  - Cr 2.0 this AM  - renal on consult   - on low rate of IV fluids. Will obtain CXR      HTN  - home meds: losartan, metoprolol, torsemide, all are currently on hold      Afib with SVR  - resume eliquis 5/19  - resume metoprolol today  - hold digoxin now and going forward per cardiology      Hx TIA  - November 2024, had dysarthria at that time  - restart eliquis     Hypothyroidism  - synthroid 50 mcg daily     ACP  - CODE- FULL  - POA- son- Hitesh  - lives alone  - does have a fiance Maddi Lakhani who lives near by- asked that she be the  as she lives closer     DVT Prophy: continue eliquis     Dispo: not medically ready. Worsening ABG note. Transfer to PCU. Spoke to pulmonologist      Maggy Genao [facility] Hospitalist      Subjective:     Lethargic but responsive  ABG done with hypercapnea  Opens eyes and squeezes my hands, nods to questions    OBJECTIVE:    Blood pressure (!) 172/99, pulse 75, temperature 97.6 °F (36.4 °C), temperature source Temporal, resp. rate 18, height 6' 3\"  (1.905 m), weight (!) 367 lb 15.2 oz (166.9 kg), SpO2 98%.    Temp:  [97.5 °F (36.4 °C)-98.2 °F (36.8 °C)] 97.6 °F (36.4 °C)  Pulse:  [] 75  Resp:  [16-22] 18  BP: (113-172)/() 172/99  SpO2:  [88 %-98 %] 98 %      Intake/Output:    Intake/Output Summary (Last 24 hours) at 5/20/2025 0947  Last data filed at 5/20/2025 0600  Gross per 24 hour   Intake 120 ml   Output 500 ml   Net -380 ml       Last 3 Weights   05/18/25 0400 (!) 367 lb 15.2 oz (166.9 kg)   05/15/25 1014 (!) 309 lb (140.2 kg)   05/12/25 1604 300 lb (136.1 kg)   05/01/25 1316 300 lb (136.1 kg)   12/13/24 0904 297 lb (134.7 kg)       Exam   GEN: awake but drowsy  Pulm: CTABL  CV: RRR, no murmurs  ABD: Soft  MSK: strength 5/5 in all extremities  Neuro: Grossly normal, CN intact, sensory intact  SKIN: warm, dry  EXT: no edema    Data Review:       Labs:     Recent Labs   Lab 05/17/25  1727 05/19/25  0355 05/20/25  0422   RBC 3.22* 3.03* 3.44*   HGB 10.3* 9.7* 11.0*   HCT 35.1* 32.9* 37.0*   .0* 108.6* 107.6*   MCH 32.0 32.0 32.0   MCHC 29.3* 29.5* 29.7*   RDW 14.5 14.7 14.9   WBC 8.1 7.5 8.6   .0* 174.0 218.0         Recent Labs   Lab 05/18/25  0354 05/19/25  0355 05/20/25  0422   * 131* 152*   BUN 31* 31* 29*   CREATSERUM 2.03* 2.22* 2.20*   EGFRCR 35* 32* 32*   CA 7.9* 8.2* 8.8    139 137   K 4.3 4.0 4.3    110 107   CO2 22.0 20.0* 19.0*       No results for input(s): \"ALT\", \"AST\", \"ALB\", \"AMYLASE\", \"LIPASE\", \"LDH\" in the last 168 hours.    Invalid input(s): \"ALPHOS\", \"TBIL\", \"DBIL\", \"TPROT\"      Imaging:  No results found.        Meds:     INPATIENT MEDICATIONS    Scheduled Medications:      Scheduled Meds[1]        Drips:  IV Meds[2]    PRN Medications  PRN Meds[3]                            [1] apixaban, 5 mg, BID  midodrine, 5 mg, TID  acetaminophen, 1,000 mg, q6h  sennosides, 17.2 mg, Nightly  docusate sodium, 100 mg, BID  allopurinol, 300 mg, Daily  atorvastatin, 10 mg, Nightly  buPROPion SR, 150 mg,  Daily  gabapentin, 300 mg, BID  levothyroxine, 100 mcg, Before breakfast  metoprolol succinate ER, 50 mg, Daily  mirtazapine, 15 mg, Nightly  [Held by provider] torsemide, 10 mg, Daily  [Held by provider] losartan, 100 mg, Daily     [2] sodium chloride, Last Rate: 83 mL/hr at 05/19/25 1450     [3] traMADol, 50 mg, Q6H PRN  metoclopramide, 5 mg, Q8H PRN  polyethylene glycol (PEG 3350), 17 g, Daily PRN  magnesium hydroxide, 30 mL, Daily PRN  bisacodyl, 10 mg, Daily PRN  fleet enema, 1 enema, Once PRN  ondansetron, 4 mg, Q6H PRN  diphenhydrAMINE, 25 mg, Q4H PRN   Or  diphenhydrAMINE, 25 mg, Q4H PRN  benzocaine-menthol, 1 lozenge, Q15 Min PRN  oxyCODONE, 5 mg, Q4H PRN   Or  oxyCODONE, 10 mg, Q4H PRN  [Held by provider] HYDROmorphone, 0.4 mg, Q2H PRN   Or  [Held by provider] HYDROmorphone, 0.8 mg, Q2H PRN  tiZANidine, 4 mg, Q8H PRN  diazePAM, 2 mg, Q6H PRN

## 2025-05-20 NOTE — OCCUPATIONAL THERAPY NOTE
Attempted to see pt for OT tx. Discussed with RN. Pt altered this AM. Stat ABG done. Pt being transferred to CCU with acute hypercapnic respiratory failure.Will defer tx at this time and continue to follow.

## 2025-05-20 NOTE — PROGRESS NOTES
PT Attempted in AM. Discussed with RN pt on hold at this time. Stat ABG done. Pt being transferred to CCU with acute hypercapnic respiratory failure. PT with hold at this time and continue to follow and progress as medical progress allows.

## 2025-05-20 NOTE — PROGRESS NOTES
[facility]  [facility]  Cardiology Progress Note    Reynaldo Huffman Patient Status:  Inpatient    1960 MRN [id number]   Location [facility] 2W/SW Attending Papito Arcos MD   Hosp Day # 4 PCP Antonio Martinez MD       Impression:  Hypotension, less likely cardiogenic- warm in extremities. Improved with fluid resuscitation.  Afib, with slow ventricular response. On eliquis 5mg BID  - [id number] 5 (age, CVA, HTN, CAD of at least 50%).  CAD, non obstructive  TIA  HTN  HDL     - Hold dig, metoprolol. Can stop digoxin going forward. Can restart metop pending stable HR and BP. Likely tomorrow if he remains well today.  - Hold home torsemide 10mg, metolazone 2.5mg weekly  - continue statin  - Tele  - Echo reviewed  - EKG reviewed: AF with slow VR overnight     Subjective:   Overnight had episode of somnolence and diaphoresis, which improved with narcan. EKG and trop were unremarkable with the exception of slow ventricular rate, which has been stable. Patient appears much improved now with normal BP and HR.     Problem List[1]    Objective:   Temp: 97.5 °F (36.4 °C)  Pulse: 83  Resp: 20  BP: 157/95    Intake/Output:     Intake/Output Summary (Last 24 hours) at 20256  Last data filed at 2025 1400  Gross per 24 hour   Intake 240 ml   Output 787 ml   Net -547 ml       Last 3 Weights   25 0400 (!) 367 lb 15.2 oz (166.9 kg)   05/15/25 1014 (!) 309 lb (140.2 kg)   25 1604 300 lb (136.1 kg)   25 1316 300 lb (136.1 kg)   24 0904 297 lb (134.7 kg)       Tele: afib with PVCs    Physical Exam:    General: awake, alert, oriented x 3, no acute distress  HEENT: at/nc, perrl, eomi  Neck: No JVD, carotids 2+ no bruits.  Cardiac: normal rate and irregularly irregular , S1, S2 normal, no murmur, rub or gallop.  Lungs: Clear without wheezes, rales, rhonchi or dullness.  Normal excursions and effort.  Abdomen: Soft, non-tender, non-distended, normal bowel sounds    Extremities: Without clubbing, cyanosis or edema.  Peripheral pulses are 2+.  Neurologic: Alert and oriented, normal affect.  Psych: normal mood and affect  Skin: Warm and dry.     Laboratory/Data:    Labs:         Recent Labs   Lab 05/16/25  0551 05/17/25  1026 05/17/25  1727 05/19/25  0355   WBC 7.5 7.9 8.1 7.5   HGB 12.2* 9.7* 10.3* 9.7*   .1* 109.2* 109.0* 108.6*   .0* 149.0* 143.0* 174.0       Recent Labs   Lab 05/16/25  0615 05/16/25  1210 05/17/25  1026 05/18/25  0354 05/19/25  0355    138 136 138 139   K 5.7* 4.8 4.3 4.3 4.0    106 106 107 110   CO2 26.0 24.0 22.0 22.0 20.0*   BUN 24* 28* 33* 31* 31*   CREATSERUM 1.74* 1.84* 2.08* 2.03* 2.22*   CA 8.5* 8.0* 7.6* 7.9* 8.2*   MG  --   --   --   --  2.1   PHOS  --   --   --   --  3.6   * 126* 196* 127* 131*       No results for input(s): \"ALT\", \"AST\", \"ALB\", \"AMYLASE\", \"LIPASE\", \"LDH\" in the last 168 hours.    Invalid input(s): \"ALPHOS\", \"TBIL\", \"DBIL\", \"TPROT\"    No results for input(s): \"TROP\" in the last 168 hours.    Allergies:   Allergies[2]    Medications:  Current Hospital Medications[3]    Dhiraj Sainz MD  Interventional Cardiology  05/19/25         [1]   Patient Active Problem List  Diagnosis    Paroxysmal atrial fibrillation (HCC)    Hypertension    Thyroid disease    Osteoarthritis of right knee    History of constipation    History of partial ray amputation of left great toe (HCC)    Encounter for current long-term use of anticoagulants    S/P total knee arthroplasty, right    Primary osteoarthritis of left knee    BMI 36.0-36.9,adult    Excoriation    Hyperlipidemia    Gout    Hypothyroidism due to Hashimoto's thyroiditis    Preop testing    Status post left knee replacement    Uric acid kidney stone    LESLY (obstructive sleep apnea)    Hypokalemia    Primary osteoarthritis of right shoulder    Arthritis of right glenohumeral joint    Septic joint of right shoulder region (HCC)    Pyogenic arthritis of right  shoulder region, due to unspecified organism (HCC)    Cellulitis    Cervical spinal stenosis    Radiculopathy of cervical spine   [2]   Allergies  Allergen Reactions    Ancef [Cefazolin] RENAL INSUFFICIENCY     Interstitial nephritis    Vancomycin HYPOTENSION     Per patient, he tolerated last treatment with Vancomycin.    Tigecycline FACE FLUSHING    Naproxen UNKNOWN     Patient cannot take d/t kidney dse.     Clindamycin RASH    Daptomycin RASH     Tolerating course of dapto in 8/2022   [3]   Current Facility-Administered Medications   Medication Dose Route Frequency    apixaban (Eliquis) tab 5 mg  5 mg Oral BID    sodium chloride 0.9% infusion   Intravenous Continuous    midodrine (ProAmatine) tab 5 mg  5 mg Oral TID    traMADol (Ultram) tab 50 mg  50 mg Oral Q6H PRN    acetaminophen (Tylenol Extra Strength) tab 1,000 mg  1,000 mg Oral q6h    metoclopramide (Reglan) 5 mg/mL injection 5 mg  5 mg Intravenous Q8H PRN    sennosides (Senokot) tab 17.2 mg  17.2 mg Oral Nightly    docusate sodium (Colace) cap 100 mg  100 mg Oral BID    polyethylene glycol (PEG 3350) (Miralax) 17 g oral packet 17 g  17 g Oral Daily PRN    magnesium hydroxide (Milk of Magnesia) 400 MG/5ML oral suspension 30 mL  30 mL Oral Daily PRN    bisacodyl (Dulcolax) 10 MG rectal suppository 10 mg  10 mg Rectal Daily PRN    fleet enema (Fleet) rectal enema 133 mL  1 enema Rectal Once PRN    ondansetron (Zofran) 4 MG/2ML injection 4 mg  4 mg Intravenous Q6H PRN    diphenhydrAMINE (Benadryl) cap/tab 25 mg  25 mg Oral Q4H PRN    Or    diphenhydrAMINE (Benadryl) 50 mg/mL  injection 25 mg  25 mg Intravenous Q4H PRN    benzocaine-menthol (Cepacol) lozenge 1 lozenge  1 lozenge Buccal Q15 Min PRN    oxyCODONE immediate release tab 5 mg  5 mg Oral Q4H PRN    Or    oxyCODONE immediate release tab 10 mg  10 mg Oral Q4H PRN    [Held by provider] HYDROmorphone (Dilaudid) 1 MG/ML injection 0.4 mg  0.4 mg Intravenous Q2H PRN    Or    [Held by provider]  HYDROmorphone (Dilaudid) 1 MG/ML injection 0.8 mg  0.8 mg Intravenous Q2H PRN    tiZANidine (Zanaflex) tab 4 mg  4 mg Oral Q8H PRN    diazePAM (Valium) tab 2 mg  2 mg Oral Q6H PRN    allopurinol (Zyloprim) tab 300 mg  300 mg Oral Daily    atorvastatin (Lipitor) tab 10 mg  10 mg Oral Nightly    buPROPion SR (WELLBUTRIN SR) 12 hr tab 150 mg  150 mg Oral Daily    gabapentin (Neurontin) cap 300 mg  300 mg Oral BID    levothyroxine (Synthroid) tab 100 mcg  100 mcg Oral Before breakfast    [Held by provider] metoprolol succinate ER (Toprol XL) 24 hr tab 50 mg  50 mg Oral Daily    mirtazapine (Remeron) tab 15 mg  15 mg Oral Nightly    [Held by provider] torsemide (Demadex) tab 10 mg  10 mg Oral Daily    [Held by provider] losartan (Cozaar) tab 100 mg  100 mg Oral Daily

## 2025-05-20 NOTE — CM/SW NOTE
Pt transferred to CCU 2/2 acute hypercapnic respiratory failure. Pt no compliant w/CPAP overnight, now on continuous AVAPS. List of accepting agencies provided to pt's neftaly Linda at bedside. [facility] is choice at [place]. Agency reserved in [facility], liaison Chelly also notified. Maddi will be POC for dc planning.    Plan: Home w/fiancee with [facility] pending O2 weaning, re-evals, and medical clearance.    Ervin Patel, PRICILLAN    [phone]

## 2025-05-20 NOTE — DISCHARGE INSTRUCTIONS
Sometimes managing your health at home requires assistance.  The [facility] team has recognized your preference to use [facility].  They can be reached by phone at [phone].  The fax number for your reference is [phone].  A representative from the home health agency will contact you or your family to schedule your first visit.

## 2025-05-20 NOTE — PROGRESS NOTES
[facility]  part of [facility]     Progress Note    Reynaldo Huffman Patient Status:  Inpatient    1960 MRN [id number]   Location [facility] 2W/SW Attending Papito Arcos MD   Hosp Day # 5 PCP Antonio Martinez MD       Subjective:   Patient seen and examined.  Somnolent this morning.    Objective:   Blood pressure 149/53, pulse 86, temperature 97.8 °F (36.6 °C), temperature source Temporal, resp. rate 21, height 6' 3\" (1.905 m), weight (!) 367 lb 15.2 oz (166.9 kg), SpO2 98%.  Intake/Output:   Last 3 shifts: I/O last 3 completed shifts:  In: 790 [P.O.:290; I.V.:500]  Out: 817 [Urine:775; Drains:42]   This shift: I/O this shift:  In: -   Out: 1000 [Urine:1000]     Vent Settings: FiO2 (%):  [40 %] 40 %    Hemodynamic parameters (last 24 hours):      Scheduled Meds: Current Hospital Medications[1]    Continuous Infusions: Medication Infusions[2]    Physical Exam  Constitutional: Somnolent, lethargic  Eyes: PERRL  ENT: nares pateint  Neck: supple, no JVD  Cardio: RRR, S1 S2  Respiratory: Basilar crackles  GI: abdomen soft, non tender, active bowel sounds, no organomegaly  Extremities: no clubbing, cyanosis, edema  Neurologic: Somnolent not following commands  Skin: warm, dry      Results:     Lab Results   Component Value Date    WBC 8.6 2025    HGB 11.0 2025    HCT 37.0 2025    .0 2025    CREATSERUM 2.20 2025    BUN 29 2025     2025    K 4.3 2025     2025    CO2 19.0 2025     2025    CA 8.8 2025       No results found.    EKG 12 Lead  Result Date: 2025  Atrial fibrillation with slow ventricular response Rightward axis Low voltage QRS, consider pulmonary disease, pericardial effusion, or normal variant Septal infarct (cited on or before 17-MAY-2025) Abnormal ECG When compared with ECG of 17-MAY-2025 10:36, No significant change was found Confirmed by IAN PARKINSON CASH ([id number]) on  5/19/2025 7:58:43 AM      Assessment   1.  Acute hypercapnic respiratory failure  2.  Status post C3/C7 laminoplasty  3.  History of atrial fibrillation  4.  Hypercapnic encephalopathy  5.  Acute kidney injury on chronic kidney disease       Plan   -Patient's status post C3/C7 laminoplasty.  Worsening lethargy noted today.  ABG reviewed with hypercapnic respiratory failure.  Started on AVAPS and transferred to PCU.  Did not use NIV overnight.  - Cautious use of sedatives and narcotics  - Concern for LESLY.  Recommend outpatient polysomnography  - Monitor hypertension at this time.  -DVT prophylaxis: Eliquis  - Discussed with hospitalist    Isaac Marti DO  Pulmonary Critical Care Medicine  [facility]        [1]   Current Facility-Administered Medications   Medication Dose Route Frequency    oxyCODONE immediate release tab 5 mg  5 mg Oral Q4H PRN    apixaban (Eliquis) tab 5 mg  5 mg Oral BID    sodium chloride 0.9% infusion   Intravenous Continuous    midodrine (ProAmatine) tab 5 mg  5 mg Oral TID    traMADol (Ultram) tab 50 mg  50 mg Oral Q6H PRN    acetaminophen (Tylenol Extra Strength) tab 1,000 mg  1,000 mg Oral q6h    metoclopramide (Reglan) 5 mg/mL injection 5 mg  5 mg Intravenous Q8H PRN    sennosides (Senokot) tab 17.2 mg  17.2 mg Oral Nightly    docusate sodium (Colace) cap 100 mg  100 mg Oral BID    polyethylene glycol (PEG 3350) (Miralax) 17 g oral packet 17 g  17 g Oral Daily PRN    magnesium hydroxide (Milk of Magnesia) 400 MG/5ML oral suspension 30 mL  30 mL Oral Daily PRN    bisacodyl (Dulcolax) 10 MG rectal suppository 10 mg  10 mg Rectal Daily PRN    fleet enema (Fleet) rectal enema 133 mL  1 enema Rectal Once PRN    ondansetron (Zofran) 4 MG/2ML injection 4 mg  4 mg Intravenous Q6H PRN    diphenhydrAMINE (Benadryl) cap/tab 25 mg  25 mg Oral Q4H PRN    Or    diphenhydrAMINE (Benadryl) 50 mg/mL  injection 25 mg  25 mg Intravenous Q4H PRN    benzocaine-menthol (Cepacol) lozenge 1 lozenge  1  lozenge Buccal Q15 Min PRN    tiZANidine (Zanaflex) tab 4 mg  4 mg Oral Q8H PRN    diazePAM (Valium) tab 2 mg  2 mg Oral Q6H PRN    allopurinol (Zyloprim) tab 300 mg  300 mg Oral Daily    atorvastatin (Lipitor) tab 10 mg  10 mg Oral Nightly    buPROPion SR (WELLBUTRIN SR) 12 hr tab 150 mg  150 mg Oral Daily    gabapentin (Neurontin) cap 300 mg  300 mg Oral BID    levothyroxine (Synthroid) tab 100 mcg  100 mcg Oral Before breakfast    metoprolol succinate ER (Toprol XL) 24 hr tab 50 mg  50 mg Oral Daily    mirtazapine (Remeron) tab 15 mg  15 mg Oral Nightly    [Held by provider] torsemide (Demadex) tab 10 mg  10 mg Oral Daily    [Held by provider] losartan (Cozaar) tab 100 mg  100 mg Oral Daily   [2]    sodium chloride 83 mL/hr at 05/19/25 1450

## 2025-05-20 NOTE — PROGRESS NOTES
Spine Surgery Progress Note     Interval history     Delirious on floor today. Pulm evaluated and recommending STAT ABG.        Objective   Lab Results   Component Value Date    WBC 8.6 05/20/2025    HGB 11.0 05/20/2025    HCT 37.0 05/20/2025    .0 05/20/2025    CREATSERUM 2.20 05/20/2025    BUN 29 05/20/2025     05/20/2025    K 4.3 05/20/2025     05/20/2025    CO2 19.0 05/20/2025     05/20/2025    CA 8.8 05/20/2025        Vitals:    05/20/25 0726   BP: (!) 172/99   Pulse: 95   Resp: 18   Temp: 97.6 °F (36.4 °C)       Examination     D  B WE T FF HI        R  5 5 5 5 4+ 5      L  5 5 5 5 4+ 5       Sensation intact to light touch UE dermatomes bilateral    Drain to suction with ss output     Assessment  Post op cervical laminoplasty     Plan   -DVT ppx with SCD, stockings, and ambulation- restarted eliquis   -continue PT/OT eval and treatment  -postoperative radiograph  -regular diet   -pain control  -progress toward discharge   - keep  drain for today pending mobilization   - medical care per hospitalist, pulm, nephro, cards   - medical eval of delirium

## 2025-05-20 NOTE — PLAN OF CARE
Aox 4 on RA. Remote tele no calls. 1x walker. Eliquis. VSS. Voiding via urinal. Oxy and Tylenol given for pain. Plan Home w/ HH. Frequent rounding in place. Call light within reach. Bed in lowest position and locked. Hemovac in place. C-collar for comfort.    Problem: Patient Centered Care  Goal: Patient preferences are identified and integrated in the patient's plan of care  Description: Interventions:  - What would you like us to know as we care for you?   - Provide timely, complete, and accurate information to patient/family  - Incorporate patient and family knowledge, values, beliefs, and cultural backgrounds into the planning and delivery of care  - Encourage patient/family to participate in care and decision-making at the level they choose  - Honor patient and family perspectives and choices  Outcome: Progressing     Problem: PAIN - ADULT  Goal: Verbalizes/displays adequate comfort level or patient's stated pain goal  Description: INTERVENTIONS:  - Encourage pt to monitor pain and request assistance  - Assess pain using appropriate pain scale  - Administer analgesics based on type and severity of pain and evaluate response  - Implement non-pharmacological measures as appropriate and evaluate response  - Consider cultural and social influences on pain and pain management  - Manage/alleviate anxiety  - Utilize distraction and/or relaxation techniques  - Monitor for opioid side effects  - Notify MD/LIP if interventions unsuccessful or patient reports new pain  - Anticipate increased pain with activity and pre-medicate as appropriate  Outcome: Progressing     Problem: RISK FOR INFECTION - ADULT  Goal: Absence of fever/infection during anticipated neutropenic period  Description: INTERVENTIONS  - Monitor WBC  - Administer growth factors as ordered  - Implement neutropenic guidelines  Outcome: Progressing     Problem: SAFETY ADULT - FALL  Goal: Free from fall injury  Description: INTERVENTIONS:  - Assess pt  frequently for physical needs  - Identify cognitive and physical deficits and behaviors that affect risk of falls.  - [place] fall precautions as indicated by assessment.  - Educate pt/family on patient safety including physical limitations  - Instruct pt to call for assistance with activity based on assessment  - Modify environment to reduce risk of injury  - Provide assistive devices as appropriate  - Consider OT/PT consult to assist with strengthening/mobility  - Encourage toileting schedule  Outcome: Progressing     Problem: DISCHARGE PLANNING  Goal: Discharge to home or other facility with appropriate resources  Description: INTERVENTIONS:  - Identify barriers to discharge w/pt and caregiver  - Include patient/family/discharge partner in discharge planning  - Arrange for needed discharge resources and transportation as appropriate  - Identify discharge learning needs (meds, wound care, etc)  - Arrange for interpreters to assist at discharge as needed  - Consider post-discharge preferences of patient/family/discharge partner  - Complete POLST form as appropriate  - Assess patient's ability to be responsible for managing their own health  - Refer to Case Management Department for coordinating discharge planning if the patient needs post-hospital services based on physician/LIP order or complex needs related to functional status, cognitive ability or social support system  Outcome: Progressing     Problem: CARDIOVASCULAR - ADULT  Goal: Maintains optimal cardiac output and hemodynamic stability  Description: INTERVENTIONS:  - Monitor vital signs, rhythm, and trends  - Monitor for bleeding, hypotension and signs of decreased cardiac output  - Evaluate effectiveness of vasoactive medications to optimize hemodynamic stability  - Monitor arterial and/or venous puncture sites for bleeding and/or hematoma  - Assess quality of pulses, skin color and temperature  - Assess for signs of decreased coronary artery perfusion  - ex. Angina  - Evaluate fluid balance, assess for edema, trend weights  Outcome: Progressing  Goal: Absence of cardiac arrhythmias or at baseline  Description: INTERVENTIONS:  - Continuous cardiac monitoring, monitor vital signs, obtain 12 lead EKG if indicated  - Evaluate effectiveness of antiarrhythmic and heart rate control medications as ordered  - Initiate emergency measures for life threatening arrhythmias  - Monitor electrolytes and administer replacement therapy as ordered  Outcome: Progressing     Problem: SKIN/TISSUE INTEGRITY - ADULT  Goal: Incision(s), wounds(s) or drain site(s) healing without S/S of infection  Description: INTERVENTIONS:  - Assess and document risk factors for pressure ulcer development  - Assess and document skin integrity  - Assess and document dressing/incision, wound bed, drain sites and surrounding tissue  - Implement wound care per orders  - Initiate isolation precautions as appropriate  - Initiate Pressure Ulcer prevention bundle as indicated  Outcome: Progressing     Problem: RESPIRATORY - ADULT  Goal: Achieves optimal ventilation and oxygenation  Description: INTERVENTIONS:  - Assess for changes in respiratory status  - Assess for changes in mentation and behavior  - Position to facilitate oxygenation and minimize respiratory effort  - Oxygen supplementation based on oxygen saturation or ABGs  - Provide Smoking Cessation handout, if applicable  - Encourage broncho-pulmonary hygiene including cough, deep breathe, Incentive Spirometry  - Assess the need for suctioning and perform as needed  - Assess and instruct to report SOB or any respiratory difficulty  - Respiratory Therapy support as indicated  - Manage/alleviate anxiety  - Monitor for signs/symptoms of CO2 retention  Outcome: Progressing

## 2025-05-20 NOTE — HOME CARE LIAISON
[facility].  I spoke with scott Linda by phone and she agreed to our home health services.  [facility] will follow this patient thru their discharge from the hospital.

## 2025-05-20 NOTE — PROGRESS NOTES
Nephrology Progress Note    Reynaldo Huffman Attending:  Papito Arcos MD       SUBJECTIVE:     On bipap  somnolent  On midodrine  Cr stable today  Transferred to PCU     PHYSICAL EXAM:     Vital Signs: /76 (BP Location: Right arm)   Pulse 76   Temp 97.5 °F (36.4 °C) (Temporal)   Resp 18   Ht 6' 3\" (1.905 m)   Wt (!) 367 lb 15.2 oz (166.9 kg)   SpO2 96%   BMI 45.99 kg/m²   Temp (24hrs), Av.6 °F (36.4 °C), Min:97.2 °F (36.2 °C), Max:98 °F (36.7 °C)       Intake/Output Summary (Last 24 hours) at 2025 1732  Last data filed at 2025 1520  Gross per 24 hour   Intake 100 ml   Output 1318.5 ml   Net -1218.5 ml     Wt Readings from Last 3 Encounters:   25 (!) 367 lb 15.2 oz (166.9 kg)   25 300 lb (136.1 kg)   24 297 lb (134.7 kg)       General: pleasant, well appearing  HEENT: NCAT  Neck: Supple  Cardiac: Regular rate and rhythm, no murmurs  Lungs: CTAB  Abdomen: Soft, non-tender, non-distended  Extremities: no LE edema  Neurologic/Psych: mentating well       LABORATORY DATA:     Lab Results   Component Value Date     (H) 2025    BUN 29 (H) 2025    BUNCREA 13.2 2025    CREATSERUM 2.20 (H) 2025    ANIONGAP 11 2025    GFRNAA 18 (L) 2022    GFRAA 21 (L) 2022    CA 8.8 2025    OSMOCALC 293 2025    ALKPHO 117 2022    AST 15 2022    ALT 11 (L) 2022    BILT 0.5 2022    TP 6.5 2022    ALB 2.6 (L) 2022    GLOBULIN 3.9 2022     2025    K 4.3 2025     2025    CO2 19.0 (L) 2025     Lab Results   Component Value Date    WBC 8.6 2025    RBC 3.44 (L) 2025    HGB 11.0 (L) 2025    HCT 37.0 (L) 2025    .0 2025    .6 (H) 2025    MCH 32.0 2025    MCHC 29.7 (L) 2025    RDW 14.9 2025    NEPRELIM 3.10 2022    NEUTABS 18.24 (H) 2022    LYMPHABS 0.38 (L) 2022    EOSABS 0.00  07/31/2022    BASABS 0.00 07/31/2022    NEUT 93 07/31/2022    LYMPH 2 07/31/2022    MON 0 07/31/2022    EOS 0 07/31/2022    BASO 0 07/31/2022    NEPERCENT 56.1 08/25/2022    LYPERCENT 26.4 08/25/2022    MOPERCENT 13.9 08/25/2022    EOPERCENT 2.5 08/25/2022    BAPERCENT 0.2 08/25/2022    NE 3.10 08/25/2022    LYMABS 1.46 08/25/2022    MOABSO 0.77 08/25/2022    EOABSO 0.14 08/25/2022    BAABSO 0.01 08/25/2022     Lab Results   Component Value Date    CREUR 115.40 05/17/2025     Lab Results   Component Value Date    COLORUR Yellow 07/30/2022    CLARITY Clear 07/30/2022    SPECGRAVITY 1.010 07/30/2022    GLUUR Negative 07/30/2022    BILUR Negative 07/30/2022    KETUR Negative 07/30/2022    BLOODURINE Trace-lysed (A) 07/30/2022    PHURINE 5.5 07/30/2022    PROUR 30 mg/dL (A) 07/30/2022    UROBILINOGEN 0.2 07/30/2022    NITRITE Negative 07/30/2022    LEUUR Negative 07/30/2022    NMIC Microscopic not indicated 07/28/2022    WBCUR 1-5 07/30/2022    WBCUR 1-5 07/30/2022    RBCUR 0-2 07/30/2022    RBCUR 0-2 07/30/2022    EPIUR Few (A) 07/30/2022    EPIUR Few (A) 07/30/2022    BACUR Rare (A) 07/30/2022    BACUR Rare (A) 07/30/2022    HYLUR 13 (H) 05/19/2020       Impression:      #.  FERNANDA - early ATN   - intraoperative hypotension 5/15 noted  -sCr stable today     #.  CKD stage 3A -cardiorenal  - Baseline serum creatinine appears to be around 1.4 to 1.5 mg/dL     #.  Hyperkalemia   - Due to FERNANDA, hypotension, and resultant diminished kaliuresis  -resolved      #.  Acidosis   #Acute hypercapneic resp failure  - Initially respiratory, then mild metabolic in patient with likely chronic CO2 retention     #.  Proteinuria   - Minimal when last checked 60mg/g; outpatient f/u   - Consider future SGLT2     #.  HFpEF  #.  HTN   - PTA regimen: Losartan, metoprolol, torsemide     #.  Anemia         Recommendations:    Supportive care for ATN - sCr appears to have plateud today  stop IVF. F/u CXR. PO Diuretics on hold per cards. Ok to give  IV diuretics as needed for volume /resp compromise.   Consider diamox as needed  Renal US without hydro, did show urinary retention, subsequent PVR ok. Follow PVR q shift.    Hold BP RX - on midodrine TID  Strict I/Os   Avoid IV contrast unless emergent       Tamika Benedict MD  [facility]  Nephrology

## 2025-05-20 NOTE — PLAN OF CARE
Patient transferred to PCCU at 1045 on AVAPS. Call light within reach, safety measures maintained.     Problem: Patient Centered Care  Goal: Patient preferences are identified and integrated in the patient's plan of care  Description: Interventions:  - What would you like us to know as we care for you? Ambulates well at home.   - Provide timely, complete, and accurate information to patient/family  - Incorporate patient and family knowledge, values, beliefs, and cultural backgrounds into the planning and delivery of care  - Encourage patient/family to participate in care and decision-making at the level they choose  - Honor patient and family perspectives and choices  Outcome: Progressing     Problem: PAIN - ADULT  Goal: Verbalizes/displays adequate comfort level or patient's stated pain goal  Description: INTERVENTIONS:  - Encourage pt to monitor pain and request assistance  - Assess pain using appropriate pain scale  - Administer analgesics based on type and severity of pain and evaluate response  - Implement non-pharmacological measures as appropriate and evaluate response  - Consider cultural and social influences on pain and pain management  - Manage/alleviate anxiety  - Utilize distraction and/or relaxation techniques  - Monitor for opioid side effects  - Notify MD/LIP if interventions unsuccessful or patient reports new pain  - Anticipate increased pain with activity and pre-medicate as appropriate  Outcome: Progressing     Problem: RISK FOR INFECTION - ADULT  Goal: Absence of fever/infection during anticipated neutropenic period  Description: INTERVENTIONS  - Monitor WBC  - Administer growth factors as ordered  - Implement neutropenic guidelines  Outcome: Progressing     Problem: SAFETY ADULT - FALL  Goal: Free from fall injury  Description: INTERVENTIONS:  - Assess pt frequently for physical needs  - Identify cognitive and physical deficits and behaviors that affect risk of falls.  - [place] fall  precautions as indicated by assessment.  - Educate pt/family on patient safety including physical limitations  - Instruct pt to call for assistance with activity based on assessment  - Modify environment to reduce risk of injury  - Provide assistive devices as appropriate  - Consider OT/PT consult to assist with strengthening/mobility  - Encourage toileting schedule  Outcome: Progressing     Problem: DISCHARGE PLANNING  Goal: Discharge to home or other facility with appropriate resources  Description: INTERVENTIONS:  - Identify barriers to discharge w/pt and caregiver  - Include patient/family/discharge partner in discharge planning  - Arrange for needed discharge resources and transportation as appropriate  - Identify discharge learning needs (meds, wound care, etc)  - Arrange for interpreters to assist at discharge as needed  - Consider post-discharge preferences of patient/family/discharge partner  - Complete POLST form as appropriate  - Assess patient's ability to be responsible for managing their own health  - Refer to Case Management Department for coordinating discharge planning if the patient needs post-hospital services based on physician/LIP order or complex needs related to functional status, cognitive ability or social support system  Outcome: Progressing     Problem: CARDIOVASCULAR - ADULT  Goal: Maintains optimal cardiac output and hemodynamic stability  Description: INTERVENTIONS:  - Monitor vital signs, rhythm, and trends  - Monitor for bleeding, hypotension and signs of decreased cardiac output  - Evaluate effectiveness of vasoactive medications to optimize hemodynamic stability  - Monitor arterial and/or venous puncture sites for bleeding and/or hematoma  - Assess quality of pulses, skin color and temperature  - Assess for signs of decreased coronary artery perfusion - ex. Angina  - Evaluate fluid balance, assess for edema, trend weights  Outcome: Progressing  Goal: Absence of cardiac arrhythmias  or at baseline  Description: INTERVENTIONS:  - Continuous cardiac monitoring, monitor vital signs, obtain 12 lead EKG if indicated  - Evaluate effectiveness of antiarrhythmic and heart rate control medications as ordered  - Initiate emergency measures for life threatening arrhythmias  - Monitor electrolytes and administer replacement therapy as ordered  Outcome: Progressing     Problem: SKIN/TISSUE INTEGRITY - ADULT  Goal: Incision(s), wounds(s) or drain site(s) healing without S/S of infection  Description: INTERVENTIONS:  - Assess and document risk factors for pressure ulcer development  - Assess and document skin integrity  - Assess and document dressing/incision, wound bed, drain sites and surrounding tissue  - Implement wound care per orders  - Initiate isolation precautions as appropriate  - Initiate Pressure Ulcer prevention bundle as indicated  Outcome: Progressing     Problem: RESPIRATORY - ADULT  Goal: Achieves optimal ventilation and oxygenation  Description: INTERVENTIONS:  - Assess for changes in respiratory status  - Assess for changes in mentation and behavior  - Position to facilitate oxygenation and minimize respiratory effort  - Oxygen supplementation based on oxygen saturation or ABGs  - Provide Smoking Cessation handout, if applicable  - Encourage broncho-pulmonary hygiene including cough, deep breathe, Incentive Spirometry  - Assess the need for suctioning and perform as needed  - Assess and instruct to report SOB or any respiratory difficulty  - Respiratory Therapy support as indicated  - Manage/alleviate anxiety  - Monitor for signs/symptoms of CO2 retention  Outcome: Progressing

## 2025-05-21 LAB
ANION GAP SERPL CALC-SCNC: 9 MMOL/L (ref 0–18)
BUN BLD-MCNC: 26 MG/DL (ref 9–23)
BUN/CREAT SERPL: 13.2 (ref 10–20)
CALCIUM BLD-MCNC: 9 MG/DL (ref 8.7–10.4)
CHLORIDE SERPL-SCNC: 110 MMOL/L (ref 98–112)
CO2 SERPL-SCNC: 24 MMOL/L (ref 21–32)
CREAT BLD-MCNC: 1.97 MG/DL (ref 0.7–1.3)
DEPRECATED RDW RBC AUTO: 57.2 FL (ref 35.1–46.3)
EGFRCR SERPLBLD CKD-EPI 2021: 37 ML/MIN/1.73M2 (ref 60–?)
ERYTHROCYTE [DISTWIDTH] IN BLOOD BY AUTOMATED COUNT: 14.6 % (ref 11–15)
GLUCOSE BLD-MCNC: 135 MG/DL (ref 70–99)
HCT VFR BLD AUTO: 34.6 % (ref 39–53)
HGB BLD-MCNC: 10.3 G/DL (ref 13–17.5)
MCH RBC QN AUTO: 32 PG (ref 26–34)
MCHC RBC AUTO-ENTMCNC: 29.8 G/DL (ref 31–37)
MCV RBC AUTO: 107.5 FL (ref 80–100)
OSMOLALITY SERPL CALC.SUM OF ELEC: 303 MOSM/KG (ref 275–295)
PLATELET # BLD AUTO: 205 10(3)UL (ref 150–450)
POTASSIUM SERPL-SCNC: 4.1 MMOL/L (ref 3.5–5.1)
RBC # BLD AUTO: 3.22 X10(6)UL (ref 3.8–5.8)
SODIUM SERPL-SCNC: 143 MMOL/L (ref 136–145)
WBC # BLD AUTO: 8.2 X10(3) UL (ref 4–11)

## 2025-05-21 PROCEDURE — 99233 SBSQ HOSP IP/OBS HIGH 50: CPT | Performed by: INTERNAL MEDICINE

## 2025-05-21 RX ORDER — TORSEMIDE 5 MG/1
10 TABLET ORAL DAILY
Status: DISCONTINUED | OUTPATIENT
Start: 2025-05-22 | End: 2025-05-22

## 2025-05-21 RX ORDER — FAMOTIDINE 10 MG/ML
20 INJECTION, SOLUTION INTRAVENOUS ONCE
Status: COMPLETED | OUTPATIENT
Start: 2025-05-21 | End: 2025-05-21

## 2025-05-21 RX ORDER — FUROSEMIDE 10 MG/ML
40 INJECTION INTRAMUSCULAR; INTRAVENOUS ONCE
Status: COMPLETED | OUTPATIENT
Start: 2025-05-21 | End: 2025-05-21

## 2025-05-21 NOTE — PROGRESS NOTES
[facility]  part of [facility]    Progress Note    Reynaldo Huffman Patient Status:  Inpatient    1960 MRN [id number]   Location [facility] 2W/SW Attending Papito Arcos MD   Hosp Day # 6 PCP Antonio Martinez MD     SUBJECTIVE:  Interval History: Agitated this morning, wanting to get out of bed.  Denies significant neck or arm pain.  Temp overnight 100.4F.    Post-Op Day: 6    OBJECTIVE:  Blood pressure (!) 150/103, pulse 83, temperature 100.4 °F (38 °C), temperature source Temporal, resp. rate 23, height 6' 3\" (1.905 m), weight (!) 367 lb 15.2 oz (166.9 kg), SpO2 95%.     Ortho Spine Exam:  Incisional dressing is clean, dry and intact.  Hemovac intact.  Adjacent skin is without erythema or edema.  BUE motor exam 5/5 deltoid, tricep, bicep, wrist extension, finger intrinsics 4+/5 bilateral.  SILT distally.                                    ASSESSMENT/PLAN:    S/P cervical laminoplasty POD6      -DVT ppx with SCD, stockings, and ambulation- restarted eliquis   -continue PT/OT eval and treatment  -pain control  -progress toward discharge   - keep drain for today pending mobilization   - medical care per hospitalist, pulm, nephro, cards         FRANK CHURCHILL NP  2025  6:37 AM

## 2025-05-21 NOTE — PROGRESS NOTES
05/20/25 1935   BiPAP   Mode AVAPS   Interface Full face mask   Mask Size Medium   Control Settings   Set Rate 12 breaths/min   Set EPAP 5   Oxygen Percent 40 %   Inspiratory time 1.5   AVAPS   Min IPAP 25   Max IPAP 10   EPAP Level 5   Set rate 12   Tidal volume 650     Pt received on the above settings. Pt tolerating therapy with breaks dinner. No events on shift

## 2025-05-21 NOTE — PROGRESS NOTES
[facility] Hospitalist Progress Note     CC: Hospital Follow up    PCP: Antonio Martinez MD       Assessment/Plan:   Mr. Huffman is a 65 yo M with PMH of Afib on Eliquis, TIA, HTN, HL, who presented for cervical surgery.      C3-C7 cervical laminoplasty  -POD # 6  -orthopedic surgery following  -resumed eliquis 5/19   -continue PT/OT    Acute hypercapnic respiratory failure  -altered AM of 5/20, required transfer to ICU for NIPPV  -ABG done 5/20. pH 7.11 CO2 69  -started NIPPV   -non compliance with avaps at night   -decreased oxycodone dosing and stopped 10mg tabs, now with 5mg tabs   -gave lasix 40mg IV once 5/20 evening, give another dose this AM, and start oral torsemide tomorrow AM. CXR reviewed from 5/20  -monitor for fever, suspect low grade temp due to atelectasis but at risk of pneumonia as well.      FERNANDA on CKD3  Hyperkalemia  -repeat BMP pending  -suspect volume up from CXR and mild edema in legs  -gave lasix 40mg IV once yesterday, give this AM and likely can start oral torsemide tomorrow AM  -renal on consult      HTN  - home meds: losartan on hold. Metoprolol ongoing. Resume oral torsemide tomorrow. Give lasix IV today      Afib with SVR  - resume eliquis 5/19  - resume metoprolol   - hold digoxin now and going forward per cardiology      Hx TIA  - November 2024, had dysarthria at that time  - eliquis     Hypothyroidism  - synthroid 50 mcg daily     ACP  - CODE- FULL  - POA- son- Hitesh  - lives alone  - does have a fiance Maddi Lakhani who lives near by- asked that she be the  as she lives closer     DVT Prophy: continue eliquis     Dispo: not medically ready today but hopefully 1-2 days     [facility] Hospitalist      Subjective:     More awake  Feels ok  In bed however    OBJECTIVE:    Blood pressure (!) 150/103, pulse 83, temperature 100.4 °F (38 °C), temperature source Temporal, resp. rate 23, height 6' 3\" (1.905 m), weight (!) 367 lb 15.2 oz (166.9 kg), SpO2  95%.    Temp:  [97 °F (36.1 °C)-100.4 °F (38 °C)] 100.4 °F (38 °C)  Pulse:  [68-94] 83  Resp:  [12-28] 23  BP: (126-178)/() 150/103  SpO2:  [95 %-99 %] 95 %  FiO2 (%):  [40 %] 40 %      Intake/Output:    Intake/Output Summary (Last 24 hours) at 5/21/2025 0947  Last data filed at 5/21/2025 0600  Gross per 24 hour   Intake 440 ml   Output 3465.5 ml   Net -3025.5 ml       Last 3 Weights   05/18/25 0400 (!) 367 lb 15.2 oz (166.9 kg)   05/15/25 1014 (!) 309 lb (140.2 kg)   05/12/25 1604 300 lb (136.1 kg)   05/01/25 1316 300 lb (136.1 kg)   12/13/24 0904 297 lb (134.7 kg)       Exam   GEN: awake   Pulm: CTABL  CV: RRR, no murmurs  ABD: Soft  MSK: strength 5/5 in all extremities  Neuro: Grossly normal, CN intact, sensory intact  SKIN: warm, dry  EXT: trace pitting edema     Data Review:       Labs:     Recent Labs   Lab 05/19/25 0355 05/20/25 0422 05/21/25  0327   RBC 3.03* 3.44* 3.22*   HGB 9.7* 11.0* 10.3*   HCT 32.9* 37.0* 34.6*   .6* 107.6* 107.5*   MCH 32.0 32.0 32.0   MCHC 29.5* 29.7* 29.8*   RDW 14.7 14.9 14.6   WBC 7.5 8.6 8.2   .0 218.0 205.0         Recent Labs   Lab 05/18/25 0354 05/19/25 0355 05/20/25 0422   * 131* 152*   BUN 31* 31* 29*   CREATSERUM 2.03* 2.22* 2.20*   EGFRCR 35* 32* 32*   CA 7.9* 8.2* 8.8    139 137   K 4.3 4.0 4.3    110 107   CO2 22.0 20.0* 19.0*       No results for input(s): \"ALT\", \"AST\", \"ALB\", \"AMYLASE\", \"LIPASE\", \"LDH\" in the last 168 hours.    Invalid input(s): \"ALPHOS\", \"TBIL\", \"DBIL\", \"TPROT\"      Imaging:  XR CHEST AP PORTABLE  (CPT=71045)  Result Date: 5/20/2025  CONCLUSION: Findings suggesting CHF and/or increased fluid volume status of the patient.  New bibasilar pulmonary opacities which could represent atelectasis, infection, or edema.   Dictated by (CST): Jamey Fritz MD on 5/20/2025 at 4:59 PM     Finalized by (CST): Jamey Fritz MD on 5/20/2025 at 5:00 PM                Meds:     INPATIENT MEDICATIONS    Scheduled  Medications:      Scheduled Meds[1]        Drips:  IV Meds[2]    PRN Medications  PRN Meds[3]                            [1] furosemide, 40 mg, Once  [START ON 5/22/2025] torsemide, 10 mg, Daily  apixaban, 5 mg, BID  midodrine, 5 mg, TID  acetaminophen, 1,000 mg, q6h  sennosides, 17.2 mg, Nightly  docusate sodium, 100 mg, BID  allopurinol, 300 mg, Daily  atorvastatin, 10 mg, Nightly  buPROPion SR, 150 mg, Daily  gabapentin, 300 mg, BID  levothyroxine, 100 mcg, Before breakfast  metoprolol succinate ER, 50 mg, Daily  mirtazapine, 15 mg, Nightly  [Held by provider] losartan, 100 mg, Daily     [2]    [3] oxyCODONE, 5 mg, Q4H PRN  traMADol, 50 mg, Q6H PRN  metoclopramide, 5 mg, Q8H PRN  polyethylene glycol (PEG 3350), 17 g, Daily PRN  magnesium hydroxide, 30 mL, Daily PRN  bisacodyl, 10 mg, Daily PRN  fleet enema, 1 enema, Once PRN  ondansetron, 4 mg, Q6H PRN  diphenhydrAMINE, 25 mg, Q4H PRN   Or  diphenhydrAMINE, 25 mg, Q4H PRN  benzocaine-menthol, 1 lozenge, Q15 Min PRN  tiZANidine, 4 mg, Q8H PRN  diazePAM, 2 mg, Q6H PRN

## 2025-05-21 NOTE — PLAN OF CARE
Patient refusing AVAP on/off throughout the night, MD notified. O2 sats 95% on 4 L oxygen, desats with movement. Patient constantly getting up and down throughout the night, refusing fall precautions, educated importance of fall precautions. Patient taking off blood pressure cuff, pulse ox, educated on importance of keeping on monitoring precautions. Hemovac in place. Alert x 4 but forgetful. Patient agitated, stating \"he wants to get fresh air and go home\".    Problem: Patient Centered Care  Goal: Patient preferences are identified and integrated in the patient's plan of care  Description: Interventions:  - What would you like us to know as we care for you?   - Provide timely, complete, and accurate information to patient/family  - Incorporate patient and family knowledge, values, beliefs, and cultural backgrounds into the planning and delivery of care  - Encourage patient/family to participate in care and decision-making at the level they choose  - Honor patient and family perspectives and choices  5/21/2025 0325 by Lanette Godwin RN  Outcome: Progressing  5/20/2025 2108 by Lanette Godwin RN  Outcome: Progressing     Problem: Patient/Family Goals  Goal: Patient/Family Long Term Goal  Description: Patient's Long Term Goal:     Interventions:  - See additional Care Plan goals for specific interventions  5/21/2025 0325 by Lanette Godwin RN  Outcome: Progressing  5/20/2025 2108 by Lanette Godwin RN  Outcome: Progressing  Goal: Patient/Family Short Term Goal  Description: Patient's Short Term Goal:     Interventions:   - See additional Care Plan goals for specific interventions  5/21/2025 0325 by Lanette Godwin RN  Outcome: Progressing  5/20/2025 2108 by Lanette Godwin RN  Outcome: Progressing     Problem: PAIN - ADULT  Goal: Verbalizes/displays adequate comfort level or patient's stated pain goal  Description: INTERVENTIONS:  - Encourage pt to monitor pain and request  assistance  - Assess pain using appropriate pain scale  - Administer analgesics based on type and severity of pain and evaluate response  - Implement non-pharmacological measures as appropriate and evaluate response  - Consider cultural and social influences on pain and pain management  - Manage/alleviate anxiety  - Utilize distraction and/or relaxation techniques  - Monitor for opioid side effects  - Notify MD/LIP if interventions unsuccessful or patient reports new pain  - Anticipate increased pain with activity and pre-medicate as appropriate  5/21/2025 0325 by Lanette Godwin RN  Outcome: Progressing  5/20/2025 2108 by Lanette Godwin RN  Outcome: Progressing     Problem: RISK FOR INFECTION - ADULT  Goal: Absence of fever/infection during anticipated neutropenic period  Description: INTERVENTIONS  - Monitor WBC  - Administer growth factors as ordered  - Implement neutropenic guidelines  5/21/2025 0325 by Lanette Godwin RN  Outcome: Progressing  5/20/2025 2108 by Lanette Godwin RN  Outcome: Progressing     Problem: SAFETY ADULT - FALL  Goal: Free from fall injury  Description: INTERVENTIONS:  - Assess pt frequently for physical needs  - Identify cognitive and physical deficits and behaviors that affect risk of falls.  - [place] fall precautions as indicated by assessment.  - Educate pt/family on patient safety including physical limitations  - Instruct pt to call for assistance with activity based on assessment  - Modify environment to reduce risk of injury  - Provide assistive devices as appropriate  - Consider OT/PT consult to assist with strengthening/mobility  - Encourage toileting schedule  5/21/2025 0325 by Lanette Godwin RN  Outcome: Progressing  5/20/2025 2108 by Lanette Godwin RN  Outcome: Progressing     Problem: DISCHARGE PLANNING  Goal: Discharge to home or other facility with appropriate resources  Description: INTERVENTIONS:  - Identify barriers to discharge  w/pt and caregiver  - Include patient/family/discharge partner in discharge planning  - Arrange for needed discharge resources and transportation as appropriate  - Identify discharge learning needs (meds, wound care, etc)  - Arrange for interpreters to assist at discharge as needed  - Consider post-discharge preferences of patient/family/discharge partner  - Complete POLST form as appropriate  - Assess patient's ability to be responsible for managing their own health  - Refer to Case Management Department for coordinating discharge planning if the patient needs post-hospital services based on physician/LIP order or complex needs related to functional status, cognitive ability or social support system  5/21/2025 0325 by Lanette Godwin RN  Outcome: Progressing  5/20/2025 2108 by Lanette Godwin RN  Outcome: Progressing     Problem: CARDIOVASCULAR - ADULT  Goal: Maintains optimal cardiac output and hemodynamic stability  Description: INTERVENTIONS:  - Monitor vital signs, rhythm, and trends  - Monitor for bleeding, hypotension and signs of decreased cardiac output  - Evaluate effectiveness of vasoactive medications to optimize hemodynamic stability  - Monitor arterial and/or venous puncture sites for bleeding and/or hematoma  - Assess quality of pulses, skin color and temperature  - Assess for signs of decreased coronary artery perfusion - ex. Angina  - Evaluate fluid balance, assess for edema, trend weights  5/21/2025 0325 by Lanette Godwin RN  Outcome: Progressing  5/20/2025 2108 by Lanette Godwin RN  Outcome: Progressing  Goal: Absence of cardiac arrhythmias or at baseline  Description: INTERVENTIONS:  - Continuous cardiac monitoring, monitor vital signs, obtain 12 lead EKG if indicated  - Evaluate effectiveness of antiarrhythmic and heart rate control medications as ordered  - Initiate emergency measures for life threatening arrhythmias  - Monitor electrolytes and administer replacement  therapy as ordered  5/21/2025 0325 by Lanette Godwin RN  Outcome: Progressing  5/20/2025 2108 by Lanette Godwin RN  Outcome: Progressing     Problem: SKIN/TISSUE INTEGRITY - ADULT  Goal: Incision(s), wounds(s) or drain site(s) healing without S/S of infection  Description: INTERVENTIONS:  - Assess and document risk factors for pressure ulcer development  - Assess and document skin integrity  - Assess and document dressing/incision, wound bed, drain sites and surrounding tissue  - Implement wound care per orders  - Initiate isolation precautions as appropriate  - Initiate Pressure Ulcer prevention bundle as indicated  5/21/2025 0325 by Lanette Godwin RN  Outcome: Progressing  5/20/2025 2108 by Lanette Godwin RN  Outcome: Progressing     Problem: RESPIRATORY - ADULT  Goal: Achieves optimal ventilation and oxygenation  Description: INTERVENTIONS:  - Assess for changes in respiratory status  - Assess for changes in mentation and behavior  - Position to facilitate oxygenation and minimize respiratory effort  - Oxygen supplementation based on oxygen saturation or ABGs  - Provide Smoking Cessation handout, if applicable  - Encourage broncho-pulmonary hygiene including cough, deep breathe, Incentive Spirometry  - Assess the need for suctioning and perform as needed  - Assess and instruct to report SOB or any respiratory difficulty  - Respiratory Therapy support as indicated  - Manage/alleviate anxiety  - Monitor for signs/symptoms of CO2 retention  5/21/2025 0325 by Lanette Godwin RN  Outcome: Progressing  5/20/2025 2108 by Lanette Godwin RN  Outcome: Progressing

## 2025-05-21 NOTE — PROGRESS NOTES
Pulmonary/ICU/Critical Care Progress Note        Reason for Consultation: lethargic and hypotensive  Referring Physician: Dr. Arcos      Subjective:  On 4 LNC  Afebrile  Was hypercapnic yesterday   Wore BIPAP but wasn't happy about it      From the initial consultation  HPI: 63 yo male with s/p POD #1 C3-C7 laminoplasty on pain meds (last oxycodone 10 mg at 3 am), A-fib on eliquis (being held), TIA, HTN, HLP  This am was found to be hypotensive and somnolent this am s/p 1500 ml fluid bolus and IVF @ 100 ml/hr  Pt reports he didn't sleep last night  Per fiance, pt snores but never been tested for sleep apnea  He denies previous hx of smoking and not on inhalers, nebs, supp 02  Afebrile  BP meds held this am  ABG earlier showed hypercapnia started on BIPAP but pt removed now to speak with family at bedside  Was also given narcan earlier this am when he was somnolent  When seen this afternoon, he is much more awake and answering questions appropriately.   Repeat BP with SBP in mid 90s. Pt reports his BP runs on the lower side normally.      REVIEW OF SYSTEMS:  Positives and negatives as noted in HPI. All other review of systems otherwise are either limited (due to pt/family inability to provide) or negative.      PAST MEDICAL HISTORY:  Past Medical History[1]      PAST SURGICAL HISTORY:  Past Surgical History[2]      PAST SOCIAL HISTORY:  Social Hx on file[3]      PAST FAMILY HISTORY:  Family History[4]      ALLERGIES:  Allergies[5]      MEDS:  Home Medications:  Medications Taking[6]    Scheduled Medication:  Scheduled Medications[7]  Continuous Infusing Medication:  Medication Infusions[8]  PRN Medications:  PRN Medications[9]       PHYSICAL EXAM:  BP (!) 150/103 (BP Location: Right arm)   Pulse 83   Temp 100.4 °F (38 °C) (Temporal)   Resp 23   Ht 6' 3\" (1.905 m)   Wt (!) 367 lb 15.2 oz (166.9 kg)   SpO2 95%   BMI 45.99 kg/m²   FiO2 (%):  [40 %] 40 %  CONSTITUTIONAL: alert, oriented, no apparent  distress  HEENT: atraumatic normocephalic  MOUTH: mmm  NECK/THROAT: + soft collar   LUNG: clear b/l no wheezing, crackles. Chest symmetric with respiratory motion  HEART: regular rate and rhythm, no obvious murmers or gallops noted  ABD: soft non tender. + bowel sounds. No organomegaly noted  EXT: no clubbing, cyanosis, or edema noted. Pulses intact grossly  NEURO/MUSCULOSKELETAL: no gross deficits  SKIN: warm, dry. No obvious lesions noted  LYMPH: no obvious LAD      IMAGES:   CXR 5/20/25  CONCLUSION: Findings suggesting CHF and/or increased fluid volume status of the patient.  New bibasilar pulmonary opacities which could represent atelectasis, infection, or edema.         CXR 5/16/25  1. Suboptimal inspiration.  Mild pulmonary vascular redistribution.  Otherwise negative        LABS:  Recent Labs   Lab 05/19/25  0355 05/20/25  0422 05/21/25  0327   RBC 3.03* 3.44* 3.22*   HGB 9.7* 11.0* 10.3*   HCT 32.9* 37.0* 34.6*   .6* 107.6* 107.5*   MCH 32.0 32.0 32.0   MCHC 29.5* 29.7* 29.8*   RDW 14.7 14.9 14.6   WBC 7.5 8.6 8.2   .0 218.0 205.0       Recent Labs   Lab 05/18/25  0354 05/19/25  0355 05/20/25  0422   * 131* 152*   BUN 31* 31* 29*   CREATSERUM 2.03* 2.22* 2.20*   EGFRCR 35* 32* 32*   CA 7.9* 8.2* 8.8    139 137   K 4.3 4.0 4.3    110 107   CO2 22.0 20.0* 19.0*         ASSESSMENT/PLAN:  Hypotension (resolved)  -possibly secondary to meds vs hypovolemia. Less likely sepsis   -checked LA was normal   -checked troponin, TTE with EF 55%. RFSF reduced, diastolic flattening  -monitor on tele   -responded to IVF  -restarted BP meds    Hx of afib  on eliquis  -eliquis restarted  -ordered TTE as above  -pt on digoxin at home. Checked level  -was transferred to CCU for bradycardia. Holding dig. Restarted bb  -cards following   -overall, vitals stable     Acute hypercapnic respiratory failure  -possibly d/t pain meds and underlying LESLY?  -continue PRN BIPAP and at night time/with naps as  concern for LESLY. Stressed importance of complaince  -outpt PSG    S/p POD C3-C7 laminoplasty  -ortho following  -minimize pain meds    FERNANDA on CKD?  -IVF  -monitor renal function and UO    Proph  -DVT: SCDs    Dispo  -full code  -can transfer out of ICU    Thank you for the opportunity to care for Reynaldo HuffmanTavares Rodriguez DO, MPH  Pulmonary Critical Care Medicine  [facility] Pulmonary and Critical Care Medicine                         [1]   Past Medical History:  Diagnosis Date    Arrhythmia     A.Fib    Atrial fibrillation (HCC)     Calculus of kidney     Depression     Disorder of thyroid     Essential hypertension     Gout     High blood pressure     High cholesterol     Hyperlipidemia     Neuropathy     bilateral feet    Numbness and tingling in both hands     Osteoarthritis     Stroke (HCC) 11/18/2024    mini stroke-    Visual impairment     eyeglasses   [2]   Past Surgical History:  Procedure Laterality Date    Anesth,achilles tendon surg Bilateral     Appendectomy      Colonoscopy      Fracture surgery Left     wrist    Gastric bypass,obesity,sb reconstruc  2000    Total knee replacement Bilateral 2021    Total shoulder arthroplasty Right 07/13/2022    Right total shoulder arthroplasty --Christiano   [3]   Social History  Socioeconomic History    Marital status:    Tobacco Use    Smoking status: Never    Smokeless tobacco: Never   Vaping Use    Vaping status: Never Used   Substance and Sexual Activity    Alcohol use: Yes     Alcohol/week: 2.0 standard drinks of alcohol     Types: 2 Standard drinks or equivalent per week    Drug use: Never   [4]   Family History  Problem Relation Age of Onset    Hypertension Father     Heart Disorder Father     Hypertension Mother    [5]   Allergies  Allergen Reactions    Ancef [Cefazolin] RENAL INSUFFICIENCY     Interstitial nephritis    Vancomycin HYPOTENSION     Per patient, he tolerated last treatment with Vancomycin.    Tigecycline FACE FLUSHING     Naproxen UNKNOWN     Patient cannot take d/t kidney dse.     Clindamycin RASH    Daptomycin RASH     Tolerating course of dapto in 8/2022   [6]   Outpatient Medications Marked as Taking for the 5/15/25 encounter (Hospital Encounter)   Medication Sig Dispense Refill    valsartan 80 MG Oral Tab Take 1 tablet (80 mg total) by mouth daily.      gabapentin 100 MG Oral Cap Take 3 capsules (300 mg total) by mouth 2 (two) times daily.      metoprolol succinate ER 50 MG Oral Tablet 24 Hr Take 1 tablet (50 mg total) by mouth daily.      mirtazapine 15 MG Oral Tab Take 1 tablet (15 mg total) by mouth nightly.      buPROPion  MG Oral Tablet 12 Hr Take 1 tablet (150 mg total) by mouth daily.      torsemide 10 MG Oral Tab Take 1 tablet (10 mg total) by mouth daily.      Omega-3 1000 MG Oral Cap Take 2,400 mg by mouth daily.      enoxaparin 150 MG/ML Injection Solution Prefilled Syringe Inject 1.5 mg/kg into the skin every 12 (twelve) hours.      Fenofibrate 160 MG Oral Tab Take 1 tablet (160 mg total) by mouth daily.      Multiple Vitamin (ONE-DAILY MULTI VITAMINS) Oral Tab Take 1 tablet by mouth daily.      allopurinol 300 MG Oral Tab Take 1 tablet (300 mg total) by mouth in the morning.      digoxin 0.25 MG Oral Tab Take 1 tablet (0.25 mg total) by mouth in the morning.      atorvastatin 10 MG Oral Tab Take 1 tablet (10 mg total) by mouth nightly.      ELIQUIS 5 MG Oral Tab Take 1 tablet (5 mg total) by mouth in the morning and 1 tablet (5 mg total) before bedtime.      Levothyroxine Sodium 50 MCG Oral Tab Take 2 tablets (100 mcg total) by mouth before breakfast.     [7]    apixaban  5 mg Oral BID    midodrine  5 mg Oral TID    acetaminophen  1,000 mg Oral q6h    sennosides  17.2 mg Oral Nightly    docusate sodium  100 mg Oral BID    allopurinol  300 mg Oral Daily    atorvastatin  10 mg Oral Nightly    buPROPion SR  150 mg Oral Daily    gabapentin  300 mg Oral BID    levothyroxine  100 mcg Oral Before breakfast     metoprolol succinate ER  50 mg Oral Daily    mirtazapine  15 mg Oral Nightly    [Held by provider] torsemide  10 mg Oral Daily    [Held by provider] losartan  100 mg Oral Daily   [8] [9]   oxyCODONE **OR** [DISCONTINUED] oxyCODONE    traMADol    metoclopramide    polyethylene glycol (PEG 3350)    magnesium hydroxide    bisacodyl    fleet enema    ondansetron    diphenhydrAMINE **OR** diphenhydrAMINE    benzocaine-menthol    tiZANidine    diazePAM

## 2025-05-21 NOTE — PLAN OF CARE
Patient is A&Ox4, on 2L NC, pt educated on the importance of wearing AVAPS overnight. PRN zofran, reglan. Pepcid x1 given.   Problem: Patient Centered Care  Goal: Patient preferences are identified and integrated in the patient's plan of care  Description: Interventions:  - What would you like us to know as we care for you?   - Provide timely, complete, and accurate information to patient/family  - Incorporate patient and family knowledge, values, beliefs, and cultural backgrounds into the planning and delivery of care  - Encourage patient/family to participate in care and decision-making at the level they choose  - Honor patient and family perspectives and choices  Outcome: Progressing     Problem: Patient/Family Goals  Goal: Patient/Family Long Term Goal  Description: Patient's Long Term Goal: to go home     Interventions:  - follow MD order  - See additional Care Plan goals for specific interventions  Outcome: Progressing  Goal: Patient/Family Short Term Goal  Description: Patient's Short Term Goal: to tolerate AVAPS at night    Interventions:   - educate on the importance  - stay with patient for a few minutes to help with anxiety  - See additional Care Plan goals for specific interventions  Outcome: Progressing     Problem: PAIN - ADULT  Goal: Verbalizes/displays adequate comfort level or patient's stated pain goal  Description: INTERVENTIONS:  - Encourage pt to monitor pain and request assistance  - Assess pain using appropriate pain scale  - Administer analgesics based on type and severity of pain and evaluate response  - Implement non-pharmacological measures as appropriate and evaluate response  - Consider cultural and social influences on pain and pain management  - Manage/alleviate anxiety  - Utilize distraction and/or relaxation techniques  - Monitor for opioid side effects  - Notify MD/LIP if interventions unsuccessful or patient reports new pain  - Anticipate increased pain with activity and  pre-medicate as appropriate  Outcome: Progressing     Problem: RISK FOR INFECTION - ADULT  Goal: Absence of fever/infection during anticipated neutropenic period  Description: INTERVENTIONS  - Monitor WBC  - Administer growth factors as ordered  - Implement neutropenic guidelines  Outcome: Progressing     Problem: SAFETY ADULT - FALL  Goal: Free from fall injury  Description: INTERVENTIONS:  - Assess pt frequently for physical needs  - Identify cognitive and physical deficits and behaviors that affect risk of falls.  - [place] fall precautions as indicated by assessment.  - Educate pt/family on patient safety including physical limitations  - Instruct pt to call for assistance with activity based on assessment  - Modify environment to reduce risk of injury  - Provide assistive devices as appropriate  - Consider OT/PT consult to assist with strengthening/mobility  - Encourage toileting schedule  Outcome: Progressing     Problem: DISCHARGE PLANNING  Goal: Discharge to home or other facility with appropriate resources  Description: INTERVENTIONS:  - Identify barriers to discharge w/pt and caregiver  - Include patient/family/discharge partner in discharge planning  - Arrange for needed discharge resources and transportation as appropriate  - Identify discharge learning needs (meds, wound care, etc)  - Arrange for interpreters to assist at discharge as needed  - Consider post-discharge preferences of patient/family/discharge partner  - Complete POLST form as appropriate  - Assess patient's ability to be responsible for managing their own health  - Refer to Case Management Department for coordinating discharge planning if the patient needs post-hospital services based on physician/LIP order or complex needs related to functional status, cognitive ability or social support system  Outcome: Progressing     Problem: CARDIOVASCULAR - ADULT  Goal: Maintains optimal cardiac output and hemodynamic stability  Description:  INTERVENTIONS:  - Monitor vital signs, rhythm, and trends  - Monitor for bleeding, hypotension and signs of decreased cardiac output  - Evaluate effectiveness of vasoactive medications to optimize hemodynamic stability  - Monitor arterial and/or venous puncture sites for bleeding and/or hematoma  - Assess quality of pulses, skin color and temperature  - Assess for signs of decreased coronary artery perfusion - ex. Angina  - Evaluate fluid balance, assess for edema, trend weights  Outcome: Progressing  Goal: Absence of cardiac arrhythmias or at baseline  Description: INTERVENTIONS:  - Continuous cardiac monitoring, monitor vital signs, obtain 12 lead EKG if indicated  - Evaluate effectiveness of antiarrhythmic and heart rate control medications as ordered  - Initiate emergency measures for life threatening arrhythmias  - Monitor electrolytes and administer replacement therapy as ordered  Outcome: Progressing     Problem: SKIN/TISSUE INTEGRITY - ADULT  Goal: Incision(s), wounds(s) or drain site(s) healing without S/S of infection  Description: INTERVENTIONS:  - Assess and document risk factors for pressure ulcer development  - Assess and document skin integrity  - Assess and document dressing/incision, wound bed, drain sites and surrounding tissue  - Implement wound care per orders  - Initiate isolation precautions as appropriate  - Initiate Pressure Ulcer prevention bundle as indicated  Outcome: Progressing     Problem: RESPIRATORY - ADULT  Goal: Achieves optimal ventilation and oxygenation  Description: INTERVENTIONS:  - Assess for changes in respiratory status  - Assess for changes in mentation and behavior  - Position to facilitate oxygenation and minimize respiratory effort  - Oxygen supplementation based on oxygen saturation or ABGs  - Provide Smoking Cessation handout, if applicable  - Encourage broncho-pulmonary hygiene including cough, deep breathe, Incentive Spirometry  - Assess the need for suctioning and  perform as needed  - Assess and instruct to report SOB or any respiratory difficulty  - Respiratory Therapy support as indicated  - Manage/alleviate anxiety  - Monitor for signs/symptoms of CO2 retention  Outcome: Progressing

## 2025-05-21 NOTE — PROGRESS NOTES
[facility]  [facility]  Cardiology Progress Note    Reynaldo Huffman Patient Status:  Inpatient    1960 MRN [id number]   Location [facility] 2W/SW Attending Papito Arcos MD   Hosp Day # 6 PCP Antonio Martinez MD       Impression:  Hypotension, less likely cardiogenic- warm in extremities. Improved with fluid resuscitation.  Afib, with slow ventricular response. On eliquis 5mg BID  - [id number] 5 (age, CVA, HTN, CAD of at least 50%).  CAD, non obstructive  TIA  HTN  HDL     - Hold digoxin. Can stop digoxin going forward.   - Back on home metoprolol succinate 50mg daily  - Restart home torsemide 10mg, metolazone 2.5mg weekly  - continue statin  - Continue Eliquis  - Tele  - Echo reviewed  - EKG reviewed: AF with slow VR overnight     Subjective:   Feels well. Received IV lasix. Now back on home meds.    Problem List[1]    Objective:   Temp: 98.6 °F (37 °C)  Pulse: 81  Resp: 24  BP: 113/68  FiO2 (%): 40 %    Intake/Output:     Intake/Output Summary (Last 24 hours) at 2025 1447  Last data filed at 2025 1233  Gross per 24 hour   Intake 340 ml   Output 3465 ml   Net -3125 ml       Last 3 Weights   25 0400 (!) 367 lb 15.2 oz (166.9 kg)   05/15/25 1014 (!) 309 lb (140.2 kg)   25 1604 300 lb (136.1 kg)   25 1316 300 lb (136.1 kg)   24 0904 297 lb (134.7 kg)       Tele: afib with PVCs    Physical Exam:    General: awake, alert, oriented x 3, no acute distress  HEENT: at/nc, perrl, eomi  Neck: No JVD, carotids 2+ no bruits.  Cardiac: normal rate and irregularly irregular , S1, S2 normal, no murmur, rub or gallop.  Lungs: Clear without wheezes, rales, rhonchi or dullness.  Normal excursions and effort.  Abdomen: Soft, non-tender, non-distended, normal bowel sounds   Extremities: Without clubbing, cyanosis or edema.  Peripheral pulses are 2+.  Neurologic: Alert and oriented, normal affect.  Psych: normal mood and affect  Skin: Warm and dry.      Laboratory/Data:    Labs:         Recent Labs   Lab 05/17/25  1026 05/17/25  1727 05/19/25  0355 05/20/25  0422 05/21/25  0327   WBC 7.9 8.1 7.5 8.6 8.2   HGB 9.7* 10.3* 9.7* 11.0* 10.3*   .2* 109.0* 108.6* 107.6* 107.5*   .0* 143.0* 174.0 218.0 205.0       Recent Labs   Lab 05/17/25  1026 05/18/25  0354 05/19/25  0355 05/20/25  0422 05/21/25  1012    138 139 137 143   K 4.3 4.3 4.0 4.3 4.1    107 110 107 110   CO2 22.0 22.0 20.0* 19.0* 24.0   BUN 33* 31* 31* 29* 26*   CREATSERUM 2.08* 2.03* 2.22* 2.20* 1.97*   CA 7.6* 7.9* 8.2* 8.8 9.0   MG  --   --  2.1  --   --    PHOS  --   --  3.6  --   --    * 127* 131* 152* 135*       No results for input(s): \"ALT\", \"AST\", \"ALB\", \"AMYLASE\", \"LIPASE\", \"LDH\" in the last 168 hours.    Invalid input(s): \"ALPHOS\", \"TBIL\", \"DBIL\", \"TPROT\"    No results for input(s): \"TROP\" in the last 168 hours.    Allergies:   Allergies[2]    Medications:  Current Hospital Medications[3]    Dhiraj Sainz MD  Interventional Cardiology  05/21/25         [1]   Patient Active Problem List  Diagnosis    Paroxysmal atrial fibrillation (HCC)    Hypertension    Thyroid disease    Osteoarthritis of right knee    History of constipation    History of partial ray amputation of left great toe (HCC)    Encounter for current long-term use of anticoagulants    S/P total knee arthroplasty, right    Primary osteoarthritis of left knee    BMI 36.0-36.9,adult    Excoriation    Hyperlipidemia    Gout    Hypothyroidism due to Hashimoto's thyroiditis    Preop testing    Status post left knee replacement    Uric acid kidney stone    LESLY (obstructive sleep apnea)    Hypokalemia    Primary osteoarthritis of right shoulder    Arthritis of right glenohumeral joint    Septic joint of right shoulder region (HCC)    Pyogenic arthritis of right shoulder region, due to unspecified organism (HCC)    Cellulitis    Cervical spinal stenosis    Radiculopathy of cervical spine   [2]    Allergies  Allergen Reactions    Ancef [Cefazolin] RENAL INSUFFICIENCY     Interstitial nephritis    Vancomycin HYPOTENSION     Per patient, he tolerated last treatment with Vancomycin.    Tigecycline FACE FLUSHING    Naproxen UNKNOWN     Patient cannot take d/t kidney dse.     Clindamycin RASH    Daptomycin RASH     Tolerating course of dapto in 8/2022   [3]   Current Facility-Administered Medications   Medication Dose Route Frequency    [START ON 5/22/2025] torsemide (Demadex) tab 10 mg  10 mg Oral Daily    oxyCODONE immediate release tab 5 mg  5 mg Oral Q4H PRN    apixaban (Eliquis) tab 5 mg  5 mg Oral BID    midodrine (ProAmatine) tab 5 mg  5 mg Oral TID    traMADol (Ultram) tab 50 mg  50 mg Oral Q6H PRN    acetaminophen (Tylenol Extra Strength) tab 1,000 mg  1,000 mg Oral q6h    metoclopramide (Reglan) 5 mg/mL injection 5 mg  5 mg Intravenous Q8H PRN    sennosides (Senokot) tab 17.2 mg  17.2 mg Oral Nightly    docusate sodium (Colace) cap 100 mg  100 mg Oral BID    polyethylene glycol (PEG 3350) (Miralax) 17 g oral packet 17 g  17 g Oral Daily PRN    magnesium hydroxide (Milk of Magnesia) 400 MG/5ML oral suspension 30 mL  30 mL Oral Daily PRN    bisacodyl (Dulcolax) 10 MG rectal suppository 10 mg  10 mg Rectal Daily PRN    fleet enema (Fleet) rectal enema 133 mL  1 enema Rectal Once PRN    ondansetron (Zofran) 4 MG/2ML injection 4 mg  4 mg Intravenous Q6H PRN    diphenhydrAMINE (Benadryl) cap/tab 25 mg  25 mg Oral Q4H PRN    Or    diphenhydrAMINE (Benadryl) 50 mg/mL  injection 25 mg  25 mg Intravenous Q4H PRN    benzocaine-menthol (Cepacol) lozenge 1 lozenge  1 lozenge Buccal Q15 Min PRN    tiZANidine (Zanaflex) tab 4 mg  4 mg Oral Q8H PRN    diazePAM (Valium) tab 2 mg  2 mg Oral Q6H PRN    allopurinol (Zyloprim) tab 300 mg  300 mg Oral Daily    atorvastatin (Lipitor) tab 10 mg  10 mg Oral Nightly    buPROPion SR (WELLBUTRIN SR) 12 hr tab 150 mg  150 mg Oral Daily    gabapentin (Neurontin) cap 300 mg  300 mg  Oral BID    levothyroxine (Synthroid) tab 100 mcg  100 mcg Oral Before breakfast    metoprolol succinate ER (Toprol XL) 24 hr tab 50 mg  50 mg Oral Daily    mirtazapine (Remeron) tab 15 mg  15 mg Oral Nightly    [Held by provider] losartan (Cozaar) tab 100 mg  100 mg Oral Daily

## 2025-05-21 NOTE — PROGRESS NOTES
Nephrology Progress Note    Reynaldo Huffman Attending:  Papito Arcos MD       SUBJECTIVE:     On midodrine  Cr improving today  Uop 3.4L   S/p IV lasix     PHYSICAL EXAM:     Vital Signs: /68 (BP Location: Right arm)   Pulse 81   Temp 98.6 °F (37 °C) (Temporal)   Resp 24   Ht 6' 3\" (1.905 m)   Wt (!) 367 lb 15.2 oz (166.9 kg)   SpO2 91%   BMI 45.99 kg/m²   Temp (24hrs), Av.2 °F (36.8 °C), Min:97 °F (36.1 °C), Max:100.4 °F (38 °C)       Intake/Output Summary (Last 24 hours) at 2025 1347  Last data filed at 2025 1233  Gross per 24 hour   Intake 440 ml   Output 3465.5 ml   Net -3025.5 ml     Wt Readings from Last 3 Encounters:   25 (!) 367 lb 15.2 oz (166.9 kg)   25 300 lb (136.1 kg)   24 297 lb (134.7 kg)       General: pleasant, well appearing  HEENT: NCAT  Neck: Supple  Cardiac: Regular rate and rhythm, no murmurs  Lungs: CTAB  Abdomen: Soft, non-tender, non-distended  Extremities: no LE edema  Neurologic/Psych: mentating well       LABORATORY DATA:     Lab Results   Component Value Date     (H) 2025    BUN 26 (H) 2025    BUNCREA 13.2 2025    CREATSERUM 1.97 (H) 2025    ANIONGAP 9 2025    GFRNAA 18 (L) 2022    GFRAA 21 (L) 2022    CA 9.0 2025    OSMOCALC 303 (H) 2025    ALKPHO 117 2022    AST 15 2022    ALT 11 (L) 2022    BILT 0.5 2022    TP 6.5 2022    ALB 2.6 (L) 2022    GLOBULIN 3.9 2022     2025    K 4.1 2025     2025    CO2 24.0 2025     Lab Results   Component Value Date    WBC 8.2 2025    RBC 3.22 (L) 2025    HGB 10.3 (L) 2025    HCT 34.6 (L) 2025    .0 2025    .5 (H) 2025    MCH 32.0 2025    MCHC 29.8 (L) 2025    RDW 14.6 2025    NEPRELIM 3.10 2022    NEUTABS 18.24 (H) 2022    LYMPHABS 0.38 (L) 2022    EOSABS 0.00 2022    BASABS  0.00 07/31/2022    NEUT 93 07/31/2022    LYMPH 2 07/31/2022    MON 0 07/31/2022    EOS 0 07/31/2022    BASO 0 07/31/2022    NEPERCENT 56.1 08/25/2022    LYPERCENT 26.4 08/25/2022    MOPERCENT 13.9 08/25/2022    EOPERCENT 2.5 08/25/2022    BAPERCENT 0.2 08/25/2022    NE 3.10 08/25/2022    LYMABS 1.46 08/25/2022    MOABSO 0.77 08/25/2022    EOABSO 0.14 08/25/2022    BAABSO 0.01 08/25/2022     Lab Results   Component Value Date    CREUR 115.40 05/17/2025     Lab Results   Component Value Date    COLORUR Yellow 07/30/2022    CLARITY Clear 07/30/2022    SPECGRAVITY 1.010 07/30/2022    GLUUR Negative 07/30/2022    BILUR Negative 07/30/2022    KETUR Negative 07/30/2022    BLOODURINE Trace-lysed (A) 07/30/2022    PHURINE 5.5 07/30/2022    PROUR 30 mg/dL (A) 07/30/2022    UROBILINOGEN 0.2 07/30/2022    NITRITE Negative 07/30/2022    LEUUR Negative 07/30/2022    NMIC Microscopic not indicated 07/28/2022    WBCUR 1-5 07/30/2022    WBCUR 1-5 07/30/2022    RBCUR 0-2 07/30/2022    RBCUR 0-2 07/30/2022    EPIUR Few (A) 07/30/2022    EPIUR Few (A) 07/30/2022    BACUR Rare (A) 07/30/2022    BACUR Rare (A) 07/30/2022    HYLUR 13 (H) 05/19/2020       Impression:      #.  FERNANDA - early ATN   - intraoperative hypotension 5/15 noted  -improving     #.  CKD stage 3A -cardiorenal  - Baseline serum creatinine appears to be around 1.4 to 1.5 mg/dL    #hyponatremia  -hypervolemic  -improving     #.  Hyperkalemia   - Due to FERNANDA, hypotension, and resultant diminished kaliuresis  -resolved      #.  Acidosis   #Acute hypercapneic resp failure  - Initially respiratory, then mild metabolic in patient with likely chronic CO2 retention     #.  Proteinuria   - Minimal when last checked 60mg/g; outpatient f/u   - Consider future SGLT2     #.  HFpEF  #.  HTN   - PTA regimen: Losartan, metoprolol, torsemide     #.  Anemia         Recommendations:    Supportive care for ATN - sCr improving  S/p IV lasix 5/20. Redose today.  Per cards. Ok to give IV  diuretics as needed for volume /resp compromise.   Consider diamox as needed  Renal US without hydro, did show urinary retention, subsequent PVR ok. Follow PVR q shift.    Hold BP RX - on midodrine TID  Strict I/Os   Avoid IV contrast unless emergent       Tamika Benedict MD  [facility]  Nephrology

## 2025-05-21 NOTE — OPERATIVE REPORT
PATIENT  Reynaldo Huffman    DATE OF SURGERY  5/15/2025    SURGEON   Papito Arcos MD    ASSISTANT  CAMI Arreaga MD Fellow    PREOPERATIVE DIAGNOSIS   Cervical myelopathy  C3-7 spinal stenosis   Morbid obesity    POSTOPERATIVE DIAGNOSIS   Same    PROCEDURES   Cerivcal laminoplasty  C2 dome osteotomy  C7 dome laminectomy     DESCRIPTION OF PROCEDURE   Bilateral thecal sac decompression was performed. Exiting nerve roots were identified.     ANESTHESIA   General endotracheal    COMPLICATIONS   None.     BLOOD LOSS   Minimal.     INDICATIONS   The patient is a 64 year-old male with severe cervical myelopathy with critical stenosis.  The patient failed   nonoperative intervention and they elected to proceed with surgical   Decompression. The patient consented to the decompression procedure. They understood the risks and benefits, including risks of failure to improve, neurologic deterioration, infection, durotomy, continued low back pain, and progression of the spondylolisthesis were explained to the patient at length. The patient also understood the risks of anesthesia which include possible stroke, MI, death.       TECHNIQUE   The patient was brought to the operating room. General   anesthesia with endotracheal intubation was performed. The patient was positioned prone on the Adolfo spinal frame. Care was taken to ensure bony prominences   were well padded. The neck was prepped and draped in the   Usual sterile fashion. A surgical timeout was performed according to the WHO standards identifying the appropriate patient, surgical site, and surgical procedure.   Under fluoroscopy, the operative levels were identified. A midline incision was made after local anesthetic was injected into the tissue. Bovie cautery was used to subperiosteally dissect the midline muscle from the midline elements out to the medial portion of the facets. A fluoroscopy image was taken to confirm the correct levels. A trough was  made on the left side that was unicortical at the laminofacet junction from C3-C6. On the right side the lamina was fully opened and using a curette the laminoplasty was swung open. The C2 level was ideitifed and posteiror osteotomy was performed for decompression purposes and a superior C7 laminectomy was performed for cord float back. An angled hockey stick elevator was tracked underneath the thecal sac proximally and distally and ensured adequate decompression with no remaining fragments compressing the nerve root or the thecal sac. At this point, we were satisfied with the extent of the decompression, the neural elements were seen to be free.     The wound was thoroughly irrigated and hemostasis was ensured.   Sterile solution was infiltrated.   Meticulous hemostasis was achieved.   Fascia was approximated with 0 Vicryl suture. Subcutaneous tissue   and skin was approximated with suture. Sterile dressing applied.   The patient returned to recovery in stable condition.     I was present for and performed the entire procedure.     Increased time and complexity was required due to morbid obesity.

## 2025-05-22 ENCOUNTER — APPOINTMENT (OUTPATIENT)
Dept: GENERAL RADIOLOGY | Facility: HOSPITAL | Age: 65
End: 2025-05-22
Attending: PHYSICIAN ASSISTANT
Payer: MEDICARE

## 2025-05-22 VITALS
TEMPERATURE: 97 F | BODY MASS INDEX: 39.17 KG/M2 | HEART RATE: 77 BPM | DIASTOLIC BLOOD PRESSURE: 63 MMHG | RESPIRATION RATE: 17 BRPM | SYSTOLIC BLOOD PRESSURE: 103 MMHG | HEIGHT: 75 IN | OXYGEN SATURATION: 94 % | WEIGHT: 315 LBS

## 2025-05-22 LAB
ANION GAP SERPL CALC-SCNC: 9 MMOL/L (ref 0–18)
BUN BLD-MCNC: 27 MG/DL (ref 9–23)
BUN/CREAT SERPL: 14.9 (ref 10–20)
CALCIUM BLD-MCNC: 9 MG/DL (ref 8.7–10.4)
CHLORIDE SERPL-SCNC: 107 MMOL/L (ref 98–112)
CO2 SERPL-SCNC: 26 MMOL/L (ref 21–32)
CREAT BLD-MCNC: 1.81 MG/DL (ref 0.7–1.3)
DEPRECATED RDW RBC AUTO: 57 FL (ref 35.1–46.3)
EGFRCR SERPLBLD CKD-EPI 2021: 41 ML/MIN/1.73M2 (ref 60–?)
ERYTHROCYTE [DISTWIDTH] IN BLOOD BY AUTOMATED COUNT: 14.7 % (ref 11–15)
GLUCOSE BLD-MCNC: 114 MG/DL (ref 70–99)
HCT VFR BLD AUTO: 33.5 % (ref 39–53)
HGB BLD-MCNC: 9.9 G/DL (ref 13–17.5)
MCH RBC QN AUTO: 31.8 PG (ref 26–34)
MCHC RBC AUTO-ENTMCNC: 29.6 G/DL (ref 31–37)
MCV RBC AUTO: 107.7 FL (ref 80–100)
OSMOLALITY SERPL CALC.SUM OF ELEC: 300 MOSM/KG (ref 275–295)
PLATELET # BLD AUTO: 205 10(3)UL (ref 150–450)
POTASSIUM SERPL-SCNC: 3.7 MMOL/L (ref 3.5–5.1)
RBC # BLD AUTO: 3.11 X10(6)UL (ref 3.8–5.8)
SODIUM SERPL-SCNC: 142 MMOL/L (ref 136–145)
WBC # BLD AUTO: 6.5 X10(3) UL (ref 4–11)

## 2025-05-22 PROCEDURE — 72040 X-RAY EXAM NECK SPINE 2-3 VW: CPT | Performed by: PHYSICIAN ASSISTANT

## 2025-05-22 PROCEDURE — 99232 SBSQ HOSP IP/OBS MODERATE 35: CPT | Performed by: INTERNAL MEDICINE

## 2025-05-22 RX ORDER — POTASSIUM CHLORIDE 1500 MG/1
40 TABLET, EXTENDED RELEASE ORAL ONCE
Status: COMPLETED | OUTPATIENT
Start: 2025-05-22 | End: 2025-05-22

## 2025-05-22 NOTE — PROGRESS NOTES
Nephrology Progress Note    Reynaldo Huffman Attending:  Papito Arcos MD       SUBJECTIVE:     Cr improving today  Uop 2.9L   Denies sob    PHYSICAL EXAM:     Vital Signs: /63 (BP Location: Right arm)   Pulse 77   Temp 96.8 °F (36 °C) (Temporal)   Resp 17   Ht 6' 3\" (1.905 m)   Wt (!) 343 lb 3.2 oz (155.7 kg)   SpO2 94%   BMI 42.90 kg/m²   Temp (24hrs), Av.7 °F (36.5 °C), Min:96.8 °F (36 °C), Max:98.9 °F (37.2 °C)       Intake/Output Summary (Last 24 hours) at 2025 1415  Last data filed at 2025 0634  Gross per 24 hour   Intake --   Output  ml   Net - ml     Wt Readings from Last 3 Encounters:   25 (!) 343 lb 3.2 oz (155.7 kg)   25 300 lb (136.1 kg)   24 297 lb (134.7 kg)       General: pleasant, well appearing  HEENT: NCAT  Neck: Supple  Cardiac: Regular rate and rhythm, no murmurs  Lungs: CTAB  Abdomen: Soft, non-tender, non-distended  Extremities: trace BLE edema  Neurologic/Psych: mentating well       LABORATORY DATA:     Lab Results   Component Value Date     (H) 2025    BUN 27 (H) 2025    BUNCREA 14.9 2025    CREATSERUM 1.81 (H) 2025    ANIONGAP 9 2025    GFRNAA 18 (L) 2022    GFRAA 21 (L) 2022    CA 9.0 2025    OSMOCALC 300 (H) 2025    ALKPHO 117 2022    AST 15 2022    ALT 11 (L) 2022    BILT 0.5 2022    TP 6.5 2022    ALB 2.6 (L) 2022    GLOBULIN 3.9 2022     2025    K 3.7 2025     2025    CO2 26.0 2025     Lab Results   Component Value Date    WBC 6.5 2025    RBC 3.11 (L) 2025    HGB 9.9 (L) 2025    HCT 33.5 (L) 2025    .0 2025    .7 (H) 2025    MCH 31.8 2025    MCHC 29.6 (L) 2025    RDW 14.7 2025    NEPRELIM 3.10 2022    NEUTABS 18.24 (H) 2022    LYMPHABS 0.38 (L) 2022    EOSABS 0.00 2022    BASABS 0.00 2022    NEUT  93 07/31/2022    LYMPH 2 07/31/2022    MON 0 07/31/2022    EOS 0 07/31/2022    BASO 0 07/31/2022    NEPERCENT 56.1 08/25/2022    LYPERCENT 26.4 08/25/2022    MOPERCENT 13.9 08/25/2022    EOPERCENT 2.5 08/25/2022    BAPERCENT 0.2 08/25/2022    NE 3.10 08/25/2022    LYMABS 1.46 08/25/2022    MOABSO 0.77 08/25/2022    EOABSO 0.14 08/25/2022    BAABSO 0.01 08/25/2022     Lab Results   Component Value Date    CREUR 115.40 05/17/2025     Lab Results   Component Value Date    COLORUR Yellow 07/30/2022    CLARITY Clear 07/30/2022    SPECGRAVITY 1.010 07/30/2022    GLUUR Negative 07/30/2022    BILUR Negative 07/30/2022    KETUR Negative 07/30/2022    BLOODURINE Trace-lysed (A) 07/30/2022    PHURINE 5.5 07/30/2022    PROUR 30 mg/dL (A) 07/30/2022    UROBILINOGEN 0.2 07/30/2022    NITRITE Negative 07/30/2022    LEUUR Negative 07/30/2022    NMIC Microscopic not indicated 07/28/2022    WBCUR 1-5 07/30/2022    WBCUR 1-5 07/30/2022    RBCUR 0-2 07/30/2022    RBCUR 0-2 07/30/2022    EPIUR Few (A) 07/30/2022    EPIUR Few (A) 07/30/2022    BACUR Rare (A) 07/30/2022    BACUR Rare (A) 07/30/2022    HYLUR 13 (H) 05/19/2020       Impression:      #.  FERNANDA - early ATN   - intraoperative hypotension 5/15 noted  -improving     #.  CKD stage 3A -cardiorenal  - Baseline serum creatinine appears to be around 1.4 to 1.5 mg/dL    #hyponatremia  -hypervolemic  -improving     #.  Hyperkalemia   - Due to FERNANDA, hypotension, and resultant diminished kaliuresis  -resolved      #.  Acidosis   #Acute hypercapneic resp failure  - Initially respiratory, then mild metabolic in patient with likely chronic CO2 retention     #.  Proteinuria   - Minimal when last checked 60mg/g; outpatient f/u   - Consider future SGLT2     #.  HFpEF  #.  HTN   - PTA regimen: Losartan, metoprolol, torsemide     #.  Anemia      #hypotension  -sp midodrine 10 TID - holding  -on metoprolol per cards  -titrate as needed     Recommendations:    Supportive care for ATN - sCr  improving  S/p IV lasix 5/20. Transitioned to PO torsemide 10mg every day 5/22.  Per cards. Appreciate recs   Renal US without hydro, did show urinary retention, subsequent PVR ok. Follow PVR q shift.    Strict I/Os   Avoid IV contrast unless emergent       Tamika Benedict MD  [facility]  Nephrology

## 2025-05-22 NOTE — PROGRESS NOTES
[facility]  part of [facility]    Progress Note    Reynaldo Huffman Patient Status:  Inpatient    1960 MRN [id number]   Location [facility] 2W/SW Attending Papito Arcos MD   Hosp Day # 7 PCP Antonio Martinez MD     SUBJECTIVE:  Interval History: patient threatening to kelly RANGEL told the risks would include medical complications or even death.     Post-Op Day: 6    OBJECTIVE:  Blood pressure 103/63, pulse 77, temperature 96.8 °F (36 °C), temperature source Temporal, resp. rate 17, height 6' 3\" (1.905 m), weight (!) 343 lb 3.2 oz (155.7 kg), SpO2 94%.     Ortho Spine Exam:  Incisional dressing is clean, dry and intact.  Hemovac intact.  Adjacent skin is without erythema or edema.  BUE motor exam 5/5 deltoid, tricep, bicep, wrist extension, finger intrinsics 4+/5 bilateral.  SILT distally.                                    ASSESSMENT/PLAN:    S/P cervical laminoplasty POD6      -DVT ppx with SCD, stockings, and ambulation- restarted eliquis   -continue PT/OT eval and treatment  -pain control  -progress toward discharge   - dc drain   - medical care per hospitalist, pulm, nephro, cards         Cole Reddy MD

## 2025-05-22 NOTE — PROGRESS NOTES
Patient is unable to tolerate BiPAP and refusing to wear it. Patient has been educated on the importance of wearing a BiPAP, MD has been notified

## 2025-05-22 NOTE — PROGRESS NOTES
[facility] Hospitalist Progress Note     CC: Hospital Follow up    PCP: Antonio Martinez MD       Assessment/Plan:   Mr. Huffman is a 65 yo M with PMH of Afib on Eliquis, TIA, HTN, HL, who presented for cervical surgery.      C3-C7 cervical laminoplasty  -POD # 7  -orthopedic surgery following  -resumed eliquis 5/19   -continue PT/OT, at discharge he will be doing this outpatient     Acute hypercapnic respiratory failure  -altered AM of 5/20, required transfer to ICU for NIPPV  -ABG done 5/20. pH 7.11 CO2 69  -started NIPPV   -non compliance with avaps at night   -decreased oxycodone dosing and stopped 10mg tabs, now with 5mg tabs   -lasix given last two days now on oral home regimen      FERNANDA on CKD3  Hyperkalemia  -Cr improving   -ok to resume home torsemide      HTN  - home meds:   -can resume ARB and metoprolol at discharge  -resume torsemide   -will not need midodrine at discharge    Afib with SVR  - resume eliquis 5/19  - resume metoprolol   - hold digoxin now and going forward per cardiology (discontinued at discharge)     Hx TIA  - November 2024, had dysarthria at that time  - eliquis     Hypothyroidism  - synthroid 50 mcg daily     ACP  - CODE- FULL  - POA- son- Hitesh  - lives alone  - does have a fiance Maddi Lakhani who lives near by- asked that she be the  as she lives closer     DVT Prophy: continue eliquis     Dispo: ok from my end to go home today from ICU, rather then transferring to floor, if cleared by ortho spine and other services. Drain will need to be removed per ortho      [facility] Hospitalist      Subjective:     Feels much better  Not having any pain he states  Breathing is normal.     OBJECTIVE:    Blood pressure 125/84, pulse 70, temperature 97 °F (36.1 °C), resp. rate 25, height 6' 3\" (1.905 m), weight (!) 343 lb 3.2 oz (155.7 kg), SpO2 98%.    Temp:  [97 °F (36.1 °C)-98.9 °F (37.2 °C)] 97 °F (36.1 °C)  Pulse:  [70-81] 70  Resp:  [16-25] 25  BP:  (113-146)/(68-92) 125/84  SpO2:  [91 %-100 %] 98 %      Intake/Output:    Intake/Output Summary (Last 24 hours) at 5/22/2025 1010  Last data filed at 5/22/2025 0634  Gross per 24 hour   Intake --   Output 2993 ml   Net -2993 ml       Last 3 Weights   05/21/25 1500 (!) 343 lb 3.2 oz (155.7 kg)   05/18/25 0400 (!) 367 lb 15.2 oz (166.9 kg)   05/15/25 1014 (!) 309 lb (140.2 kg)   05/12/25 1604 300 lb (136.1 kg)   05/01/25 1316 300 lb (136.1 kg)   12/13/24 0904 297 lb (134.7 kg)       Exam   GEN: awake, sitting in chair  Pulm: CTABL  CV: RRR  ABD: Soft  MSK: strength 5/5 in all extremities  SKIN: warm, dry  EXT: no edema    Data Review:       Labs:     Recent Labs   Lab 05/20/25  0422 05/21/25  0327 05/22/25  0331   RBC 3.44* 3.22* 3.11*   HGB 11.0* 10.3* 9.9*   HCT 37.0* 34.6* 33.5*   .6* 107.5* 107.7*   MCH 32.0 32.0 31.8   MCHC 29.7* 29.8* 29.6*   RDW 14.9 14.6 14.7   WBC 8.6 8.2 6.5   .0 205.0 205.0         Recent Labs   Lab 05/20/25  0422 05/21/25  1012 05/22/25  0331   * 135* 114*   BUN 29* 26* 27*   CREATSERUM 2.20* 1.97* 1.81*   EGFRCR 32* 37* 41*   CA 8.8 9.0 9.0    143 142   K 4.3 4.1 3.7    110 107   CO2 19.0* 24.0 26.0       No results for input(s): \"ALT\", \"AST\", \"ALB\", \"AMYLASE\", \"LIPASE\", \"LDH\" in the last 168 hours.    Invalid input(s): \"ALPHOS\", \"TBIL\", \"DBIL\", \"TPROT\"      Imaging:  XR CHEST AP PORTABLE  (CPT=71045)  Result Date: 5/20/2025  CONCLUSION: Findings suggesting CHF and/or increased fluid volume status of the patient.  New bibasilar pulmonary opacities which could represent atelectasis, infection, or edema.   Dictated by (CST): Jamey Fritz MD on 5/20/2025 at 4:59 PM     Finalized by (CST): Jamey Fritz MD on 5/20/2025 at 5:00 PM                Meds:     INPATIENT MEDICATIONS    Scheduled Medications:      Scheduled Meds[1]        Drips:  IV Meds[2]    PRN Medications  PRN Meds[3]                            [1] torsemide, 10 mg, Daily  apixaban, 5 mg,  BID  midodrine, 5 mg, TID  acetaminophen, 1,000 mg, q6h  sennosides, 17.2 mg, Nightly  docusate sodium, 100 mg, BID  allopurinol, 300 mg, Daily  atorvastatin, 10 mg, Nightly  buPROPion SR, 150 mg, Daily  gabapentin, 300 mg, BID  levothyroxine, 100 mcg, Before breakfast  metoprolol succinate ER, 50 mg, Daily  mirtazapine, 15 mg, Nightly  [Held by provider] losartan, 100 mg, Daily     [2]    [3] oxyCODONE, 5 mg, Q4H PRN  traMADol, 50 mg, Q6H PRN  metoclopramide, 5 mg, Q8H PRN  polyethylene glycol (PEG 3350), 17 g, Daily PRN  magnesium hydroxide, 30 mL, Daily PRN  bisacodyl, 10 mg, Daily PRN  fleet enema, 1 enema, Once PRN  ondansetron, 4 mg, Q6H PRN  diphenhydrAMINE, 25 mg, Q4H PRN   Or  diphenhydrAMINE, 25 mg, Q4H PRN  benzocaine-menthol, 1 lozenge, Q15 Min PRN  tiZANidine, 4 mg, Q8H PRN  diazePAM, 2 mg, Q6H PRN

## 2025-05-22 NOTE — CM/SW NOTE
Per chart, pt has DC order pending ortho spine.    SW spoke to RN and requested confirmation if pt requires home O2 to be arranged. RN confirmed she will check.    PLAN: Home w/ [facility] - pending O2 needs & 100% med clear      SW/CM to remain available for support and/or discharge planning.       CAMILLA Ward, LSW [id number]

## 2025-05-22 NOTE — PROGRESS NOTES
[facility]  part of [facility]     Progress Note    Reynaldo Huffman Patient Status:  Inpatient    1960 MRN [id number]   Location [facility] 2W/SW Attending Papito Arcos MD   Hosp Day # 7 PCP Antonio Martinez MD       Subjective:   Patient seen and examined.  Somnolent this morning.    Objective:   Blood pressure 125/84, pulse 70, temperature 97 °F (36.1 °C), resp. rate 25, height 6' 3\" (1.905 m), weight (!) 343 lb 3.2 oz (155.7 kg), SpO2 98%.  Intake/Output:   Last 3 shifts: I/O last 3 completed shifts:  In: 340 [P.O.:340]  Out: 5158 [Urine:5130; Drains:28]   This shift: No intake/output data recorded.     Vent Settings:      Hemodynamic parameters (last 24 hours):      Scheduled Meds: Current Hospital Medications[1]    Continuous Infusions: Medication Infusions[2]    Physical Exam  Constitutional: Somnolent, lethargic  Eyes: PERRL  ENT: nares pateint  Neck: supple, no JVD  Cardio: RRR, S1 S2  Respiratory: Basilar crackles  GI: abdomen soft, non tender, active bowel sounds, no organomegaly  Extremities: no clubbing, cyanosis, edema  Neurologic: Somnolent not following commands  Skin: warm, dry      Results:     Lab Results   Component Value Date    WBC 6.5 2025    HGB 9.9 2025    HCT 33.5 2025    .0 2025    CREATSERUM 1.81 2025    BUN 27 2025     2025    K 3.7 2025     2025    CO2 26.0 2025     2025    CA 9.0 2025       XR CHEST AP PORTABLE  (CPT=71045)  Result Date: 2025  CONCLUSION: Findings suggesting CHF and/or increased fluid volume status of the patient.  New bibasilar pulmonary opacities which could represent atelectasis, infection, or edema.   Dictated by (CST): Jamey Fritz MD on 2025 at 4:59 PM     Finalized by (CST): Jamey Fritz MD on 2025 at 5:00 PM                    Assessment   1.  Acute hypercapnic respiratory failure  2.  Status post C3/C7  laminoplasty  3.  History of atrial fibrillation  4.  Hypercapnic encephalopathy  5.  Acute kidney injury on chronic kidney disease       Plan   -Patient's status post C3/C7 laminoplasty.    - Hypercapnic respiratory failure noted during hospital course with improvement with NIV.  - Concern for LESLY.  Recommend outpatient polysomnography.  Patient however noncompliant and not interested in nightly PAP therapy  - Monitor hypertension at this time which is improved  -DVT prophylaxis: Eliquis  - Okay to transfer to floor      Isaac Marti DO  Pulmonary Critical Care Medicine  [facility]          [1]   Current Facility-Administered Medications   Medication Dose Route Frequency    torsemide (Demadex) tab 10 mg  10 mg Oral Daily    oxyCODONE immediate release tab 5 mg  5 mg Oral Q4H PRN    apixaban (Eliquis) tab 5 mg  5 mg Oral BID    midodrine (ProAmatine) tab 5 mg  5 mg Oral TID    traMADol (Ultram) tab 50 mg  50 mg Oral Q6H PRN    acetaminophen (Tylenol Extra Strength) tab 1,000 mg  1,000 mg Oral q6h    metoclopramide (Reglan) 5 mg/mL injection 5 mg  5 mg Intravenous Q8H PRN    sennosides (Senokot) tab 17.2 mg  17.2 mg Oral Nightly    docusate sodium (Colace) cap 100 mg  100 mg Oral BID    polyethylene glycol (PEG 3350) (Miralax) 17 g oral packet 17 g  17 g Oral Daily PRN    magnesium hydroxide (Milk of Magnesia) 400 MG/5ML oral suspension 30 mL  30 mL Oral Daily PRN    bisacodyl (Dulcolax) 10 MG rectal suppository 10 mg  10 mg Rectal Daily PRN    fleet enema (Fleet) rectal enema 133 mL  1 enema Rectal Once PRN    ondansetron (Zofran) 4 MG/2ML injection 4 mg  4 mg Intravenous Q6H PRN    diphenhydrAMINE (Benadryl) cap/tab 25 mg  25 mg Oral Q4H PRN    Or    diphenhydrAMINE (Benadryl) 50 mg/mL  injection 25 mg  25 mg Intravenous Q4H PRN    benzocaine-menthol (Cepacol) lozenge 1 lozenge  1 lozenge Buccal Q15 Min PRN    tiZANidine (Zanaflex) tab 4 mg  4 mg Oral Q8H PRN    diazePAM (Valium) tab 2 mg  2 mg Oral Q6H  PRN    allopurinol (Zyloprim) tab 300 mg  300 mg Oral Daily    atorvastatin (Lipitor) tab 10 mg  10 mg Oral Nightly    buPROPion SR (WELLBUTRIN SR) 12 hr tab 150 mg  150 mg Oral Daily    gabapentin (Neurontin) cap 300 mg  300 mg Oral BID    levothyroxine (Synthroid) tab 100 mcg  100 mcg Oral Before breakfast    metoprolol succinate ER (Toprol XL) 24 hr tab 50 mg  50 mg Oral Daily    mirtazapine (Remeron) tab 15 mg  15 mg Oral Nightly    [Held by provider] losartan (Cozaar) tab 100 mg  100 mg Oral Daily   [2]

## 2025-05-22 NOTE — DISCHARGE SUMMARY
General Medicine Discharge Summary     Patient ID:  Reynaldo Huffman  64 year old  7/11/1960    Admit date: 5/15/2025    Discharge date and time: 5/22/25    Attending Physician: Maggy Gilbert DO     Primary Care Physician: Antonio Martinez MD     Discharge Diagnoses:   C3-C7 cervical laminoplasty   Acute hypercapnic respiratory failure  FERNANDA on CKD stage III  Hypertension  Atrial fibrillation  TIA  Hypothyroidism    Discharge Condition: stable    Disposition: home    Hospital Course:    Mr. Huffman is a 63 yo M with PMH of Afib on Eliquis, TIA, HTN, HL, who presented for cervical surgery.      C3-C7 cervical laminoplasty  -POD # 7  -orthopedic surgery following  -resumed eliquis 5/19   -continue PT/OT, at discharge he will be doing this outpatient      Acute hypercapnic respiratory failure  -altered AM of 5/20, required transfer to ICU for NIPPV  -ABG done 5/20. pH 7.11 CO2 69  -started NIPPV   -non compliance with avaps at night   -decreased oxycodone dosing and stopped 10mg tabs, now with 5mg tabs   -lasix IV given last two days now on oral home regimen      FERNANDA on CKD3  Hyperkalemia  -Cr improving   -ok to resume home torsemide      HTN  - home meds:   -can resume ARB and metoprolol at discharge  -resume torsemide   -will not need midodrine at discharge     Afib with SVR  - resume eliquis 5/19  - resume metoprolol   - hold digoxin now and going forward per cardiology (discontinued at discharge)     Hx TIA  - November 2024, had dysarthria at that time  - eliquis     Hypothyroidism  - synthroid 50 mcg daily       Consults:   IP CONSULT TO RESPIRATORY CARE  IP CONSULT TO HOSPITALIST  CONSULT TO WOUND OSTOMY  IP CONSULT TO SOCIAL WORK  IP CONSULT TO PULMONOLOGY  IP CONSULT TO CARDIOLOGY  IP CONSULT TO NEPHROLOGY    Operative Procedures:   Procedure(s) (LRB):  C3-7 cervical laminoplasty (N/A)       Patient instructions:      Discharge medications reconciled with  current medication list     Current Discharge Medication List        CONTINUE these medications which have NOT CHANGED    Details   valsartan 80 MG Oral Tab Take 1 tablet (80 mg total) by mouth daily.      gabapentin 100 MG Oral Cap Take 3 capsules (300 mg total) by mouth 2 (two) times daily.      metoprolol succinate ER 50 MG Oral Tablet 24 Hr Take 1 tablet (50 mg total) by mouth daily.      mirtazapine 15 MG Oral Tab Take 1 tablet (15 mg total) by mouth nightly.      buPROPion  MG Oral Tablet 12 Hr Take 1 tablet (150 mg total) by mouth daily.      torsemide 10 MG Oral Tab Take 1 tablet (10 mg total) by mouth daily.      Omega-3 1000 MG Oral Cap Take 2,400 mg by mouth daily.      Fenofibrate 160 MG Oral Tab Take 1 tablet (160 mg total) by mouth daily.      Multiple Vitamin (ONE-DAILY MULTI VITAMINS) Oral Tab Take 1 tablet by mouth daily.      allopurinol 300 MG Oral Tab Take 1 tablet (300 mg total) by mouth in the morning.      atorvastatin 10 MG Oral Tab Take 1 tablet (10 mg total) by mouth nightly.      ELIQUIS 5 MG Oral Tab Take 1 tablet (5 mg total) by mouth in the morning and 1 tablet (5 mg total) before bedtime.      Levothyroxine Sodium 50 MCG Oral Tab Take 2 tablets (100 mcg total) by mouth before breakfast.      Sildenafil Citrate 100 MG Oral Tab Take 1 tablet (100 mg total) by mouth daily as needed.      Ferrous Sulfate 325 (65 Fe) MG Oral Tab Take 1 tablet (325 mg total) by mouth in the morning.      acetaminophen 500 MG Oral Tab Take 1-2 tablets (500-1,000 mg total) by mouth every 6 (six) hours as needed for Pain.           STOP taking these medications       enoxaparin 150 MG/ML Injection Solution Prefilled Syringe        digoxin 0.25 MG Oral Tab            Code Status: Full Code      Exam on day of discharge:     Vitals:    05/22/25 1200   BP: 103/63   Pulse: 77   Resp: 17   Temp: 96.8 °F (36 °C)       General: no acute distress  Heart: RRR  Lungs: clear bilaterally  Abdomen:  nontender  Extremities: no pedal edema     Total time coordinating care for discharge: Greater than 30 minutes    Maggy Gilbert DO

## 2025-05-22 NOTE — PLAN OF CARE
Problem: Patient Centered Care  Goal: Patient preferences are identified and integrated in the patient's plan of care  Description: Interventions:  - What would you like us to know as we care for you?   - Provide timely, complete, and accurate information to patient/family  - Incorporate patient and family knowledge, values, beliefs, and cultural backgrounds into the planning and delivery of care  - Encourage patient/family to participate in care and decision-making at the level they choose  - Honor patient and family perspectives and choices  Outcome: Progressing     Problem: Patient/Family Goals  Goal: Patient/Family Long Term Goal  Description: Patient's Long Term Goal:     Interventions:  - See additional Care Plan goals for specific interventions  Outcome: Progressing  Goal: Patient/Family Short Term Goal  Description: Patient's Short Term Goal:     Interventions:   - See additional Care Plan goals for specific interventions  Outcome: Progressing     Problem: PAIN - ADULT  Goal: Verbalizes/displays adequate comfort level or patient's stated pain goal  Description: INTERVENTIONS:  - Encourage pt to monitor pain and request assistance  - Assess pain using appropriate pain scale  - Administer analgesics based on type and severity of pain and evaluate response  - Implement non-pharmacological measures as appropriate and evaluate response  - Consider cultural and social influences on pain and pain management  - Manage/alleviate anxiety  - Utilize distraction and/or relaxation techniques  - Monitor for opioid side effects  - Notify MD/LIP if interventions unsuccessful or patient reports new pain  - Anticipate increased pain with activity and pre-medicate as appropriate  Outcome: Progressing     Problem: RISK FOR INFECTION - ADULT  Goal: Absence of fever/infection during anticipated neutropenic period  Description: INTERVENTIONS  - Monitor WBC  - Administer growth factors as ordered  - Implement neutropenic  guidelines  Outcome: Progressing     Problem: SAFETY ADULT - FALL  Goal: Free from fall injury  Description: INTERVENTIONS:  - Assess pt frequently for physical needs  - Identify cognitive and physical deficits and behaviors that affect risk of falls.  - [place] fall precautions as indicated by assessment.  - Educate pt/family on patient safety including physical limitations  - Instruct pt to call for assistance with activity based on assessment  - Modify environment to reduce risk of injury  - Provide assistive devices as appropriate  - Consider OT/PT consult to assist with strengthening/mobility  - Encourage toileting schedule  Outcome: Progressing     Problem: DISCHARGE PLANNING  Goal: Discharge to home or other facility with appropriate resources  Description: INTERVENTIONS:  - Identify barriers to discharge w/pt and caregiver  - Include patient/family/discharge partner in discharge planning  - Arrange for needed discharge resources and transportation as appropriate  - Identify discharge learning needs (meds, wound care, etc)  - Arrange for interpreters to assist at discharge as needed  - Consider post-discharge preferences of patient/family/discharge partner  - Complete POLST form as appropriate  - Assess patient's ability to be responsible for managing their own health  - Refer to Case Management Department for coordinating discharge planning if the patient needs post-hospital services based on physician/LIP order or complex needs related to functional status, cognitive ability or social support system  Outcome: Progressing     Problem: CARDIOVASCULAR - ADULT  Goal: Maintains optimal cardiac output and hemodynamic stability  Description: INTERVENTIONS:  - Monitor vital signs, rhythm, and trends  - Monitor for bleeding, hypotension and signs of decreased cardiac output  - Evaluate effectiveness of vasoactive medications to optimize hemodynamic stability  - Monitor arterial and/or venous puncture sites for  bleeding and/or hematoma  - Assess quality of pulses, skin color and temperature  - Assess for signs of decreased coronary artery perfusion - ex. Angina  - Evaluate fluid balance, assess for edema, trend weights  Outcome: Progressing  Goal: Absence of cardiac arrhythmias or at baseline  Description: INTERVENTIONS:  - Continuous cardiac monitoring, monitor vital signs, obtain 12 lead EKG if indicated  - Evaluate effectiveness of antiarrhythmic and heart rate control medications as ordered  - Initiate emergency measures for life threatening arrhythmias  - Monitor electrolytes and administer replacement therapy as ordered  Outcome: Progressing     Problem: SKIN/TISSUE INTEGRITY - ADULT  Goal: Incision(s), wounds(s) or drain site(s) healing without S/S of infection  Description: INTERVENTIONS:  - Assess and document risk factors for pressure ulcer development  - Assess and document skin integrity  - Assess and document dressing/incision, wound bed, drain sites and surrounding tissue  - Implement wound care per orders  - Initiate isolation precautions as appropriate  - Initiate Pressure Ulcer prevention bundle as indicated  Outcome: Progressing     Problem: RESPIRATORY - ADULT  Goal: Achieves optimal ventilation and oxygenation  Description: INTERVENTIONS:  - Assess for changes in respiratory status  - Assess for changes in mentation and behavior  - Position to facilitate oxygenation and minimize respiratory effort  - Oxygen supplementation based on oxygen saturation or ABGs  - Provide Smoking Cessation handout, if applicable  - Encourage broncho-pulmonary hygiene including cough, deep breathe, Incentive Spirometry  - Assess the need for suctioning and perform as needed  - Assess and instruct to report SOB or any respiratory difficulty  - Respiratory Therapy support as indicated  - Manage/alleviate anxiety  - Monitor for signs/symptoms of CO2 retention  Outcome: Progressing

## 2025-05-22 NOTE — PLAN OF CARE
Patient to discharge home with scott. AVS printed and reviewed. PIV removed. Belongings gathered and sent with patient.

## 2025-05-22 NOTE — PHYSICAL THERAPY NOTE
PHYSICAL THERAPY TREATMENT NOTE - INPATIENT     Room Number: 227/227-A       Presenting Problem: cervical stenosis w/radiculopathy s/p C3-7 laminoplasty, soft collar for comfort  Co-Morbidities : a-fib on Eliquis, [id number], HTN, HLD    Problem List  Principal Problem:    Cervical spinal stenosis  Active Problems:    Paroxysmal atrial fibrillation (HCC)    Hypertension    Thyroid disease    Hyperlipidemia    Gout    Hypothyroidism due to Hashimoto's thyroiditis    LESLY (obstructive sleep apnea)    Radiculopathy of cervical spine      PHYSICAL THERAPY ASSESSMENT   Patient demonstrates fair progress this session, goals  remain in progress.      Patient is requiring supervision as a result of the following impairments: decreased endurance/aerobic capacity and impaired dynamic balance.     Patient continues to function near baseline with gait.  Next session anticipate patient to progress in distance next session but pt was SOB.  Physical Therapy will continue to follow patient for duration of hospitalization.    Patient continues to benefit from continued skilled PT services: at discharge to promote prior level of function.  Anticipate patient will return home.    PLAN DURING HOSPITALIZATION  Nursing Mobility Recommendation : 1 Assist  PT Device Recommendation: Rolling walker  PT Treatment Plan: Bed mobility, Patient education, Family education, Gait training, Transfer training  Frequency (Obs): Daily     SUBJECTIVE  \"A little short of breath\"    OBJECTIVE  Precautions: Spine (soft collar prn)    WEIGHT BEARING RESTRICTION       PAIN ASSESSMENT   Ratin  Location: neck  Management Techniques: Activity promotion, Breathing techniques, Repositioning, Other (Comment) (neck support, medicated)    BALANCE  Static Sitting: Good  Dynamic Sitting: Fair +  Static Standing: Fair -  Dynamic Standing: Fair -    ACTIVITY TOLERANCE                          O2 WALK       AM-PAC '6-Clicks' INPATIENT SHORT FORM - BASIC MOBILITY  How  much difficulty does the patient currently have...  Patient Difficulty: Turning over in bed (including adjusting bedclothes, sheets and blankets)?: A Little   Patient Difficulty: Sitting down on and standing up from a chair with arms (e.g., wheelchair, bedside commode, etc.): A Little   Patient Difficulty: Moving from lying on back to sitting on the side of the bed?: A Little   How much help from another person does the patient currently need...   Help from Another: Moving to and from a bed to a chair (including a wheelchair)?: A Little   Help from Another: Need to walk in hospital room?: A Little   Help from Another: Climbing 3-5 steps with a railing?: A Little     AM-PAC Score:  Raw Score: 18   Approx Degree of Impairment: 46.58%   Standardized Score (AM-PAC Scale): 43.63   CMS Modifier (G-Code): CK    FUNCTIONAL ABILITY STATUS  Functional Mobility/Gait Assessment  Gait Assistance: Supervision  Distance (ft): 50  Assistive Device: Rolling walker  Pattern: Shuffle  Rolling: supervision  Supine to Sit: supervision  Sit to Supine: supervision  Sit to Stand: supervision    Skilled Therapy Provided: activity pacing    The patient's Approx Degree of Impairment: 46.58% has been calculated based on documentation in the [facility] '6 clicks' Inpatient Daily Activity Short Form.  Research supports that patients with this level of impairment may benefit from HH.  Final disposition will be made by interdisciplinary medical team.    Patient End of Session: Up in chair    CURRENT GOALS   Goals to be met by: 5/25/25  Patient Goal Patient's self-stated goal is: to go home   Goal #1 Patient is able to demonstrate supine - sit EOB @ level: modified independent      Goal #1   Current Status  min A, cues for log roll   Goal #2 Patient is able to demonstrate transfers Sit to/from Stand at assistance level: modified independent with walker - rolling      Goal #2  Current Status  MOD A - max cues and needs feet blocked - recommended to pt he  increase seat height of all needed surfaces at home   Goal #3 Patient is able to ambulate 150 feet with assist device: walker - rolling at assistance level: supervision   Goal #3   Current Status Min A 50'x2 with RW, shuffle   Goal #4 Patient will negotiate one curb w/ assistive device and supervision   Goal #4   Current Status  NT   Goal #5 Patient to demonstrate independence with home activity/exercise instructions provided to patient in preparation for discharge.   Goal #5   Current Status  In progress   Goal #6     Goal #6  Current Status         Therapeutic Activity: 12 minutes  Gait: 10 minutes

## 2025-05-22 NOTE — CM/SW NOTE
05/22/25 1100   Discharge disposition   Expected discharge disposition Home-Health   Post Acute Care Provider Residential   Discharge transportation Private car     RN confirmed pt will not require Home O2 arranged.    Liaison Chelly lino/ [facility] notified of pending DC today.    Pt is cleared from SW/CM stand point.    PLAN: Home w/ Residential HHC          CAMILLA Ward, LSW [id number]

## 2025-05-22 NOTE — PROGRESS NOTES
[facility]  Cardiology Progress Note    Reynaldo Hufmfan Patient Status:  Inpatient    1960 MRN [id number]   Location [facility] 2W/SW Attending Papito Arcos MD   Hosp Day # 7 PCP Antonio Martinez MD       Impression:  Hypotension, less likely cardiogenic- warm in extremities. Improved with fluid resuscitation.  Afib, with slow ventricular response. On eliquis 5mg BID  - [id number] 5 (age, CVA, HTN, CAD of at least 50%).  CAD, non obstructive  TIA  HTN  HDL     - Hold digoxin. Can stop digoxin going forward.   - Back on home metoprolol succinate 50mg daily  - Restart home torsemide 10mg, metolazone 2.5mg weekly  - continue statin  - Continue Eliquis  - Tele  - Echo reviewed    Okay for discharge from CV standpoint. Patient should follow up with Dr. Skelton in 2-4 weeks.     Subjective:   Feels well. No CP, LE edema, orthopnea, PND.    Problem List[1]    Objective:   Temp: 96.8 °F (36 °C)  Pulse: 77  Resp: 17  BP: 103/63    Intake/Output:     Intake/Output Summary (Last 24 hours) at 2025 1536  Last data filed at 2025 0634  Gross per 24 hour   Intake --   Output 908 ml   Net -908 ml       Last 3 Weights   25 1500 (!) 343 lb 3.2 oz (155.7 kg)   25 0400 (!) 367 lb 15.2 oz (166.9 kg)   05/15/25 1014 (!) 309 lb (140.2 kg)   25 1604 300 lb (136.1 kg)   25 1316 300 lb (136.1 kg)   24 0904 297 lb (134.7 kg)       Tele: afib with PVCs    Physical Exam:    General: awake, alert, oriented x 3, no acute distress  HEENT: at/nc, perrl, eomi  Neck: No JVD, carotids 2+ no bruits.  Cardiac: normal rate and irregularly irregular , S1, S2 normal, no murmur, rub or gallop.  Lungs: Clear without wheezes, rales, rhonchi or dullness.  Normal excursions and effort.  Abdomen: Soft, non-tender, non-distended, normal bowel sounds   Extremities: Without clubbing, cyanosis or edema.  Peripheral pulses are 2+. Trace LE edema.  Neurologic: Alert and  oriented, normal affect.  Psych: normal mood and affect  Skin: Warm and dry.     Laboratory/Data:    Labs:         Recent Labs   Lab 05/17/25  1727 05/19/25  0355 05/20/25  0422 05/21/25  0327 05/22/25  0331   WBC 8.1 7.5 8.6 8.2 6.5   HGB 10.3* 9.7* 11.0* 10.3* 9.9*   .0* 108.6* 107.6* 107.5* 107.7*   .0* 174.0 218.0 205.0 205.0       Recent Labs   Lab 05/18/25  0354 05/19/25  0355 05/20/25  0422 05/21/25  1012 05/22/25  0331    139 137 143 142   K 4.3 4.0 4.3 4.1 3.7    110 107 110 107   CO2 22.0 20.0* 19.0* 24.0 26.0   BUN 31* 31* 29* 26* 27*   CREATSERUM 2.03* 2.22* 2.20* 1.97* 1.81*   CA 7.9* 8.2* 8.8 9.0 9.0   MG  --  2.1  --   --   --    PHOS  --  3.6  --   --   --    * 131* 152* 135* 114*       No results for input(s): \"ALT\", \"AST\", \"ALB\", \"AMYLASE\", \"LIPASE\", \"LDH\" in the last 168 hours.    Invalid input(s): \"ALPHOS\", \"TBIL\", \"DBIL\", \"TPROT\"    No results for input(s): \"TROP\" in the last 168 hours.    Allergies:   Allergies[2]    Medications:  Current Hospital Medications[3]    Dhiraj Sainz MD  Interventional Cardiology  05/22/25         [1]   Patient Active Problem List  Diagnosis    Paroxysmal atrial fibrillation (HCC)    Hypertension    Thyroid disease    Osteoarthritis of right knee    History of constipation    History of partial ray amputation of left great toe (HCC)    Encounter for current long-term use of anticoagulants    S/P total knee arthroplasty, right    Primary osteoarthritis of left knee    BMI 36.0-36.9,adult    Excoriation    Hyperlipidemia    Gout    Hypothyroidism due to Hashimoto's thyroiditis    Preop testing    Status post left knee replacement    Uric acid kidney stone    LESLY (obstructive sleep apnea)    Hypokalemia    Primary osteoarthritis of right shoulder    Arthritis of right glenohumeral joint    Septic joint of right shoulder region (HCC)    Pyogenic arthritis of right shoulder region, due to unspecified organism (HCC)    Cellulitis     Cervical spinal stenosis    Radiculopathy of cervical spine   [2]   Allergies  Allergen Reactions    Ancef [Cefazolin] RENAL INSUFFICIENCY     Interstitial nephritis    Vancomycin HYPOTENSION     Per patient, he tolerated last treatment with Vancomycin.    Tigecycline FACE FLUSHING    Naproxen UNKNOWN     Patient cannot take d/t kidney dse.     Clindamycin RASH    Daptomycin RASH     Tolerating course of dapto in 8/2022   [3]   Current Facility-Administered Medications   Medication Dose Route Frequency    torsemide (Demadex) tab 10 mg  10 mg Oral Daily    oxyCODONE immediate release tab 5 mg  5 mg Oral Q4H PRN    apixaban (Eliquis) tab 5 mg  5 mg Oral BID    [Held by provider] midodrine (ProAmatine) tab 5 mg  5 mg Oral TID    traMADol (Ultram) tab 50 mg  50 mg Oral Q6H PRN    acetaminophen (Tylenol Extra Strength) tab 1,000 mg  1,000 mg Oral q6h    metoclopramide (Reglan) 5 mg/mL injection 5 mg  5 mg Intravenous Q8H PRN    sennosides (Senokot) tab 17.2 mg  17.2 mg Oral Nightly    docusate sodium (Colace) cap 100 mg  100 mg Oral BID    polyethylene glycol (PEG 3350) (Miralax) 17 g oral packet 17 g  17 g Oral Daily PRN    magnesium hydroxide (Milk of Magnesia) 400 MG/5ML oral suspension 30 mL  30 mL Oral Daily PRN    bisacodyl (Dulcolax) 10 MG rectal suppository 10 mg  10 mg Rectal Daily PRN    fleet enema (Fleet) rectal enema 133 mL  1 enema Rectal Once PRN    ondansetron (Zofran) 4 MG/2ML injection 4 mg  4 mg Intravenous Q6H PRN    diphenhydrAMINE (Benadryl) cap/tab 25 mg  25 mg Oral Q4H PRN    Or    diphenhydrAMINE (Benadryl) 50 mg/mL  injection 25 mg  25 mg Intravenous Q4H PRN    benzocaine-menthol (Cepacol) lozenge 1 lozenge  1 lozenge Buccal Q15 Min PRN    tiZANidine (Zanaflex) tab 4 mg  4 mg Oral Q8H PRN    diazePAM (Valium) tab 2 mg  2 mg Oral Q6H PRN    allopurinol (Zyloprim) tab 300 mg  300 mg Oral Daily    atorvastatin (Lipitor) tab 10 mg  10 mg Oral Nightly    buPROPion SR (WELLBUTRIN SR) 12 hr tab 150 mg   150 mg Oral Daily    gabapentin (Neurontin) cap 300 mg  300 mg Oral BID    levothyroxine (Synthroid) tab 100 mcg  100 mcg Oral Before breakfast    metoprolol succinate ER (Toprol XL) 24 hr tab 50 mg  50 mg Oral Daily    mirtazapine (Remeron) tab 15 mg  15 mg Oral Nightly    [Held by provider] losartan (Cozaar) tab 100 mg  100 mg Oral Daily

## 2025-05-22 NOTE — SPIRITUAL CARE NOTE
Spiritual Care Visit Note    Patient Name: Reynaldo Huffman Date of Spiritual Care Visit: 25   : 1960 Primary Dx: Cervical spinal stenosis       Referred By: Referral From: Care Coordination    Spiritual Care Taxonomy:    Intended Effects: Build relationship of care and support    Methods: Collaborate with care team member, Offer support, Offer emotional support    Interventions: Acknowledge current situation, Explain  role, Silent prayer, Provide hospitality    Visit Type/Summary:     - Spiritual Care: Responded to a request for spiritual care and assessed patient for spiritual care needs. Consulted with RN prior to visit. Offered empathic listening and emotional support. Patient  expressed appreciation for  visit. Provided information regarding how to contact Spiritual Care and left a Spiritual Care information card. Patient is going home today and he is feeling ok. He has family support.  remains available as needed for follow up.    Spiritual Care support can be requested via an Epic consult. For urgent/immediate needs, please contact the On Call  at: [place]: ext [phone]    Massimo KLINE  Chaplain Resident  [phone]

## 2025-05-23 NOTE — PAYOR COMM NOTE
--------------  DISCHARGE REVIEW    Payor: [employer]  Subscriber #:  [id number]  Authorization Number: [id number]    Admit date: 5/15/25  Admit time:  10:01 AM  Discharge Date: 5/22/2025  7:04 PM     Admitting Physician: Papito Arcos MD  Attending Physician:  No att. providers found  Primary Care Physician: Antonio Martinez MD          Discharge Summary Notes        Discharge Summary signed by Maggy Gilbert DO at 5/22/2025  6:43 PM       Author: Maggy Gilbert DO Specialty: HOSPITALIST Author Type: Physician    Filed: 5/22/2025  6:43 PM Date of Service: 5/22/2025  5:11 PM Status: Signed    : Maggy Gilbert DO (Physician)                                                              General Medicine Discharge Summary     Patient ID:  Reynaldo Huffman  64 year old  7/11/1960    Admit date: 5/15/2025    Discharge date and time: 5/22/25    Attending Physician: Maggy Gilbert DO     Primary Care Physician: Antonio Martinez MD     Discharge Diagnoses:   C3-C7 cervical laminoplasty   Acute hypercapnic respiratory failure  FERNANDA on CKD stage III  Hypertension  Atrial fibrillation  TIA  Hypothyroidism    Discharge Condition: stable    Disposition: home    Hospital Course:    Mr. Huffman is a 65 yo M with PMH of Afib on Eliquis, TIA, HTN, HL, who presented for cervical surgery.      C3-C7 cervical laminoplasty  -POD # 7  -orthopedic surgery following  -resumed eliquis 5/19   -continue PT/OT, at discharge he will be doing this outpatient      Acute hypercapnic respiratory failure  -altered AM of 5/20, required transfer to ICU for NIPPV  -ABG done 5/20. pH 7.11 CO2 69  -started NIPPV   -non compliance with avaps at night   -decreased oxycodone dosing and stopped 10mg tabs, now with 5mg tabs   -lasix IV given last two days now on oral home regimen      FERNANDA on CKD3  Hyperkalemia  -Cr improving   -ok to resume home torsemide      HTN  - home meds:   -can resume ARB and metoprolol at discharge  -resume torsemide    -will not need midodrine at discharge     Afib with SVR  - resume eliquis 5/19  - resume metoprolol   - hold digoxin now and going forward per cardiology (discontinued at discharge)     Hx TIA  - November 2024, had dysarthria at that time  - eliquis     Hypothyroidism  - synthroid 50 mcg daily       Consults:   IP CONSULT TO RESPIRATORY CARE  IP CONSULT TO HOSPITALIST  CONSULT TO WOUND OSTOMY  IP CONSULT TO SOCIAL WORK  IP CONSULT TO PULMONOLOGY  IP CONSULT TO CARDIOLOGY  IP CONSULT TO NEPHROLOGY    Operative Procedures:   Procedure(s) (LRB):  C3-7 cervical laminoplasty (N/A)       Patient instructions:      Discharge medications reconciled with current medication list     Current Discharge Medication List        CONTINUE these medications which have NOT CHANGED    Details   valsartan 80 MG Oral Tab Take 1 tablet (80 mg total) by mouth daily.      gabapentin 100 MG Oral Cap Take 3 capsules (300 mg total) by mouth 2 (two) times daily.      metoprolol succinate ER 50 MG Oral Tablet 24 Hr Take 1 tablet (50 mg total) by mouth daily.      mirtazapine 15 MG Oral Tab Take 1 tablet (15 mg total) by mouth nightly.      buPROPion  MG Oral Tablet 12 Hr Take 1 tablet (150 mg total) by mouth daily.      torsemide 10 MG Oral Tab Take 1 tablet (10 mg total) by mouth daily.      Omega-3 1000 MG Oral Cap Take 2,400 mg by mouth daily.      Fenofibrate 160 MG Oral Tab Take 1 tablet (160 mg total) by mouth daily.      Multiple Vitamin (ONE-DAILY MULTI VITAMINS) Oral Tab Take 1 tablet by mouth daily.      allopurinol 300 MG Oral Tab Take 1 tablet (300 mg total) by mouth in the morning.      atorvastatin 10 MG Oral Tab Take 1 tablet (10 mg total) by mouth nightly.      ELIQUIS 5 MG Oral Tab Take 1 tablet (5 mg total) by mouth in the morning and 1 tablet (5 mg total) before bedtime.      Levothyroxine Sodium 50 MCG Oral Tab Take 2 tablets (100 mcg total) by mouth before breakfast.      Sildenafil Citrate 100 MG Oral Tab Take 1  tablet (100 mg total) by mouth daily as needed.      Ferrous Sulfate 325 (65 Fe) MG Oral Tab Take 1 tablet (325 mg total) by mouth in the morning.      acetaminophen 500 MG Oral Tab Take 1-2 tablets (500-1,000 mg total) by mouth every 6 (six) hours as needed for Pain.           STOP taking these medications       enoxaparin 150 MG/ML Injection Solution Prefilled Syringe        digoxin 0.25 MG Oral Tab            Code Status: Full Code      Exam on day of discharge:     Vitals:    05/22/25 1200   BP: 103/63   Pulse: 77   Resp: 17   Temp: 96.8 °F (36 °C)       General: no acute distress  Heart: RRR  Lungs: clear bilaterally  Abdomen: nontender  Extremities: no pedal edema     Total time coordinating care for discharge: Greater than 30 minutes    Maggy Gilbert DO      Electronically signed by Maggy Gilbert DO on 5/22/2025  6:43 PM         REVIEWER COMMENTS

## 2025-05-30 ENCOUNTER — E-ADVICE (OUTPATIENT)
Dept: BARIATRICS/WEIGHT MGMT | Age: 65
End: 2025-05-30

## 2025-06-02 ENCOUNTER — TELEPHONE (OUTPATIENT)
Dept: BARIATRICS/WEIGHT MGMT | Age: 65
End: 2025-06-02

## 2025-06-04 ENCOUNTER — APPOINTMENT (OUTPATIENT)
Dept: GENERAL RADIOLOGY | Age: 65
DRG: 291 | End: 2025-06-04
Attending: EMERGENCY MEDICINE

## 2025-06-04 ENCOUNTER — APPOINTMENT (OUTPATIENT)
Dept: ULTRASOUND IMAGING | Age: 65
DRG: 291 | End: 2025-06-04
Attending: INTERNAL MEDICINE

## 2025-06-04 ENCOUNTER — HOSPITAL ENCOUNTER (INPATIENT)
Age: 65
LOS: 3 days | Discharge: HOME HEALTH | DRG: 291 | End: 2025-06-07
Attending: EMERGENCY MEDICINE | Admitting: EMERGENCY MEDICINE

## 2025-06-04 DIAGNOSIS — I48.11 LONGSTANDING PERSISTENT ATRIAL FIBRILLATION  (CMD): ICD-10-CM

## 2025-06-04 DIAGNOSIS — I50.43 ACUTE ON CHRONIC COMBINED SYSTOLIC AND DIASTOLIC CONGESTIVE HEART FAILURE  (CMD): Primary | ICD-10-CM

## 2025-06-04 DIAGNOSIS — N17.9 ACUTE KIDNEY INJURY (CMD): ICD-10-CM

## 2025-06-04 DIAGNOSIS — I95.9 HYPOTENSION, UNSPECIFIED HYPOTENSION TYPE: ICD-10-CM

## 2025-06-04 DIAGNOSIS — I50.32 CHRONIC HEART FAILURE WITH PRESERVED EJECTION FRACTION  (CMD): ICD-10-CM

## 2025-06-04 DIAGNOSIS — D64.9 ANEMIA, UNSPECIFIED TYPE: ICD-10-CM

## 2025-06-04 LAB
ALBUMIN SERPL-MCNC: 2.6 G/DL (ref 3.4–5)
ALBUMIN/GLOB SERPL: 0.7 {RATIO} (ref 1–2.4)
ALP SERPL-CCNC: 60 UNITS/L (ref 45–117)
ALT SERPL-CCNC: 11 UNITS/L
ANION GAP SERPL CALC-SCNC: 10 MMOL/L (ref 7–19)
ANION GAP SERPL CALC-SCNC: 8 MMOL/L (ref 7–19)
APPEARANCE UR: CLEAR
AST SERPL-CCNC: 12 UNITS/L
BACTERIA #/AREA URNS HPF: ABNORMAL /HPF
BASOPHILS # BLD: 0 K/MCL (ref 0–0.3)
BASOPHILS NFR BLD: 0 %
BILIRUB SERPL-MCNC: 0.8 MG/DL (ref 0.2–1)
BILIRUB UR QL STRIP: NEGATIVE
BUN SERPL-MCNC: 55 MG/DL (ref 6–20)
BUN SERPL-MCNC: 56 MG/DL (ref 6–20)
BUN/CREAT SERPL: 16 (ref 7–25)
BUN/CREAT SERPL: 18 (ref 7–25)
CALCIUM SERPL-MCNC: 7.6 MG/DL (ref 8.4–10.2)
CALCIUM SERPL-MCNC: 7.9 MG/DL (ref 8.4–10.2)
CHLORIDE SERPL-SCNC: 105 MMOL/L (ref 97–110)
CHLORIDE SERPL-SCNC: 106 MMOL/L (ref 97–110)
CO2 SERPL-SCNC: 25 MMOL/L (ref 21–32)
CO2 SERPL-SCNC: 27 MMOL/L (ref 21–32)
COLOR UR: ABNORMAL
CREAT SERPL-MCNC: 3.08 MG/DL (ref 0.67–1.17)
CREAT SERPL-MCNC: 3.42 MG/DL (ref 0.67–1.17)
CREAT UR-MCNC: 45.5 MG/DL
CREAT UR-MCNC: 46.5 MG/DL
CREAT UR-MCNC: 88.3 MG/DL
DEPRECATED RDW RBC: 56.2 FL (ref 39–50)
EGFRCR SERPLBLD CKD-EPI 2021: 19 ML/MIN/{1.73_M2}
EGFRCR SERPLBLD CKD-EPI 2021: 22 ML/MIN/{1.73_M2}
EOSINOPHIL # BLD: 0.2 K/MCL (ref 0–0.5)
EOSINOPHIL NFR BLD: 3 %
ERYTHROCYTE [DISTWIDTH] IN BLOOD: 15 % (ref 11–15)
FASTING DURATION TIME PATIENT: ABNORMAL H
FASTING DURATION TIME PATIENT: ABNORMAL H
GLOBULIN SER-MCNC: 3.7 G/DL (ref 2–4)
GLUCOSE BLDC GLUCOMTR-MCNC: 166 MG/DL (ref 70–99)
GLUCOSE SERPL-MCNC: 122 MG/DL (ref 70–99)
GLUCOSE SERPL-MCNC: 142 MG/DL (ref 70–99)
GLUCOSE UR STRIP-MCNC: NEGATIVE MG/DL
HCT VFR BLD CALC: 28.5 % (ref 39–51)
HGB BLD-MCNC: 8.6 G/DL (ref 13–17)
HGB UR QL STRIP: NEGATIVE
HYALINE CASTS #/AREA URNS LPF: ABNORMAL /LPF
IMM GRANULOCYTES # BLD AUTO: 0 K/MCL (ref 0–0.2)
IMM GRANULOCYTES # BLD: 0 %
INR PPP: 1.3
KETONES UR STRIP-MCNC: NEGATIVE MG/DL
LACTATE BLDV-SCNC: 1.2 MMOL/L (ref 0–2)
LEUKOCYTE ESTERASE UR QL STRIP: ABNORMAL
LYMPHOCYTES # BLD: 1.6 K/MCL (ref 1–4)
LYMPHOCYTES NFR BLD: 21 %
MAGNESIUM SERPL-MCNC: 1.9 MG/DL (ref 1.7–2.4)
MCH RBC QN AUTO: 30.7 PG (ref 26–34)
MCHC RBC AUTO-ENTMCNC: 30.2 G/DL (ref 32–36.5)
MCV RBC AUTO: 101.8 FL (ref 78–100)
MONOCYTES # BLD: 0.8 K/MCL (ref 0.3–0.9)
MONOCYTES NFR BLD: 11 %
MUCOUS THREADS URNS QL MICRO: PRESENT
NEUTROPHILS # BLD: 4.9 K/MCL (ref 1.8–7.7)
NEUTROPHILS NFR BLD: 65 %
NITRITE UR QL STRIP: NEGATIVE
NRBC BLD MANUAL-RTO: 0 /100 WBC
NT-PROBNP SERPL-MCNC: 5820 PG/ML
PH UR STRIP: 5.5 [PH] (ref 5–7)
PHOSPHATE SERPL-MCNC: 5.6 MG/DL (ref 2.4–4.7)
PLATELET # BLD AUTO: 239 K/MCL (ref 140–450)
POTASSIUM SERPL-SCNC: 3.6 MMOL/L (ref 3.4–5.1)
POTASSIUM SERPL-SCNC: 3.9 MMOL/L (ref 3.4–5.1)
PROCALCITONIN SERPL IA-MCNC: 0.16 NG/ML
PROT SERPL-MCNC: 6.3 G/DL (ref 6.4–8.2)
PROT UR STRIP-MCNC: NEGATIVE MG/DL
PROT UR-MCNC: 13 MG/DL
PROT/CREAT UR: 280 MGPR/GCR
PROTHROMBIN TIME: 13.6 SEC (ref 9.7–11.8)
QRS-INTERVAL (MSEC): 90
QRS-INTERVAL (MSEC): 90
QT-INTERVAL (MSEC): 434
QT-INTERVAL (MSEC): 450
QTC: 448
QTC: 471
R AXIS (DEGREES): 111
R AXIS (DEGREES): 98
RAINBOW EXTRA TUBES HOLD SPECIMEN: NORMAL
RBC # BLD: 2.8 MIL/MCL (ref 4.5–5.9)
RBC #/AREA URNS HPF: ABNORMAL /HPF
REPORT TEXT: NORMAL
REPORT TEXT: NORMAL
SODIUM SERPL-SCNC: 136 MMOL/L (ref 135–145)
SODIUM SERPL-SCNC: 137 MMOL/L (ref 135–145)
SODIUM UR-SCNC: 53 MMOL/L
SODIUM UR-SCNC: 79 MMOL/L
SP GR UR STRIP: 1.01 (ref 1–1.03)
SQUAMOUS #/AREA URNS HPF: ABNORMAL /HPF
T AXIS (DEGREES): -28
T AXIS (DEGREES): 24
TROPONIN I SERPL DL<=0.01 NG/ML-MCNC: 32 NG/L
UROBILINOGEN UR STRIP-MCNC: 0.2 MG/DL
VENTRICULAR RATE EKG/MIN (BPM): 64
VENTRICULAR RATE EKG/MIN (BPM): 66
WBC # BLD: 7.6 K/MCL (ref 4.2–11)
WBC #/AREA URNS HPF: ABNORMAL /HPF

## 2025-06-04 PROCEDURE — 87086 URINE CULTURE/COLONY COUNT: CPT | Performed by: EMERGENCY MEDICINE

## 2025-06-04 PROCEDURE — 10004651 HB RX, NO CHARGE ITEM: Performed by: EMERGENCY MEDICINE

## 2025-06-04 PROCEDURE — 83880 ASSAY OF NATRIURETIC PEPTIDE: CPT | Performed by: EMERGENCY MEDICINE

## 2025-06-04 PROCEDURE — 82570 ASSAY OF URINE CREATININE: CPT | Performed by: INTERNAL MEDICINE

## 2025-06-04 PROCEDURE — 71045 X-RAY EXAM CHEST 1 VIEW: CPT

## 2025-06-04 PROCEDURE — 85610 PROTHROMBIN TIME: CPT | Performed by: EMERGENCY MEDICINE

## 2025-06-04 PROCEDURE — 96361 HYDRATE IV INFUSION ADD-ON: CPT

## 2025-06-04 PROCEDURE — 80053 COMPREHEN METABOLIC PANEL: CPT | Performed by: EMERGENCY MEDICINE

## 2025-06-04 PROCEDURE — 93005 ELECTROCARDIOGRAM TRACING: CPT | Performed by: EMERGENCY MEDICINE

## 2025-06-04 PROCEDURE — 10002807 HB RX 258: Performed by: EMERGENCY MEDICINE

## 2025-06-04 PROCEDURE — 10004651 HB RX, NO CHARGE ITEM: Performed by: STUDENT IN AN ORGANIZED HEALTH CARE EDUCATION/TRAINING PROGRAM

## 2025-06-04 PROCEDURE — 82570 ASSAY OF URINE CREATININE: CPT | Performed by: STUDENT IN AN ORGANIZED HEALTH CARE EDUCATION/TRAINING PROGRAM

## 2025-06-04 PROCEDURE — 10002801 HB RX 250 W/O HCPCS: Performed by: EMERGENCY MEDICINE

## 2025-06-04 PROCEDURE — 83735 ASSAY OF MAGNESIUM: CPT | Performed by: INTERNAL MEDICINE

## 2025-06-04 PROCEDURE — 76770 US EXAM ABDO BACK WALL COMP: CPT

## 2025-06-04 PROCEDURE — 84300 ASSAY OF URINE SODIUM: CPT | Performed by: INTERNAL MEDICINE

## 2025-06-04 PROCEDURE — 10000008 HB ROOM CHARGE ICU OR CCU

## 2025-06-04 PROCEDURE — 80048 BASIC METABOLIC PNL TOTAL CA: CPT | Performed by: INTERNAL MEDICINE

## 2025-06-04 PROCEDURE — 96374 THER/PROPH/DIAG INJ IV PUSH: CPT

## 2025-06-04 PROCEDURE — 84100 ASSAY OF PHOSPHORUS: CPT | Performed by: INTERNAL MEDICINE

## 2025-06-04 PROCEDURE — 99291 CRITICAL CARE FIRST HOUR: CPT | Performed by: EMERGENCY MEDICINE

## 2025-06-04 PROCEDURE — 36415 COLL VENOUS BLD VENIPUNCTURE: CPT | Performed by: INTERNAL MEDICINE

## 2025-06-04 PROCEDURE — 82962 GLUCOSE BLOOD TEST: CPT

## 2025-06-04 PROCEDURE — 84145 PROCALCITONIN (PCT): CPT | Performed by: INTERNAL MEDICINE

## 2025-06-04 PROCEDURE — 84156 ASSAY OF PROTEIN URINE: CPT | Performed by: INTERNAL MEDICINE

## 2025-06-04 PROCEDURE — 3E033XZ INTRODUCTION OF VASOPRESSOR INTO PERIPHERAL VEIN, PERCUTANEOUS APPROACH: ICD-10-PCS | Performed by: EMERGENCY MEDICINE

## 2025-06-04 PROCEDURE — 85025 COMPLETE CBC W/AUTO DIFF WBC: CPT | Performed by: EMERGENCY MEDICINE

## 2025-06-04 PROCEDURE — 84300 ASSAY OF URINE SODIUM: CPT | Performed by: STUDENT IN AN ORGANIZED HEALTH CARE EDUCATION/TRAINING PROGRAM

## 2025-06-04 PROCEDURE — 10002800 HB RX 250 W HCPCS: Performed by: EMERGENCY MEDICINE

## 2025-06-04 PROCEDURE — 84443 ASSAY THYROID STIM HORMONE: CPT | Performed by: STUDENT IN AN ORGANIZED HEALTH CARE EDUCATION/TRAINING PROGRAM

## 2025-06-04 PROCEDURE — 81001 URINALYSIS AUTO W/SCOPE: CPT | Performed by: EMERGENCY MEDICINE

## 2025-06-04 PROCEDURE — 84439 ASSAY OF FREE THYROXINE: CPT | Performed by: STUDENT IN AN ORGANIZED HEALTH CARE EDUCATION/TRAINING PROGRAM

## 2025-06-04 PROCEDURE — 83605 ASSAY OF LACTIC ACID: CPT | Performed by: INTERNAL MEDICINE

## 2025-06-04 PROCEDURE — 10002803 HB RX 637: Performed by: STUDENT IN AN ORGANIZED HEALTH CARE EDUCATION/TRAINING PROGRAM

## 2025-06-04 PROCEDURE — 84484 ASSAY OF TROPONIN QUANT: CPT | Performed by: EMERGENCY MEDICINE

## 2025-06-04 PROCEDURE — 93010 ELECTROCARDIOGRAM REPORT: CPT | Performed by: INTERNAL MEDICINE

## 2025-06-04 RX ORDER — POLYETHYLENE GLYCOL 3350 17 G/17G
17 POWDER, FOR SOLUTION ORAL DAILY PRN
Status: DISCONTINUED | OUTPATIENT
Start: 2025-06-04 | End: 2025-06-07 | Stop reason: HOSPADM

## 2025-06-04 RX ORDER — ATORVASTATIN CALCIUM 10 MG/1
10 TABLET, FILM COATED ORAL DAILY
Status: DISCONTINUED | OUTPATIENT
Start: 2025-06-04 | End: 2025-06-07 | Stop reason: HOSPADM

## 2025-06-04 RX ORDER — METOPROLOL SUCCINATE 25 MG/1
25 TABLET, EXTENDED RELEASE ORAL DAILY
Status: ON HOLD | COMMUNITY
End: 2025-06-07 | Stop reason: HOSPADM

## 2025-06-04 RX ORDER — TIZANIDINE 2 MG/1
2 TABLET ORAL EVERY 8 HOURS PRN
COMMUNITY

## 2025-06-04 RX ORDER — BUMETANIDE 0.25 MG/ML
4 INJECTION, SOLUTION INTRAMUSCULAR; INTRAVENOUS ONCE
Status: COMPLETED | OUTPATIENT
Start: 2025-06-04 | End: 2025-06-04

## 2025-06-04 RX ORDER — MIRTAZAPINE 30 MG/1
15 TABLET, FILM COATED ORAL NIGHTLY
Status: DISCONTINUED | OUTPATIENT
Start: 2025-06-04 | End: 2025-06-07 | Stop reason: HOSPADM

## 2025-06-04 RX ORDER — BUPROPION HYDROCHLORIDE 150 MG/1
150 TABLET ORAL DAILY
Status: DISCONTINUED | OUTPATIENT
Start: 2025-06-04 | End: 2025-06-07 | Stop reason: HOSPADM

## 2025-06-04 RX ORDER — OXYCODONE HYDROCHLORIDE 5 MG/1
5 TABLET ORAL EVERY 4 HOURS PRN
COMMUNITY

## 2025-06-04 RX ORDER — ALLOPURINOL 100 MG/1
300 TABLET ORAL DAILY
Status: DISCONTINUED | OUTPATIENT
Start: 2025-06-04 | End: 2025-06-07 | Stop reason: HOSPADM

## 2025-06-04 RX ORDER — 0.9 % SODIUM CHLORIDE 0.9 %
10 VIAL (ML) INJECTION PRN
Status: DISCONTINUED | OUTPATIENT
Start: 2025-06-04 | End: 2025-06-07 | Stop reason: HOSPADM

## 2025-06-04 RX ORDER — GABAPENTIN 300 MG/1
300 CAPSULE ORAL 2 TIMES DAILY
Status: DISCONTINUED | OUTPATIENT
Start: 2025-06-04 | End: 2025-06-04

## 2025-06-04 RX ORDER — ACETAMINOPHEN 650 MG/1
650 SUPPOSITORY RECTAL EVERY 4 HOURS PRN
Status: DISCONTINUED | OUTPATIENT
Start: 2025-06-04 | End: 2025-06-04

## 2025-06-04 RX ORDER — METOLAZONE 2.5 MG/1
2.5 TABLET ORAL
Status: DISCONTINUED | OUTPATIENT
Start: 2025-06-04 | End: 2025-06-04

## 2025-06-04 RX ORDER — ONDANSETRON 2 MG/ML
4 INJECTION INTRAMUSCULAR; INTRAVENOUS EVERY 12 HOURS PRN
Status: DISCONTINUED | OUTPATIENT
Start: 2025-06-04 | End: 2025-06-07 | Stop reason: HOSPADM

## 2025-06-04 RX ORDER — ONDANSETRON 4 MG/1
4 TABLET, ORALLY DISINTEGRATING ORAL EVERY 12 HOURS PRN
Status: DISCONTINUED | OUTPATIENT
Start: 2025-06-04 | End: 2025-06-07 | Stop reason: HOSPADM

## 2025-06-04 RX ORDER — NOREPINEPHRINE BITARTRATE 0.06 MG/ML
0-10 INJECTION, SOLUTION INTRAVENOUS CONTINUOUS
Status: DISCONTINUED | OUTPATIENT
Start: 2025-06-04 | End: 2025-06-04

## 2025-06-04 RX ORDER — GABAPENTIN 100 MG/1
200 CAPSULE ORAL EVERY 12 HOURS SCHEDULED
Status: DISCONTINUED | OUTPATIENT
Start: 2025-06-04 | End: 2025-06-07 | Stop reason: HOSPADM

## 2025-06-04 RX ORDER — ACETAMINOPHEN 325 MG/1
650 TABLET ORAL EVERY 4 HOURS PRN
Status: DISCONTINUED | OUTPATIENT
Start: 2025-06-04 | End: 2025-06-07 | Stop reason: HOSPADM

## 2025-06-04 RX ORDER — LEVOTHYROXINE SODIUM 100 UG/1
100 TABLET ORAL
Status: DISCONTINUED | OUTPATIENT
Start: 2025-06-05 | End: 2025-06-07 | Stop reason: HOSPADM

## 2025-06-04 RX ORDER — VALSARTAN 80 MG/1
80 TABLET ORAL DAILY
COMMUNITY

## 2025-06-04 RX ORDER — 0.9 % SODIUM CHLORIDE 0.9 %
2 VIAL (ML) INJECTION EVERY 12 HOURS SCHEDULED
Status: DISCONTINUED | OUTPATIENT
Start: 2025-06-04 | End: 2025-06-07 | Stop reason: HOSPADM

## 2025-06-04 RX ORDER — NOREPINEPHRINE BITARTRATE 0.06 MG/ML
0-10 INJECTION, SOLUTION INTRAVENOUS CONTINUOUS
Status: DISCONTINUED | OUTPATIENT
Start: 2025-06-04 | End: 2025-06-06

## 2025-06-04 RX ORDER — ACETAMINOPHEN 650 MG/1
650 SUPPOSITORY RECTAL EVERY 4 HOURS PRN
Status: DISCONTINUED | OUTPATIENT
Start: 2025-06-04 | End: 2025-06-07 | Stop reason: HOSPADM

## 2025-06-04 RX ORDER — TORSEMIDE 10 MG/1
10 TABLET ORAL DAILY
Status: DISCONTINUED | OUTPATIENT
Start: 2025-06-04 | End: 2025-06-07

## 2025-06-04 RX ORDER — IPRATROPIUM BROMIDE AND ALBUTEROL SULFATE 2.5; .5 MG/3ML; MG/3ML
3 SOLUTION RESPIRATORY (INHALATION)
Status: DISCONTINUED | OUTPATIENT
Start: 2025-06-04 | End: 2025-06-04

## 2025-06-04 RX ORDER — ACETAMINOPHEN 325 MG/1
650 TABLET ORAL EVERY 4 HOURS PRN
Status: DISCONTINUED | OUTPATIENT
Start: 2025-06-04 | End: 2025-06-04

## 2025-06-04 RX ADMIN — APIXABAN 5 MG: 5 TABLET, FILM COATED ORAL at 21:04

## 2025-06-04 RX ADMIN — BUPROPION HYDROCHLORIDE 150 MG: 150 TABLET, EXTENDED RELEASE ORAL at 21:30

## 2025-06-04 RX ADMIN — NOREPINEPHRINE BITARTRATE 2 MCG/MIN: 64 SOLUTION INTRAVENOUS at 11:02

## 2025-06-04 RX ADMIN — NOREPINEPHRINE BITARTRATE 2 MCG/MIN: 64 SOLUTION INTRAVENOUS at 21:08

## 2025-06-04 RX ADMIN — ALLOPURINOL 300 MG: 300 TABLET ORAL at 21:30

## 2025-06-04 RX ADMIN — SODIUM CHLORIDE 500 ML: 9 INJECTION, SOLUTION INTRAVENOUS at 10:09

## 2025-06-04 RX ADMIN — NOREPINEPHRINE BITARTRATE 5 MCG/MIN: 64 SOLUTION INTRAVENOUS at 12:39

## 2025-06-04 RX ADMIN — GABAPENTIN 200 MG: 100 CAPSULE ORAL at 21:04

## 2025-06-04 RX ADMIN — SODIUM CHLORIDE, PRESERVATIVE FREE 2 ML: 5 INJECTION INTRAVENOUS at 12:40

## 2025-06-04 RX ADMIN — BUMETANIDE 4 MG: 0.25 INJECTION INTRAMUSCULAR; INTRAVENOUS at 15:05

## 2025-06-04 RX ADMIN — MIRTAZAPINE 15 MG: 30 TABLET, FILM COATED ORAL at 21:04

## 2025-06-04 RX ADMIN — SODIUM CHLORIDE, PRESERVATIVE FREE 2 ML: 5 INJECTION INTRAVENOUS at 21:07

## 2025-06-04 RX ADMIN — SODIUM CHLORIDE, PRESERVATIVE FREE 2 ML: 5 INJECTION INTRAVENOUS at 21:06

## 2025-06-04 SDOH — ECONOMIC STABILITY: GENERAL
IN THE PAST YEAR, HAVE YOU OR ANY FAMILY MEMBERS YOU LIVE WITH BEEN UNABLE TO GET ANY OF THE FOLLOWING WHEN IT WAS REALLY NEEDED? CHECK ALL THAT APPLY.: PATIENT DECLINED

## 2025-06-04 SDOH — SOCIAL STABILITY: SOCIAL INSECURITY: HOW OFTEN DOES ANYONE, INCLUDING FAMILY AND FRIENDS, PHYSICALLY HURT YOU?: NEVER

## 2025-06-04 SDOH — HEALTH STABILITY: PHYSICAL HEALTH: DO YOU HAVE SERIOUS DIFFICULTY WALKING OR CLIMBING STAIRS?: NO

## 2025-06-04 SDOH — ECONOMIC STABILITY: FOOD INSECURITY: WITHIN THE PAST 12 MONTHS, THE FOOD YOU BOUGHT JUST DIDN'T LAST AND YOU DIDN'T HAVE MONEY TO GET MORE.: NEVER TRUE

## 2025-06-04 SDOH — ECONOMIC STABILITY: HOUSING INSECURITY: WHAT IS YOUR LIVING SITUATION TODAY?: I HAVE A STEADY PLACE TO LIVE

## 2025-06-04 SDOH — ECONOMIC STABILITY: HOUSING INSECURITY: DO YOU HAVE PROBLEMS WITH ANY OF THE FOLLOWING?: PATIENT DECLINED

## 2025-06-04 SDOH — HEALTH STABILITY: PHYSICAL HEALTH: DO YOU HAVE DIFFICULTY DRESSING OR BATHING?: NO

## 2025-06-04 SDOH — HEALTH STABILITY: GENERAL: BECAUSE OF A PHYSICAL, MENTAL, OR EMOTIONAL CONDITION, DO YOU HAVE DIFFICULTY DOING ERRANDS ALONE?: NO

## 2025-06-04 SDOH — SOCIAL STABILITY: SOCIAL INSECURITY: HOW OFTEN DOES ANYONE, INCLUDING FAMILY AND FRIENDS, SCREAM OR CURSE AT YOU?: NEVER

## 2025-06-04 SDOH — SOCIAL STABILITY: SOCIAL INSECURITY: HOW OFTEN DOES ANYONE, INCLUDING FAMILY AND FRIENDS, INSULT OR TALK DOWN TO YOU?: NEVER

## 2025-06-04 SDOH — ECONOMIC STABILITY: INCOME INSECURITY: IN THE PAST 12 MONTHS, HAS THE ELECTRIC, GAS, OIL, OR WATER COMPANY THREATENED TO SHUT OFF SERVICE IN YOUR HOME?: NO

## 2025-06-04 SDOH — ECONOMIC STABILITY: HOUSING INSECURITY: WHAT IS YOUR LIVING SITUATION TODAY?: ALONE

## 2025-06-04 SDOH — ECONOMIC STABILITY: HOUSING INSECURITY: WHAT IS YOUR LIVING SITUATION TODAY?: APARTMENT

## 2025-06-04 SDOH — SOCIAL STABILITY: SOCIAL INSECURITY: HOW OFTEN DOES ANYONE, INCLUDING FAMILY AND FRIENDS, THREATEN YOU WITH HARM?: NEVER

## 2025-06-04 SDOH — ECONOMIC STABILITY: TRANSPORTATION INSECURITY
IN THE PAST 12 MONTHS, HAS LACK OF RELIABLE TRANSPORTATION KEPT YOU FROM MEDICAL APPOINTMENTS, MEETINGS, WORK OR FROM GETTING THINGS NEEDED FOR DAILY LIVING?: PATIENT DECLINED

## 2025-06-04 SDOH — SOCIAL STABILITY: SOCIAL NETWORK: SUPPORT SYSTEMS: FAMILY MEMBERS

## 2025-06-04 ASSESSMENT — ENCOUNTER SYMPTOMS
CONSTIPATION: 0
DIARRHEA: 0
VOMITING: 0
DIZZINESS: 0
FOCAL WEAKNESS: 0
WHEEZING: 0
NAUSEA: 0
SPUTUM PRODUCTION: 0
CHILLS: 0
TINGLING: 0
WEAKNESS: 0
SPEECH CHANGE: 0
COUGH: 0
HEARTBURN: 0
HEADACHES: 0
ORTHOPNEA: 0
ABDOMINAL PAIN: 0
SHORTNESS OF BREATH: 0
FEVER: 0
SORE THROAT: 0

## 2025-06-04 ASSESSMENT — PAIN SCALES - GENERAL: PAINLEVEL_OUTOF10: 0

## 2025-06-04 ASSESSMENT — LIFESTYLE VARIABLES
HOW OFTEN DO YOU HAVE A DRINK CONTAINING ALCOHOL: NEVER
ALCOHOL_USE_STATUS: NO OR LOW RISK WITH VALIDATED TOOL
AUDIT-C TOTAL SCORE: 0
HOW OFTEN DO YOU HAVE 6 OR MORE DRINKS ON ONE OCCASION: NEVER
HOW MANY STANDARD DRINKS CONTAINING ALCOHOL DO YOU HAVE ON A TYPICAL DAY: 0,1 OR 2

## 2025-06-04 ASSESSMENT — ORIENTATION MEMORY CONCENTRATION TEST (OMCT)
WHAT YEAR IS IT NOW (MUST BE EXACT): CORRECT
OMCT SCORE: 0
REPEAT THE NAME AND ADDRESS I ASKED YOU TO REMEMBER: CORRECT
WHAT MONTH IS IT NOW: CORRECT
SAY THE MONTHS IN REVERSE ORDER STARTING WITH LAST MONTH: CORRECT
OMCT INTERPRETATION: 0-6: NO SIGNIFICANT IMPAIRMENT
COUNT BACKWARDS FROM 20 TO 1: CORRECT
WHAT TIME IS IT (NO WATCH OR CLOCK): CORRECT

## 2025-06-04 ASSESSMENT — PATIENT HEALTH QUESTIONNAIRE - PHQ9
CLINICAL INTERPRETATION OF PHQ2 SCORE: NO FURTHER SCREENING NEEDED
2. FEELING DOWN, DEPRESSED OR HOPELESS: NOT AT ALL
IS PATIENT ABLE TO COMPLETE PHQ2 OR PHQ9: YES
SUM OF ALL RESPONSES TO PHQ9 QUESTIONS 1 AND 2: 0
1. LITTLE INTEREST OR PLEASURE IN DOING THINGS: NOT AT ALL
SUM OF ALL RESPONSES TO PHQ9 QUESTIONS 1 AND 2: 0

## 2025-06-04 ASSESSMENT — COLUMBIA-SUICIDE SEVERITY RATING SCALE - C-SSRS
6. HAVE YOU EVER DONE ANYTHING, STARTED TO DO ANYTHING, OR PREPARED TO DO ANYTHING TO END YOUR LIFE?: NO
IS THE PATIENT ABLE TO COMPLETE C-SSRS: YES
2. HAVE YOU ACTUALLY HAD ANY THOUGHTS OF KILLING YOURSELF?: NO
1. WITHIN THE PAST MONTH, HAVE YOU WISHED YOU WERE DEAD OR WISHED YOU COULD GO TO SLEEP AND NOT WAKE UP?: NO

## 2025-06-04 ASSESSMENT — ACTIVITIES OF DAILY LIVING (ADL)
ADL_SCORE: 12
ADL_BEFORE_ADMISSION: INDEPENDENT
ADL_SHORT_OF_BREATH: NO
RECENT_DECLINE_ADL: NO

## 2025-06-04 ASSESSMENT — VISUAL ACUITY: OU: 1

## 2025-06-05 ENCOUNTER — APPOINTMENT (OUTPATIENT)
Dept: CARDIOLOGY | Age: 65
DRG: 291 | End: 2025-06-05
Attending: EMERGENCY MEDICINE

## 2025-06-05 LAB
ALBUMIN SERPL-MCNC: 3 G/DL (ref 3.4–5)
ALBUMIN/GLOB SERPL: 0.7 {RATIO} (ref 1–2.4)
ALP SERPL-CCNC: 71 UNITS/L (ref 45–117)
ALT SERPL-CCNC: 14 UNITS/L
ANION GAP SERPL CALC-SCNC: 8 MMOL/L (ref 7–19)
ANION GAP SERPL CALC-SCNC: 9 MMOL/L (ref 7–19)
AORTIC VALVE AREA (AVA): 1.16
AST SERPL-CCNC: 15 UNITS/L
AV STENOSIS SEVERITY TEXT: NORMAL
BACTERIA UR CULT: NORMAL
BASOPHILS # BLD: 0 K/MCL (ref 0–0.3)
BASOPHILS NFR BLD: 0 %
BILIRUB SERPL-MCNC: 0.9 MG/DL (ref 0.2–1)
BUN SERPL-MCNC: 50 MG/DL (ref 6–20)
BUN SERPL-MCNC: 54 MG/DL (ref 6–20)
BUN/CREAT SERPL: 19 (ref 7–25)
BUN/CREAT SERPL: 21 (ref 7–25)
CALCIUM SERPL-MCNC: 8.3 MG/DL (ref 8.4–10.2)
CALCIUM SERPL-MCNC: 8.4 MG/DL (ref 8.4–10.2)
CHLORIDE SERPL-SCNC: 104 MMOL/L (ref 97–110)
CHLORIDE SERPL-SCNC: 105 MMOL/L (ref 97–110)
CO2 SERPL-SCNC: 28 MMOL/L (ref 21–32)
CO2 SERPL-SCNC: 28 MMOL/L (ref 21–32)
CREAT SERPL-MCNC: 2.42 MG/DL (ref 0.67–1.17)
CREAT SERPL-MCNC: 2.82 MG/DL (ref 0.67–1.17)
DEPRECATED RDW RBC: 56.1 FL (ref 39–50)
EGFRCR SERPLBLD CKD-EPI 2021: 24 ML/MIN/{1.73_M2}
EGFRCR SERPLBLD CKD-EPI 2021: 29 ML/MIN/{1.73_M2}
EOSINOPHIL # BLD: 0.3 K/MCL (ref 0–0.5)
EOSINOPHIL NFR BLD: 3 %
ERYTHROCYTE [DISTWIDTH] IN BLOOD: 14.9 % (ref 11–15)
FASTING DURATION TIME PATIENT: ABNORMAL H
FASTING DURATION TIME PATIENT: ABNORMAL H
GLOBULIN SER-MCNC: 4.5 G/DL (ref 2–4)
GLUCOSE BLDC GLUCOMTR-MCNC: 120 MG/DL (ref 70–99)
GLUCOSE BLDC GLUCOMTR-MCNC: 93 MG/DL (ref 70–99)
GLUCOSE SERPL-MCNC: 131 MG/DL (ref 70–99)
GLUCOSE SERPL-MCNC: 153 MG/DL (ref 70–99)
HCT VFR BLD CALC: 34.7 % (ref 39–51)
HGB BLD-MCNC: 10.4 G/DL (ref 13–17)
IMM GRANULOCYTES # BLD AUTO: 0 K/MCL (ref 0–0.2)
IMM GRANULOCYTES # BLD: 1 %
LV EF 2D ECHO EST: NORMAL %
LYMPHOCYTES # BLD: 1.6 K/MCL (ref 1–4)
LYMPHOCYTES NFR BLD: 20 %
MAGNESIUM SERPL-MCNC: 1.9 MG/DL (ref 1.7–2.4)
MCH RBC QN AUTO: 30.4 PG (ref 26–34)
MCHC RBC AUTO-ENTMCNC: 30 G/DL (ref 32–36.5)
MCV RBC AUTO: 101.5 FL (ref 78–100)
MONOCYTES # BLD: 1.1 K/MCL (ref 0.3–0.9)
MONOCYTES NFR BLD: 14 %
MV PEAK A VELOCITY (MVPAV): 174
NEUTROPHILS # BLD: 4.9 K/MCL (ref 1.8–7.7)
NEUTROPHILS NFR BLD: 62 %
NRBC BLD MANUAL-RTO: 0 /100 WBC
PHOSPHATE SERPL-MCNC: 5.5 MG/DL (ref 2.4–4.7)
PLATELET # BLD AUTO: 276 K/MCL (ref 140–450)
POTASSIUM SERPL-SCNC: 3.3 MMOL/L (ref 3.4–5.1)
POTASSIUM SERPL-SCNC: 3.3 MMOL/L (ref 3.4–5.1)
POTASSIUM SERPL-SCNC: 4 MMOL/L (ref 3.4–5.1)
PROT SERPL-MCNC: 7.5 G/DL (ref 6.4–8.2)
RBC # BLD: 3.42 MIL/MCL (ref 4.5–5.9)
SODIUM SERPL-SCNC: 137 MMOL/L (ref 135–145)
SODIUM SERPL-SCNC: 139 MMOL/L (ref 135–145)
TSH SERPL-ACNC: 7.74 MCUNITS/ML (ref 0.35–5)
WBC # BLD: 7.9 K/MCL (ref 4.2–11)

## 2025-06-05 PROCEDURE — 85025 COMPLETE CBC W/AUTO DIFF WBC: CPT | Performed by: EMERGENCY MEDICINE

## 2025-06-05 PROCEDURE — 36415 COLL VENOUS BLD VENIPUNCTURE: CPT | Performed by: EMERGENCY MEDICINE

## 2025-06-05 PROCEDURE — 97166 OT EVAL MOD COMPLEX 45 MIN: CPT

## 2025-06-05 PROCEDURE — 10006031 HB ROOM CHARGE TELEMETRY

## 2025-06-05 PROCEDURE — 93306 TTE W/DOPPLER COMPLETE: CPT

## 2025-06-05 PROCEDURE — 84132 ASSAY OF SERUM POTASSIUM: CPT | Performed by: EMERGENCY MEDICINE

## 2025-06-05 PROCEDURE — 10004651 HB RX, NO CHARGE ITEM: Performed by: EMERGENCY MEDICINE

## 2025-06-05 PROCEDURE — 97535 SELF CARE MNGMENT TRAINING: CPT

## 2025-06-05 PROCEDURE — 10004651 HB RX, NO CHARGE ITEM: Performed by: STUDENT IN AN ORGANIZED HEALTH CARE EDUCATION/TRAINING PROGRAM

## 2025-06-05 PROCEDURE — 97530 THERAPEUTIC ACTIVITIES: CPT

## 2025-06-05 PROCEDURE — 84100 ASSAY OF PHOSPHORUS: CPT | Performed by: EMERGENCY MEDICINE

## 2025-06-05 PROCEDURE — 80053 COMPREHEN METABOLIC PANEL: CPT | Performed by: EMERGENCY MEDICINE

## 2025-06-05 PROCEDURE — 83735 ASSAY OF MAGNESIUM: CPT | Performed by: EMERGENCY MEDICINE

## 2025-06-05 PROCEDURE — 10002800 HB RX 250 W HCPCS: Performed by: INTERNAL MEDICINE

## 2025-06-05 PROCEDURE — 10002803 HB RX 637: Performed by: STUDENT IN AN ORGANIZED HEALTH CARE EDUCATION/TRAINING PROGRAM

## 2025-06-05 PROCEDURE — 99291 CRITICAL CARE FIRST HOUR: CPT | Performed by: HOSPITALIST

## 2025-06-05 PROCEDURE — 10002803 HB RX 637: Performed by: EMERGENCY MEDICINE

## 2025-06-05 PROCEDURE — 97162 PT EVAL MOD COMPLEX 30 MIN: CPT

## 2025-06-05 PROCEDURE — 80048 BASIC METABOLIC PNL TOTAL CA: CPT | Performed by: HOSPITALIST

## 2025-06-05 PROCEDURE — 10002800 HB RX 250 W HCPCS: Performed by: HOSPITALIST

## 2025-06-05 PROCEDURE — 10002805 HB CONTRAST AGENT: Performed by: EMERGENCY MEDICINE

## 2025-06-05 RX ORDER — POTASSIUM CHLORIDE 1500 MG/1
40 TABLET, EXTENDED RELEASE ORAL ONCE
Status: COMPLETED | OUTPATIENT
Start: 2025-06-05 | End: 2025-06-05

## 2025-06-05 RX ORDER — BUMETANIDE 0.25 MG/ML
4 INJECTION, SOLUTION INTRAMUSCULAR; INTRAVENOUS ONCE
Status: DISCONTINUED | OUTPATIENT
Start: 2025-06-05 | End: 2025-06-05

## 2025-06-05 RX ORDER — BUMETANIDE 0.25 MG/ML
4 INJECTION, SOLUTION INTRAMUSCULAR; INTRAVENOUS ONCE
Status: COMPLETED | OUTPATIENT
Start: 2025-06-05 | End: 2025-06-05

## 2025-06-05 RX ADMIN — MAGNESIUM SULFATE HEPTAHYDRATE 2 G: 40 INJECTION, SOLUTION INTRAVENOUS at 16:30

## 2025-06-05 RX ADMIN — APIXABAN 5 MG: 5 TABLET, FILM COATED ORAL at 08:35

## 2025-06-05 RX ADMIN — SODIUM CHLORIDE, PRESERVATIVE FREE 2 ML: 5 INJECTION INTRAVENOUS at 20:33

## 2025-06-05 RX ADMIN — ALLOPURINOL 300 MG: 300 TABLET ORAL at 08:35

## 2025-06-05 RX ADMIN — ATORVASTATIN CALCIUM 10 MG: 10 TABLET, FILM COATED ORAL at 08:36

## 2025-06-05 RX ADMIN — PERFLUTREN 2 ML: 6.52 INJECTION, SUSPENSION INTRAVENOUS at 10:30

## 2025-06-05 RX ADMIN — SODIUM CHLORIDE, PRESERVATIVE FREE 2 ML: 5 INJECTION INTRAVENOUS at 08:33

## 2025-06-05 RX ADMIN — LEVOTHYROXINE SODIUM 100 MCG: 0.1 TABLET ORAL at 05:08

## 2025-06-05 RX ADMIN — GABAPENTIN 200 MG: 100 CAPSULE ORAL at 08:36

## 2025-06-05 RX ADMIN — BUMETANIDE 4 MG: 0.25 INJECTION INTRAMUSCULAR; INTRAVENOUS at 13:53

## 2025-06-05 RX ADMIN — GABAPENTIN 200 MG: 100 CAPSULE ORAL at 19:00

## 2025-06-05 RX ADMIN — APIXABAN 5 MG: 5 TABLET, FILM COATED ORAL at 20:32

## 2025-06-05 RX ADMIN — POTASSIUM CHLORIDE 40 MEQ: 1500 TABLET, EXTENDED RELEASE ORAL at 05:50

## 2025-06-05 RX ADMIN — MIRTAZAPINE 15 MG: 30 TABLET, FILM COATED ORAL at 20:32

## 2025-06-05 RX ADMIN — POTASSIUM CHLORIDE 40 MEQ: 20 TABLET, EXTENDED RELEASE ORAL at 21:32

## 2025-06-05 ASSESSMENT — PAIN SCALES - GENERAL
PAINLEVEL_OUTOF10: 0

## 2025-06-05 ASSESSMENT — COGNITIVE AND FUNCTIONAL STATUS - GENERAL
HELP NEEDED DRESSING REGULAR LOWER BODY CLOTHING: A LITTLE
HELP NEEDED DRESSING REGULAR UPPER BODY CLOTHING: A LITTLE
BASIC_MOBILITY_RAW_SCORE: 17
HELP NEEDED FOR PERSONAL GROOMING: A LITTLE
HELP NEEDED FOR BATHING: A LITTLE
BASIC_MOBILITY_CONVERTED_SCORE: 39.67
DAILY_ACTIVITY_RAW_SCORE: 19
DAILY_ACTIVITY_CONVERTED_SCORE: 40.22
HELP NEEDED FOR TOILETING: A LITTLE

## 2025-06-05 ASSESSMENT — ORIENTATION MEMORY CONCENTRATION TEST (OMCT)
SAY THE MONTHS IN REVERSE ORDER STARTING WITH LAST MONTH: CORRECT
WHAT TIME IS IT (NO WATCH OR CLOCK): CORRECT
COUNT BACKWARDS FROM 20 TO 1: CORRECT
WHAT MONTH IS IT NOW: CORRECT
OMCT SCORE: 0
REPEAT THE NAME AND ADDRESS I ASKED YOU TO REMEMBER: CORRECT
WHAT YEAR IS IT NOW (MUST BE EXACT): CORRECT
OMCT INTERPRETATION: 0-6: NO SIGNIFICANT IMPAIRMENT

## 2025-06-05 ASSESSMENT — ACTIVITIES OF DAILY LIVING (ADL): HOME_MANAGEMENT_TIME_ENTRY: 15

## 2025-06-05 ASSESSMENT — PATIENT HEALTH QUESTIONNAIRE - PHQ9
CLINICAL INTERPRETATION OF PHQ2 SCORE: NO FURTHER SCREENING NEEDED
SUM OF ALL RESPONSES TO PHQ9 QUESTIONS 1 AND 2: 0
IS PATIENT ABLE TO COMPLETE PHQ2 OR PHQ9: YES

## 2025-06-05 ASSESSMENT — ENCOUNTER SYMPTOMS: PAIN SEVERITY NOW: 6

## 2025-06-06 LAB
ANION GAP SERPL CALC-SCNC: 9 MMOL/L (ref 7–19)
BUN SERPL-MCNC: 51 MG/DL (ref 6–20)
BUN/CREAT SERPL: 24 (ref 7–25)
CALCIUM SERPL-MCNC: 8.6 MG/DL (ref 8.4–10.2)
CHLORIDE SERPL-SCNC: 106 MMOL/L (ref 97–110)
CO2 SERPL-SCNC: 26 MMOL/L (ref 21–32)
CREAT SERPL-MCNC: 2.11 MG/DL (ref 0.67–1.17)
DEPRECATED RDW RBC: 54.5 FL (ref 39–50)
EGFRCR SERPLBLD CKD-EPI 2021: 34 ML/MIN/{1.73_M2}
ERYTHROCYTE [DISTWIDTH] IN BLOOD: 14.6 % (ref 11–15)
FASTING DURATION TIME PATIENT: ABNORMAL H
GLUCOSE SERPL-MCNC: 134 MG/DL (ref 70–99)
HCT VFR BLD CALC: 33.1 % (ref 39–51)
HGB BLD-MCNC: 10.2 G/DL (ref 13–17)
MCH RBC QN AUTO: 30.9 PG (ref 26–34)
MCHC RBC AUTO-ENTMCNC: 30.8 G/DL (ref 32–36.5)
MCV RBC AUTO: 100.3 FL (ref 78–100)
NRBC BLD MANUAL-RTO: 0 /100 WBC
PLATELET # BLD AUTO: 245 K/MCL (ref 140–450)
POTASSIUM SERPL-SCNC: 3.3 MMOL/L (ref 3.4–5.1)
POTASSIUM SERPL-SCNC: 3.5 MMOL/L (ref 3.4–5.1)
POTASSIUM SERPL-SCNC: 3.6 MMOL/L (ref 3.4–5.1)
RBC # BLD: 3.3 MIL/MCL (ref 4.5–5.9)
SODIUM SERPL-SCNC: 138 MMOL/L (ref 135–145)
T4 FREE SERPL-MCNC: 1 NG/DL (ref 0.8–1.5)
WBC # BLD: 7.3 K/MCL (ref 4.2–11)

## 2025-06-06 PROCEDURE — 36415 COLL VENOUS BLD VENIPUNCTURE: CPT | Performed by: STUDENT IN AN ORGANIZED HEALTH CARE EDUCATION/TRAINING PROGRAM

## 2025-06-06 PROCEDURE — 80048 BASIC METABOLIC PNL TOTAL CA: CPT | Performed by: STUDENT IN AN ORGANIZED HEALTH CARE EDUCATION/TRAINING PROGRAM

## 2025-06-06 PROCEDURE — 10004651 HB RX, NO CHARGE ITEM: Performed by: STUDENT IN AN ORGANIZED HEALTH CARE EDUCATION/TRAINING PROGRAM

## 2025-06-06 PROCEDURE — 10004651 HB RX, NO CHARGE ITEM: Performed by: EMERGENCY MEDICINE

## 2025-06-06 PROCEDURE — 84132 ASSAY OF SERUM POTASSIUM: CPT | Performed by: STUDENT IN AN ORGANIZED HEALTH CARE EDUCATION/TRAINING PROGRAM

## 2025-06-06 PROCEDURE — 85027 COMPLETE CBC AUTOMATED: CPT | Performed by: STUDENT IN AN ORGANIZED HEALTH CARE EDUCATION/TRAINING PROGRAM

## 2025-06-06 PROCEDURE — 10002803 HB RX 637: Performed by: STUDENT IN AN ORGANIZED HEALTH CARE EDUCATION/TRAINING PROGRAM

## 2025-06-06 PROCEDURE — 10006031 HB ROOM CHARGE TELEMETRY

## 2025-06-06 RX ORDER — OXYCODONE HYDROCHLORIDE 5 MG/1
5 TABLET ORAL EVERY 4 HOURS PRN
Refills: 0 | Status: DISCONTINUED | OUTPATIENT
Start: 2025-06-06 | End: 2025-06-07 | Stop reason: HOSPADM

## 2025-06-06 RX ORDER — METOPROLOL SUCCINATE 25 MG/1
25 TABLET, EXTENDED RELEASE ORAL DAILY
Status: DISCONTINUED | OUTPATIENT
Start: 2025-06-06 | End: 2025-06-07 | Stop reason: HOSPADM

## 2025-06-06 RX ORDER — TIZANIDINE 2 MG/1
2 TABLET ORAL EVERY 8 HOURS PRN
Status: DISCONTINUED | OUTPATIENT
Start: 2025-06-06 | End: 2025-06-07 | Stop reason: HOSPADM

## 2025-06-06 RX ORDER — VALSARTAN 160 MG/1
80 TABLET ORAL DAILY
Status: DISCONTINUED | OUTPATIENT
Start: 2025-06-06 | End: 2025-06-07 | Stop reason: HOSPADM

## 2025-06-06 RX ORDER — POTASSIUM CHLORIDE 1500 MG/1
40 TABLET, EXTENDED RELEASE ORAL ONCE
Status: COMPLETED | OUTPATIENT
Start: 2025-06-06 | End: 2025-06-06

## 2025-06-06 RX ADMIN — POTASSIUM CHLORIDE EXTENDED-RELEASE 40 MEQ: 1500 TABLET ORAL at 05:57

## 2025-06-06 RX ADMIN — TIZANIDINE 2 MG: 2 TABLET ORAL at 11:25

## 2025-06-06 RX ADMIN — GABAPENTIN 200 MG: 100 CAPSULE ORAL at 10:00

## 2025-06-06 RX ADMIN — MIRTAZAPINE 15 MG: 30 TABLET, FILM COATED ORAL at 20:50

## 2025-06-06 RX ADMIN — SODIUM CHLORIDE, PRESERVATIVE FREE 2 ML: 5 INJECTION INTRAVENOUS at 10:00

## 2025-06-06 RX ADMIN — SODIUM CHLORIDE, PRESERVATIVE FREE 2 ML: 5 INJECTION INTRAVENOUS at 20:50

## 2025-06-06 RX ADMIN — SODIUM CHLORIDE, PRESERVATIVE FREE 2 ML: 5 INJECTION INTRAVENOUS at 20:52

## 2025-06-06 RX ADMIN — APIXABAN 5 MG: 5 TABLET, FILM COATED ORAL at 10:00

## 2025-06-06 RX ADMIN — POTASSIUM CHLORIDE EXTENDED-RELEASE 40 MEQ: 1500 TABLET ORAL at 15:00

## 2025-06-06 RX ADMIN — LEVOTHYROXINE SODIUM 100 MCG: 0.1 TABLET ORAL at 05:57

## 2025-06-06 RX ADMIN — BUPROPION HYDROCHLORIDE 150 MG: 150 TABLET, EXTENDED RELEASE ORAL at 10:00

## 2025-06-06 RX ADMIN — OXYCODONE HYDROCHLORIDE 5 MG: 5 TABLET ORAL at 20:51

## 2025-06-06 RX ADMIN — OXYCODONE HYDROCHLORIDE 5 MG: 5 TABLET ORAL at 16:57

## 2025-06-06 RX ADMIN — OXYCODONE HYDROCHLORIDE 5 MG: 5 TABLET ORAL at 11:23

## 2025-06-06 RX ADMIN — ALLOPURINOL 300 MG: 300 TABLET ORAL at 10:00

## 2025-06-06 RX ADMIN — GABAPENTIN 200 MG: 100 CAPSULE ORAL at 20:50

## 2025-06-06 RX ADMIN — ATORVASTATIN CALCIUM 10 MG: 10 TABLET, FILM COATED ORAL at 10:00

## 2025-06-06 RX ADMIN — APIXABAN 5 MG: 5 TABLET, FILM COATED ORAL at 20:50

## 2025-06-06 ASSESSMENT — PAIN SCALES - GENERAL
PAINLEVEL_OUTOF10: 2
PAINLEVEL_OUTOF10: 0
PAINLEVEL_OUTOF10: 6
PAINLEVEL_OUTOF10: 6
PAINLEVEL_OUTOF10: 0
PAINLEVEL_OUTOF10: 7
PAINLEVEL_OUTOF10: 6

## 2025-06-07 VITALS
SYSTOLIC BLOOD PRESSURE: 150 MMHG | HEART RATE: 108 BPM | WEIGHT: 315 LBS | BODY MASS INDEX: 39.17 KG/M2 | HEIGHT: 75 IN | TEMPERATURE: 99.1 F | RESPIRATION RATE: 18 BRPM | DIASTOLIC BLOOD PRESSURE: 93 MMHG | OXYGEN SATURATION: 93 %

## 2025-06-07 LAB
ANION GAP SERPL CALC-SCNC: 8 MMOL/L (ref 7–19)
BUN SERPL-MCNC: 39 MG/DL (ref 6–20)
BUN/CREAT SERPL: 23 (ref 7–25)
CALCIUM SERPL-MCNC: 8.6 MG/DL (ref 8.4–10.2)
CHLORIDE SERPL-SCNC: 108 MMOL/L (ref 97–110)
CO2 SERPL-SCNC: 27 MMOL/L (ref 21–32)
CREAT SERPL-MCNC: 1.73 MG/DL (ref 0.67–1.17)
DEPRECATED RDW RBC: 53.1 FL (ref 39–50)
EGFRCR SERPLBLD CKD-EPI 2021: 43 ML/MIN/{1.73_M2}
ERYTHROCYTE [DISTWIDTH] IN BLOOD: 14.5 % (ref 11–15)
FASTING DURATION TIME PATIENT: ABNORMAL H
GLUCOSE SERPL-MCNC: 119 MG/DL (ref 70–99)
HCT VFR BLD CALC: 34.2 % (ref 39–51)
HGB BLD-MCNC: 10.4 G/DL (ref 13–17)
MCH RBC QN AUTO: 30.7 PG (ref 26–34)
MCHC RBC AUTO-ENTMCNC: 30.4 G/DL (ref 32–36.5)
MCV RBC AUTO: 100.9 FL (ref 78–100)
NRBC BLD MANUAL-RTO: 0 /100 WBC
PLATELET # BLD AUTO: 243 K/MCL (ref 140–450)
POTASSIUM SERPL-SCNC: 3.4 MMOL/L (ref 3.4–5.1)
POTASSIUM SERPL-SCNC: 4.3 MMOL/L (ref 3.4–5.1)
RBC # BLD: 3.39 MIL/MCL (ref 4.5–5.9)
SODIUM SERPL-SCNC: 140 MMOL/L (ref 135–145)
WBC # BLD: 6.5 K/MCL (ref 4.2–11)

## 2025-06-07 PROCEDURE — 10004651 HB RX, NO CHARGE ITEM: Performed by: STUDENT IN AN ORGANIZED HEALTH CARE EDUCATION/TRAINING PROGRAM

## 2025-06-07 PROCEDURE — 84132 ASSAY OF SERUM POTASSIUM: CPT | Performed by: STUDENT IN AN ORGANIZED HEALTH CARE EDUCATION/TRAINING PROGRAM

## 2025-06-07 PROCEDURE — 10002803 HB RX 637: Performed by: STUDENT IN AN ORGANIZED HEALTH CARE EDUCATION/TRAINING PROGRAM

## 2025-06-07 PROCEDURE — 10002803 HB RX 637: Performed by: INTERNAL MEDICINE

## 2025-06-07 PROCEDURE — 80048 BASIC METABOLIC PNL TOTAL CA: CPT | Performed by: STUDENT IN AN ORGANIZED HEALTH CARE EDUCATION/TRAINING PROGRAM

## 2025-06-07 PROCEDURE — 36415 COLL VENOUS BLD VENIPUNCTURE: CPT | Performed by: STUDENT IN AN ORGANIZED HEALTH CARE EDUCATION/TRAINING PROGRAM

## 2025-06-07 PROCEDURE — 10004651 HB RX, NO CHARGE ITEM: Performed by: EMERGENCY MEDICINE

## 2025-06-07 PROCEDURE — 85027 COMPLETE CBC AUTOMATED: CPT | Performed by: STUDENT IN AN ORGANIZED HEALTH CARE EDUCATION/TRAINING PROGRAM

## 2025-06-07 RX ORDER — METOPROLOL SUCCINATE 50 MG/1
50 TABLET, EXTENDED RELEASE ORAL DAILY
Status: DISCONTINUED | OUTPATIENT
Start: 2025-06-07 | End: 2025-06-07 | Stop reason: HOSPADM

## 2025-06-07 RX ORDER — METOPROLOL SUCCINATE 50 MG/1
50 TABLET, EXTENDED RELEASE ORAL DAILY
Qty: 30 TABLET | Refills: 0 | Status: SHIPPED | OUTPATIENT
Start: 2025-06-08

## 2025-06-07 RX ORDER — TORSEMIDE 20 MG/1
20 TABLET ORAL DAILY
Qty: 30 TABLET | Refills: 0 | Status: SHIPPED | OUTPATIENT
Start: 2025-06-08

## 2025-06-07 RX ORDER — TORSEMIDE 20 MG/1
20 TABLET ORAL DAILY
Status: DISCONTINUED | OUTPATIENT
Start: 2025-06-07 | End: 2025-06-07 | Stop reason: HOSPADM

## 2025-06-07 RX ORDER — TORSEMIDE 20 MG/1
20 TABLET ORAL DAILY
Status: DISCONTINUED | OUTPATIENT
Start: 2025-06-08 | End: 2025-06-07

## 2025-06-07 RX ORDER — POTASSIUM CHLORIDE 1500 MG/1
40 TABLET, EXTENDED RELEASE ORAL ONCE
Status: COMPLETED | OUTPATIENT
Start: 2025-06-07 | End: 2025-06-07

## 2025-06-07 RX ORDER — METOPROLOL SUCCINATE 50 MG/1
50 TABLET, EXTENDED RELEASE ORAL DAILY
Qty: 30 TABLET | Refills: 0 | Status: SHIPPED | OUTPATIENT
Start: 2025-06-08 | End: 2025-06-07

## 2025-06-07 RX ORDER — TORSEMIDE 20 MG/1
20 TABLET ORAL DAILY
Qty: 30 TABLET | Refills: 0 | Status: SHIPPED | OUTPATIENT
Start: 2025-06-08 | End: 2025-06-07

## 2025-06-07 RX ADMIN — SODIUM CHLORIDE, PRESERVATIVE FREE 2 ML: 5 INJECTION INTRAVENOUS at 09:30

## 2025-06-07 RX ADMIN — OXYCODONE HYDROCHLORIDE 5 MG: 5 TABLET ORAL at 06:27

## 2025-06-07 RX ADMIN — POTASSIUM CHLORIDE EXTENDED-RELEASE 40 MEQ: 1500 TABLET ORAL at 09:25

## 2025-06-07 RX ADMIN — OXYCODONE HYDROCHLORIDE 5 MG: 5 TABLET ORAL at 01:18

## 2025-06-07 RX ADMIN — METOPROLOL SUCCINATE 50 MG: 50 TABLET, EXTENDED RELEASE ORAL at 09:30

## 2025-06-07 RX ADMIN — SODIUM CHLORIDE, PRESERVATIVE FREE 2 ML: 5 INJECTION INTRAVENOUS at 09:26

## 2025-06-07 RX ADMIN — BUPROPION HYDROCHLORIDE 150 MG: 150 TABLET, EXTENDED RELEASE ORAL at 09:25

## 2025-06-07 RX ADMIN — LEVOTHYROXINE SODIUM 100 MCG: 0.1 TABLET ORAL at 06:27

## 2025-06-07 RX ADMIN — ATORVASTATIN CALCIUM 10 MG: 10 TABLET, FILM COATED ORAL at 09:25

## 2025-06-07 RX ADMIN — ALLOPURINOL 300 MG: 300 TABLET ORAL at 09:25

## 2025-06-07 RX ADMIN — TORSEMIDE 20 MG: 20 TABLET ORAL at 09:30

## 2025-06-07 RX ADMIN — OXYCODONE HYDROCHLORIDE 5 MG: 5 TABLET ORAL at 11:18

## 2025-06-07 RX ADMIN — GABAPENTIN 200 MG: 100 CAPSULE ORAL at 09:25

## 2025-06-07 RX ADMIN — APIXABAN 5 MG: 5 TABLET, FILM COATED ORAL at 09:25

## 2025-06-07 ASSESSMENT — PAIN SCALES - GENERAL
PAINLEVEL_OUTOF10: 7
PAINLEVEL_OUTOF10: 0
PAINLEVEL_OUTOF10: 5
PAINLEVEL_OUTOF10: 0

## (undated) DEVICE — SUT COAT VCRL 2-0 18IN CT-1 ABSRB UD CR 36MM

## (undated) DEVICE — SPONGE PREMIERPRO 7X18X18

## (undated) DEVICE — T-MAX DISPOSABLE FACE MASK 8 PER BOX

## (undated) DEVICE — CONTAINER SPEC STR 4OZ GRY LID

## (undated) DEVICE — GAMMEX® PI HYBRID SIZE 8, STERILE POWDER-FREE SURGICAL GLOVE, POLYISOPRENE AND NEOPRENE BLEND: Brand: GAMMEX

## (undated) DEVICE — Device

## (undated) DEVICE — COVER STND 54X23IN MAYO REINF

## (undated) DEVICE — DRAPE SHEET LG

## (undated) DEVICE — GAUZE SPONGES,12 PLY: Brand: CURITY

## (undated) DEVICE — STANDARD HYPODERMIC NEEDLE,POLYPROPYLENE HUB: Brand: MONOJECT

## (undated) DEVICE — 60 ML SYRINGE,TOOMEY TYPE: Brand: MONOJECT

## (undated) DEVICE — PIN GUID 3MM 75MM STRL

## (undated) DEVICE — GAMMEX® NON-LATEX PI ORTHO SIZE 7.5, STERILE POLYISOPRENE POWDER-FREE SURGICAL GLOVE: Brand: GAMMEX

## (undated) DEVICE — SOLUTION  .9 1000ML BTL

## (undated) DEVICE — ANTIBACTERIAL VIOLET BRAIDED (POLYGLACTIN 910), SYNTHETIC ABSORBABLE SUTURE: Brand: COATED VICRYL

## (undated) DEVICE — INTENDED USE FOR SURGICAL MARKING ON INTACT SKIN, ALSO PROVIDES A PERMANENT METHOD OF IDENTIFYING OBJECTS IN THE OPERATING ROOM: Brand: WRITESITE® PLUS MINI PREP RESISTANT MARKER

## (undated) DEVICE — 3M™ STERI-DRAPE™ U-DRAPE 1015: Brand: STERI-DRAPE™

## (undated) DEVICE — SHOULDER STABILIZATION KIT,                                    DISPOSABLE 12 PER BOX

## (undated) DEVICE — COVER SGL STRL LGHT HNDL BLU

## (undated) DEVICE — BANDAGE ROLL,100% COTTON, 6 PLY, LARGE: Brand: KERLIX

## (undated) DEVICE — GAMMEX® PI HYBRID SIZE 6.5, STERILE POWDER-FREE SURGICAL GLOVE, POLYISOPRENE AND NEOPRENE BLEND: Brand: GAMMEX

## (undated) DEVICE — DRESSING AQUACEL AG 3.5 X 6

## (undated) DEVICE — 60 ML SYRINGE LUER-LOCK TIP: Brand: MONOJECT

## (undated) DEVICE — WRAP COOLING KNEE W/ICE PILLOW

## (undated) DEVICE — BOWL CEMENT MIX QUICK-VAC

## (undated) DEVICE — DRAPE SRG 70X60IN SPLT U IMPRV

## (undated) DEVICE — HEWSON SUTURE RETRIEVER: Brand: HEWSON SUTURE RETRIEVER

## (undated) DEVICE — SUTURE MONOCRYL 3-0 Y936H

## (undated) DEVICE — PAD THRP 16IN WRPON MU LNG STM

## (undated) DEVICE — HANDPIECE SET WITH HIGH FLOW TIP AND SUCTION TUBE: Brand: INTERPULSE

## (undated) DEVICE — TOTAL KNEE: Brand: MEDLINE INDUSTRIES, INC.

## (undated) DEVICE — 3M™ IOBAN™ 2 ANTIMICROBIAL INCISE DRAPE 6650EZ: Brand: IOBAN™ 2

## (undated) DEVICE — TOURNIQUET CUFF 34 STR

## (undated) DEVICE — COTTON UNDERCAST PADDING,REGULAR FINISH: Brand: WEBRIL

## (undated) DEVICE — TOWEL SURG OR 17X30IN BLUE

## (undated) DEVICE — 50MM MG 2.5 HEX HEAD PIN-DJ

## (undated) DEVICE — SUT MONOCRYL 3-0 PS-1 Y936H

## (undated) DEVICE — 33MM MG 2.5 HEX HEAD PIN-DJ

## (undated) DEVICE — ENCORE® LATEX ACCLAIM SIZE 7.5, STERILE LATEX POWDER-FREE SURGICAL GLOVE: Brand: ENCORE

## (undated) DEVICE — ANTIBACTERIAL UNDYED BRAIDED (POLYGLACTIN 910), SYNTHETIC ABSORBABLE SUTURE: Brand: COATED VICRYL

## (undated) DEVICE — APPLICATOR CHLORAPREP 26ML

## (undated) DEVICE — MEDI-VAC YANKAUER SUCTION HANDLE W/BULBOUS TIP: Brand: CARDINAL HEALTH

## (undated) DEVICE — 3.0MM NEURO (MATCH HEAD) SOFT TOUCH

## (undated) DEVICE — SOLUTION IRRIG 1000ML 0.9% NACL USP BTL

## (undated) DEVICE — 3M™ TEGADERM™ TRANSPARENT FILM DRESSING FRAME STYLE, 1626W, 4 IN X 4-3/4 IN (10 CM X 12 CM), 50/CT 4CT/CASE: Brand: 3M™ TEGADERM™

## (undated) DEVICE — FORCEP CUSH BAY BP DISP 7.5IN

## (undated) DEVICE — POLAR CARE CUBE COOLING UNIT

## (undated) DEVICE — GOWN SURG AERO BLUE PERF LG

## (undated) DEVICE — ENCORE® LATEX ACCLAIM SIZE 8.5, STERILE LATEX POWDER-FREE SURGICAL GLOVE: Brand: ENCORE

## (undated) DEVICE — SHOULDER: Brand: MEDLINE INDUSTRIES, INC.

## (undated) DEVICE — ADHESIVE MASTISOL 2/3ML

## (undated) DEVICE — PAD N ADH 3X4IN COT POLY SFT PERF FLM

## (undated) DEVICE — GOWN,SIRUS,FABRNF,RAGLAN,L,ST,30/CS: Brand: MEDLINE

## (undated) DEVICE — SUT MCRYL 3-0 18IN PS-2 ABSRB UD 19MM 3/8 CIR

## (undated) DEVICE — Device: Brand: STABLECUT®

## (undated) DEVICE — GAMMEX® PI HYBRID SIZE 7.5, STERILE POWDER-FREE SURGICAL GLOVE, POLYISOPRENE AND NEOPRENE BLEND: Brand: GAMMEX

## (undated) DEVICE — DERMABOND LIQUID ADHESIVE

## (undated) DEVICE — C-ARMOR C-ARM EQUIPMENT COVERS CLEAR STERILE UNIVERSAL FIT 12 PER CASE: Brand: C-ARMOR

## (undated) DEVICE — T5 HOOD WITH PEEL AWAY FACE SHIELD

## (undated) DEVICE — GAMMEX® NON-LATEX PI ORTHO SIZE 8, STERILE POLYISOPRENE POWDER-FREE SURGICAL GLOVE: Brand: GAMMEX

## (undated) DEVICE — WRAP COOLING SHLDR W/ICE PILLO

## (undated) DEVICE — SYRINGE MNJCT 35ML LF STRL LL

## (undated) DEVICE — SUT VICRYL 2-0 FS-1 J443H

## (undated) DEVICE — SUT ETHIBOND 5-0 V-40 MB46G

## (undated) DEVICE — SOL  .9 3000ML

## (undated) DEVICE — TUBING CYSTO TUR DUAL

## (undated) DEVICE — SUT VICRYL 0 CP-1 J267H

## (undated) DEVICE — SOLUTION  .9 3000ML

## (undated) DEVICE — HOOD: Brand: FLYTE

## (undated) DEVICE — PACK CDS LAMINECTOMY

## (undated) DEVICE — DRILL BIT: Brand: ESCALATE

## (undated) DEVICE — TRAY CATH FOLEY 16FR INCLUDE BARDX IC COMPLT CARE

## (undated) DEVICE — SLIM BODY SKIN STAPLER: Brand: APPOSE ULC

## (undated) DEVICE — GLOVE SUR 6.5 SENSICARE PI MIC PIP CRM PWD F

## (undated) DEVICE — MONITORING NEUROPHYSIOLOGICAL

## (undated) DEVICE — TUBING MEGADYNE SPECULUM

## (undated) DEVICE — SOL  .9 1000ML BTL

## (undated) DEVICE — BATTERY

## (undated) DEVICE — ELECTRODE ESURG 2.75IN EZ CLN

## (undated) DEVICE — SUT ETHIBOND 2 V-37 MX69G

## (undated) DEVICE — SYRINGE,TOOMEY,IRRIGATION,70CC,STERILE: Brand: MEDLINE

## (undated) DEVICE — NEEDLE HPO 18GA 1.5IN ECLPS

## (undated) DEVICE — MATTRESS PT HOVERMATT 1/2L 34W

## (undated) DEVICE — 3M™ STERI-STRIP™ REINFORCED ADHESIVE SKIN CLOSURES, R1547, 1/2 IN X 4 IN (12 MM X 100 MM), 6 STRIPS/ENVELOPE: Brand: 3M™ STERI-STRIP™

## (undated) DEVICE — MAYFIELD® DISPOSABLE ADULT SKULL PIN (STAINLESS STEEL): Brand: MAYFIELD®

## (undated) DEVICE — BANDAGE COMP PREMPRO 5YDX4IN

## (undated) DEVICE — GLOVE SUR 8.5 SENSICARE PI MIC PIP CRM PWD F

## (undated) DEVICE — CHLORAPREP 26ML APPLICATOR

## (undated) DEVICE — SUT ETHILON 3-0 FS-1 669H

## (undated) DEVICE — DRESSING AQUACEL AG SP 3.5X10

## (undated) DEVICE — DRAPE SHEET LARGE 76X55

## (undated) DEVICE — GAMMEX® NON-LATEX PI ORTHO SIZE 7, STERILE POLYISOPRENE POWDER-FREE SURGICAL GLOVE: Brand: GAMMEX

## (undated) DEVICE — 3M™ TEGADERM™ TRANSPARENT FILM DRESSING, 1626W, 4 IN X 4-3/4 IN (10 CM X 12 CM), 50 EACH/CARTON, 4 CARTON/CASE: Brand: 3M™ TEGADERM™

## (undated) DEVICE — GLOVE SUR 6.5 SENSICARE PI PIP GRN PWD F

## (undated) DEVICE — GLOVE SUR 7 SENSICARE PI MIC PIP CRM PWD F

## (undated) DEVICE — ZZ-CONVERTED-TO-524825-ADHESIVE SKIN TOP FOR WND CLSR DERMBND ADV

## (undated) DEVICE — KIT COLLECTION COPAN ESWAB 1ML

## (undated) DEVICE — DRAPE SRG 90X60IN BCK TBL CVR

## (undated) DEVICE — SUCTION CANISTER, 3000CC,SAFELINER: Brand: DEROYAL

## (undated) DEVICE — SUT PDS II 0 CT-2 Z334H

## (undated) DEVICE — SHOULDER P.A.D II: Brand: DEROYAL

## (undated) DEVICE — PREVENA PEEL & PLACE SYSTEM KIT- 13 CM: Brand: PREVENA™ PEEL & PLACE™

## (undated) DEVICE — 450 ML BOTTLE OF 0.05% CHLORHEXIDINE GLUCONATE IN 99.95% STERILE WATER FOR IRRIGATION, USP AND APPLICATOR.: Brand: IRRISEPT ANTIMICROBIAL WOUND LAVAGE

## (undated) DEVICE — TRAY SKIN PREP PVP-1

## (undated) DEVICE — GLOVE SUR 7 SENSICARE PI PIP GRN PWD F

## (undated) DEVICE — HEMOSTAT KIT SURGIFLO THROM 8ML

## (undated) NOTE — LETTER
[facility], [place]  Authorization for Invasive Procedures  1. I hereby authorize Dr. Ayla Nieto , my physician and whomever may be designated as the doctor's assistant, to perform the following operation and/or procedure: Right Shoulder Open Irrigation and Debridement with possible revision total shoulder arthroplasty on [place] [facility].    2. My physician has explained to me the nature and purpose of the operation or other procedure, possible alternative methods of treatment, the risks involved and the possibility of complications to me. I understand the probable consequences of declining the recommended procedure and the alternative methods of treatment. I acknowledge that no guarantee has been made as to the result that may be obtained. 3. I recognize that during the course of this operation or other procedure, unforeseen conditions may necessitate additional or different procedures than those listed above. I, therefore, further authorize and request that the above-named physician, his/her physician assistants, or designees perform such procedures as are, in his/her professional opinion, necessary and desirable. If I have a Do Not Attempt Resuscitation (DNAR) order in place, that status will be suspended while in the operating room, procedural suite, and during the recovery period unless otherwise explicitly stated by me (or a person authorized to consent on my behalf). The surgeon or my attending physician will determine when the applicable recovery period ends for purposes of reinstating the DNAR order. 4. Should the need arise during my operation or immediate post-operative period; I also consent to the administration of blood and/or blood products.  Further, I understand that despite careful testing and screening of blood and blood products, I may still be subject to ill effects as a result of recieving a blood transfusion an/or blood producst. The following are some, but not all, of the potential risks that can occur: fever and allergic reactions, hemolytic reactions, transmission of disease such as hepatitis, AIDS, cytomegalovirus (CMV), and flluid overload. In the event that I wish to have autologous transfusions of my own blood, or a directed donor transfusion, I will discuss this with my physician. 5. I consent to the photographing of the operations or procedures to be performed for the purposes of advancing medicine, science, and/or education, provided my identity is not revealed. If the procedure has been videotaped, the physician/surgeon will obtain the original videotape. [facility] will not be responsible for storage or maintenance of this tape. 6. I consent to the presence of a  or observer as deemed necessary by my physician or his designee. 7. Any tissues or organs removed in the operation or other procedure may be disposed of by and at the discretion of [facility].    8. I understand that the physician and his/her physician assistants may not be employees or agents of [facility], [facility], nor [facility], but are independent medical practitioners who have been permitted to use its facilities for the care and treatment of their patients. 9. Patients having a sterilization procedure: I understand that if the procedure is successful the results will be permanent and it will therefore be impossible for me to inseminate, conceive or bear children. I also understand that the procedure is intended to result in sterility, although the result has not been guaranteed. 10. I CERTIFY THAT I HAVE READ AND FULLY UNDERSTAND THE ABOVE CONSENT TO OPERATION and/or OTHER PROCEDURE. 11. I acknowledge that my physician has explained sedation/analgesia administration to me including the risks and benefits.  I consent to the administration of sedation/analgesia as may be necessary or desirable in the judgment of my physician. Signature of Patient:  ________________________________________________ Date: _________Time: _________    Responsible person in case of minor or unconscious: _____________________________Relationship: ____________     Witness Signature: ____________________________________________ Date: __________ Time: ___________    Statement of Physician  My signature below affirms that prior to the time of the procedure, I have explained to the patient and/or his legal representative, the risks and benefits involved in the proposed treatment and any reasonable alternative to the proposed treatment. I have also explained the risks and benefits involved in the refusal of the proposed treatment and have answered the patient's questions. If I have a significant financial interest in this procedure/surgery, I have disclosed this and had a discussion with my patient.     Signature of Physician:   ________________________________________Date: _________Time:_______ Patient Name: Anuj Cloud  : 1960   Printed: 2022    Medical Record #: [id number]

## (undated) NOTE — LETTER
[facility] [address], [place], [place]  [address]  INFORMED CONSENT FOR TRANSFUSION OF BLOOD OR BLOOD PRODUCTS  My physician has informed me of the nature, purpose, benefits and risks of transfusion for blood and blood components that he/she may deem necessary during my treatment or hospitalization. He/she has also discussed alternatives to receiving blood from the voluntary blood supply with me, such as self-donation (autologous) and directed donation (blood donated by family or friends to be used specifically for me). I further understand that while the [address] [place] [address] will attempt to supply any autologous or directed donor blood prior to transfusion of blood from the routine blood supply, medical circumstances may require that other or additional blood components may be required for my care. In giving consent, I acknowledge that my physician has also informed me that despite careful screening and testing in accordance with national and regional regulations, there is still a small risk of transmission of infectious agents including hepatitis, HIV-1/2, cytomegalovirus and other viruses or diseases as yet unknown for which licensed definitive screening tests do not currently exist. Additionally, my physician has informed me of the potential for transfusion reactions not related to an infectious agent. [  ]  Check here for Recurring Outpatient Transfusion Therapy (valid for 1 year) In addition to the above, my physician has informed me that I shall receive numerous transfusions over a period of time and that these can lead to other increased risks. I hereby authorize the transfusion of blood and/or blood products to me as deemed necessary and ordered by physicians participating in my care.  My physician has given me the opportunity to ask questions and any questions asked have been answered to my satisfaction  __________________________________________ ______________________________________________  (Signature of Patient)                                                            (Responsible party in case of Minor,                                                                                                 Incompetent, or unconscious Patient)  ___________________________________________       ________ ______________________________________  (Relationship to Patient)                                                       (Signature of Witness)  ______________________________________________________________________________________________   (Date)                                                                           (Time)  REFUSAL OF CONSENT FOR BLOOD TRANSFUSIONS   Sign only if Refusing   [  ] I understand refusal of blood or blood products as deemed necessary by my physician may have serious consequences to my condition to include possible death.  I hereby assume responsibility for my refusal and release the hospital, its personnel, and my physicians from any responsibility for the consequences of my refusal.    ________________________________________________________________________________  (Signature of Patient)                                                         (Responsible Party/Relationship to Patient)    ________________________________________________________________________________  (Signature of Witness)                                                       (Date/Time)     Patient Name: Jojo Ng     : 1960                 Printed: 2022      Medical Record #: [id number]                                 Page 1 of 1

## (undated) NOTE — LETTER
Date: 5/2/2025  Patient name: Reynaldo Huffman  YOB: 1960  Medical Record Number: [id number]  Primary Coverage: Payor: CELENA MCR / Plan: [employer] / Product Type: Medicare /   Secondary Coverage:   Insurance ID: [id number]  Patient Address: [address] [place] [address] [address] [place] [address]  Telephone Information:   Home Phone [phone]   Mobile [phone]         Encounter Date: 5/1/2025  Provider: Rashid Erazo MD  Diagnosis:     ICD-10-CM   1. Chronic venous hypertension (idiopathic) with ulcer and inflammation of bilateral lower extremity (HCC)  I87.333   2. Paroxysmal atrial fibrillation (HCC)  I48.0   3. History of partial ray amputation of left great toe (HCC)  Z89.412   4. Primary hypertension  I10       Progress Note:  Weekly Wound Education Note    Teaching Provided To: Patient  Training Topics: Cleasing and general instructions, Discharge instructions, Dressing  Training Method: Demonstration, Explain/Verbal  Training Response: Reinforcement needed        Notes: Patient is here for an initial consult to right lateral lower leg wound. Pt states that he has been getting small blisters on his legs that pop and turn into wounds for the past 3-4 years. He denies any pain to the wound. Provider requesting staff to open left lower leg in the computer system to monitor area. Provider recommending folded xeroform to both wounds covered with 4x4 bordered gauze dressing. Applied BLE spandagrip (E). RN to place order for compression garments. Pt to follow up on 5/13/25.        Chief Complaint   Patient presents with    Wound Care     Patient is here for an initial consult. He presents with wounds on his right lateral lower leg. He states that he has been getting small blisters on his legs that pop and turn into wounds for the past 3-4 years. He denies any pain to the wound.       HPI:     64-year-old new patient here for evaluation of wounds on the right anterior leg which started out as  small blisters and then they pop and sometimes they lead to an open wound.  He gets this on the left leg as well and he has had this off and on for the past 3 to 4 years.  He is not sure why he gets these.  He does not wear any compression.  Past medical history significant for atrial fibrillation, type 2 diabetes, obesity, hypertension, CHF.    Lab Results   Component Value Date    BUN 18 08/25/2022    CREATSERUM 1.34 (H) 08/25/2022    ALB 2.6 (L) 08/23/2022    TP 6.5 08/23/2022    A1C 4.5 10/28/2020       Medications - Current[1]    Allergies[2]         REVIEW OF SYSTEMS:     Review of Systems (ROS)  This information was obtained from the patient, chart    A comprehensive 10 point review of systems was completed.  Pertinent positives and negatives noted in the the HPI.     Past medical, surgical, family and social history updated where appropriate.    PHYSICAL EXAM:   /72   Pulse 72   Temp 97.3 °F (36.3 °C)   Resp 16   Ht 75\"   Wt 300 lb (136.1 kg)   BMI 37.50 kg/m²    Estimated body mass index is 37.5 kg/m² as calculated from the following:    Height as of this encounter: 75\".    Weight as of this encounter: 300 lb (136.1 kg).   Vital signs reviewed.Appears stated age, well groomed.        Calf  Point of Measurement - Left Calf: 38  Point of Measurement - Right Calf: 38  Left Calf from:: Heel  Calf Left cm:: 40  Right Calf from:: Heel  Right Calf cm:: 41    Ankle  Point of Measurement - Left Ankle: 13  Point of Measurement - Right Ankle: 13  Left Ankle from:: Heel  Left Ankle cm:: 24     Right Ankle from:: Heel  Right Ankle cm:: 24.5       Foot           Left Heel to Knee: 46           Right Heel to Knee: 47       Wound 05/01/25 #1 Right lateral lower leg Leg Right;Lateral (Active)   Date First Assessed/Time First Assessed: 05/01/25 1327    Wound Number (Wound Clinic Only): #1 Right lateral lower leg  Primary Wound Type: Venous Ulcer  Location: Leg  Wound Location Orientation: Right;Lateral       Assessments 5/1/2025  1:27 PM   Wound Image     Drainage Amount Unable to assess   Treatments Compression (spandagrip (E))   Wound Length (cm) 3.9 cm   Wound Width (cm) 1 cm   Wound Surface Area (cm^2) 3.06 cm^2   Wound Depth (cm) 0.1 cm   Wound Volume (cm^3) 0.204 cm^3   Margins Well-defined edges   Non-staged Wound Description Full thickness   Juliette-wound Assessment Edema;Dry;Hemosiderin staining   Wound Granulation Tissue Firm;Pink   Wound Bed Granulation (%) 80 %   Wound Bed Epithelium (%) 20 %   Wound Odor None   Tunneling? No   Undermining? No   Sinus Tracts? No       No associated orders.       Wound 05/01/25 #2 Left Lower Anterior Leg Leg Left (Active)   Date First Assessed/Time First Assessed: 05/01/25 1357    Wound Number (Wound Clinic Only): #2 Left Lower Anterior Leg  Primary Wound Type: Venous Ulcer  Location: Leg  Wound Location Orientation: Left      Assessments 5/1/2025  1:58 PM   Wound Image     Drainage Amount None   Treatments Compression (spandagrip (e))   Wound Length (cm) 0 cm   Wound Width (cm) 0 cm   Wound Surface Area (cm^2) 0 cm^2   Wound Depth (cm) 0 cm   Wound Volume (cm^3) 0 cm^3   Margins Flat and Intact   Juliette-wound Assessment Edema   Wound Bed Epithelium (%) 100 %   Wound Odor None       No associated orders.              ASSESSMENT AND PLAN:      1. Chronic venous hypertension (idiopathic) with ulcer and inflammation of bilateral lower extremity (HCC)    2. Paroxysmal atrial fibrillation (HCC)    3. 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 Primary hypertension    I had a long discussion with patient regarding the nature of his wounds and likely related to swelling of the legs or edema that is likely due to underlying venous insufficiency.  I would recommend that he see a vein specialist.  Also compression is highly recommended.  Patient does have history of congestive heart failure and compression can worsen congestive heart failure and this was discussed with  patient.  We did discuss compression wrap precautions at great length.  His wounds are very superficial and minor at this point and I would recommend using Xeroform gauze to the wounds and border gauze bandage or similar bandage.  Change on daily basis.  For now we will apply spandagrip single-layer to both legs.  He does have biphasic Doppler pulses posterior tibial and dorsalis pedis bilateral.  Blood sugar has been under control and CHF is currently stable.      Risks, benefits, and alternatives of current treatment plan discussed in detail.  Questions and concerns addressed. Red flags to RTC or ED reviewed.  Patient (or parent) agrees to plan.      Return in about 12 days (around 2025).    Also refer to patient instructions.     I spent a total of 60 minutes with this patient's care.  This time included preparing to see the patient (eg, review notes and recent diagnostics), seeing the patient, performing a medically appropriate examination and/or evaluation, counseling and educating the patient, and documenting in the record.          Rashid Erazo M.D.   [facility]   2025    Patient Instructions       PATIENT INSTRUCTIONS      FOR Reynaldo Meyer Armida , ELEAZAR 1960    DATE OF SERVICE: 2025    Apply a piece of Xeroform gauze to the wounds on both the right and left leg    Cover this with border gauze bandage or similar bandage    Change on a daily basis    Wear the spandagrip stocking on both legs all day every day and you may remove at bedtime when legs are elevated    Elevate the legs whenever possible      Control of leg swelling is very important to prevent new wounds from developing    We will order compression garments for you that you can bring to the clinic at your next visit      Follow up with Dr. Erazo       MISCELLANEOUS INSTRUCTIONS  Supplement with a daily multivitamin   Low salt diet  Intense blood sugar control - Goal Blood sugar below 180 at  all times recommended.  Increase protein intake / consider protein supplements - see below  Elevate extremities at all times when sitting / laying down.  No tobacco use     DIETARY MODIFICATIONS TO HELP WITH WOUND HEALING:          Please follow any restrictions to diet given by your other doctors     Protein: meats, beans, eggs, milk and yogurt particularly Greek yogurt), tofu, soy nuts, soy protein products     Vitamin C: citrus fruits and juices, strawberries, tomatoes, tomato juice, peppers, baked potatoes, spinach, broccoli, cauliflower, [place] sprouts, cabbage     Vitamin A: dark greens, leafy vegetables, orange or yellow vegetables, cantaloupe, fortified dairy products, liver, fortified cereals     Zinc: fortified cereals, red meats, seafood     Consider Devon by [employer] (These are essential branch chain amino acids that help with tissue building and wound healing) and take 2 packets/day. You can order online at [employer] or [employer].      Available at hospital pharmacy as well.      ADDITIONAL REMINDERS:     The treatment plan has been discussed at length with you and your provider. Follow all instructions carefully, it is very important. If you do not follow all instructions, you are at risk of your wound not healing, infection, possible loss of limb and even loss of life.  Please call the clinic at [phone] during regular business hours ( 7:30 AM - 5:30 PM) if you notice increased redness, warmth, pain or pus like drainage or start running a fever greater than 100.3.    For after hour emergencies, please call your primary physician or go to the nearest emergency room.  If your blood pressure is elevated, follow up with your primary care doctor and/or cardiologist as soon as possible.                   MISCELLANEOUS INSTRUCTIONS  Supplement with a daily multivitamin   Low salt diet  Intense blood sugar control - Goal Blood sugar below 180 at all times recommended.  Increase protein intake / consider  protein supplements - see below  Elevate extremities at all times when sitting / laying down.  No tobacco use     DIETARY MODIFICATIONS TO HELP WITH WOUND HEALING:          Please follow any restrictions to diet given by your other doctors     Protein: meats, beans, eggs, milk and yogurt particularly Greek yogurt), tofu, soy nuts, soy protein products     Vitamin C: citrus fruits and juices, strawberries, tomatoes, tomato juice, peppers, baked potatoes, spinach, broccoli, cauliflower, [place] sprouts, cabbage     Vitamin A: dark greens, leafy vegetables, orange or yellow vegetables, cantaloupe, fortified dairy products, liver, fortified cereals     Zinc: fortified cereals, red meats, seafood     Consider Devon by [employer] (These are essential branch chain amino acids that help with tissue building and wound healing) and take 2 packets/day. You can order online at [employer] or [employer]     ADDITIONAL REMINDERS:     The treatment plan has been discussed at length with you and your provider. Follow all instructions carefully, it is very important. If you do not follow all instructions, you are at risk of your wound not healing, infection, possible loss of limb and even loss of life.  Please call the clinic at [phone] during regular business hours ( 7:30 AM - 5:30 PM) if you notice increased redness, warmth, pain or pus like drainage or start running a fever greater than 100.3.    For after hour emergencies, please call your primary physician or go to the nearest emergency room.  If your blood pressure is elevated, follow up with your primary care doctor and/or cardiologist as soon as possible.     FOR LEG SWELLING,  LOWER EXTREMITY WOUNDS AND IF USING COMPRESSION:   Managing your edema:    Avoid prolonged standing in one place. It is better to have your calf muscles moving to pump fluid out of the legs.  Elevate leg(s) above the level of the heart when sitting or as much as possible.  Take you diuretics as directed  by your physician. Do not skip doses or change doses unless instructed to do so by your physician.  Decrease salt intake, follow recommended 2 grams daily.  Do not get leg(s) with compression wrap wet. If wraps are too tight as indicated by pain, numbness/tingling or discoloration of toes remove wrap completely and call the wound center @ [phone].       The treatment plan has been discussed at length between you and your provider. Follow all instructions carefully, it is very important. If you do not follow all instructions you are at risk of your wound not healing, infection, possible loss of limb and even loss of life.    COMPRESSION WRAP PATIENT CARE INSTRUCTIONS  DO NOT get compression wrap wet. When showering, use a shower boot.   Please contact wound clinic and if not able to reach, then go to emergency room if ANY of the following occur:   Tingling or numbness in the foot or toes on leg with compression wrap.  Severe pain that cannot be relieved with elevation and any medication instructed per your doctor.  A fever of 100.4°F (38°C) or higher.  Swelling, coldness, or blue-gray discoloration of the toes.  If the compression wrap feels too tight or too loose.  If the compression wrap is damaged or has rough edges that hurt.  If the compression wrap gets wet, you must cut wrap off.   Drainage from compression wrap that smells different than usual.                        [1]   Current Outpatient Medications:     Multiple Vitamin (ONE-DAILY MULTI VITAMINS) Oral Tab, Take 1 tablet by mouth daily., Disp: , Rfl:     allopurinol 300 MG Oral Tab, Take 1 tablet (300 mg total) by mouth daily., Disp: , Rfl:     Magnesium 200 MG Oral Tab, Take 2 tablets (400 mg total) by mouth daily., Disp: , Rfl:     Ferrous Sulfate 325 (65 Fe) MG Oral Tab, Take 1 tablet (325 mg total) by mouth daily., Disp: , Rfl:     digoxin 0.25 MG Oral Tab, Take 0.25 mg by mouth daily., Disp: , Rfl:     atorvastatin 10 MG Oral Tab, Take 1 tablet  (10 mg total) by mouth nightly., Disp: , Rfl:     ELIQUIS 5 MG Oral Tab, Take 1 tablet (5 mg total) by mouth in the morning and 1 tablet (5 mg total) before bedtime., Disp: , Rfl:     torsemide 20 MG Oral Tab, torsemide 20 mg tablet  take one qd if weight above 293 lbs, Disp: , Rfl:     acetaminophen 500 MG Oral Tab, Take 1-2 tablets (500-1,000 mg total) by mouth every 6 (six) hours as needed for Pain., Disp: 90 tablet, Rfl: 0    buPROPion HCl ER, XL, 300 MG Oral Tablet 24 Hr, Take 1 tablet (300 mg total) by mouth every morning., Disp: , Rfl:     Levothyroxine Sodium 50 MCG Oral Tab, Take 2 tablets (100 mcg total) by mouth before breakfast., Disp: , Rfl:     Cholecalciferol 50 MCG (2000 UT) Oral Tab, Take 50 mcg by mouth daily. (Patient not taking: Reported on 5/1/2025), Disp: , Rfl:   [2]   Allergies  Allergen Reactions    Ancef [Cefazolin] RENAL INSUFFICIENCY     Interstitial nephritis    Vancomycin HYPOTENSION     Per patient, he tolerated last treatment with Vancomycin.    Tigecycline FACE FLUSHING    Naproxen UNKNOWN     Patient cannot take d/t kidney dse.     Clindamycin RASH    Daptomycin RASH     Tolerating course of dapto in 8/2022       Wound Treatment Orders:  No orders of the defined types were placed in this encounter.      Compression Stockings ordered: Yes   Product type: Juxtalite HD  Frequency: daily        Calf  Point of Measurement - Left Calf: 38  Point of Measurement - Right Calf: 38  Left Calf from:: Heel  Calf Left cm:: 40  Right Calf from:: Heel  Right Calf cm:: 41    Ankle  Point of Measurement - Left Ankle: 13  Point of Measurement - Right Ankle: 13  Left Ankle from:: Heel  Left Ankle cm:: 24     Right Ankle from:: Heel  Right Ankle cm:: 24.5       Foot                        Heel to Knee  Left Heel to Knee: 46  Right Heel to Knee: 47    Knee to Thigh                      Notes: PLEASE SEND : CIRCAID JUXALITE HD BILATERAL LOWER LEG COMPRESSION     DO NOT SEND IF NOT COVERED BY INSURANCE: IF  THERE IS ANY OUT OF POCKET COST - CALL/CONTACT PATIENT PRIOR TO SENDING SUPPLIES    Additional wound: No

## (undated) NOTE — LETTER
Alisha Payton [address], [place], [place]  [address]  INFORMED CONSENT FOR TRANSFUSION OF BLOOD OR BLOOD PRODUCTS  My physician has informed me of the nature, purpose, benefits and risks of transfusion for blood and blood components that he/she may deem necessary during my treatment or hospitalization. He/she has also discussed alternatives to receiving blood from the voluntary blood supply with me, such as self-donation (autologous) and directed donation (blood donated by family or friends to be used specifically for me). I further understand that while the [address] [place] [address] will attempt to supply any autologous or directed donor blood prior to transfusion of blood from the routine blood supply, medical circumstances may require that other or additional blood components may be required for my care. In giving consent, I acknowledge that my physician has also informed me that despite careful screening and testing in accordance with national and regional regulations, there is still a small risk of transmission of infectious agents including hepatitis, HIV-1/2, cytomegalovirus and other viruses or diseases as yet unknown for which licensed definitive screening tests do not currently exist. Additionally, my physician has informed me of the potential for transfusion reactions not related to an infectious agent. [  ]  Check here for Recurring Outpatient Transfusion Therapy (valid for 1 year) In addition to the above, my physician has informed me that I shall receive numerous transfusions over a period of time and that these can lead to other increased risks. I hereby authorize the transfusion of blood and/or blood products to me as deemed necessary and ordered by physicians participating in my care.  My physician has given me the opportunity to ask questions and any questions asked have been answered to my satisfaction  __________________________________________ ______________________________________________  (Signature of Patient)                                                            (Responsible party in case of Minor,                                                                                                 Incompetent, or unconscious Patient)  ___________________________________________       ________ ______________________________________  (Relationship to Patient)                                                       (Signature of Witness)  ______________________________________________________________________________________________   (Date)                                                                           (Time)  REFUSAL OF CONSENT FOR BLOOD TRANSFUSIONS   Sign only if Refusing   [  ] I understand refusal of blood or blood products as deemed necessary by my physician may have serious consequences to my condition to include possible death.  I hereby assume responsibility for my refusal and release the hospital, its personnel, and my physicians from any responsibility for the consequences of my refusal.    ________________________________________________________________________________  (Signature of Patient)                                                         (Responsible Party/Relationship to Patient)    ________________________________________________________________________________  (Signature of Witness)                                                       (Date/Time)     Patient Name: Carson Mascorro     : 1960                 Printed: 2022      Medical Record #: [id number]                                 Page 1 of 1

## (undated) NOTE — LETTER
Date: 5/1/2025  Patient name: Reynaldo Huffman  YOB: 1960  Medical Record Number: [id number]  Primary Coverage: Payor: CELENA MCR / Plan: [employer]ICARE / Product Type: Medicare /   Secondary Coverage:   Insurance ID: [id number]  Patient Address: [address] [place] [address] [address] [place] [address]  Telephone Information:   Home Phone [phone]   Mobile [phone]         Encounter Date: 5/1/2025  Provider: Rashid Erazo MD  Diagnosis: No diagnosis found.    Progress Note:  Weekly Wound Education Note    Teaching Provided To: Patient  Training Topics: Cleasing and general instructions, Discharge instructions, Dressing  Training Method: Demonstration, Explain/Verbal  Training Response: Reinforcement needed        Notes: Patient is here for an initial consult to right lateral lower leg wound. Pt states that he has been getting small blisters on his legs that pop and turn into wounds for the past 3-4 years. He denies any pain to the wound. Provider requesting staff to open left lower leg in the computer system to monitor area. Provider recommending folded xeroform to both wounds covered with 4x4 bordered gauze dressing. Applied BLE spandagrip (E). RN to place order for compression garments. Pt to follow up on 5/13/25.              Compression Stockings ordered: Yes   Product type: Juxtalite HD  Frequency: daily        Calf  Point of Measurement - Left Calf: 38  Point of Measurement - Right Calf: 38  Left Calf from:: Heel  Calf Left cm:: 40  Right Calf from:: Heel  Right Calf cm:: 41    Ankle  Point of Measurement - Left Ankle: 13  Point of Measurement - Right Ankle: 13  Left Ankle from:: Heel  Left Ankle cm:: 24     Right Ankle from:: Heel  Right Ankle cm:: 24.5       Foot                        Heel to Knee  Left Heel to Knee: 46  Right Heel to Knee: 47    Knee to Thigh                      Notes: PLEASE SEND : CIRCAID JUXALITE HD BILATERAL LOWER LEG COMPRESSION     DO NOT SEND IF NOT COVERED BY  INSURANCE: IF THERE IS ANY OUT OF POCKET COST - CALL/CONTACT PATIENT PRIOR TO SENDING SUPPLIES    Additional wound: No